# Patient Record
Sex: FEMALE | Race: WHITE | NOT HISPANIC OR LATINO | Employment: OTHER | URBAN - METROPOLITAN AREA
[De-identification: names, ages, dates, MRNs, and addresses within clinical notes are randomized per-mention and may not be internally consistent; named-entity substitution may affect disease eponyms.]

---

## 2017-10-04 ENCOUNTER — APPOINTMENT (OUTPATIENT)
Dept: WOMENS IMAGING | Facility: HOSPITAL | Age: 72
End: 2017-10-04

## 2017-10-04 PROCEDURE — 77080 DXA BONE DENSITY AXIAL: CPT | Performed by: RADIOLOGY

## 2017-10-04 PROCEDURE — 77063 BREAST TOMOSYNTHESIS BI: CPT | Performed by: RADIOLOGY

## 2017-10-04 PROCEDURE — G0202 SCR MAMMO BI INCL CAD: HCPCS | Performed by: RADIOLOGY

## 2018-05-08 VITALS
HEIGHT: 65 IN | DIASTOLIC BLOOD PRESSURE: 74 MMHG | HEART RATE: 72 BPM | RESPIRATION RATE: 21 BRPM | OXYGEN SATURATION: 95 % | SYSTOLIC BLOOD PRESSURE: 122 MMHG | DIASTOLIC BLOOD PRESSURE: 67 MMHG | HEART RATE: 87 BPM | HEART RATE: 76 BPM | HEART RATE: 77 BPM | HEART RATE: 73 BPM | SYSTOLIC BLOOD PRESSURE: 141 MMHG | RESPIRATION RATE: 40 BRPM | SYSTOLIC BLOOD PRESSURE: 107 MMHG | OXYGEN SATURATION: 90 % | RESPIRATION RATE: 14 BRPM | OXYGEN SATURATION: 98 % | RESPIRATION RATE: 41 BRPM | DIASTOLIC BLOOD PRESSURE: 71 MMHG | TEMPERATURE: 97.6 F | SYSTOLIC BLOOD PRESSURE: 124 MMHG | SYSTOLIC BLOOD PRESSURE: 143 MMHG | SYSTOLIC BLOOD PRESSURE: 129 MMHG | DIASTOLIC BLOOD PRESSURE: 72 MMHG | DIASTOLIC BLOOD PRESSURE: 86 MMHG | RESPIRATION RATE: 20 BRPM | DIASTOLIC BLOOD PRESSURE: 73 MMHG | DIASTOLIC BLOOD PRESSURE: 75 MMHG | OXYGEN SATURATION: 89 % | HEART RATE: 75 BPM | DIASTOLIC BLOOD PRESSURE: 83 MMHG | OXYGEN SATURATION: 97 % | DIASTOLIC BLOOD PRESSURE: 76 MMHG | HEART RATE: 82 BPM | TEMPERATURE: 98.1 F | SYSTOLIC BLOOD PRESSURE: 105 MMHG | SYSTOLIC BLOOD PRESSURE: 144 MMHG | RESPIRATION RATE: 18 BRPM | HEART RATE: 83 BPM | SYSTOLIC BLOOD PRESSURE: 133 MMHG | HEART RATE: 78 BPM | OXYGEN SATURATION: 94 % | SYSTOLIC BLOOD PRESSURE: 137 MMHG | RESPIRATION RATE: 48 BRPM | RESPIRATION RATE: 16 BRPM | OXYGEN SATURATION: 91 % | RESPIRATION RATE: 23 BRPM | WEIGHT: 150 LBS | SYSTOLIC BLOOD PRESSURE: 119 MMHG | OXYGEN SATURATION: 100 %

## 2018-05-10 PROBLEM — Z12.11 SURVEILLANCE DUE TO PRIOR COLONIC NEOPLASIA: Status: ACTIVE | Noted: 2018-05-11

## 2018-05-11 ENCOUNTER — AMBULATORY SURGICAL CENTER (AMBULATORY)
Dept: URBAN - METROPOLITAN AREA SURGERY 17 | Facility: SURGERY | Age: 73
End: 2018-05-11

## 2018-05-11 DIAGNOSIS — Z83.71 FAMILY HISTORY OF COLONIC POLYPS: ICD-10-CM

## 2018-05-11 DIAGNOSIS — Z12.11 ENCOUNTER FOR SCREENING FOR MALIGNANT NEOPLASM OF COLON: ICD-10-CM

## 2018-05-11 DIAGNOSIS — Z80.9 FAMILY HISTORY OF MALIGNANT NEOPLASM, UNSPECIFIED: ICD-10-CM

## 2018-05-11 PROCEDURE — G0105 COLORECTAL SCRN; HI RISK IND: HCPCS | Performed by: INTERNAL MEDICINE

## 2018-05-11 RX ADMIN — PROPOFOL 100 MG: 10 INJECTION, EMULSION INTRAVENOUS at 13:03

## 2018-05-11 RX ADMIN — PROPOFOL 25 MG: 10 INJECTION, EMULSION INTRAVENOUS at 13:17

## 2018-05-11 RX ADMIN — PROPOFOL 25 MG: 10 INJECTION, EMULSION INTRAVENOUS at 13:13

## 2018-05-11 RX ADMIN — PROPOFOL 25 MG: 10 INJECTION, EMULSION INTRAVENOUS at 13:09

## 2018-05-11 RX ADMIN — LIDOCAINE HYDROCHLORIDE 25 MG: 10 INJECTION, SOLUTION EPIDURAL; INFILTRATION; INTRACAUDAL; PERINEURAL at 13:03

## 2018-10-08 ENCOUNTER — APPOINTMENT (OUTPATIENT)
Dept: WOMENS IMAGING | Facility: HOSPITAL | Age: 73
End: 2018-10-08

## 2018-10-08 PROCEDURE — 77063 BREAST TOMOSYNTHESIS BI: CPT | Performed by: RADIOLOGY

## 2018-10-08 PROCEDURE — 77067 SCR MAMMO BI INCL CAD: CPT | Performed by: RADIOLOGY

## 2019-11-01 ENCOUNTER — OFFICE VISIT (OUTPATIENT)
Dept: ORTHOPEDIC SURGERY | Facility: CLINIC | Age: 74
End: 2019-11-01

## 2019-11-01 VITALS — WEIGHT: 150 LBS | TEMPERATURE: 98.7 F | HEIGHT: 65 IN | BODY MASS INDEX: 24.99 KG/M2

## 2019-11-01 DIAGNOSIS — M51.36 DDD (DEGENERATIVE DISC DISEASE), LUMBAR: ICD-10-CM

## 2019-11-01 DIAGNOSIS — M25.552 PAIN OF LEFT HIP JOINT: Primary | ICD-10-CM

## 2019-11-01 DIAGNOSIS — M41.9 SCOLIOSIS OF LUMBAR SPINE, UNSPECIFIED SCOLIOSIS TYPE: ICD-10-CM

## 2019-11-01 DIAGNOSIS — M54.16 LEFT LUMBAR RADICULOPATHY: ICD-10-CM

## 2019-11-01 PROCEDURE — 73502 X-RAY EXAM HIP UNI 2-3 VIEWS: CPT | Performed by: ORTHOPAEDIC SURGERY

## 2019-11-01 PROCEDURE — 99214 OFFICE O/P EST MOD 30 MIN: CPT | Performed by: ORTHOPAEDIC SURGERY

## 2019-11-01 RX ORDER — FLUOXETINE HYDROCHLORIDE 20 MG/1
20 CAPSULE ORAL DAILY
COMMUNITY
End: 2021-11-15 | Stop reason: SDUPTHER

## 2019-11-01 RX ORDER — PRAVASTATIN SODIUM 20 MG
20 TABLET ORAL DAILY
COMMUNITY
End: 2022-01-10 | Stop reason: SDUPTHER

## 2019-11-01 RX ORDER — MONTELUKAST SODIUM 10 MG/1
10 TABLET ORAL NIGHTLY
COMMUNITY
End: 2021-10-21

## 2019-11-01 RX ORDER — RIZATRIPTAN BENZOATE 10 MG/1
10 TABLET ORAL ONCE AS NEEDED
COMMUNITY
End: 2022-01-10 | Stop reason: SDUPTHER

## 2019-11-01 RX ORDER — TRIAMCINOLONE ACETONIDE 55 UG/1
2 SPRAY, METERED NASAL DAILY
COMMUNITY
End: 2020-08-03

## 2019-11-01 NOTE — PROGRESS NOTES
"ePatient Name: Sonia Wooten   YOB: 1945  Referring Primary Care Physician: Jeanette Florentino APRN  BMI: Body mass index is 24.96 kg/m².    Chief Complaint:    Chief Complaint   Patient presents with   • Left Hip - Pain   • Establish Care        HPI:     Sonia Wooten is a 74 y.o. female who presents today for evaluation of   Chief Complaint   Patient presents with   • Left Hip - Pain   • Establish Care   .  Patient is seen today complaining of \"left hip pain\".  Is been going on for a few months it hurts her mainly at night and its tingling going from her lower back to her foot on the left.  Gets better with rest there is no trauma.  Had a right total hip arthroplasty in 2011 right total knee in 2013.  He tries to walk about 2 miles a day but is getting difficult    This problem is new to this examiner.     Subjective   Medications:   Home Medications:  Current Outpatient Medications on File Prior to Visit   Medication Sig   • FLUoxetine (PROzac) 20 MG capsule Take 20 mg by mouth Daily.   • montelukast (SINGULAIR) 10 MG tablet Take 10 mg by mouth Every Night.   • pravastatin (PRAVACHOL) 20 MG tablet Take 20 mg by mouth Daily.   • rizatriptan (MAXALT) 10 MG tablet Take 10 mg by mouth 1 (One) Time As Needed for Migraine. May repeat in 2 hours if needed   • Triamcinolone Acetonide (NASACORT) 55 MCG/ACT nasal inhaler 2 sprays into the nostril(s) as directed by provider Daily.     No current facility-administered medications on file prior to visit.      Current Medications:  Scheduled Meds:  Continuous Infusions:  No current facility-administered medications for this visit.   PRN Meds:.    I have reviewed the patient's medical history in detail and updated the computerized patient record.  Review and summarization of old records includes:    History reviewed. No pertinent past medical history.     Past Surgical History:   Procedure Laterality Date   • BACK SURGERY     • HIP SURGERY     • KNEE SURGERY   " "       Social History     Occupational History   • Not on file   Tobacco Use   • Smoking status: Never Smoker   • Smokeless tobacco: Current User   Substance and Sexual Activity   • Alcohol use: Not on file   • Drug use: Not on file   • Sexual activity: Not on file      Social History     Social History Narrative   • Not on file      History reviewed. No pertinent family history.    ROS: 14 point review of systems was performed and all other systems were reviewed and are negative except for documented findings in HPI and today's encounter.     Allergies:   Allergies   Allergen Reactions   • Codeine Nausea And Vomiting   • Penicillins Hives     Constitutional:  Denies fever, shaking or chills   Eyes:  Denies change in visual acuity   HENT:  Denies nasal congestion or sore throat   Respiratory:  Denies cough or shortness of breath   Cardiovascular:  Denies chest pain or severe LE edema   GI:  Denies abdominal pain, nausea, vomiting, bloody stools or diarrhea   Musculoskeletal:  Numbness, tingling, pain, or loss of motor function only as noted above in history of present illness.  : Denies painful urination or hematuria  Integument:  Denies rash, lesion or ulceration   Neurologic:  Denies headache or focal weakness  Endocrine:  Denies lymphadenopathy  Psych:  Denies confusion or change in mental status   Hem:  Denies active bleeding    OBJECTIVE:  Physical Exam: 74 y.o. female  Wt Readings from Last 3 Encounters:   11/01/19 68 kg (150 lb)     Ht Readings from Last 1 Encounters:   11/01/19 165.1 cm (65\")     Body mass index is 24.96 kg/m².  Vitals:    11/01/19 1013   Temp: 98.7 °F (37.1 °C)     Vital signs reviewed.     General Appearance:    Alert, cooperative, in no acute distress                  Eyes: conjunctiva clear  ENT: external ears and nose atraumatic  CV: no peripheral edema  Resp: normal respiratory effort  Skin: no rashes or wounds; normal turgor  Psych: mood and affect appropriate  Lymph: no nodes " appreciated  Neuro: gross sensation intact  Vascular:  Palpable peripheral pulse in noted extremity  Musculoskeletal Extremities: Exam today shows pleasant lady diffuse tenderness over SI joint and trochanter with diffuse low back tenderness Stinchfield is negative she has decreased rotation of her hips no optically tender with axial loading she is stiff in her lower back    Radiology:   AP of the hips lateral left hip show previous right total hip arthroplasty and moderate arthritic change in her left hip.  She has diffuse severe degenerative changes the visualized portion of lumbar spine.  AP lateral lumbar spine which she was sent back for today show severe degenerative changes with scoliosis especially at L4-5 L5-S1.  She did have previous surgery back many years ago.    Assessment:     ICD-10-CM ICD-9-CM   1. Pain of left hip joint M25.552 719.45   2. DDD (degenerative disc disease), lumbar M51.36 722.52   3. Scoliosis of lumbar spine, unspecified scoliosis type M41.9 737.30   4. Left lumbar radiculopathy M54.16 724.4        Procedures       Plan: Recommend physical therapy I think her symptoms are largely coming from her back at this time and she may be in fact experiencing symptoms and signs of radiculopathy along with spinal stenosis which is suspected with her history.  Her hip does not appear arthritic or symptomatic enough to justify hip replacement at this time we will have him work on therapy in her low back if she fails to get better she get an MRI of her lumbar spine to be referred to a spine doctor      11/1/2019    Much of this encounter note is an electronic transcription/translation of spoken language to printed text. The electronic translation of spoken language may permit erroneous, or at times, nonsensical words or phrases to be inadvertently transcribed; Although I have reviewed the note for such errors, some may still exist

## 2019-11-15 ENCOUNTER — HOSPITAL ENCOUNTER (OUTPATIENT)
Dept: PHYSICAL THERAPY | Facility: HOSPITAL | Age: 74
Setting detail: THERAPIES SERIES
Discharge: HOME | End: 2019-11-15

## 2019-11-15 DIAGNOSIS — R26.2 DIFFICULTY WALKING: ICD-10-CM

## 2019-11-15 DIAGNOSIS — M25.552 LEFT HIP PAIN: Primary | ICD-10-CM

## 2019-11-15 DIAGNOSIS — M79.605 CHRONIC PAIN OF LEFT LOWER EXTREMITY: ICD-10-CM

## 2019-11-15 DIAGNOSIS — G89.29 CHRONIC PAIN OF LEFT LOWER EXTREMITY: ICD-10-CM

## 2019-11-15 PROCEDURE — 97110 THERAPEUTIC EXERCISES: CPT

## 2019-11-15 PROCEDURE — 97161 PT EVAL LOW COMPLEX 20 MIN: CPT

## 2019-11-15 NOTE — THERAPY EVALUATION
"    Outpatient Physical Therapy Ortho Initial Evaluation  [facility]     Patient Name: Sonia Wooten  : 1945  MRN: [id number]  Today's Date: 11/15/2019      Visit Date: 11/15/2019    There is no problem list on file for this patient.       History reviewed. No pertinent past medical history.     Past Surgical History:   Procedure Laterality Date   • BACK SURGERY     • HIP SURGERY     • KNEE SURGERY         Visit Dx:     ICD-10-CM ICD-9-CM   1. Left hip pain M25.552 719.45   2. Chronic pain of left lower extremity M79.605 729.5    G89.29 338.29   3. Difficulty walking R26.2 719.7         Patient History     Row Name 11/15/19 1300             History    Chief Complaint  Pain  -LB      Type of Pain  Hip pain;Lower Extremity / Leg  -LB      Date Current Problem(s) Began  19  -LB      Brief Description of Current Complaint  Pt reports L hip pain that began 6 months ago of insidious onset and has worsened since that time. Hx of lumbar surgery in  where they \"cleaned out a disc\", R PRANAV, L TKA. She does report episode on R side 30 years ago that led to back surgery. She used to walk >2 miles without pain. Now she is unable to walk without pain. She has difficulty sleeping, unable to find comfortable position. She does chair exercises 2x/week and does pretty well with that.  She describes the pain as generally an instense aching that makes it uncomfortable to sit on L hip, occasionally shooting pains medial upper leg and lateral lower leg, tingling that comes and goes throughout entire LLE.   -LB      Previous treatment for THIS PROBLEM  Surgery  on lumbar spine  -LB      Patient/Caregiver Goals  Relieve pain;Return to prior level of function;Know what to do to help the symptoms  -LB      Hand Dominance  right-handed  -LB      Occupation/sports/leisure activities  Pt enjoys gardening, walking for exercise.  -LB      Patient seeing anyone else for problem(s)?  yes  -LB      How has patient tried to " help current problem?  rest, stopping walking  -LB      What clinical tests have you had for this problem?  X-ray  -LB      Results of Clinical Tests  of L hip unremarkable, severe degenerative changes of visualized lumbar spine  -LB      Surgery/Hospitalization  L TKR; R PRANAV  -LB      History of Previous Related Injuries  similar episode on R many years ago  -LB         Pain     Pain Location  Hip  -LB      Pain at Present  6  -LB      Pain at Best  4  -LB      Pain at Worst  8  -LB      Pain Frequency  Constant/continuous  -LB      Pain Description  Aching;Sharp;Shooting  -LB      What Performance Factors Make the Current Problem(s) WORSE?  walking, standing, trying to sleep  -LB      What Performance Factors Make the Current Problem(s) BETTER?  rest  -LB      Tolerance Time- Standing  5 minutes  -LB      Tolerance Time- Sitting  30 minutes  -LB      Tolerance Time- Walking  immediate pain  -LB      Tolerance Time- Lying  unable to find comfortable position  -LB      Is your sleep disturbed?  Yes  -LB      What position do you sleep in?  Supine;Right sidelying;Left sidelying  -LB      Difficulties at work?  Pt does not work.  -LB      Difficulties with ADL's?  Pain with cleaning, lifting, bending.  -LB      Difficulties with recreational activities?  Unable to walk for exercise.  -LB         Fall Risk Assessment    Any falls in the past year:  No  -LB         Services    Prior Rehab/Home Health Experiences  No  -LB      Are you currently receiving Home Health services  No  -LB      Do you plan to receive Home Health services in the near future  No  -LB         Daily Activities    Primary Language  English  -LB      Pt Participated in POC and Goals  Yes  -LB         Safety    Are you being hurt, hit, or frightened by anyone at home or in your life?  No  -LB      Are you being neglected by a caregiver  No  -LB        User Key  (r) = Recorded By, (t) = Taken By, (c) = Cosigned By    Initials Name Provider Type    LB  Deanna Rivera, PT Physical Therapist          PT Ortho     Row Name 11/15/19 1500       Subjective Pain    Able to rate subjective pain?  yes  -LB    Pre-Treatment Pain Level  6  -LB       Posture/Observations    Posture/Observations Comments  LLE appeared longer as well as L ASIS anterior; improved with MET  -LB       DTR- Lower Quarter Clearing    Patellar tendon (L2-4)  Bilateral:;2- Normal response  -LB       Myotomal Screen- Lower Quarter Clearing    Hip flexion (L2)  Bilateral:;4+ (Good +)  -LB    Knee extension (L3)  Left:;4+ (Good +);Right:;5 (Normal)  -LB    Ankle DF (L4)  Left:;4 (Good);Right:;5 (Normal)  -LB    Ankle PF (S1)  Bilateral:;4 (Good)  -LB    Knee flexion (S2)  Bilateral:;5 (Normal)  -LB       Lumbar ROM Screen- Lower Quarter Clearing    Lumbar Flexion  Normal  -LB    Lumbar Extension  Normal pt reports relieving  -LB    Lumbar Lateral Flexion  Impaired dec 50% B  -LB    Lumbar Rotation  Impaired dec 75% to R; 25% to L  -LB       SI/Hip Screen- Lower Quarter Clearing    Gaenslen's test  Left:;Positive  -LB    ASIS compression  Negative  -LB    ASIS distraction  Negative  -LB    Aye's/Casey's test  Left:;Positive  -LB    Posterior thigh sheer  Negative  -LB    Pain in Galina's area  Left:;Positive  -LB       Lumbar/SI Special Tests    Slump Test (Neural Tension)  Negative  -LB    SLR (Neural Tension)  Negative  -LB       General ROM    GENERAL ROM COMMENTS  dec L hip ER/IR  -LB       Sensation    Light Touch  Partial deficits in the LLE  -LB       Lower Extremity Flexibility    Hamstrings  Left:;Moderately limited  -LB    Hip External Rotators  Left:;Moderately limited  -LB    Hip Internal Rotators  Left:;Mildly limited  -LB    Gastrocnemius  Left:;Mildly limited  -LB       Gait/Stairs Assessment/Training    [place] Level (Gait)  independent  -LB      User Key  (r) = Recorded By, (t) = Taken By, (c) = Cosigned By    Initials Name Provider Type    LB Deanna Rivera, PT Physical Therapist                       Therapy Education  Education Details: issued HEP, discussed centralization vs. peripheralization, discouraged continued painful exercises at chair class, reviewed log roll  Given: Symptoms/condition management, HEP, Mobility training  Program: New  How Provided: Verbal, Demonstration, Written  Provided to: Patient  Level of Understanding: Verbalized, Demonstrated, Teach back education performed     PT OP Goals     Row Name 11/15/19 1500          PT Short Term Goals    STG Date to Achieve  11/29/19  -LB     STG 1  Pt will demonstrate understanding and compliance with initial HEP.  -LB     STG 1 Progress  New  -LB     STG 2  Pt will report centralization of symptoms from L foot to L knee or more proximal.  -LB     STG 2 Progress  New  -LB     STG 3  Pt will report pain at worse in LLE decreased from 8/10 to 6/10 or better.  -LB     STG 3 Progress  New  -LB        Long Term Goals    LTG Date to Achieve  12/15/19  -LB     LTG 1  Pt will report reduced overall disability from 50% per modified Oswestry to 30% or less.  -LB     LTG 1 Progress  New  -LB     LTG 2  Pt will report tolerance to 15 minutes of continuous walking without inc LLE pain.  -LB     LTG 2 Progress  New  -LB     LTG 3  Pt will report improved tolerance to sleeping by 50% to allow improved healing and improved quality of life.  -LB     LTG 3 Progress  New  -LB        Time Calculation    PT Goal Re-Cert Due Date  02/13/20  -LB       User Key  (r) = Recorded By, (t) = Taken By, (c) = Cosigned By    Initials Name Provider Type    Deanna Moseley, PT Physical Therapist          PT Assessment/Plan     Row Name 11/15/19 1601          PT Assessment    Functional Limitations  Limitation in home management;Performance in leisure activities;Impaired gait;Limitations in community activities;Performance in self-care ADL;Impaired locomotion;Limitations in functional capacity and performance  -LB     Impairments  Impaired  flexibility;Locomotion;Sensation;Poor body mechanics;Posture;Range of motion;Pain;Joint mobility;Gait;Muscle strength  -LB     Assessment Comments  Pt is 74 y.o. female referred to outpatient physical therapy for evaluation and treatment of  evolving  left hip and LLE pain that radiates from L hip to L medial upper leg and lateral lower leg and is described as a general achiness with occasionally shooting pain and intermittent tingling that began insidiously and has worsened over last 6 months.  Patient presents with unequal ASIS, dec R lumbar rotation and B lateral lumbar flexion, dec LLE DF, knee extension strength, pain with transitional movements, muscle guarding and trigger points in L glute, piriformis. PMHx consistent with lumbar surgery (decompression?) 1977, R PRANAV, L TKR. Personal factors affecting her care include chronic nature of symptoms, degenerative changes of lumbar spine, active lifestyle, anxiety towards movement. Pt demonstrates signs and symptoms  consistent with referring diagnosis with radicular symptoms.  Pt scored 50% disability on the Modified Oswestry. Pt is limited in their ability to participate in walking for exercise, gardening, standing for choir practice. She will benefit from continued skilled PT services to address functional deficits. Thank you for this referral.  -LB     Please refer to paper survey for additional self-reported information  Yes  -LB     Rehab Potential  Good  -LB     Patient/caregiver participated in establishment of treatment plan and goals  Yes  -LB     Patient would benefit from skilled therapy intervention  Yes  -LB        PT Plan    PT Frequency  2x/week  -LB     Predicted Duration of Therapy Intervention (Therapy Eval)  4 weeks   -LB     Planned CPT's?  PT EVAL MOD COMPLELITY: 01776;PT RE-EVAL: 66667;PT THER ACT EA 15 MIN: 01269;PT THER PROC EA 15 MIN: 23525;PT MANUAL THERAPY EA 15 MIN: 51878;PT GAIT TRAINING EA 15 MIN: 18800;PT HOT OR COLD PACK TREAT  MCARE;PT HOT/COLD PACK WC NONMCARE: 37511;PT NEUROMUSC RE-EDUCATION EA 15 MIN: 14251;PT ELECTRICAL STIM UNATTEND: ;PT TRACTION LUMBAR: 75168;PT ULTRASOUND EA 15 MIN: 67579  -LB     PT Plan Comments  Assess tolerance to HEP, review log roll, consider lumbar traction, TA stabilization exercises, assess pelvic landmarks, LE flexibility.  -LB       User Key  (r) = Recorded By, (t) = Taken By, (c) = Cosigned By    Initials Name Provider Type    Deanna Moseley, PT Physical Therapist            OP Exercises     Row Name 11/15/19 1500             Subjective Pain    Able to rate subjective pain?  yes  -LB      Pre-Treatment Pain Level  6  -LB         Total Minutes    99506 - PT Therapeutic Exercise Minutes  15  -LB         Exercise 1    Exercise Name 1  LTR  -LB      Reps 1  20  -LB         Exercise 2    Exercise Name 2  SKTC  -LB      Reps 2  3  -LB      Time 2  20  -LB         Exercise 3    Exercise Name 3  piriformis stretch  -LB      Reps 3  3  -LB      Time 3  20  -LB         Exercise 4    Exercise Name 4  glute set  -LB      Reps 4  10  -LB      Time 4  5  -LB         Exercise 5    Exercise Name 5  seated HS stretch  -LB      Reps 5  3  -LB      Time 5  20  -LB         Exercise 6    Exercise Name 6  performed MET for L anterior innominate rotation  -LB      Sets 6  5  -LB      Reps 6  5  -LB      Additional Comments  reduced discrepancy following  -LB        User Key  (r) = Recorded By, (t) = Taken By, (c) = Cosigned By    Initials Name Provider Type    LB Deanna Rivera, PT Physical Therapist                        Outcome Measure Options: Modifed Owestry  Modified Oswestry  Modified Oswestry Score/Comments: 50% disability       Time Calculation:     Start Time: 1315  Stop Time: 1400  Time Calculation (min): 45 min  Total Timed Code Minutes- PT: 15 minute(s)     Therapy Charges for Today     Code Description Service Date Service Provider Modifiers Qty    33979058548 HC PT THER PROC EA 15 MIN 11/15/2019 Miguel  Deanna, PT GP 1    20931333996 HC PT EVAL LOW COMPLEXITY 2 11/15/2019 Deanna Rivera, PT GP 1          PT G-Codes  Outcome Measure Options: Modifed Owestry  Modified Oswestry Score/Comments: 50% disability          Deanna Rivera, PT  11/15/2019

## 2019-11-26 ENCOUNTER — HOSPITAL ENCOUNTER (OUTPATIENT)
Dept: PHYSICAL THERAPY | Facility: HOSPITAL | Age: 74
Setting detail: THERAPIES SERIES
Discharge: HOME | End: 2019-11-26

## 2019-11-26 PROCEDURE — 97012 MECHANICAL TRACTION THERAPY: CPT | Performed by: PHYSICAL THERAPIST

## 2019-11-26 PROCEDURE — 97110 THERAPEUTIC EXERCISES: CPT | Performed by: PHYSICAL THERAPIST

## 2019-11-26 NOTE — THERAPY TREATMENT NOTE
Outpatient Physical Therapy Ortho Treatment Note  [facility]     Patient Name: Sonia Wooten  : 1945  MRN: [id number]  Today's Date: 2019      Visit Date: 2019    Visit Dx:  No diagnosis found.    There is no problem list on file for this patient.       No past medical history on file.     Past Surgical History:   Procedure Laterality Date   • BACK SURGERY     • HIP SURGERY     • KNEE SURGERY                         PT Assessment/Plan     Row Name 19 1141          PT Assessment    Assessment Comments  Patient returns for 1st follow up visit. She requires min cueing for HEP return and was encouraged to trial BLE (previously just performing on the L).  Trialed mechanical lumbar traction for pain control with no immediate adverse response noted.  She did present with equal LL after MET.  -GR        PT Plan    PT Plan Comments  Assess response to traction continue if beneficial.  -GR       User Key  (r) = Recorded By, (t) = Taken By, (c) = Cosigned By    Initials Name Provider Type    Raghu Jones, PT Physical Therapist          Modalities     Row Name 19 1100             Traction 78437    Traction Type  Lumbar  -GR      Rx Minutes  14  -GR      Duration  Intermittent  -GR      Position  Other 90/90  -GR      Weight  50 /25  -GR      Hold  45  -GR      Relax  15  -GR        User Key  (r) = Recorded By, (t) = Taken By, (c) = Cosigned By    Initials Name Provider Type    Raghu Jones, PT Physical Therapist        OP Exercises     Row Name 19 1100             Subjective Comments    Subjective Comments  Reports she loves the HEP and feels very relaxed. Her pain was almost abolished until she went for a long walk yesterday and it flared her back up again.  -GR         Subjective Pain    Able to rate subjective pain?  yes  -GR      Pre-Treatment Pain Level  4  -GR         Total Minutes    59231 - PT Therapeutic Exercise Minutes  25  -GR         Exercise 1     Exercise Name 1  LTR  -GR      Cueing 1  Verbal  -GR      Sets 1  2  -GR      Reps 1  10  -GR      Time 1  3 sec  -GR      Additional Comments  10 to 2 on the clock  -GR         Exercise 2    Exercise Name 2  SKTC  -GR      Cueing 2  Verbal  -GR      Reps 2  3  -GR      Time 2  20  -GR      Additional Comments  each side  -GR         Exercise 3    Exercise Name 3  piriformis stretch  -GR      Cueing 3  Verbal  -GR      Reps 3  3  -GR      Time 3  20  -GR         Exercise 4    Exercise Name 4  glute set  -GR      Cueing 4  Verbal  -GR      Reps 4  10  -GR      Time 4  5  -GR         Exercise 5    Exercise Name 5  seated HS stretch  -GR      Reps 5  3  -GR      Time 5  20  -GR         Exercise 6    Exercise Name 6  SI muscle energy  -GR      Sets 6  5  -GR      Reps 6  5  -GR        User Key  (r) = Recorded By, (t) = Taken By, (c) = Cosigned By    Initials Name Provider Type    GR Raghu Fraire, PT Physical Therapist                       PT OP Goals     Row Name 11/26/19 1100          PT Short Term Goals    STG Date to Achieve  11/29/19  -GR     STG 1  Pt will demonstrate understanding and compliance with initial HEP.  -GR     STG 1 Progress  Ongoing  -GR     STG 2  Pt will report centralization of symptoms from L foot to L knee or more proximal.  -GR     STG 2 Progress  Ongoing  -GR     STG 3  Pt will report pain at worse in LLE decreased from 8/10 to 6/10 or better.  -GR     STG 3 Progress  Ongoing  -GR        Long Term Goals    LTG Date to Achieve  12/15/19  -GR     LTG 1  Pt will report reduced overall disability from 50% per modified Oswestry to 30% or less.  -GR     LTG 1 Progress  Ongoing  -GR     LTG 2  Pt will report tolerance to 15 minutes of continuous walking without inc LLE pain.  -GR     LTG 2 Progress  Ongoing  -GR     LTG 3  Pt will report improved tolerance to sleeping by 50% to allow improved healing and improved quality of life.  -GR     LTG 3 Progress  Ongoing  -GR       User Key  (r) =  Recorded By, (t) = Taken By, (c) = Cosigned By    Initials Name Provider Type    GR Raghu Fraire, PT Physical Therapist          Therapy Education  Education Details: review of initial HEP and benefits/risks traction              Time Calculation:   Start Time: 1100  Stop Time: 1145  Time Calculation (min): 45 min  Total Timed Code Minutes- PT: 25 minute(s)  Therapy Charges for Today     Code Description Service Date Service Provider Modifiers Qty    27509344015  PT THER PROC EA 15 MIN 11/26/2019 Raghu Fraire, PT GP 2    [id number]  PT-TRACTION MECHANICAL 11/26/2019 Raghu Fraire, PT  1                    Raghu CONNOR. [place], PT  11/26/2019

## 2019-11-29 ENCOUNTER — HOSPITAL ENCOUNTER (OUTPATIENT)
Dept: PHYSICAL THERAPY | Facility: HOSPITAL | Age: 74
Setting detail: THERAPIES SERIES
Discharge: HOME | End: 2019-11-29

## 2019-11-29 DIAGNOSIS — R26.2 DIFFICULTY WALKING: ICD-10-CM

## 2019-11-29 DIAGNOSIS — M79.605 CHRONIC PAIN OF LEFT LOWER EXTREMITY: ICD-10-CM

## 2019-11-29 DIAGNOSIS — G89.29 CHRONIC PAIN OF LEFT LOWER EXTREMITY: ICD-10-CM

## 2019-11-29 DIAGNOSIS — M25.552 LEFT HIP PAIN: Primary | ICD-10-CM

## 2019-11-29 PROCEDURE — 97012 MECHANICAL TRACTION THERAPY: CPT

## 2019-11-29 PROCEDURE — 97110 THERAPEUTIC EXERCISES: CPT

## 2019-11-29 NOTE — THERAPY TREATMENT NOTE
Outpatient Physical Therapy Ortho Treatment Note  [facility]     Patient Name: Sonia Wooten  : 1945  MRN: [id number]  Today's Date: 2019      Visit Date: 2019    Visit Dx:    ICD-10-CM ICD-9-CM   1. Left hip pain M25.552 719.45   2. Chronic pain of left lower extremity M79.605 729.5    G89.29 338.29   3. Difficulty walking R26.2 719.7       There is no problem list on file for this patient.       No past medical history on file.     Past Surgical History:   Procedure Laterality Date   • BACK SURGERY     • HIP SURGERY     • KNEE SURGERY                         PT Assessment/Plan     Row Name 19 5497          PT Assessment    Assessment Comments  Pt presents today for her 3 visit and demos great HEP compliance. She responds well to the lumbar traction and tolerated new core strengthening ex's well. Pt will cont to benefit from progressive strengthening.  -LC        PT Plan    PT Plan Comments  increase ex intensity and progress core strengthening  -LC       User Key  (r) = Recorded By, (t) = Taken By, (c) = Cosigned By    Initials Name Provider Type    Hilary Rodriguez, PT Physical Therapist          Modalities     Row Name 19 1300             Subjective Comments    Subjective Comments  Pt reports she is doing a lot better but she has not tried to walk far yet  -LC         Subjective Pain    Able to rate subjective pain?  yes  -LC      Pre-Treatment Pain Level  4  -LC         Traction 39309    Traction Type  Lumbar  -LC      Rx Minutes  14  -LC      Position  Other 90/90  -LC      Weight  50 /25  -LC      Hold  45  -LC      Relax  15  -LC        User Key  (r) = Recorded By, (t) = Taken By, (c) = Cosigned By    Initials Name Provider Type    Hilary Rodriguez, PT Physical Therapist        OP Exercises     Row Name 19 1300             Subjective Comments    Subjective Comments  Pt reports she is doing a lot better but she has not tried to walk far yet  -         Subjective  Pain    Able to rate subjective pain?  yes  -LC      Pre-Treatment Pain Level  4  -LC         Total Minutes    43528 - PT Therapeutic Exercise Minutes  30  -LC         Exercise 1    Exercise Name 1  LTR  -LC      Cueing 1  Verbal  -LC      Sets 1  2  -LC      Reps 1  10  -LC      Time 1  3 sec  -LC         Exercise 2    Exercise Name 2  SKTC  -LC      Cueing 2  Verbal  -LC      Reps 2  3  -LC      Time 2  20  -LC      Additional Comments  each side  -LC         Exercise 3    Exercise Name 3  piriformis stretch  -LC      Cueing 3  Verbal  -LC      Reps 3  3  -LC      Time 3  20  -LC         Exercise 4    Exercise Name 4  bridge  -LC      Cueing 4  Verbal  -LC      Reps 4  10  -LC      Time 4  3  -LC         Exercise 5    Exercise Name 5  seated HS stretch  -LC      Reps 5  3  -LC      Time 5  20  -LC         Exercise 6    Exercise Name 6  SI muscle energy  -LC      Sets 6  --  -LC      Reps 6  --  -LC      Additional Comments  not off today  -LC         Exercise 7    Exercise Name 7  Tra with pelvic floor  -LC      Reps 7  5  -LC      Time 7  5  -LC         Exercise 8    Exercise Name 8  Tra/pf with hip add  -LC      Reps 8  10  -LC      Additional Comments  ball  -LC         Exercise 9    Exercise Name 9  tra/pf with hip abd  -LC      Reps 9  10  -LC      Additional Comments  BTB  -LC         Exercise 10    Exercise Name 10  tra/pf with alt heel lift  -LC      Sets 10  2  -LC      Reps 10  5  -LC        User Key  (r) = Recorded By, (t) = Taken By, (c) = Cosigned By    Initials Name Provider Type    LC Hilary Lopez, PT Physical Therapist                       PT OP Goals     Row Name 11/29/19 1300          PT Short Term Goals    STG Date to Achieve  11/29/19  -LC     STG 1  Pt will demonstrate understanding and compliance with initial HEP.  -LC     STG 1 Progress  Ongoing  -LC     STG 2  Pt will report centralization of symptoms from L foot to L knee or more proximal.  -LC     STG 2 Progress  Ongoing  -LC     STG 3   Pt will report pain at worse in LLE decreased from 8/10 to 6/10 or better.  -LC     STG 3 Progress  Ongoing  -LC        Long Term Goals    LTG Date to Achieve  12/15/19  -LC     LTG 1  Pt will report reduced overall disability from 50% per modified Oswestry to 30% or less.  -LC     LTG 1 Progress  Ongoing  -LC     LTG 2  Pt will report tolerance to 15 minutes of continuous walking without inc LLE pain.  -LC     LTG 2 Progress  Ongoing  -LC     LTG 3  Pt will report improved tolerance to sleeping by 50% to allow improved healing and improved quality of life.  -LC     LTG 3 Progress  Ongoing  -LC       User Key  (r) = Recorded By, (t) = Taken By, (c) = Cosigned By    Initials Name Provider Type    Hilary Rodriguez, PT Physical Therapist          Therapy Education  Education Details: review of hep and added new ex's  Given: HEP  Program: New, Reinforced, Progressed  How Provided: Verbal, Demonstration, Written  Provided to: Patient  Level of Understanding: Teach back education performed, Verbalized, Demonstrated              Time Calculation:   Start Time: 1315  Stop Time: 1400  Time Calculation (min): 45 min  Therapy Charges for Today     Code Description Service Date Service Provider Modifiers Qty    30979641024  PT THER PROC EA 15 MIN 11/29/2019 Hilary Lopez, PT GP 2    [id number]  PT TRACTION LUMBAR 11/29/2019 Hilary Lopez, PT GP 1                    Hilary Lopez, PT  11/29/2019

## 2019-12-03 ENCOUNTER — HOSPITAL ENCOUNTER (OUTPATIENT)
Dept: PHYSICAL THERAPY | Facility: HOSPITAL | Age: 74
Setting detail: THERAPIES SERIES
Discharge: HOME | End: 2019-12-03

## 2019-12-03 DIAGNOSIS — G89.29 CHRONIC PAIN OF LEFT LOWER EXTREMITY: ICD-10-CM

## 2019-12-03 DIAGNOSIS — M25.552 LEFT HIP PAIN: Primary | ICD-10-CM

## 2019-12-03 DIAGNOSIS — M79.605 CHRONIC PAIN OF LEFT LOWER EXTREMITY: ICD-10-CM

## 2019-12-03 DIAGNOSIS — R26.2 DIFFICULTY WALKING: ICD-10-CM

## 2019-12-03 PROCEDURE — 97012 MECHANICAL TRACTION THERAPY: CPT | Performed by: PHYSICAL THERAPIST

## 2019-12-03 PROCEDURE — 97110 THERAPEUTIC EXERCISES: CPT | Performed by: PHYSICAL THERAPIST

## 2019-12-03 NOTE — THERAPY TREATMENT NOTE
"    Outpatient Physical Therapy Ortho Treatment Note  [facility]     Patient Name: Sonia Wooten  : 1945  MRN: [id number]  Today's Date: 12/3/2019      Visit Date: 2019    Visit Dx:    ICD-10-CM ICD-9-CM   1. Left hip pain M25.552 719.45   2. Chronic pain of left lower extremity M79.605 729.5    G89.29 338.29   3. Difficulty walking R26.2 719.7       There is no problem list on file for this patient.       No past medical history on file.     Past Surgical History:   Procedure Laterality Date   • BACK SURGERY     • HIP SURGERY     • KNEE SURGERY         PT Ortho     Row Name 19 1130       Subjective Comments    Subjective Comments  Reports \"low level\" of hip/knee pain. \"I wish I could return to walking\", but reports limited walking due to pain at this time.  -JS       Subjective Pain    Able to rate subjective pain?  yes  -JS    Pre-Treatment Pain Level  2  -JS    Post-Treatment Pain Level  1 Mild L knee pain, 0/10 L hip pain after traction  -JS      User Key  (r) = Recorded By, (t) = Taken By, (c) = Cosigned By    Initials Name Provider Type    Sulma Jefferson, PT Physical Therapist                      PT Assessment/Plan     Row Name 19 1130          PT Assessment    Assessment Comments  Pt with continued subjective improvement, remains compliant with HEP. Treatment continues to focus on core stabilization/strengthening, flexibility progressing to initial standing core strengthening with theraband without increased pain. Responds with further improvement following traction.  -JS        PT Plan    PT Plan Comments  Continue for further core strengthening/stabilization, flexibility. Review standing theraband ex next visit.  -JS       User Key  (r) = Recorded By, (t) = Taken By, (c) = Cosigned By    Initials Name Provider Type    Sulma Jefferson, PT Physical Therapist          Modalities     Row Name 19 1130             Traction 58453    Traction Type  Lumbar  -JS      Rx Minutes  15 " " -JS      Position  Other 90/90  -JS      Weight  50 /25  -JS      Hold  45  -JS      Relax  15  -JS        User Key  (r) = Recorded By, (t) = Taken By, (c) = Cosigned By    Initials Name Provider Type    Sulma Jefferson PT Physical Therapist        OP Exercises     Row Name 12/03/19 1130             Subjective Comments    Subjective Comments  Reports \"low level\" of hip/knee pain. \"I wish I could return to walking\", but reports limited walking due to pain at this time.  -JS         Subjective Pain    Able to rate subjective pain?  yes  -JS      Pre-Treatment Pain Level  2  -JS      Post-Treatment Pain Level  1 Mild L knee pain, 0/10 L hip pain after traction  -JS         Total Minutes    86880 - PT Therapeutic Exercise Minutes  35  -JS         Exercise 1    Exercise Name 1  LTR  -JS      Cueing 1  Verbal  -JS      Sets 1  2  -JS      Reps 1  10  -JS      Time 1  3 sec  -JS         Exercise 2    Exercise Name 2  SKTC  -JS      Cueing 2  Verbal  -JS      Reps 2  3  -JS      Time 2  20  -JS      Additional Comments  each side  -JS         Exercise 3    Exercise Name 3  piriformis stretch  -JS      Cueing 3  Verbal  -JS      Reps 3  3  -JS      Time 3  20  -JS         Exercise 4    Exercise Name 4  bridge  -JS      Cueing 4  Verbal  -JS      Sets 4  2  -JS      Reps 4  10  -JS      Time 4  3  -JS         Exercise 5    Exercise Name 5  seated HS stretch  -JS      Reps 5  3  -JS      Time 5  20  -JS         Exercise 6    Exercise Name 6  SI muscle energy  -JS      Additional Comments  level today  -JS         Exercise 7    Exercise Name 7  Tra with pelvic floor  -JS      Reps 7  5  -JS      Time 7  5  -JS         Exercise 8    Exercise Name 8  Tra/pf with hip add  -JS      Sets 8  2  -JS      Reps 8  10  -JS      Additional Comments  ball  -JS         Exercise 9    Exercise Name 9  tra/pf with hip abd  -JS      Sets 9  2  -JS      Reps 9  10  -JS      Additional Comments  BTB  -JS         Exercise 10    Exercise Name 10  " tra/pf with alt heel lift  -JS      Sets 10  2  -JS      Reps 10  5  -JS         Exercise 11    Exercise Name 11  Scapular rows with TrAbdominus  -JS      Cueing 11  Verbal;Demo  -JS      Reps 11  10  -JS      Additional Comments  RTB  -JS         Exercise 12    Exercise Name 12  Shoulder ext with TrAbdominus  -JS      Cueing 12  Verbal;Demo  -JS      Reps 12  10  -JS      Additional Comments  RTB  -JS        User Key  (r) = Recorded By, (t) = Taken By, (c) = Cosigned By    Initials Name Provider Type    Sulma Jefferson, PT Physical Therapist                       PT OP Goals     Row Name 12/03/19 1130          PT Short Term Goals    STG Date to Achieve  11/29/19  -JS     STG 1  Pt will demonstrate understanding and compliance with initial HEP.  -JS     STG 1 Progress  Met  -JS     STG 2  Pt will report centralization of symptoms from L foot to L knee or more proximal.  -JS     STG 2 Progress  Met  -JS     STG 2 Progress Comments  Current pain located L hip & knee.  -JS     STG 3  Pt will report pain at worse in LLE decreased from 8/10 to 6/10 or better.  -JS     STG 3 Progress  Met  -JS     STG 3 Progress Comments  Current pain 2/10 L LE  -JS        Long Term Goals    LTG Date to Achieve  12/15/19  -JS     LTG 1  Pt will report reduced overall disability from 50% per modified Oswestry to 30% or less.  -JS     LTG 1 Progress  Ongoing  -JS     LTG 2  Pt will report tolerance to 15 minutes of continuous walking without inc LLE pain.  -JS     LTG 2 Progress  Ongoing  -JS     LTG 3  Pt will report improved tolerance to sleeping by 50% to allow improved healing and improved quality of life.  -JS     LTG 3 Progress  Ongoing  -JS       User Key  (r) = Recorded By, (t) = Taken By, (c) = Cosigned By    Initials Name Provider Type    Sulma Jefferson, PT Physical Therapist          Therapy Education  Education Details: Review of HEP, cueing for core stabilization. Added standing theraband ex with core stabilization  Given:  HEP  Program: Reinforced  How Provided: Verbal, Demonstration  Provided to: Patient  Level of Understanding: Teach back education performed, Verbalized, Demonstrated              Time Calculation:   Start Time: 1130  Stop Time: 1220  Time Calculation (min): 50 min  Therapy Charges for Today     Code Description Service Date Service Provider Modifiers Qty    76367267136  PT THER PROC EA 15 MIN 12/3/2019 Sulma Covarrubias, PT GP 2    [id number]  PT-TRACTION MECHANICAL 12/3/2019 Sulma Covarrubias, PT  1                    Sulma Covarrubias, PT  12/3/2019

## 2019-12-06 ENCOUNTER — HOSPITAL ENCOUNTER (OUTPATIENT)
Dept: PHYSICAL THERAPY | Facility: HOSPITAL | Age: 74
Setting detail: THERAPIES SERIES
Discharge: HOME | End: 2019-12-06

## 2019-12-06 DIAGNOSIS — G89.29 CHRONIC PAIN OF LEFT LOWER EXTREMITY: ICD-10-CM

## 2019-12-06 DIAGNOSIS — R26.2 DIFFICULTY WALKING: ICD-10-CM

## 2019-12-06 DIAGNOSIS — M25.552 LEFT HIP PAIN: Primary | ICD-10-CM

## 2019-12-06 DIAGNOSIS — M79.605 CHRONIC PAIN OF LEFT LOWER EXTREMITY: ICD-10-CM

## 2019-12-06 PROCEDURE — 97110 THERAPEUTIC EXERCISES: CPT

## 2019-12-06 PROCEDURE — 97012 MECHANICAL TRACTION THERAPY: CPT

## 2019-12-06 NOTE — THERAPY TREATMENT NOTE
Outpatient Physical Therapy Ortho Treatment Note  [facility]     Patient Name: Sonia Wooten  : 1945  MRN: [id number]  Today's Date: 2019      Visit Date: 2019    Visit Dx:    ICD-10-CM ICD-9-CM   1. Left hip pain M25.552 719.45   2. Chronic pain of left lower extremity M79.605 729.5    G89.29 338.29   3. Difficulty walking R26.2 719.7       There is no problem list on file for this patient.       No past medical history on file.     Past Surgical History:   Procedure Laterality Date   • BACK SURGERY     • HIP SURGERY     • KNEE SURGERY                         PT Assessment/Plan     Row Name 19 1350          PT Assessment    Assessment Comments  Pt continues to report decreased symptoms with stretches and PT visits. She continues to have dec tolerance to walking long distances or standing for prolonged period of time. Discussed timeline for strengthening as pt has only began working on strengthening exercises for last 2 weeks. Pt verbalized understanding. Pt doing well with TrA activation during exercise but has not attempted when doing housework etc. Discussed the importance of stabilization with walking and household tasks to improve function.   -LB        PT Plan    PT Plan Comments  Continue to progress strengthening of core/hips.   -LB       User Key  (r) = Recorded By, (t) = Taken By, (c) = Cosigned By    Initials Name Provider Type    Deanna Moseley PT Physical Therapist          Modalities     Row Name 19 1300             Moist Heat    MH Applied  Yes  -LB      Location  lumbar spine during traction  -LB      Rx Minutes  10 mins  -LB         Traction 13776    Traction Type  Lumbar  -LB      Rx Minutes  15  -LB      Position  Other 90/90  -LB      Weight  50 /25  -LB      Hold  45  -LB      Relax  15  -LB        User Key  (r) = Recorded By, (t) = Taken By, (c) = Cosigned By    Initials Name Provider Type    Deanna Moseley PT Physical Therapist        OP Exercises      Row Name 12/06/19 1300             Subjective Comments    Subjective Comments  I really do fine unless I do alot of walking or standing. I feel great when I leave as long as I don't walk or stand.  -LB         Subjective Pain    Able to rate subjective pain?  yes  -LB      Pre-Treatment Pain Level  2  -LB      Subjective Pain Comment  I did alot yesterday so it was hurting.  -LB         Total Minutes    95269 - PT Therapeutic Exercise Minutes  30  -LB         Exercise 1    Exercise Name 1  LTR on green ball  -LB      Cueing 1  Verbal  -LB      Reps 1  10  -LB      Additional Comments  each; cuing for TA  -LB         Exercise 3    Exercise Name 3  piriformis stretch  -LB      Cueing 3  Verbal  -LB      Reps 3  3  -LB      Time 3  20  -LB         Exercise 4    Exercise Name 4  bridge  -LB      Cueing 4  Verbal  -LB      Sets 4  2  -LB      Reps 4  10  -LB      Time 4  3  -LB      Additional Comments  on green ball  -LB         Exercise 7    Exercise Name 7  --  -LB      Reps 7  --  -LB      Time 7  --  -LB         Exercise 8    Exercise Name 8  --  -LB      Sets 8  --  -LB      Reps 8  --  -LB      Additional Comments  --  -LB         Exercise 9    Exercise Name 9  tra/pf with lateral walking at ballet bar  -LB      Sets 9  --  -LB      Reps 9  3 laps  -LB      Additional Comments  RTB  -LB         Exercise 10    Exercise Name 10  tra/pf with alt heel lift  -LB      Sets 10  2  -LB      Reps 10  5  -LB         Exercise 11    Exercise Name 11  Scapular rows with TrAbdominus  -LB      Cueing 11  Verbal;Demo  -LB      Reps 11  15  -LB      Additional Comments  RTB  -LB         Exercise 12    Exercise Name 12  Shoulder ext with TrAbdominus  -LB      Cueing 12  Verbal;Demo  -LB      Reps 12  15  -LB      Additional Comments  RTB  -LB         Exercise 13    Exercise Name 13  SL clamshell  -LB      Reps 13  10  -LB      Additional Comments  each  -LB         Exercise 14    Exercise Name 14  DKTC with green ball  -LB       Reps 14  15  -LB      Time 14  5  -LB      Additional Comments  with TrA  -LB        User Key  (r) = Recorded By, (t) = Taken By, (c) = Cosigned By    Initials Name Provider Type    Deanna Moseley PT Physical Therapist                       PT OP Goals     Row Name 12/06/19 1300          PT Short Term Goals    STG Date to Achieve  11/29/19  -LB     STG 1  Pt will demonstrate understanding and compliance with initial HEP.  -LB     STG 1 Progress  Met  -LB     STG 2  Pt will report centralization of symptoms from L foot to L knee or more proximal.  -LB     STG 2 Progress  Met  -LB     STG 3  Pt will report pain at worse in LLE decreased from 8/10 to 6/10 or better.  -LB     STG 3 Progress  Met  -LB        Long Term Goals    LTG Date to Achieve  12/15/19  -LB     LTG 1  Pt will report reduced overall disability from 50% per modified Oswestry to 30% or less.  -LB     LTG 1 Progress  Ongoing  -LB     LTG 2  Pt will report tolerance to 15 minutes of continuous walking without inc LLE pain.  -LB     LTG 2 Progress  Ongoing  -LB     LTG 3  Pt will report improved tolerance to sleeping by 50% to allow improved healing and improved quality of life.  -LB     LTG 3 Progress  Ongoing  -LB       User Key  (r) = Recorded By, (t) = Taken By, (c) = Cosigned By    Initials Name Provider Type    Deanna Moseley PT Physical Therapist          Therapy Education  Education Details: discussed strengthening timeline; expectations, added SL clamshell to HEP  Given: Symptoms/condition management, HEP  Program: Reinforced  How Provided: Verbal  Provided to: Patient  Level of Understanding: Teach back education performed, Verbalized, Demonstrated              Time Calculation:   Start Time: 1315  Stop Time: 1400  Time Calculation (min): 45 min  Total Timed Code Minutes- PT: 30 minute(s)  Therapy Charges for Today     Code Description Service Date Service Provider Modifiers Qty    50566743621 HC PT THER PROC EA 15 MIN 12/6/2019 Miguel  Deanna, PT GP 2    50482607375 HC PT HOT OR COLD PACK TREAT MCARE 12/6/2019 Deanna Rivera, PT GP 1    64597000227 HC PT TRACTION LUMBAR 12/6/2019 Deanna Rivera, PT GP 1                    Deanna Rivera, PT  12/6/2019

## 2019-12-10 ENCOUNTER — HOSPITAL ENCOUNTER (OUTPATIENT)
Dept: PHYSICAL THERAPY | Facility: HOSPITAL | Age: 74
Setting detail: THERAPIES SERIES
Discharge: HOME | End: 2019-12-10

## 2019-12-10 DIAGNOSIS — R26.2 DIFFICULTY WALKING: ICD-10-CM

## 2019-12-10 DIAGNOSIS — G89.29 CHRONIC PAIN OF LEFT LOWER EXTREMITY: ICD-10-CM

## 2019-12-10 DIAGNOSIS — M79.605 CHRONIC PAIN OF LEFT LOWER EXTREMITY: ICD-10-CM

## 2019-12-10 DIAGNOSIS — M25.552 LEFT HIP PAIN: Primary | ICD-10-CM

## 2019-12-10 PROCEDURE — 97110 THERAPEUTIC EXERCISES: CPT | Performed by: PHYSICAL THERAPIST

## 2019-12-10 PROCEDURE — 97012 MECHANICAL TRACTION THERAPY: CPT | Performed by: PHYSICAL THERAPIST

## 2019-12-10 NOTE — THERAPY DISCHARGE NOTE
Outpatient Physical Therapy Ortho Treatment Note/Discharge Summary  [facility]     Patient Name: Sonia Wooten  : 1945  MRN: [id number]  Today's Date: 12/10/2019      Visit Date: 12/10/2019    Visit Dx:    ICD-10-CM ICD-9-CM   1. Left hip pain M25.552 719.45   2. Chronic pain of left lower extremity M79.605 729.5    G89.29 338.29   3. Difficulty walking R26.2 719.7       There is no problem list on file for this patient.       No past medical history on file.     Past Surgical History:   Procedure Laterality Date   • BACK SURGERY     • HIP SURGERY     • KNEE SURGERY                         PT Assessment/Plan     Row Name 12/10/19 1233          PT Assessment    Assessment Comments  Patient attended 6 sessions of skilled PT for lumbar radiculopathy.  She has plateaud with progress and perceived disability per the STACE[facility] is unfortunately worse (was 50% now 58% where 100% = complete disability). For this reason she will d/c to I HEP today. Thank you for this referral.  -GR        PT Plan    PT Plan Comments  dc to I HEP per plateau.  -GR       User Key  (r) = Recorded By, (t) = Taken By, (c) = Cosigned By    Initials Name Provider Type    Raghu Jones, PT Physical Therapist          Modalities     Row Name 12/10/19 1100             Moist Heat    MH Applied  Yes  -GR      Location  lumbar spine during traction  -GR      Rx Minutes  15 mins  -GR         Traction 48871    Traction Type  Lumbar  -GR      Rx Minutes  15  -GR      Duration  Intermittent  -GR      Position  Other 90/90  -GR      Weight  55 /30  -GR      Hold  45  -GR      Relax  15  -GR        User Key  (r) = Recorded By, (t) = Taken By, (c) = Cosigned By    Initials Name Provider Type    Raghu Jones, PT Physical Therapist          OP Exercises     Row Name 12/10/19 1100             Subjective Comments    Subjective Comments  Feels she has reached a plateau.  -GR         Subjective Pain    Able to rate subjective pain?  yes  -GR       Pre-Treatment Pain Level  0  -GR      Subjective Pain Comment  at rest  -GR         Total Minutes    00840 - PT Therapeutic Exercise Minutes  33  -GR         Exercise 1    Exercise Name 1  LTR   -GR      Cueing 1  Verbal  -GR      Reps 1  10  -GR      Additional Comments  swiss ball  -GR         Exercise 3    Exercise Name 3  piriformis stretch  -GR      Cueing 3  Verbal  -GR      Reps 3  3  -GR      Time 3  20  -GR         Exercise 4    Exercise Name 4  bridge  -GR      Cueing 4  Verbal  -GR      Sets 4  2  -GR      Reps 4  10  -GR      Time 4  3  -GR      Additional Comments  swiss ball  -GR         Exercise 8    Exercise Name 8  Tra/pf with hip add  -GR      Sets 8  2  -GR      Reps 8  10  -GR         Exercise 11    Exercise Name 11  Scapular rows with TrAbdominus  -GR      Cueing 11  Verbal;Demo  -GR      Reps 11  15  -GR      Additional Comments  GTB  -GR         Exercise 12    Exercise Name 12  Shoulder ext with TrAbdominus  -GR      Cueing 12  Verbal;Demo  -GR      Reps 12  15  -GR      Additional Comments  GTB  -GR         Exercise 13    Exercise Name 13  SL clamshell  -GR      Reps 13  10  -GR      Additional Comments  each  -GR         Exercise 15    Exercise Name 15  finalized HEP review  -GR        User Key  (r) = Recorded By, (t) = Taken By, (c) = Cosigned By    Initials Name Provider Type    GR Raghu Fraire, PT Physical Therapist                         PT OP Goals     Row Name 12/10/19 1100          PT Short Term Goals    STG Date to Achieve  11/29/19  -GR     STG 1  Pt will demonstrate understanding and compliance with initial HEP.  -GR     STG 1 Progress  Met  -GR     STG 2  Pt will report centralization of symptoms from L foot to L knee or more proximal.  -GR     STG 2 Progress  Met  -GR     STG 3  Pt will report pain at worse in LLE decreased from 8/10 to 6/10 or better.  -GR     STG 3 Progress  Met  -GR        Long Term Goals    LTG Date to Achieve  12/15/19  -GR     LTG 1  Pt will  report reduced overall disability from 50% per modified Oswestry to 30% or less.  -GR     LTG 1 Progress  Not Met  -GR     LTG 1 Progress Comments  58%  -GR     LTG 2  Pt will report tolerance to 15 minutes of continuous walking without inc LLE pain.  -GR     LTG 2 Progress  Not Met  -GR     LTG 3  Pt will report improved tolerance to sleeping by 50% to allow improved healing and improved quality of life.  -GR     LTG 3 Progress  Met  -GR       User Key  (r) = Recorded By, (t) = Taken By, (c) = Cosigned By    Initials Name Provider Type    Raghu Jones, PT Physical Therapist               Outcome Measure Options: Modifed Owestry  Modified Oswestry  Modified Oswestry Score/Comments: 58% disability      Time Calculation:   Start Time: 1150  Stop Time: 1238  Time Calculation (min): 48 min  Total Timed Code Minutes- PT: 33 minute(s)  Therapy Charges for Today     Code Description Service Date Service Provider Modifiers Qty    13205490867 HC PT THER PROC EA 15 MIN 12/10/2019 Raghu Fraire, PT GP 2    08611978419 HC PT-TRACTION MECHANICAL 12/10/2019 Raghu Fraire, PT  1    26888115681 HC PT HOT OR COLD PACK TREAT MCARE 12/10/2019 Raghu Fraire, PT GP 1          PT G-Codes  Outcome Measure Options: Modifed Owestry  Modified Oswestry Score/Comments: 58% disability     OP PT Discharge Summary  Date of Discharge: 12/10/19  Reason for Discharge: Maximum functional potential achieved  Outcomes Achieved: Patient able to partially acheive established goals  Discharge Destination: Home with home program  Discharge Instructions/Additional Comments: dc to I HEP and MD consult if no improvement.      Raghu Fraire, PT  12/10/2019

## 2019-12-13 ENCOUNTER — APPOINTMENT (OUTPATIENT)
Dept: PHYSICAL THERAPY | Facility: HOSPITAL | Age: 74
End: 2019-12-13

## 2019-12-18 ENCOUNTER — APPOINTMENT (OUTPATIENT)
Dept: PHYSICAL THERAPY | Facility: HOSPITAL | Age: 74
End: 2019-12-18

## 2020-04-17 ENCOUNTER — OFFICE VISIT (OUTPATIENT)
Dept: ONCOLOGY | Facility: CLINIC | Age: 75
End: 2020-04-17

## 2020-04-17 DIAGNOSIS — D75.839 THROMBOCYTOSIS: Primary | ICD-10-CM

## 2020-04-17 DIAGNOSIS — D47.1 MYELOPROLIFERATIVE DISORDER (HCC): ICD-10-CM

## 2020-04-17 PROCEDURE — 99443 PR PHYS/QHP TELEPHONE EVALUATION 21-30 MIN: CPT | Performed by: INTERNAL MEDICINE

## 2020-04-17 RX ORDER — HYDROXYUREA 500 MG/1
500 CAPSULE ORAL DAILY
Qty: 90 CAPSULE | Refills: 3 | Status: SHIPPED | OUTPATIENT
Start: 2020-04-17 | End: 2020-05-11 | Stop reason: SDUPTHER

## 2020-04-17 NOTE — PROGRESS NOTES
Subjective     REASON FOR CONSULTATION: Thrombocytosis with platelet count > 1 million; Probable MPN   Provide an opinion on any further workup or treatment                             REQUESTING PHYSICIAN: Jeanette SPANGLER      RECORDS OBTAINED:  Records of the patients history including those obtained from the referring provider were reviewed and summarized in detail.    HISTORY OF PRESENT ILLNESS:  The patient is a 74 y.o. year old female who is here for an opinion about the above issue. She is referred to us from her primary care office with recent abnormal CBC showing her platelet count over 1.5 million. She has been started on 81 mg aspirin and she is not having any acute symptoms of headache, chest pain, itching , bleeding or eythromelalgia.    We plan to start her on Hydrea 500mg daily and will be ordering labs to evaluate for myeloproliferative disorder.     History of Present Illness     No past medical history on file.     Past Surgical History:   Procedure Laterality Date   • BACK SURGERY     • CATARACT EXTRACTION  2018   • HIP SURGERY     • KNEE SURGERY          Current Outpatient Medications on File Prior to Visit   Medication Sig Dispense Refill   • FLUoxetine (PROzac) 20 MG capsule Take 20 mg by mouth Daily.     • montelukast (SINGULAIR) 10 MG tablet Take 10 mg by mouth Every Night.     • pravastatin (PRAVACHOL) 20 MG tablet Take 20 mg by mouth Daily.     • rizatriptan (MAXALT) 10 MG tablet Take 10 mg by mouth 1 (One) Time As Needed for Migraine. May repeat in 2 hours if needed     • Triamcinolone Acetonide (NASACORT) 55 MCG/ACT nasal inhaler 2 sprays into the nostril(s) as directed by provider Daily.       No current facility-administered medications on file prior to visit.         ALLERGIES:    Allergies   Allergen Reactions   • Codeine Nausea And Vomiting   • Penicillins Hives        Social History     Socioeconomic History   • Marital status: Unknown     Spouse name: Not on file   • Number of  children: Not on file   • Years of education: Not on file   • Highest education level: Not on file   Tobacco Use   • Smoking status: Never Smoker   • Smokeless tobacco: Current User        No family history on file.     Review of Systems   Constitutional: Negative for activity change, chills, fatigue and fever.   HENT: Negative for mouth sores, trouble swallowing and voice change.    Eyes: Negative for pain and visual disturbance.   Respiratory: Negative for cough, shortness of breath and wheezing.    Cardiovascular: Negative for chest pain and palpitations.   Gastrointestinal: Negative for abdominal pain, constipation, diarrhea, nausea and vomiting.   Genitourinary: Negative for difficulty urinating, frequency and urgency.   Musculoskeletal: Negative for arthralgias and joint swelling.   Skin: Negative for rash.   Neurological: Negative for dizziness, seizures, weakness and headaches.   Hematological: Negative for adenopathy. Does not bruise/bleed easily.   Psychiatric/Behavioral: Negative for behavioral problems and confusion. The patient is not nervous/anxious.        Objective     There were no vitals filed for this visit.  No flowsheet data found.    No PE performed 4/17/2020; Telemedicine visit due to COVID 19  Physical Exam      RECENT LABS:  Hematology No results found for: WBC, RBC, HGB, HCT, PLT       Assessment/Plan   1.  Probable MPD ( Essential Thrombocytosis ) with platelet count > 1 million    Recommendations:  1.  Continue aspirin 81 mg po daily  2.  Begin Hydrea 500 mg po daily  3.  Lab visit next week for: JAK2, MPL, CALR, BCR/ABL, repeat CBC, CMP,LDH, uric acid  4.  MD follow up 3-4 wks to review results and check CBC, and adjust Hydrea dose as needed.    This visit has been rescheduled as a phone visit to comply with patient safety concerns in accordance with CDC recommendations. Total time of discussion was 30 minutes.    You have chosen to receive care through a telephone visit. Do you consent  to use a telephone visit for your medical care today? Yes

## 2020-04-20 ENCOUNTER — TELEPHONE (OUTPATIENT)
Dept: ONCOLOGY | Facility: HOSPITAL | Age: 75
End: 2020-04-20

## 2020-04-20 NOTE — TELEPHONE ENCOUNTER
Called and notified patients  this was sent in and confirmed by pharmacy     ----- Message from Alyse Giraldo Rep sent at 4/20/2020  8:39 AM EDT -----  Regarding: rx  Patient states she has not heard from [facility] regarding Rx for Hydrea (I Assume). She uses [facility] in the [place] near [facility].    If you could check on this for her please.    Thank you!    Huong

## 2020-04-21 ENCOUNTER — LAB (OUTPATIENT)
Dept: LAB | Facility: HOSPITAL | Age: 75
End: 2020-04-21

## 2020-04-21 ENCOUNTER — CLINICAL SUPPORT (OUTPATIENT)
Dept: ONCOLOGY | Facility: HOSPITAL | Age: 75
End: 2020-04-21

## 2020-04-21 DIAGNOSIS — D75.839 THROMBOCYTOSIS: ICD-10-CM

## 2020-04-21 DIAGNOSIS — D47.1 MYELOPROLIFERATIVE DISORDER (HCC): ICD-10-CM

## 2020-04-21 LAB
ALBUMIN SERPL-MCNC: 4.5 G/DL (ref 3.5–5.2)
ALBUMIN/GLOB SERPL: 1.6 G/DL (ref 1.1–2.4)
ALP SERPL-CCNC: 56 U/L (ref 38–116)
ALT SERPL W P-5'-P-CCNC: 11 U/L (ref 0–33)
ANION GAP SERPL CALCULATED.3IONS-SCNC: 12.3 MMOL/L (ref 5–15)
AST SERPL-CCNC: 21 U/L (ref 0–32)
BASOPHILS # BLD AUTO: 0.08 10*3/MM3 (ref 0–0.2)
BASOPHILS NFR BLD AUTO: 0.9 % (ref 0–1.5)
BILIRUB SERPL-MCNC: 0.3 MG/DL (ref 0.2–1.2)
BUN BLD-MCNC: 16 MG/DL (ref 6–20)
BUN/CREAT SERPL: 24.6 (ref 7.3–30)
CALCIUM SPEC-SCNC: 10.2 MG/DL (ref 8.5–10.2)
CHLORIDE SERPL-SCNC: 100 MMOL/L (ref 98–107)
CO2 SERPL-SCNC: 27.7 MMOL/L (ref 22–29)
CREAT BLD-MCNC: 0.65 MG/DL (ref 0.6–1.1)
DEPRECATED RDW RBC AUTO: 51.2 FL (ref 37–54)
EOSINOPHIL # BLD AUTO: 0.5 10*3/MM3 (ref 0–0.4)
EOSINOPHIL NFR BLD AUTO: 5.7 % (ref 0.3–6.2)
ERYTHROCYTE [DISTWIDTH] IN BLOOD BY AUTOMATED COUNT: 15.9 % (ref 12.3–15.4)
GFR SERPL CREATININE-BSD FRML MDRD: 89 ML/MIN/1.73
GLOBULIN UR ELPH-MCNC: 2.9 GM/DL (ref 1.8–3.5)
GLUCOSE BLD-MCNC: 87 MG/DL (ref 74–124)
HCT VFR BLD AUTO: 39 % (ref 34–46.6)
HGB BLD-MCNC: 12.1 G/DL (ref 12–15.9)
IMM GRANULOCYTES # BLD AUTO: 0.02 10*3/MM3 (ref 0–0.05)
IMM GRANULOCYTES NFR BLD AUTO: 0.2 % (ref 0–0.5)
LDH SERPL-CCNC: 347 U/L (ref 99–259)
LYMPHOCYTES # BLD AUTO: 2.89 10*3/MM3 (ref 0.7–3.1)
LYMPHOCYTES NFR BLD AUTO: 32.7 % (ref 19.6–45.3)
MCH RBC QN AUTO: 27.4 PG (ref 26.6–33)
MCHC RBC AUTO-ENTMCNC: 31 G/DL (ref 31.5–35.7)
MCV RBC AUTO: 88.4 FL (ref 79–97)
MONOCYTES # BLD AUTO: 0.99 10*3/MM3 (ref 0.1–0.9)
MONOCYTES NFR BLD AUTO: 11.2 % (ref 5–12)
NEUTROPHILS # BLD AUTO: 4.36 10*3/MM3 (ref 1.7–7)
NEUTROPHILS NFR BLD AUTO: 49.3 % (ref 42.7–76)
NRBC BLD AUTO-RTO: 0 /100 WBC (ref 0–0.2)
PLATELET # BLD AUTO: 2054 10*3/MM3 (ref 140–450)
PMV BLD AUTO: 8.8 FL (ref 6–12)
POTASSIUM BLD-SCNC: 4.3 MMOL/L (ref 3.5–4.7)
PROT SERPL-MCNC: 7.4 G/DL (ref 6.3–8)
RBC # BLD AUTO: 4.41 10*6/MM3 (ref 3.77–5.28)
SODIUM BLD-SCNC: 140 MMOL/L (ref 134–145)
URATE SERPL-MCNC: 4.2 MG/DL (ref 2.8–7.4)
WBC NRBC COR # BLD: 8.84 10*3/MM3 (ref 3.4–10.8)

## 2020-04-21 PROCEDURE — G0463 HOSPITAL OUTPT CLINIC VISIT: HCPCS

## 2020-04-21 PROCEDURE — 85025 COMPLETE CBC W/AUTO DIFF WBC: CPT

## 2020-04-21 PROCEDURE — 36415 COLL VENOUS BLD VENIPUNCTURE: CPT

## 2020-04-21 PROCEDURE — 83615 LACTATE (LD) (LDH) ENZYME: CPT

## 2020-04-21 PROCEDURE — 80053 COMPREHEN METABOLIC PANEL: CPT

## 2020-04-21 PROCEDURE — 84550 ASSAY OF BLOOD/URIC ACID: CPT

## 2020-04-21 NOTE — PROGRESS NOTES
CBC reviewed with pt. Plts >2mil. Pt states she just started Hydrea 500mg yesterday. There was a delay in getting her medication. Pt does not return until 5/11. D/W Dr. Brown. Per Dr. Brown, have her start taking Hydrea 1000mg daily and continue on baby aspirin. Pt to return in 1 week for CBC and RN review. Informed pt and wrote down instructions. She v/u. Message sent to scheduling. Pt was advised that scheduling would call her to make apts. She v/u.

## 2020-04-28 ENCOUNTER — LAB (OUTPATIENT)
Dept: LAB | Facility: HOSPITAL | Age: 75
End: 2020-04-28

## 2020-04-28 ENCOUNTER — CLINICAL SUPPORT (OUTPATIENT)
Dept: ONCOLOGY | Facility: HOSPITAL | Age: 75
End: 2020-04-28

## 2020-04-28 DIAGNOSIS — D75.839 THROMBOCYTOSIS: ICD-10-CM

## 2020-04-28 DIAGNOSIS — D47.1 MYELOPROLIFERATIVE DISORDER (HCC): ICD-10-CM

## 2020-04-28 LAB
BASOPHILS # BLD AUTO: 0.09 10*3/MM3 (ref 0–0.2)
BASOPHILS NFR BLD AUTO: 1.1 % (ref 0–1.5)
DEPRECATED RDW RBC AUTO: 47.6 FL (ref 37–54)
EOSINOPHIL # BLD AUTO: 0.27 10*3/MM3 (ref 0–0.4)
EOSINOPHIL NFR BLD AUTO: 3.3 % (ref 0.3–6.2)
ERYTHROCYTE [DISTWIDTH] IN BLOOD BY AUTOMATED COUNT: 15.8 % (ref 12.3–15.4)
HCT VFR BLD AUTO: 37.5 % (ref 34–46.6)
HGB BLD-MCNC: 12.2 G/DL (ref 12–15.9)
IMM GRANULOCYTES # BLD AUTO: 0.05 10*3/MM3 (ref 0–0.05)
IMM GRANULOCYTES NFR BLD AUTO: 0.6 % (ref 0–0.5)
LYMPHOCYTES # BLD AUTO: 3.09 10*3/MM3 (ref 0.7–3.1)
LYMPHOCYTES NFR BLD AUTO: 37.7 % (ref 19.6–45.3)
MCH RBC QN AUTO: 27.8 PG (ref 26.6–33)
MCHC RBC AUTO-ENTMCNC: 32.5 G/DL (ref 31.5–35.7)
MCV RBC AUTO: 85.4 FL (ref 79–97)
MONOCYTES # BLD AUTO: 0.88 10*3/MM3 (ref 0.1–0.9)
MONOCYTES NFR BLD AUTO: 10.7 % (ref 5–12)
NEUTROPHILS # BLD AUTO: 3.82 10*3/MM3 (ref 1.7–7)
NEUTROPHILS NFR BLD AUTO: 46.6 % (ref 42.7–76)
NRBC BLD AUTO-RTO: 0 /100 WBC (ref 0–0.2)
PLATELET # BLD AUTO: 1968 10*3/MM3 (ref 140–450)
PMV BLD AUTO: 8.8 FL (ref 6–12)
RBC # BLD AUTO: 4.39 10*6/MM3 (ref 3.77–5.28)
WBC NRBC COR # BLD: 8.2 10*3/MM3 (ref 3.4–10.8)

## 2020-04-28 PROCEDURE — G0463 HOSPITAL OUTPT CLINIC VISIT: HCPCS

## 2020-04-28 PROCEDURE — 85025 COMPLETE CBC W/AUTO DIFF WBC: CPT

## 2020-04-28 PROCEDURE — 36415 COLL VENOUS BLD VENIPUNCTURE: CPT

## 2020-04-28 NOTE — PROGRESS NOTES
CBC reviewed with pt. Plts 1.9 million today. Pt has been taking Hydrea 1000mg daily for the past week. She complains of skin itching/dryness, fatigue and hair loss. She has several questions related to her disease process which I told her would best be answered by Dr. Brown at her apt with him on 5/11. Reviewed symptoms with Esther Bravo NP. She states the itching and hair loss are most likely related to disease process. The fatigue is a SE of the Hydrea. Reviewed pt's labs with Dr. Krishna (MD#2). Per Dr. Krishna, pt needs to increase her Hydrea to 1000mg in AM and 1000mg in PM. Recheck in 1 week. Called pt and informed her. She v/u. Advised her of her apt next week also. V/u.      Lab Results   Component Value Date    WBC 8.20 04/28/2020    HGB 12.2 04/28/2020    HCT 37.5 04/28/2020    MCV 85.4 04/28/2020    PLT 1,968 (C) 04/28/2020

## 2020-05-05 ENCOUNTER — LAB (OUTPATIENT)
Dept: LAB | Facility: HOSPITAL | Age: 75
End: 2020-05-05

## 2020-05-05 ENCOUNTER — CLINICAL SUPPORT (OUTPATIENT)
Dept: ONCOLOGY | Facility: HOSPITAL | Age: 75
End: 2020-05-05

## 2020-05-05 DIAGNOSIS — D75.839 THROMBOCYTOSIS: Primary | ICD-10-CM

## 2020-05-05 LAB
BASOPHILS # BLD AUTO: 0.07 10*3/MM3 (ref 0–0.2)
BASOPHILS NFR BLD AUTO: 1.2 % (ref 0–1.5)
DEPRECATED RDW RBC AUTO: 48.3 FL (ref 37–54)
EOSINOPHIL # BLD AUTO: 0.08 10*3/MM3 (ref 0–0.4)
EOSINOPHIL NFR BLD AUTO: 1.3 % (ref 0.3–6.2)
ERYTHROCYTE [DISTWIDTH] IN BLOOD BY AUTOMATED COUNT: 16.6 % (ref 12.3–15.4)
HCT VFR BLD AUTO: 34.4 % (ref 34–46.6)
HGB BLD-MCNC: 11.4 G/DL (ref 12–15.9)
IMM GRANULOCYTES # BLD AUTO: 0.03 10*3/MM3 (ref 0–0.05)
IMM GRANULOCYTES NFR BLD AUTO: 0.5 % (ref 0–0.5)
LYMPHOCYTES # BLD AUTO: 2.65 10*3/MM3 (ref 0.7–3.1)
LYMPHOCYTES NFR BLD AUTO: 44.2 % (ref 19.6–45.3)
MCH RBC QN AUTO: 28.3 PG (ref 26.6–33)
MCHC RBC AUTO-ENTMCNC: 33.1 G/DL (ref 31.5–35.7)
MCV RBC AUTO: 85.4 FL (ref 79–97)
MONOCYTES # BLD AUTO: 0.5 10*3/MM3 (ref 0.1–0.9)
MONOCYTES NFR BLD AUTO: 8.3 % (ref 5–12)
NEUTROPHILS # BLD AUTO: 2.67 10*3/MM3 (ref 1.7–7)
NEUTROPHILS NFR BLD AUTO: 44.5 % (ref 42.7–76)
NRBC BLD AUTO-RTO: 0 /100 WBC (ref 0–0.2)
PLATELET # BLD AUTO: 1387 10*3/MM3 (ref 140–450)
PMV BLD AUTO: 8.8 FL (ref 6–12)
RBC # BLD AUTO: 4.03 10*6/MM3 (ref 3.77–5.28)
WBC NRBC COR # BLD: 6 10*3/MM3 (ref 3.4–10.8)

## 2020-05-05 PROCEDURE — G0463 HOSPITAL OUTPT CLINIC VISIT: HCPCS

## 2020-05-05 PROCEDURE — 85025 COMPLETE CBC W/AUTO DIFF WBC: CPT

## 2020-05-05 PROCEDURE — 36415 COLL VENOUS BLD VENIPUNCTURE: CPT

## 2020-05-05 NOTE — PROGRESS NOTES
CBC reviewed with pt. Plts improved at 1.3 million. Hgb dropped to 11.4. Pt has been taking Hydrea 1000mg in AM and 1000mg in PM. Denies complaints. D/W Dr. Brown. Per Dr. Brown, pt needs to continue on this dose. Order a ferritin and iron panel to be drawn next week. Informed pt and she v/u.       Lab Results   Component Value Date    WBC 6.00 05/05/2020    HGB 11.4 (L) 05/05/2020    HCT 34.4 05/05/2020    MCV 85.4 05/05/2020    PLT 1,387 (C) 05/05/2020

## 2020-05-11 ENCOUNTER — LAB (OUTPATIENT)
Dept: LAB | Facility: HOSPITAL | Age: 75
End: 2020-05-11

## 2020-05-11 ENCOUNTER — OFFICE VISIT (OUTPATIENT)
Dept: ONCOLOGY | Facility: CLINIC | Age: 75
End: 2020-05-11

## 2020-05-11 VITALS
HEART RATE: 85 BPM | HEIGHT: 65 IN | TEMPERATURE: 97.5 F | DIASTOLIC BLOOD PRESSURE: 74 MMHG | OXYGEN SATURATION: 97 % | BODY MASS INDEX: 25.99 KG/M2 | WEIGHT: 156 LBS | RESPIRATION RATE: 18 BRPM | SYSTOLIC BLOOD PRESSURE: 109 MMHG

## 2020-05-11 DIAGNOSIS — D75.839 THROMBOCYTOSIS: Primary | ICD-10-CM

## 2020-05-11 DIAGNOSIS — D75.839 THROMBOCYTOSIS: ICD-10-CM

## 2020-05-11 DIAGNOSIS — D47.1 MYELOPROLIFERATIVE DISORDER (HCC): Primary | ICD-10-CM

## 2020-05-11 LAB
BASOPHILS # BLD AUTO: 0.06 10*3/MM3 (ref 0–0.2)
BASOPHILS NFR BLD AUTO: 1.3 % (ref 0–1.5)
CALR EXON 9 MUT ANL BLD/T: NORMAL
DEPRECATED RDW RBC AUTO: 50.4 FL (ref 37–54)
EOSINOPHIL # BLD AUTO: 0.05 10*3/MM3 (ref 0–0.4)
EOSINOPHIL NFR BLD AUTO: 1.1 % (ref 0.3–6.2)
ERYTHROCYTE [DISTWIDTH] IN BLOOD BY AUTOMATED COUNT: 18.6 % (ref 12.3–15.4)
FERRITIN SERPL-MCNC: 18.3 NG/ML (ref 13–150)
HCT VFR BLD AUTO: 38.3 % (ref 34–46.6)
HGB BLD-MCNC: 12.2 G/DL (ref 12–15.9)
IMM GRANULOCYTES # BLD AUTO: 0.01 10*3/MM3 (ref 0–0.05)
IMM GRANULOCYTES NFR BLD AUTO: 0.2 % (ref 0–0.5)
IRON 24H UR-MRATE: 84 MCG/DL (ref 37–145)
IRON SATN MFR SERPL: 16 % (ref 14–48)
LYMPHOCYTES # BLD AUTO: 2.07 10*3/MM3 (ref 0.7–3.1)
LYMPHOCYTES NFR BLD AUTO: 44.2 % (ref 19.6–45.3)
MCH RBC QN AUTO: 29 PG (ref 26.6–33)
MCHC RBC AUTO-ENTMCNC: 31.9 G/DL (ref 31.5–35.7)
MCV RBC AUTO: 91 FL (ref 79–97)
MONOCYTES # BLD AUTO: 0.39 10*3/MM3 (ref 0.1–0.9)
MONOCYTES NFR BLD AUTO: 8.3 % (ref 5–12)
NEUTROPHILS # BLD AUTO: 2.1 10*3/MM3 (ref 1.7–7)
NEUTROPHILS NFR BLD AUTO: 44.9 % (ref 42.7–76)
NRBC BLD AUTO-RTO: 0.6 /100 WBC (ref 0–0.2)
PLATELET # BLD AUTO: 619 10*3/MM3 (ref 140–450)
PMV BLD AUTO: 8.7 FL (ref 6–12)
RBC # BLD AUTO: 4.21 10*6/MM3 (ref 3.77–5.28)
REF LAB TEST METHOD: NORMAL
REF LAB TEST METHOD: NORMAL
TIBC SERPL-MCNC: 519 MCG/DL (ref 249–505)
TRANSFERRIN SERPL-MCNC: 371 MG/DL (ref 200–360)
WBC NRBC COR # BLD: 4.68 10*3/MM3 (ref 3.4–10.8)

## 2020-05-11 PROCEDURE — 36415 COLL VENOUS BLD VENIPUNCTURE: CPT

## 2020-05-11 PROCEDURE — 83540 ASSAY OF IRON: CPT

## 2020-05-11 PROCEDURE — 82728 ASSAY OF FERRITIN: CPT

## 2020-05-11 PROCEDURE — 99442 PR PHYS/QHP TELEPHONE EVALUATION 11-20 MIN: CPT | Performed by: INTERNAL MEDICINE

## 2020-05-11 PROCEDURE — 85025 COMPLETE CBC W/AUTO DIFF WBC: CPT

## 2020-05-11 PROCEDURE — 84466 ASSAY OF TRANSFERRIN: CPT

## 2020-05-11 RX ORDER — TRIAMTERENE AND HYDROCHLOROTHIAZIDE 37.5; 25 MG/1; MG/1
TABLET ORAL
COMMUNITY
Start: 2020-04-12 | End: 2021-11-15 | Stop reason: SDUPTHER

## 2020-05-11 RX ORDER — HYDROXYUREA 500 MG/1
1500 CAPSULE ORAL DAILY
Qty: 180 CAPSULE | Refills: 3 | Status: SHIPPED | OUTPATIENT
Start: 2020-05-11 | End: 2020-08-03 | Stop reason: SDUPTHER

## 2020-05-11 NOTE — PROGRESS NOTES
Subjective   This visit has been rescheduled as a phone visit to comply with patient safety concerns in accordance with CDC recommendations. Total time of discussion was 13 minutes.    You have chosen to receive care through a telephone visit. Do you consent to use a telephone visit for your medical care today? Yes      REASON FOR CONSULTATION: Thrombocytosis with platelet count > 1 million; Probable MPN   Provide an opinion on any further workup or treatment                             REQUESTING PHYSICIAN: Jeanette SPANGLER      RECORDS OBTAINED:  Records of the patients history including those obtained from the referring provider were reviewed and summarized in detail.    HISTORY OF PRESENT ILLNESS:  The patient is a 75 y.o. year old female who is here for an opinion about the above issue. She is referred to us from her primary care office with recent abnormal CBC showing her platelet count over 1.5 million. She has been started on 81 mg aspirin and she is not having any acute symptoms of headache, chest pain, itching , bleeding or eythromelalgia.    We originally started her on  Hydrea 500mg daily and increased her dose based on her blood counts over the last few weeks to her current dose of 1000 mg twice daily. Her platelet count today is much improved at 614,000.     We will slightly decrease her dose of Hydrea to 1500 mg daily ( 2 tabs qam, and 2 tabs qpm ).    History of Present Illness     No past medical history on file.     Past Surgical History:   Procedure Laterality Date   • BACK SURGERY     • CATARACT EXTRACTION  2018   • HIP SURGERY     • KNEE SURGERY          Current Outpatient Medications on File Prior to Visit   Medication Sig Dispense Refill   • FLUoxetine (PROzac) 20 MG capsule Take 20 mg by mouth Daily.     • hydroxyurea (HYDREA) 500 MG capsule Take 1 capsule by mouth Daily. 90 capsule 3   • montelukast (SINGULAIR) 10 MG tablet Take 10 mg by mouth Every Night.     • pravastatin (PRAVACHOL) 20  "MG tablet Take 20 mg by mouth Daily.     • rizatriptan (MAXALT) 10 MG tablet Take 10 mg by mouth 1 (One) Time As Needed for Migraine. May repeat in 2 hours if needed     • Triamcinolone Acetonide (NASACORT) 55 MCG/ACT nasal inhaler 2 sprays into the nostril(s) as directed by provider Daily.     • triamterene-hydrochlorothiazide (MAXZIDE-25) 37.5-25 MG per tablet        No current facility-administered medications on file prior to visit.         ALLERGIES:    Allergies   Allergen Reactions   • Codeine Nausea And Vomiting   • Penicillins Hives        Social History     Socioeconomic History   • Marital status:      Spouse name: Not on file   • Number of children: Not on file   • Years of education: Not on file   • Highest education level: Not on file   Tobacco Use   • Smoking status: Never Smoker   • Smokeless tobacco: Current User        No family history on file.     Review of Systems   Constitutional: Negative for activity change, chills, fatigue and fever.   HENT: Negative for mouth sores, trouble swallowing and voice change.    Eyes: Negative for pain and visual disturbance.   Respiratory: Negative for cough, shortness of breath and wheezing.    Cardiovascular: Negative for chest pain and palpitations.   Gastrointestinal: Negative for abdominal pain, constipation, diarrhea, nausea and vomiting.   Genitourinary: Negative for difficulty urinating, frequency and urgency.   Musculoskeletal: Negative for arthralgias and joint swelling.   Skin: Negative for rash.   Neurological: Negative for dizziness, seizures, weakness and headaches.   Hematological: Negative for adenopathy. Does not bruise/bleed easily.   Psychiatric/Behavioral: Negative for behavioral problems and confusion. The patient is not nervous/anxious.        Objective     Vitals:    05/11/20 1310   BP: 109/74   Pulse: 85   Resp: 18   Temp: 97.5 °F (36.4 °C)   TempSrc: Oral   SpO2: 97%   Weight: 70.8 kg (156 lb)   Height: 165.1 cm (65\")   PainSc: " 0-No pain     Current Status 5/11/2020   ECOG score 0       No PE performed 5/11/2020; Telemedicine visit due to COVID 19  Physical Exam      RECENT LABS:  Hematology WBC   Date Value Ref Range Status   05/11/2020 4.68 3.40 - 10.80 10*3/mm3 Final     RBC   Date Value Ref Range Status   05/11/2020 4.21 3.77 - 5.28 10*6/mm3 Final     Hemoglobin   Date Value Ref Range Status   05/11/2020 12.2 12.0 - 15.9 g/dL Final     Hematocrit   Date Value Ref Range Status   05/11/2020 38.3 34.0 - 46.6 % Final     Platelets   Date Value Ref Range Status   05/11/2020 619 (H) 140 - 450 10*3/mm3 Final          Assessment/Plan   1.  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  Good response and tolerance to Hydrea thus far.    Recommendations:  1.  Continue aspirin 81 mg po daily  2.  Adjust  Hydrea to a dose of 1500 mg po daily  3.  Lab + RN review Q2wks  4.  MD follow up 6 wks to review results and check CBC, and adjust Hydrea dose further as needed.    This visit has been rescheduled as a phone visit to comply with patient safety concerns in accordance with CDC recommendations. Total time of discussion was 13 minutes.    You have chosen to receive care through a telephone visit. Do you consent to use a telephone visit for your medical care today? Yes

## 2020-05-12 LAB — REF LAB TEST METHOD: NORMAL

## 2020-05-22 ENCOUNTER — RESULTS ENCOUNTER (OUTPATIENT)
Dept: ONCOLOGY | Facility: CLINIC | Age: 75
End: 2020-05-22

## 2020-05-22 DIAGNOSIS — D47.1 MYELOPROLIFERATIVE DISORDER (HCC): ICD-10-CM

## 2020-05-22 DIAGNOSIS — D75.839 THROMBOCYTOSIS: ICD-10-CM

## 2020-05-26 ENCOUNTER — LAB (OUTPATIENT)
Dept: LAB | Facility: HOSPITAL | Age: 75
End: 2020-05-26

## 2020-05-26 ENCOUNTER — CLINICAL SUPPORT (OUTPATIENT)
Dept: ONCOLOGY | Facility: HOSPITAL | Age: 75
End: 2020-05-26

## 2020-05-26 DIAGNOSIS — D75.839 THROMBOCYTOSIS: ICD-10-CM

## 2020-05-26 DIAGNOSIS — D47.1 MYELOPROLIFERATIVE DISORDER (HCC): ICD-10-CM

## 2020-05-26 LAB
BASOPHILS # BLD AUTO: 0.07 10*3/MM3 (ref 0–0.2)
BASOPHILS NFR BLD AUTO: 1.5 % (ref 0–1.5)
DEPRECATED RDW RBC AUTO: 52.8 FL (ref 37–54)
EOSINOPHIL # BLD AUTO: 0.05 10*3/MM3 (ref 0–0.4)
EOSINOPHIL NFR BLD AUTO: 1 % (ref 0.3–6.2)
ERYTHROCYTE [DISTWIDTH] IN BLOOD BY AUTOMATED COUNT: 23.9 % (ref 12.3–15.4)
HCT VFR BLD AUTO: 37.6 % (ref 34–46.6)
HGB BLD-MCNC: 12.9 G/DL (ref 12–15.9)
IMM GRANULOCYTES # BLD AUTO: 0.02 10*3/MM3 (ref 0–0.05)
IMM GRANULOCYTES NFR BLD AUTO: 0.4 % (ref 0–0.5)
LYMPHOCYTES # BLD AUTO: 2.3 10*3/MM3 (ref 0.7–3.1)
LYMPHOCYTES NFR BLD AUTO: 47.9 % (ref 19.6–45.3)
MCH RBC QN AUTO: 31.3 PG (ref 26.6–33)
MCHC RBC AUTO-ENTMCNC: 34.3 G/DL (ref 31.5–35.7)
MCV RBC AUTO: 91.3 FL (ref 79–97)
MONOCYTES # BLD AUTO: 0.48 10*3/MM3 (ref 0.1–0.9)
MONOCYTES NFR BLD AUTO: 10 % (ref 5–12)
NEUTROPHILS # BLD AUTO: 1.88 10*3/MM3 (ref 1.7–7)
NEUTROPHILS NFR BLD AUTO: 39.2 % (ref 42.7–76)
NRBC BLD AUTO-RTO: 0 /100 WBC (ref 0–0.2)
PLATELET # BLD AUTO: 231 10*3/MM3 (ref 140–450)
PMV BLD AUTO: 9.2 FL (ref 6–12)
RBC # BLD AUTO: 4.12 10*6/MM3 (ref 3.77–5.28)
WBC NRBC COR # BLD: 4.8 10*3/MM3 (ref 3.4–10.8)

## 2020-05-26 PROCEDURE — 85025 COMPLETE CBC W/AUTO DIFF WBC: CPT

## 2020-05-26 PROCEDURE — G0463 HOSPITAL OUTPT CLINIC VISIT: HCPCS

## 2020-05-26 PROCEDURE — 36415 COLL VENOUS BLD VENIPUNCTURE: CPT

## 2020-05-26 NOTE — NURSING NOTE
Pt here for RN review, labs stable. Tolerating Hydrea 1500 mg daily. No complaints voiced.  Lab Results   Component Value Date    WBC 4.80 05/26/2020    HGB 12.9 05/26/2020    HCT 37.6 05/26/2020    MCV 91.3 05/26/2020     05/26/2020       Printout of appts provided, educated to call for questions or concerns

## 2020-06-05 ENCOUNTER — RESULTS ENCOUNTER (OUTPATIENT)
Dept: ONCOLOGY | Facility: CLINIC | Age: 75
End: 2020-06-05

## 2020-06-05 DIAGNOSIS — D75.839 THROMBOCYTOSIS: ICD-10-CM

## 2020-06-05 DIAGNOSIS — D47.1 MYELOPROLIFERATIVE DISORDER (HCC): ICD-10-CM

## 2020-06-08 ENCOUNTER — LAB (OUTPATIENT)
Dept: LAB | Facility: HOSPITAL | Age: 75
End: 2020-06-08

## 2020-06-08 ENCOUNTER — CLINICAL SUPPORT (OUTPATIENT)
Dept: ONCOLOGY | Facility: HOSPITAL | Age: 75
End: 2020-06-08

## 2020-06-08 DIAGNOSIS — D47.1 MYELOPROLIFERATIVE DISORDER (HCC): ICD-10-CM

## 2020-06-08 DIAGNOSIS — D75.839 THROMBOCYTOSIS: ICD-10-CM

## 2020-06-08 LAB
BASOPHILS # BLD AUTO: 0.06 10*3/MM3 (ref 0–0.2)
BASOPHILS NFR BLD AUTO: 1.3 % (ref 0–1.5)
DEPRECATED RDW RBC AUTO: 85.2 FL (ref 37–54)
EOSINOPHIL # BLD AUTO: 0.05 10*3/MM3 (ref 0–0.4)
EOSINOPHIL NFR BLD AUTO: 1.1 % (ref 0.3–6.2)
ERYTHROCYTE [DISTWIDTH] IN BLOOD BY AUTOMATED COUNT: 27.1 % (ref 12.3–15.4)
HCT VFR BLD AUTO: 34.9 % (ref 34–46.6)
HGB BLD-MCNC: 12.1 G/DL (ref 12–15.9)
IMM GRANULOCYTES # BLD AUTO: 0.02 10*3/MM3 (ref 0–0.05)
IMM GRANULOCYTES NFR BLD AUTO: 0.4 % (ref 0–0.5)
LYMPHOCYTES # BLD AUTO: 2.57 10*3/MM3 (ref 0.7–3.1)
LYMPHOCYTES NFR BLD AUTO: 55.3 % (ref 19.6–45.3)
MCH RBC QN AUTO: 32.6 PG (ref 26.6–33)
MCHC RBC AUTO-ENTMCNC: 34.7 G/DL (ref 31.5–35.7)
MCV RBC AUTO: 94.1 FL (ref 79–97)
MONOCYTES # BLD AUTO: 0.56 10*3/MM3 (ref 0.1–0.9)
MONOCYTES NFR BLD AUTO: 12 % (ref 5–12)
NEUTROPHILS # BLD AUTO: 1.39 10*3/MM3 (ref 1.7–7)
NEUTROPHILS NFR BLD AUTO: 29.9 % (ref 42.7–76)
NRBC BLD AUTO-RTO: 0 /100 WBC (ref 0–0.2)
PLATELET # BLD AUTO: 338 10*3/MM3 (ref 140–450)
PMV BLD AUTO: 9 FL (ref 6–12)
RBC # BLD AUTO: 3.71 10*6/MM3 (ref 3.77–5.28)
WBC NRBC COR # BLD: 4.65 10*3/MM3 (ref 3.4–10.8)

## 2020-06-08 PROCEDURE — 36415 COLL VENOUS BLD VENIPUNCTURE: CPT

## 2020-06-08 PROCEDURE — 85025 COMPLETE CBC W/AUTO DIFF WBC: CPT

## 2020-06-08 PROCEDURE — G0463 HOSPITAL OUTPT CLINIC VISIT: HCPCS

## 2020-06-08 NOTE — NURSING NOTE
Pt is here for lab with RN review.  CBC reviewed with pt, counts are stable for this pt at this time.   Copy of labs given to pt and f/u appt reviewed. Pt is instructed to call the office with any concerns or new symptoms prior to next visit. Pt vu.    Lab Results   Component Value Date    WBC 4.65 06/08/2020    HGB 12.1 06/08/2020    HCT 34.9 06/08/2020    MCV 94.1 06/08/2020     06/08/2020     Pt did mention that she noticed a darker discoloration in a few of her nail beds, near her cuticles.  Noticed it in the last week or so.  I told her I wasn't aware that the hydrea was known to do that, but that I would mention it to Dr. Brown to see if there were further instructions.  She sees him in 2 weeks for f/u and will call us sooner if this issue is worsening.  I did discuss with Dr. Brown. He is aware and no further instructions given at this time.    Pt will continue hydrea 1500mg daily

## 2020-06-19 ENCOUNTER — RESULTS ENCOUNTER (OUTPATIENT)
Dept: ONCOLOGY | Facility: CLINIC | Age: 75
End: 2020-06-19

## 2020-06-19 DIAGNOSIS — D75.839 THROMBOCYTOSIS: ICD-10-CM

## 2020-06-19 DIAGNOSIS — D47.1 MYELOPROLIFERATIVE DISORDER (HCC): ICD-10-CM

## 2020-06-22 ENCOUNTER — OFFICE VISIT (OUTPATIENT)
Dept: ONCOLOGY | Facility: CLINIC | Age: 75
End: 2020-06-22

## 2020-06-22 ENCOUNTER — LAB (OUTPATIENT)
Dept: LAB | Facility: HOSPITAL | Age: 75
End: 2020-06-22

## 2020-06-22 VITALS
WEIGHT: 156 LBS | RESPIRATION RATE: 16 BRPM | SYSTOLIC BLOOD PRESSURE: 127 MMHG | BODY MASS INDEX: 25.99 KG/M2 | HEART RATE: 98 BPM | OXYGEN SATURATION: 96 % | TEMPERATURE: 97.1 F | HEIGHT: 65 IN | DIASTOLIC BLOOD PRESSURE: 82 MMHG

## 2020-06-22 DIAGNOSIS — Z15.89 MONOALLELIC MUTATION OF CALR3 GENE: ICD-10-CM

## 2020-06-22 DIAGNOSIS — D47.1 MYELOPROLIFERATIVE DISORDER (HCC): Primary | ICD-10-CM

## 2020-06-22 DIAGNOSIS — D75.839 THROMBOCYTOSIS: ICD-10-CM

## 2020-06-22 DIAGNOSIS — D47.1 MYELOPROLIFERATIVE DISORDER (HCC): ICD-10-CM

## 2020-06-22 LAB
ALBUMIN SERPL-MCNC: 4.4 G/DL (ref 3.5–5.2)
ALBUMIN/GLOB SERPL: 1.8 G/DL (ref 1.1–2.4)
ALP SERPL-CCNC: 51 U/L (ref 38–116)
ALT SERPL W P-5'-P-CCNC: 15 U/L (ref 0–33)
ANION GAP SERPL CALCULATED.3IONS-SCNC: 8.9 MMOL/L (ref 5–15)
AST SERPL-CCNC: 21 U/L (ref 0–32)
BASOPHILS # BLD AUTO: 0.05 10*3/MM3 (ref 0–0.2)
BASOPHILS NFR BLD AUTO: 1.3 % (ref 0–1.5)
BILIRUB SERPL-MCNC: 0.6 MG/DL (ref 0.2–1.2)
BUN BLD-MCNC: 14 MG/DL (ref 6–20)
BUN/CREAT SERPL: 21.9 (ref 7.3–30)
CALCIUM SPEC-SCNC: 10 MG/DL (ref 8.5–10.2)
CHLORIDE SERPL-SCNC: 102 MMOL/L (ref 98–107)
CO2 SERPL-SCNC: 28.1 MMOL/L (ref 22–29)
CREAT BLD-MCNC: 0.64 MG/DL (ref 0.6–1.1)
EOSINOPHIL # BLD AUTO: 0.01 10*3/MM3 (ref 0–0.4)
EOSINOPHIL NFR BLD AUTO: 0.3 % (ref 0.3–6.2)
ERYTHROCYTE [DISTWIDTH] IN BLOOD BY AUTOMATED COUNT: ABNORMAL %
GFR SERPL CREATININE-BSD FRML MDRD: 90 ML/MIN/1.73
GLOBULIN UR ELPH-MCNC: 2.4 GM/DL (ref 1.8–3.5)
GLUCOSE BLD-MCNC: 90 MG/DL (ref 74–124)
HCT VFR BLD AUTO: 35.7 % (ref 34–46.6)
HGB BLD-MCNC: 12.5 G/DL (ref 12–15.9)
IMM GRANULOCYTES # BLD AUTO: 0.02 10*3/MM3 (ref 0–0.05)
IMM GRANULOCYTES NFR BLD AUTO: 0.5 % (ref 0–0.5)
LYMPHOCYTES # BLD AUTO: 1.88 10*3/MM3 (ref 0.7–3.1)
LYMPHOCYTES NFR BLD AUTO: 50.4 % (ref 19.6–45.3)
MCH RBC QN AUTO: 35.1 PG (ref 26.6–33)
MCHC RBC AUTO-ENTMCNC: 35 G/DL (ref 31.5–35.7)
MCV RBC AUTO: 100.3 FL (ref 79–97)
MONOCYTES # BLD AUTO: 0.4 10*3/MM3 (ref 0.1–0.9)
MONOCYTES NFR BLD AUTO: 10.7 % (ref 5–12)
NEUTROPHILS # BLD AUTO: 1.37 10*3/MM3 (ref 1.7–7)
NEUTROPHILS NFR BLD AUTO: 36.8 % (ref 42.7–76)
NRBC BLD AUTO-RTO: 0 /100 WBC (ref 0–0.2)
PLATELET # BLD AUTO: 174 10*3/MM3 (ref 140–450)
PMV BLD AUTO: 8.4 FL (ref 6–12)
POTASSIUM BLD-SCNC: 4.1 MMOL/L (ref 3.5–4.7)
PROT SERPL-MCNC: 6.8 G/DL (ref 6.3–8)
RBC # BLD AUTO: 3.56 10*6/MM3 (ref 3.77–5.28)
SODIUM BLD-SCNC: 139 MMOL/L (ref 134–145)
WBC NRBC COR # BLD: 3.73 10*3/MM3 (ref 3.4–10.8)

## 2020-06-22 PROCEDURE — 36415 COLL VENOUS BLD VENIPUNCTURE: CPT

## 2020-06-22 PROCEDURE — 85025 COMPLETE CBC W/AUTO DIFF WBC: CPT

## 2020-06-22 PROCEDURE — 99214 OFFICE O/P EST MOD 30 MIN: CPT | Performed by: INTERNAL MEDICINE

## 2020-06-22 PROCEDURE — 80053 COMPREHEN METABOLIC PANEL: CPT

## 2020-06-22 RX ORDER — FLUTICASONE PROPIONATE 50 MCG
SPRAY, SUSPENSION (ML) NASAL
COMMUNITY
Start: 2020-05-21

## 2020-06-22 NOTE — PROGRESS NOTES
Subjective     REASON FOR FOLLOW UP: Essential thrombocytosis controlled with Hydrea    HISTORY OF PRESENT ILLNESS:  The patient is a 75 y.o. year old female who was referred to us from her primary care office with recent abnormal CBC showing her platelet count over 1.5 million. She was started on 81 mg aspirin and Hydrea.  She currently is taking 1500 mg daily.    Her blood counts today look good with a normal platelet count of 174,000 but her white count is down slightly and we will be further modifying her dose to 1500 mg 3 days a week (Monday, Wednesday, and Friday) and 1000 mg a day the remaining 4 days of the week    History of Present Illness     No past medical history on file.     Past Surgical History:   Procedure Laterality Date   • BACK SURGERY     • CATARACT EXTRACTION  2018   • HIP SURGERY     • KNEE SURGERY          Current Outpatient Medications on File Prior to Visit   Medication Sig Dispense Refill   • FLUoxetine (PROzac) 20 MG capsule Take 20 mg by mouth Daily.     • fluticasone (FLONASE) 50 MCG/ACT nasal spray      • hydroxyurea (HYDREA) 500 MG capsule Take 3 capsules by mouth Daily. Take 2 caps orally each am and 1 cap orally each pm 180 capsule 3   • montelukast (SINGULAIR) 10 MG tablet Take 10 mg by mouth Every Night.     • pravastatin (PRAVACHOL) 20 MG tablet Take 20 mg by mouth Daily.     • rizatriptan (MAXALT) 10 MG tablet Take 10 mg by mouth 1 (One) Time As Needed for Migraine. May repeat in 2 hours if needed     • Triamcinolone Acetonide (NASACORT) 55 MCG/ACT nasal inhaler 2 sprays into the nostril(s) as directed by provider Daily.     • triamterene-hydrochlorothiazide (MAXZIDE-25) 37.5-25 MG per tablet        No current facility-administered medications on file prior to visit.         ALLERGIES:    Allergies   Allergen Reactions   • Codeine Nausea And Vomiting   • Penicillins Hives        Social History     Socioeconomic History   • Marital status:      Spouse name: Not on file  "  • Number of children: Not on file   • Years of education: Not on file   • Highest education level: Not on file   Tobacco Use   • Smoking status: Never Smoker   • Smokeless tobacco: Current User        No family history on file.     Review of Systems   Constitutional: Negative for activity change, chills, fatigue and fever.   HENT: Negative for mouth sores, trouble swallowing and voice change.    Eyes: Negative for pain and visual disturbance.   Respiratory: Negative for cough, shortness of breath and wheezing.    Cardiovascular: Negative for chest pain and palpitations.   Gastrointestinal: Negative for abdominal pain, constipation, diarrhea, nausea and vomiting.   Genitourinary: Negative for difficulty urinating, frequency and urgency.   Musculoskeletal: Negative for arthralgias and joint swelling.   Skin: Negative for rash.   Neurological: Negative for dizziness, seizures, weakness and headaches.   Hematological: Negative for adenopathy. Does not bruise/bleed easily.   Psychiatric/Behavioral: Negative for behavioral problems and confusion. The patient is not nervous/anxious.        Objective     Vitals:    06/22/20 0903   BP: 127/82   Pulse: 98   Resp: 16   Temp: 97.1 °F (36.2 °C)   TempSrc: Skin   SpO2: 96%   Weight: 70.8 kg (156 lb)   Height: 165.1 cm (65\")   PainSc: 0-No pain     Current Status 6/22/2020   ECOG score 0       Physical Exam   Constitutional: She is oriented to person, place, and time. She appears well-developed and well-nourished. No distress.   HENT:   Head: Normocephalic.   Eyes: Pupils are equal, round, and reactive to light. Conjunctivae and EOM are normal. No scleral icterus.   Neck: Normal range of motion. Neck supple. No JVD present. No thyromegaly present.   Cardiovascular: Normal rate and regular rhythm. Exam reveals no gallop and no friction rub.   No murmur heard.  Pulmonary/Chest: Effort normal and breath sounds normal. She has no wheezes. She has no rales.   Abdominal: Soft. She " exhibits no distension and no mass. There is no tenderness.   Musculoskeletal: Normal range of motion. She exhibits no edema or deformity.   Lymphadenopathy:     She has no cervical adenopathy.   Neurological: She is alert and oriented to person, place, and time. She has normal reflexes. No cranial nerve deficit.   Skin: Skin is warm and dry. No rash noted. No erythema.   Nailbed discoloration   Psychiatric: She has a normal mood and affect. Her behavior is normal. Judgment normal.             RECENT LABS:  Hematology WBC   Date Value Ref Range Status   06/22/2020 3.73 3.40 - 10.80 10*3/mm3 Final     RBC   Date Value Ref Range Status   06/22/2020 3.56 (L) 3.77 - 5.28 10*6/mm3 Final     Hemoglobin   Date Value Ref Range Status   06/22/2020 12.5 12.0 - 15.9 g/dL Final     Hematocrit   Date Value Ref Range Status   06/22/2020 35.7 34.0 - 46.6 % Final     Platelets   Date Value Ref Range Status   06/22/2020 174 140 - 450 10*3/mm3 Final          Assessment/Plan   1.  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  Good response and tolerance to Hydrea thus far.  She currently is on 1500 mg a day but we will be decreasing her dose as part of the visit today to 1500 mg every Monday Wednesday and Friday and 1000 mg the remaining 4 days of the week.    Recommendations:  1.  Continue aspirin 81 mg po daily  2.  Adjust  Hydrea to a dose of 1500 mg every Monday Wednesday and Friday and 1000 mg the remaining 4 days of the week  3.  Lab + RN review Q2wks  4.  MD follow up 6 wks to review results and check CBC, and adjust Hydrea dose further as needed.

## 2020-07-03 ENCOUNTER — RESULTS ENCOUNTER (OUTPATIENT)
Dept: ONCOLOGY | Facility: CLINIC | Age: 75
End: 2020-07-03

## 2020-07-03 DIAGNOSIS — D75.839 THROMBOCYTOSIS: ICD-10-CM

## 2020-07-03 DIAGNOSIS — D47.1 MYELOPROLIFERATIVE DISORDER (HCC): ICD-10-CM

## 2020-07-03 DIAGNOSIS — Z15.89 MONOALLELIC MUTATION OF CALR3 GENE: ICD-10-CM

## 2020-07-06 ENCOUNTER — CLINICAL SUPPORT (OUTPATIENT)
Dept: ONCOLOGY | Facility: HOSPITAL | Age: 75
End: 2020-07-06

## 2020-07-06 ENCOUNTER — LAB (OUTPATIENT)
Dept: LAB | Facility: HOSPITAL | Age: 75
End: 2020-07-06

## 2020-07-06 DIAGNOSIS — Z15.89 MONOALLELIC MUTATION OF CALR3 GENE: ICD-10-CM

## 2020-07-06 DIAGNOSIS — D75.839 THROMBOCYTOSIS: ICD-10-CM

## 2020-07-06 DIAGNOSIS — D47.1 MYELOPROLIFERATIVE DISORDER (HCC): ICD-10-CM

## 2020-07-06 LAB
BASOPHILS # BLD AUTO: 0.04 10*3/MM3 (ref 0–0.2)
BASOPHILS NFR BLD AUTO: 0.9 % (ref 0–1.5)
EOSINOPHIL # BLD AUTO: 0.02 10*3/MM3 (ref 0–0.4)
EOSINOPHIL NFR BLD AUTO: 0.5 % (ref 0.3–6.2)
ERYTHROCYTE [DISTWIDTH] IN BLOOD BY AUTOMATED COUNT: ABNORMAL %
HCT VFR BLD AUTO: 34 % (ref 34–46.6)
HGB BLD-MCNC: 12.1 G/DL (ref 12–15.9)
IMM GRANULOCYTES # BLD AUTO: 0.01 10*3/MM3 (ref 0–0.05)
IMM GRANULOCYTES NFR BLD AUTO: 0.2 % (ref 0–0.5)
LYMPHOCYTES # BLD AUTO: 2.49 10*3/MM3 (ref 0.7–3.1)
LYMPHOCYTES NFR BLD AUTO: 57.8 % (ref 19.6–45.3)
MCH RBC QN AUTO: 36.2 PG (ref 26.6–33)
MCHC RBC AUTO-ENTMCNC: 35.6 G/DL (ref 31.5–35.7)
MCV RBC AUTO: 101.8 FL (ref 79–97)
MONOCYTES # BLD AUTO: 0.43 10*3/MM3 (ref 0.1–0.9)
MONOCYTES NFR BLD AUTO: 10 % (ref 5–12)
NEUTROPHILS NFR BLD AUTO: 1.32 10*3/MM3 (ref 1.7–7)
NEUTROPHILS NFR BLD AUTO: 30.6 % (ref 42.7–76)
NRBC BLD AUTO-RTO: 0 /100 WBC (ref 0–0.2)
PLATELET # BLD AUTO: 285 10*3/MM3 (ref 140–450)
PMV BLD AUTO: 9 FL (ref 6–12)
RBC # BLD AUTO: 3.34 10*6/MM3 (ref 3.77–5.28)
WBC # BLD AUTO: 4.31 10*3/MM3 (ref 3.4–10.8)

## 2020-07-06 PROCEDURE — G0463 HOSPITAL OUTPT CLINIC VISIT: HCPCS

## 2020-07-06 PROCEDURE — 85025 COMPLETE CBC W/AUTO DIFF WBC: CPT

## 2020-07-06 PROCEDURE — 36415 COLL VENOUS BLD VENIPUNCTURE: CPT

## 2020-07-17 ENCOUNTER — RESULTS ENCOUNTER (OUTPATIENT)
Dept: ONCOLOGY | Facility: CLINIC | Age: 75
End: 2020-07-17

## 2020-07-17 DIAGNOSIS — D47.1 MYELOPROLIFERATIVE DISORDER (HCC): ICD-10-CM

## 2020-07-17 DIAGNOSIS — D75.839 THROMBOCYTOSIS: ICD-10-CM

## 2020-07-17 DIAGNOSIS — Z15.89 MONOALLELIC MUTATION OF CALR3 GENE: ICD-10-CM

## 2020-07-20 ENCOUNTER — CLINICAL SUPPORT (OUTPATIENT)
Dept: ONCOLOGY | Facility: HOSPITAL | Age: 75
End: 2020-07-20

## 2020-07-20 ENCOUNTER — LAB (OUTPATIENT)
Dept: LAB | Facility: HOSPITAL | Age: 75
End: 2020-07-20

## 2020-07-20 DIAGNOSIS — D47.1 MYELOPROLIFERATIVE DISORDER (HCC): ICD-10-CM

## 2020-07-20 DIAGNOSIS — D75.839 THROMBOCYTOSIS: ICD-10-CM

## 2020-07-20 DIAGNOSIS — Z15.89 MONOALLELIC MUTATION OF CALR3 GENE: ICD-10-CM

## 2020-07-20 LAB
BASOPHILS # BLD AUTO: 0.03 10*3/MM3 (ref 0–0.2)
BASOPHILS NFR BLD AUTO: 0.7 % (ref 0–1.5)
EOSINOPHIL # BLD AUTO: 0.02 10*3/MM3 (ref 0–0.4)
EOSINOPHIL NFR BLD AUTO: 0.5 % (ref 0.3–6.2)
ERYTHROCYTE [DISTWIDTH] IN BLOOD BY AUTOMATED COUNT: ABNORMAL %
HCT VFR BLD AUTO: 32 % (ref 34–46.6)
HGB BLD-MCNC: 11.4 G/DL (ref 12–15.9)
IMM GRANULOCYTES # BLD AUTO: 0.04 10*3/MM3 (ref 0–0.05)
IMM GRANULOCYTES NFR BLD AUTO: 0.9 % (ref 0–0.5)
LYMPHOCYTES # BLD AUTO: 2.44 10*3/MM3 (ref 0.7–3.1)
LYMPHOCYTES NFR BLD AUTO: 55.1 % (ref 19.6–45.3)
MCH RBC QN AUTO: 38.3 PG (ref 26.6–33)
MCHC RBC AUTO-ENTMCNC: 35.6 G/DL (ref 31.5–35.7)
MCV RBC AUTO: 107.4 FL (ref 79–97)
MONOCYTES # BLD AUTO: 0.57 10*3/MM3 (ref 0.1–0.9)
MONOCYTES NFR BLD AUTO: 12.9 % (ref 5–12)
NEUTROPHILS NFR BLD AUTO: 1.33 10*3/MM3 (ref 1.7–7)
NEUTROPHILS NFR BLD AUTO: 29.9 % (ref 42.7–76)
NRBC BLD AUTO-RTO: 0 /100 WBC (ref 0–0.2)
PLATELET # BLD AUTO: 318 10*3/MM3 (ref 140–450)
PMV BLD AUTO: 9.1 FL (ref 6–12)
RBC # BLD AUTO: 2.98 10*6/MM3 (ref 3.77–5.28)
WBC # BLD AUTO: 4.43 10*3/MM3 (ref 3.4–10.8)

## 2020-07-20 PROCEDURE — G0463 HOSPITAL OUTPT CLINIC VISIT: HCPCS

## 2020-07-20 PROCEDURE — 36415 COLL VENOUS BLD VENIPUNCTURE: CPT

## 2020-07-20 PROCEDURE — 85025 COMPLETE CBC W/AUTO DIFF WBC: CPT

## 2020-07-20 NOTE — PROGRESS NOTES
Lab Results   Component Value Date    WBC 4.43 07/20/2020    HGB 11.4 (L) 07/20/2020    HCT 32.0 (L) 07/20/2020    .4 (H) 07/20/2020     07/20/2020   Pt is here for lab with RN review.   ANC 1.33  CBC reviewed with pt, counts are stable for this pt at this time. Pt has no complaints and reports feeling good. Reviewed need for good handwashing and to avoid crowds. Copy of labs given to pt and f/u appt reviewed. Pt is instructed to call the office with any concerns or new symptoms prior to next visit. Pt vu

## 2020-07-29 ENCOUNTER — TELEPHONE (OUTPATIENT)
Dept: ORTHOPEDIC SURGERY | Facility: CLINIC | Age: 75
End: 2020-07-29

## 2020-07-31 ENCOUNTER — RESULTS ENCOUNTER (OUTPATIENT)
Dept: ONCOLOGY | Facility: CLINIC | Age: 75
End: 2020-07-31

## 2020-07-31 DIAGNOSIS — D75.839 THROMBOCYTOSIS: ICD-10-CM

## 2020-07-31 DIAGNOSIS — Z15.89 MONOALLELIC MUTATION OF CALR3 GENE: ICD-10-CM

## 2020-07-31 DIAGNOSIS — D47.1 MYELOPROLIFERATIVE DISORDER (HCC): ICD-10-CM

## 2020-08-03 ENCOUNTER — LAB (OUTPATIENT)
Dept: LAB | Facility: HOSPITAL | Age: 75
End: 2020-08-03

## 2020-08-03 ENCOUNTER — OFFICE VISIT (OUTPATIENT)
Dept: ONCOLOGY | Facility: CLINIC | Age: 75
End: 2020-08-03

## 2020-08-03 VITALS
TEMPERATURE: 98 F | HEIGHT: 65 IN | BODY MASS INDEX: 26.12 KG/M2 | HEART RATE: 94 BPM | SYSTOLIC BLOOD PRESSURE: 115 MMHG | WEIGHT: 156.8 LBS | OXYGEN SATURATION: 97 % | RESPIRATION RATE: 16 BRPM | DIASTOLIC BLOOD PRESSURE: 77 MMHG

## 2020-08-03 DIAGNOSIS — D75.839 THROMBOCYTOSIS: ICD-10-CM

## 2020-08-03 DIAGNOSIS — Z15.89 MONOALLELIC MUTATION OF CALR3 GENE: ICD-10-CM

## 2020-08-03 DIAGNOSIS — D47.1 MYELOPROLIFERATIVE DISORDER (HCC): Primary | ICD-10-CM

## 2020-08-03 DIAGNOSIS — D47.1 MYELOPROLIFERATIVE DISORDER (HCC): ICD-10-CM

## 2020-08-03 LAB
ALBUMIN SERPL-MCNC: 4.6 G/DL (ref 3.5–5.2)
ALBUMIN/GLOB SERPL: 1.7 G/DL (ref 1.1–2.4)
ALP SERPL-CCNC: 55 U/L (ref 38–116)
ALT SERPL W P-5'-P-CCNC: 15 U/L (ref 0–33)
ANION GAP SERPL CALCULATED.3IONS-SCNC: 12.7 MMOL/L (ref 5–15)
AST SERPL-CCNC: 25 U/L (ref 0–32)
BASOPHILS # BLD AUTO: 0.04 10*3/MM3 (ref 0–0.2)
BASOPHILS NFR BLD AUTO: 0.7 % (ref 0–1.5)
BILIRUB SERPL-MCNC: 0.5 MG/DL (ref 0.2–1.2)
BUN SERPL-MCNC: 20 MG/DL (ref 6–20)
BUN/CREAT SERPL: 25.6 (ref 7.3–30)
CALCIUM SPEC-SCNC: 10 MG/DL (ref 8.5–10.2)
CHLORIDE SERPL-SCNC: 99 MMOL/L (ref 98–107)
CO2 SERPL-SCNC: 27.3 MMOL/L (ref 22–29)
CREAT SERPL-MCNC: 0.78 MG/DL (ref 0.6–1.1)
EOSINOPHIL # BLD AUTO: 0.02 10*3/MM3 (ref 0–0.4)
EOSINOPHIL NFR BLD AUTO: 0.4 % (ref 0.3–6.2)
ERYTHROCYTE [DISTWIDTH] IN BLOOD BY AUTOMATED COUNT: ABNORMAL %
GFR SERPL CREATININE-BSD FRML MDRD: 72 ML/MIN/1.73
GLOBULIN UR ELPH-MCNC: 2.7 GM/DL (ref 1.8–3.5)
GLUCOSE SERPL-MCNC: 113 MG/DL (ref 74–124)
HCT VFR BLD AUTO: 36 % (ref 34–46.6)
HGB BLD-MCNC: 12.7 G/DL (ref 12–15.9)
IMM GRANULOCYTES # BLD AUTO: 0.02 10*3/MM3 (ref 0–0.05)
IMM GRANULOCYTES NFR BLD AUTO: 0.4 % (ref 0–0.5)
LYMPHOCYTES # BLD AUTO: 2.6 10*3/MM3 (ref 0.7–3.1)
LYMPHOCYTES NFR BLD AUTO: 48.2 % (ref 19.6–45.3)
MCH RBC QN AUTO: 41.4 PG (ref 26.6–33)
MCHC RBC AUTO-ENTMCNC: 35.3 G/DL (ref 31.5–35.7)
MCV RBC AUTO: 117.3 FL (ref 79–97)
MONOCYTES # BLD AUTO: 0.7 10*3/MM3 (ref 0.1–0.9)
MONOCYTES NFR BLD AUTO: 13 % (ref 5–12)
NEUTROPHILS NFR BLD AUTO: 2.01 10*3/MM3 (ref 1.7–7)
NEUTROPHILS NFR BLD AUTO: 37.3 % (ref 42.7–76)
NRBC BLD AUTO-RTO: 0 /100 WBC (ref 0–0.2)
PLATELET # BLD AUTO: 228 10*3/MM3 (ref 140–450)
PMV BLD AUTO: 8.8 FL (ref 6–12)
POTASSIUM SERPL-SCNC: 3.7 MMOL/L (ref 3.5–4.7)
PROT SERPL-MCNC: 7.3 G/DL (ref 6.3–8)
RBC # BLD AUTO: 3.07 10*6/MM3 (ref 3.77–5.28)
SODIUM SERPL-SCNC: 139 MMOL/L (ref 134–145)
WBC # BLD AUTO: 5.39 10*3/MM3 (ref 3.4–10.8)

## 2020-08-03 PROCEDURE — 36415 COLL VENOUS BLD VENIPUNCTURE: CPT

## 2020-08-03 PROCEDURE — 99214 OFFICE O/P EST MOD 30 MIN: CPT | Performed by: INTERNAL MEDICINE

## 2020-08-03 PROCEDURE — 80053 COMPREHEN METABOLIC PANEL: CPT

## 2020-08-03 PROCEDURE — 85025 COMPLETE CBC W/AUTO DIFF WBC: CPT

## 2020-08-03 RX ORDER — HYDROXYUREA 500 MG/1
CAPSULE ORAL
Qty: 70 CAPSULE | Refills: 5 | Status: SHIPPED | OUTPATIENT
Start: 2020-08-03 | End: 2021-02-17

## 2020-08-03 NOTE — PROGRESS NOTES
Subjective     REASON FOR FOLLOW UP: Essential thrombocytosis controlled with Hydrea    HISTORY OF PRESENT ILLNESS:  The patient is a 75 y.o. year old female who was referred to us from her primary care office with recent abnormal CBC showing her platelet count over 1.5 million. She was started on 81 mg aspirin and Hydrea.  She currently is taking 1500 mg every Monday Wednesday and Friday and 1000 mg Tuesday Thursday Saturday and Sunday..    Her blood counts today look good with a normal platelet count of 228,000.  Her hemoglobin and white count are normal also.  She does not have any new complaints and is tolerating this dose very well.  History of Present Illness     No past medical history on file.     Past Surgical History:   Procedure Laterality Date   • BACK SURGERY     • CATARACT EXTRACTION  2018   • HIP SURGERY     • KNEE SURGERY          Current Outpatient Medications on File Prior to Visit   Medication Sig Dispense Refill   • FLUoxetine (PROzac) 20 MG capsule Take 20 mg by mouth Daily.     • fluticasone (FLONASE) 50 MCG/ACT nasal spray      • hydroxyurea (HYDREA) 500 MG capsule Take 3 capsules by mouth Daily. Take 2 caps orally each am and 1 cap orally each pm 180 capsule 3   • montelukast (SINGULAIR) 10 MG tablet Take 10 mg by mouth Every Night.     • pravastatin (PRAVACHOL) 20 MG tablet Take 20 mg by mouth Daily.     • rizatriptan (MAXALT) 10 MG tablet Take 10 mg by mouth 1 (One) Time As Needed for Migraine. May repeat in 2 hours if needed     • Triamcinolone Acetonide (NASACORT) 55 MCG/ACT nasal inhaler 2 sprays into the nostril(s) as directed by provider Daily.     • triamterene-hydrochlorothiazide (MAXZIDE-25) 37.5-25 MG per tablet        No current facility-administered medications on file prior to visit.         ALLERGIES:    Allergies   Allergen Reactions   • Codeine Nausea And Vomiting   • Penicillins Hives        Social History     Socioeconomic History   • Marital status:      Spouse  "name: Not on file   • Number of children: Not on file   • Years of education: Not on file   • Highest education level: Not on file   Tobacco Use   • Smoking status: Never Smoker   • Smokeless tobacco: Current User        No family history on file.     Review of Systems   Constitutional: Negative for activity change, chills, fatigue and fever.   HENT: Negative for mouth sores, trouble swallowing and voice change.    Eyes: Negative for pain and visual disturbance.   Respiratory: Negative for cough, shortness of breath and wheezing.    Cardiovascular: Negative for chest pain and palpitations.   Gastrointestinal: Negative for abdominal pain, constipation, diarrhea, nausea and vomiting.   Genitourinary: Negative for difficulty urinating, frequency and urgency.   Musculoskeletal: Negative for arthralgias and joint swelling.   Skin: Negative for rash.   Neurological: Negative for dizziness, seizures, weakness and headaches.   Hematological: Negative for adenopathy. Does not bruise/bleed easily.   Psychiatric/Behavioral: Negative for behavioral problems and confusion. The patient is not nervous/anxious.    Reviewed and unchanged on the visit of 8/3/2020    Objective     Vitals:    08/03/20 1550   BP: 115/77   Pulse: 94   Resp: 16   Temp: 98 °F (36.7 °C)   TempSrc: Skin   SpO2: 97%   Weight: 71.1 kg (156 lb 12.8 oz)   Height: 165.1 cm (65\")   PainSc: 0-No pain     Current Status 8/3/2020   ECOG score 0       Physical Exam   Constitutional: She is oriented to person, place, and time. She appears well-developed and well-nourished. No distress.   HENT:   Head: Normocephalic.   Eyes: Pupils are equal, round, and reactive to light. Conjunctivae and EOM are normal. No scleral icterus.   Neck: Normal range of motion. Neck supple. No JVD present. No thyromegaly present.   Cardiovascular: Normal rate and regular rhythm. Exam reveals no gallop and no friction rub.   No murmur heard.  Pulmonary/Chest: Effort normal and breath sounds " normal. She has no wheezes. She has no rales.   Abdominal: Soft. She exhibits no distension and no mass. There is no tenderness.   Musculoskeletal: Normal range of motion. She exhibits no edema or deformity.   Lymphadenopathy:     She has no cervical adenopathy.   Neurological: She is alert and oriented to person, place, and time. She has normal reflexes. No cranial nerve deficit.   Skin: Skin is warm and dry. No rash noted. No erythema.   Nailbed discoloration   Psychiatric: She has a normal mood and affect. Her behavior is normal. Judgment normal.   Repeated and unchanged on the visit of 8/3/2020         RECENT LABS:  Hematology WBC   Date Value Ref Range Status   08/03/2020 5.39 3.40 - 10.80 10*3/mm3 Final     RBC   Date Value Ref Range Status   08/03/2020 3.07 (L) 3.77 - 5.28 10*6/mm3 Final     Hemoglobin   Date Value Ref Range Status   08/03/2020 12.7 12.0 - 15.9 g/dL Final     Hematocrit   Date Value Ref Range Status   08/03/2020 36.0 34.0 - 46.6 % Final     Platelets   Date Value Ref Range Status   08/03/2020 228 140 - 450 10*3/mm3 Final          Assessment/Plan   1.  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  Good response and tolerance to Hydrea thus far.  She currently is on 1500 mg every Monday Wednesday and Friday and 1000 mg the remaining 4 days of the week.    Recommendations:  1.  Continue aspirin 81 mg po daily  2.  Continue Hydrea to a dose of 1500 mg every Monday Wednesday and Friday and 1000 mg the remaining 4 days of the week  3.  Lab + RN review Q4wks  4.  MD follow up 4 months to review results and check CBC, and adjust Hydrea dose further as needed.  If she is stable on the next visit we likely will go to an every 6-month MD follow-up schedule.

## 2020-08-28 ENCOUNTER — RESULTS ENCOUNTER (OUTPATIENT)
Dept: ONCOLOGY | Facility: CLINIC | Age: 75
End: 2020-08-28

## 2020-08-28 ENCOUNTER — CONSULT (OUTPATIENT)
Dept: ORTHOPEDIC SURGERY | Facility: CLINIC | Age: 75
End: 2020-08-28

## 2020-08-28 VITALS — HEIGHT: 65 IN | BODY MASS INDEX: 25.92 KG/M2 | TEMPERATURE: 98.2 F | WEIGHT: 155.6 LBS

## 2020-08-28 DIAGNOSIS — Z15.89 MONOALLELIC MUTATION OF CALR3 GENE: ICD-10-CM

## 2020-08-28 DIAGNOSIS — D47.1 MYELOPROLIFERATIVE DISORDER (HCC): ICD-10-CM

## 2020-08-28 DIAGNOSIS — M48.062 SPINAL STENOSIS OF LUMBAR REGION WITH NEUROGENIC CLAUDICATION: ICD-10-CM

## 2020-08-28 DIAGNOSIS — M41.9 SCOLIOSIS OF LUMBAR SPINE, UNSPECIFIED SCOLIOSIS TYPE: Primary | ICD-10-CM

## 2020-08-28 DIAGNOSIS — D75.839 THROMBOCYTOSIS: ICD-10-CM

## 2020-08-28 PROCEDURE — 99213 OFFICE O/P EST LOW 20 MIN: CPT | Performed by: ORTHOPAEDIC SURGERY

## 2020-08-28 PROCEDURE — 72100 X-RAY EXAM L-S SPINE 2/3 VWS: CPT | Performed by: ORTHOPAEDIC SURGERY

## 2020-08-28 NOTE — PROGRESS NOTES
New patient or new problem visit    Chief Complaint   Patient presents with   • Lumbar Spine - Establish Care, Pain       HPI: He complains about back pain radiating to both lower extremities been ongoing a while equal portions back and leg moderate constant aching physical therapy did not help much.  Worse when she is walking and gardening is tolerable but she wants to make sure nothing is being damaged.  She did undergo a laminectomy and 71 by Dr. Robertson in [place].    PFSH: See chart- reviewed    Review of Systems   HENT: Positive for hearing loss, sinus pressure, sinus pain and tinnitus.    Eyes: Positive for photophobia.   Endocrine: Positive for heat intolerance.   Musculoskeletal: Positive for arthralgias, back pain, gait problem, myalgias and neck stiffness.   Allergic/Immunologic: Positive for food allergies.   Neurological: Positive for headaches.       PE: Constitutional: Vital signs above-noted.  Awake, alert and oriented    Psychiatric: Affect and insight do not appear grossly disturbed.    Pulmonary: Breathing is unlabored, color is good.    Skin: Warm, dry and normal turgor    Cardiac: Pedal pulses intact.  No edema.    Eyesight and hearing appear adequate for examination purposes      Musculoskeletal:  There is some tenderness to percussion and palpation of the spine. Motion appears undisturbed.  Posture is unremarkable to coronal and sagittal inspection.    The skin about the area is notable for a long well-healed midline lumbar incision.  There is no palpable or visible deformity.  There is no local spasm.       Neurologic:   Reflexes are 2+ and symmetrical in the patellae and absent in the Achilles.   Motor function is undisturbed in quadriceps, EHL, and gastrocnemius   sensation appears symmetrically intact to light touch.  In the bilateral lower extremities there is no evidence of atrophy.   Clonus is absent..  Gait appears undisturbed.  SLR test negative      MEDICAL DECISION MAKING    XRAY:  Plain film x-rays of the lumbar spine show slight loss of lordosis multilevel spondylosis well-maintained coronal posture.  No comparison views are available.    Other: n/a    Impression: Lumbar disc degeneration and probable lumbar spinal stenosis    Plan: Lumbar MRI for evaluation of extent of stenosis.  There may be a role for epidurals on the other hand she mostly wants reassurance that she is safe to carry on as is.

## 2020-08-31 ENCOUNTER — LAB (OUTPATIENT)
Dept: LAB | Facility: HOSPITAL | Age: 75
End: 2020-08-31

## 2020-08-31 ENCOUNTER — CLINICAL SUPPORT (OUTPATIENT)
Dept: ONCOLOGY | Facility: HOSPITAL | Age: 75
End: 2020-08-31

## 2020-08-31 DIAGNOSIS — D75.839 THROMBOCYTOSIS: ICD-10-CM

## 2020-08-31 DIAGNOSIS — D47.1 MYELOPROLIFERATIVE DISORDER (HCC): ICD-10-CM

## 2020-08-31 DIAGNOSIS — Z15.89 MONOALLELIC MUTATION OF CALR3 GENE: ICD-10-CM

## 2020-08-31 LAB
BASOPHILS # BLD AUTO: 0.04 10*3/MM3 (ref 0–0.2)
BASOPHILS NFR BLD AUTO: 1.1 % (ref 0–1.5)
DEPRECATED RDW RBC AUTO: 57.5 FL (ref 37–54)
EOSINOPHIL # BLD AUTO: 0.04 10*3/MM3 (ref 0–0.4)
EOSINOPHIL NFR BLD AUTO: 1.1 % (ref 0.3–6.2)
ERYTHROCYTE [DISTWIDTH] IN BLOOD BY AUTOMATED COUNT: 13.7 % (ref 12.3–15.4)
HCT VFR BLD AUTO: 32.1 % (ref 34–46.6)
HGB BLD-MCNC: 11.9 G/DL (ref 12–15.9)
IMM GRANULOCYTES # BLD AUTO: 0.01 10*3/MM3 (ref 0–0.05)
IMM GRANULOCYTES NFR BLD AUTO: 0.3 % (ref 0–0.5)
LYMPHOCYTES # BLD AUTO: 1.81 10*3/MM3 (ref 0.7–3.1)
LYMPHOCYTES NFR BLD AUTO: 49.3 % (ref 19.6–45.3)
MCH RBC QN AUTO: 45.9 PG (ref 26.6–33)
MCHC RBC AUTO-ENTMCNC: 37.1 G/DL (ref 31.5–35.7)
MCV RBC AUTO: 123.9 FL (ref 79–97)
MONOCYTES # BLD AUTO: 0.44 10*3/MM3 (ref 0.1–0.9)
MONOCYTES NFR BLD AUTO: 12 % (ref 5–12)
NEUTROPHILS NFR BLD AUTO: 1.33 10*3/MM3 (ref 1.7–7)
NEUTROPHILS NFR BLD AUTO: 36.2 % (ref 42.7–76)
NRBC BLD AUTO-RTO: 0 /100 WBC (ref 0–0.2)
PLATELET # BLD AUTO: 278 10*3/MM3 (ref 140–450)
PMV BLD AUTO: 9 FL (ref 6–12)
RBC # BLD AUTO: 2.59 10*6/MM3 (ref 3.77–5.28)
WBC # BLD AUTO: 3.67 10*3/MM3 (ref 3.4–10.8)

## 2020-08-31 PROCEDURE — 85025 COMPLETE CBC W/AUTO DIFF WBC: CPT

## 2020-08-31 PROCEDURE — 36415 COLL VENOUS BLD VENIPUNCTURE: CPT

## 2020-08-31 PROCEDURE — G0463 HOSPITAL OUTPT CLINIC VISIT: HCPCS

## 2020-08-31 NOTE — PROGRESS NOTES
CBC reviewed with pt. Hgb 11.9, ANC 1.33. All other counts stable. Pt confirmed Hydrea dose, states she is taking 1500mg M,W,F and 1000mg all other days. States she is tolerating well, voicing no complaints. States she has a little discoloration in her fingernails but this is not new and Dr. Borwn advised her it is an expected SE. Since some of her counts decreased, d/w Emmanuelle Meyers NP (Dr. Brown out of office). Per Emmanuelle, no adjustments needed. We will continue to monitor monthly. Pt aware.    Lab Results   Component Value Date    WBC 3.67 08/31/2020    HGB 11.9 (L) 08/31/2020    HCT 32.1 (L) 08/31/2020    .9 (H) 08/31/2020     08/31/2020

## 2020-09-10 ENCOUNTER — HOSPITAL ENCOUNTER (OUTPATIENT)
Dept: MRI IMAGING | Facility: HOSPITAL | Age: 75
Discharge: HOME | End: 2020-09-10
Admitting: ORTHOPAEDIC SURGERY

## 2020-09-10 DIAGNOSIS — M41.9 SCOLIOSIS OF LUMBAR SPINE, UNSPECIFIED SCOLIOSIS TYPE: ICD-10-CM

## 2020-09-10 DIAGNOSIS — M48.062 SPINAL STENOSIS OF LUMBAR REGION WITH NEUROGENIC CLAUDICATION: ICD-10-CM

## 2020-09-10 PROCEDURE — 72148 MRI LUMBAR SPINE W/O DYE: CPT

## 2020-09-14 ENCOUNTER — TELEPHONE (OUTPATIENT)
Dept: ORTHOPEDIC SURGERY | Facility: CLINIC | Age: 75
End: 2020-09-14

## 2020-09-14 NOTE — TELEPHONE ENCOUNTER
Patient called to request results of Lumbar MRI done 9/10/20 ([facility]). Patient can be reached at [phone]. Thanks /srh

## 2020-09-15 NOTE — TELEPHONE ENCOUNTER
Spoke with pt. She stated she's not in any pain right now and she will call us when the pain returns.

## 2020-09-15 NOTE — TELEPHONE ENCOUNTER
Reassured her that the MRI shows widespread wear-and-tear changes, but nothing sinister in the way of nerve compression or apparent instability.  That means she can safely live with this, try epidurals, and try to avoid surgery as we discussed.

## 2020-09-25 ENCOUNTER — RESULTS ENCOUNTER (OUTPATIENT)
Dept: ONCOLOGY | Facility: CLINIC | Age: 75
End: 2020-09-25

## 2020-09-25 DIAGNOSIS — D75.839 THROMBOCYTOSIS: ICD-10-CM

## 2020-09-25 DIAGNOSIS — D47.1 MYELOPROLIFERATIVE DISORDER (HCC): ICD-10-CM

## 2020-09-25 DIAGNOSIS — Z15.89 MONOALLELIC MUTATION OF CALR3 GENE: ICD-10-CM

## 2020-09-28 ENCOUNTER — CLINICAL SUPPORT (OUTPATIENT)
Dept: ONCOLOGY | Facility: HOSPITAL | Age: 75
End: 2020-09-28

## 2020-09-28 ENCOUNTER — LAB (OUTPATIENT)
Dept: LAB | Facility: HOSPITAL | Age: 75
End: 2020-09-28

## 2020-09-28 DIAGNOSIS — D75.839 THROMBOCYTOSIS: ICD-10-CM

## 2020-09-28 DIAGNOSIS — D47.1 MYELOPROLIFERATIVE DISORDER (HCC): ICD-10-CM

## 2020-09-28 DIAGNOSIS — Z15.89 MONOALLELIC MUTATION OF CALR3 GENE: ICD-10-CM

## 2020-09-28 LAB
BASOPHILS # BLD AUTO: 0.04 10*3/MM3 (ref 0–0.2)
BASOPHILS NFR BLD AUTO: 0.8 % (ref 0–1.5)
DEPRECATED RDW RBC AUTO: 55.3 FL (ref 37–54)
EOSINOPHIL # BLD AUTO: 0.04 10*3/MM3 (ref 0–0.4)
EOSINOPHIL NFR BLD AUTO: 0.8 % (ref 0.3–6.2)
ERYTHROCYTE [DISTWIDTH] IN BLOOD BY AUTOMATED COUNT: 11.9 % (ref 12.3–15.4)
HCT VFR BLD AUTO: 34.1 % (ref 34–46.6)
HGB BLD-MCNC: 12.7 G/DL (ref 12–15.9)
IMM GRANULOCYTES # BLD AUTO: 0.03 10*3/MM3 (ref 0–0.05)
IMM GRANULOCYTES NFR BLD AUTO: 0.6 % (ref 0–0.5)
LYMPHOCYTES # BLD AUTO: 2.47 10*3/MM3 (ref 0.7–3.1)
LYMPHOCYTES NFR BLD AUTO: 48.6 % (ref 19.6–45.3)
MCH RBC QN AUTO: 47.2 PG (ref 26.6–33)
MCHC RBC AUTO-ENTMCNC: 37.2 G/DL (ref 31.5–35.7)
MCV RBC AUTO: 126.8 FL (ref 79–97)
MONOCYTES # BLD AUTO: 0.51 10*3/MM3 (ref 0.1–0.9)
MONOCYTES NFR BLD AUTO: 10 % (ref 5–12)
NEUTROPHILS NFR BLD AUTO: 1.99 10*3/MM3 (ref 1.7–7)
NEUTROPHILS NFR BLD AUTO: 39.2 % (ref 42.7–76)
NRBC BLD AUTO-RTO: 0 /100 WBC (ref 0–0.2)
PLATELET # BLD AUTO: 202 10*3/MM3 (ref 140–450)
PMV BLD AUTO: 9.6 FL (ref 6–12)
RBC # BLD AUTO: 2.69 10*6/MM3 (ref 3.77–5.28)
WBC # BLD AUTO: 5.08 10*3/MM3 (ref 3.4–10.8)

## 2020-09-28 PROCEDURE — G0463 HOSPITAL OUTPT CLINIC VISIT: HCPCS

## 2020-09-28 PROCEDURE — 36415 COLL VENOUS BLD VENIPUNCTURE: CPT

## 2020-09-28 PROCEDURE — 85025 COMPLETE CBC W/AUTO DIFF WBC: CPT

## 2020-09-28 NOTE — PROGRESS NOTES
CBC reviewed with pt. Counts improved from last visit. Pt taking Hydrea as instructed (1500mg M,W,F and 1000mg all other days). She is tolerating treatment well. She does still report some discoloration of her fingernails and toenails. This is not bothersome to her and states Dr. Brown told her this could happen while on Hydrea. D/W Babita in pharmacy also and she said changes in skin pigmentation could occur while on Hydrea. Informed pt to keep an eye on this. If she notices her fingernails becoming soft, brittle or feeling like they are going to fall off, she needs to call our office. Pt v/u.       Lab Results   Component Value Date    WBC 5.08 09/28/2020    HGB 12.7 09/28/2020    HCT 34.1 09/28/2020    .8 (H) 09/28/2020     09/28/2020

## 2020-10-23 ENCOUNTER — RESULTS ENCOUNTER (OUTPATIENT)
Dept: ONCOLOGY | Facility: CLINIC | Age: 75
End: 2020-10-23

## 2020-10-23 DIAGNOSIS — Z15.89 MONOALLELIC MUTATION OF CALR3 GENE: ICD-10-CM

## 2020-10-23 DIAGNOSIS — D75.839 THROMBOCYTOSIS: ICD-10-CM

## 2020-10-23 DIAGNOSIS — D47.1 MYELOPROLIFERATIVE DISORDER (HCC): ICD-10-CM

## 2020-10-26 ENCOUNTER — LAB (OUTPATIENT)
Dept: LAB | Facility: HOSPITAL | Age: 75
End: 2020-10-26

## 2020-10-26 ENCOUNTER — CLINICAL SUPPORT (OUTPATIENT)
Dept: ONCOLOGY | Facility: HOSPITAL | Age: 75
End: 2020-10-26

## 2020-10-26 DIAGNOSIS — D47.1 MYELOPROLIFERATIVE DISORDER (HCC): ICD-10-CM

## 2020-10-26 DIAGNOSIS — Z15.89 MONOALLELIC MUTATION OF CALR3 GENE: ICD-10-CM

## 2020-10-26 DIAGNOSIS — D75.839 THROMBOCYTOSIS: ICD-10-CM

## 2020-10-26 LAB
BASOPHILS # BLD AUTO: 0.04 10*3/MM3 (ref 0–0.2)
BASOPHILS NFR BLD AUTO: 0.9 % (ref 0–1.5)
DEPRECATED RDW RBC AUTO: 55.1 FL (ref 37–54)
EOSINOPHIL # BLD AUTO: 0.07 10*3/MM3 (ref 0–0.4)
EOSINOPHIL NFR BLD AUTO: 1.6 % (ref 0.3–6.2)
ERYTHROCYTE [DISTWIDTH] IN BLOOD BY AUTOMATED COUNT: 11.8 % (ref 12.3–15.4)
HCT VFR BLD AUTO: 35 % (ref 34–46.6)
HGB BLD-MCNC: 12.9 G/DL (ref 12–15.9)
IMM GRANULOCYTES # BLD AUTO: 0.01 10*3/MM3 (ref 0–0.05)
IMM GRANULOCYTES NFR BLD AUTO: 0.2 % (ref 0–0.5)
LYMPHOCYTES # BLD AUTO: 1.86 10*3/MM3 (ref 0.7–3.1)
LYMPHOCYTES NFR BLD AUTO: 43.7 % (ref 19.6–45.3)
MCH RBC QN AUTO: 46.6 PG (ref 26.6–33)
MCHC RBC AUTO-ENTMCNC: 36.9 G/DL (ref 31.5–35.7)
MCV RBC AUTO: 126.4 FL (ref 79–97)
MONOCYTES # BLD AUTO: 0.5 10*3/MM3 (ref 0.1–0.9)
MONOCYTES NFR BLD AUTO: 11.7 % (ref 5–12)
NEUTROPHILS NFR BLD AUTO: 1.78 10*3/MM3 (ref 1.7–7)
NEUTROPHILS NFR BLD AUTO: 41.9 % (ref 42.7–76)
NRBC BLD AUTO-RTO: 0 /100 WBC (ref 0–0.2)
PLATELET # BLD AUTO: 209 10*3/MM3 (ref 140–450)
PMV BLD AUTO: 9.4 FL (ref 6–12)
RBC # BLD AUTO: 2.77 10*6/MM3 (ref 3.77–5.28)
WBC # BLD AUTO: 4.26 10*3/MM3 (ref 3.4–10.8)

## 2020-10-26 PROCEDURE — 36415 COLL VENOUS BLD VENIPUNCTURE: CPT

## 2020-10-26 PROCEDURE — 85025 COMPLETE CBC W/AUTO DIFF WBC: CPT

## 2020-10-26 PROCEDURE — G0463 HOSPITAL OUTPT CLINIC VISIT: HCPCS

## 2020-10-26 NOTE — PROGRESS NOTES
CBC reviewed with pt. Counts stable for pt. She continues on Hydrea 1500mg M,W,F and 1000mg all other days. She is tolerating well. Pt will return in about 3 weeks for MD visit with Dr. Brown. Copy of labs given to pt.         Lab Results   Component Value Date    WBC 4.26 10/26/2020    HGB 12.9 10/26/2020    HCT 35.0 10/26/2020    .4 (H) 10/26/2020     10/26/2020

## 2020-11-16 DIAGNOSIS — D47.1 MYELOPROLIFERATIVE DISORDER (HCC): Primary | ICD-10-CM

## 2020-11-19 ENCOUNTER — LAB (OUTPATIENT)
Dept: LAB | Facility: HOSPITAL | Age: 75
End: 2020-11-19

## 2020-11-19 ENCOUNTER — OFFICE VISIT (OUTPATIENT)
Dept: ONCOLOGY | Facility: CLINIC | Age: 75
End: 2020-11-19

## 2020-11-19 VITALS
RESPIRATION RATE: 16 BRPM | OXYGEN SATURATION: 94 % | HEIGHT: 65 IN | HEART RATE: 108 BPM | DIASTOLIC BLOOD PRESSURE: 68 MMHG | TEMPERATURE: 97.8 F | WEIGHT: 157.8 LBS | SYSTOLIC BLOOD PRESSURE: 106 MMHG | BODY MASS INDEX: 26.29 KG/M2

## 2020-11-19 DIAGNOSIS — D75.839 THROMBOCYTOSIS: ICD-10-CM

## 2020-11-19 DIAGNOSIS — Z15.89 MONOALLELIC MUTATION OF CALR3 GENE: ICD-10-CM

## 2020-11-19 DIAGNOSIS — D47.1 MYELOPROLIFERATIVE DISORDER (HCC): ICD-10-CM

## 2020-11-19 DIAGNOSIS — D75.839 THROMBOCYTOSIS: Primary | ICD-10-CM

## 2020-11-19 LAB
ALBUMIN SERPL-MCNC: 4.6 G/DL (ref 3.5–5.2)
ALBUMIN/GLOB SERPL: 1.8 G/DL (ref 1.1–2.4)
ALP SERPL-CCNC: 62 U/L (ref 38–116)
ALT SERPL W P-5'-P-CCNC: 16 U/L (ref 0–33)
ANION GAP SERPL CALCULATED.3IONS-SCNC: 9.3 MMOL/L (ref 5–15)
AST SERPL-CCNC: 23 U/L (ref 0–32)
BASOPHILS # BLD AUTO: 0.03 10*3/MM3 (ref 0–0.2)
BASOPHILS NFR BLD AUTO: 0.6 % (ref 0–1.5)
BILIRUB SERPL-MCNC: 0.5 MG/DL (ref 0.2–1.2)
BUN SERPL-MCNC: 16 MG/DL (ref 6–20)
BUN/CREAT SERPL: 25.4 (ref 7.3–30)
CALCIUM SPEC-SCNC: 9.6 MG/DL (ref 8.5–10.2)
CHLORIDE SERPL-SCNC: 101 MMOL/L (ref 98–107)
CO2 SERPL-SCNC: 28.7 MMOL/L (ref 22–29)
CREAT SERPL-MCNC: 0.63 MG/DL (ref 0.6–1.1)
DEPRECATED RDW RBC AUTO: 58.3 FL (ref 37–54)
EOSINOPHIL # BLD AUTO: 0.05 10*3/MM3 (ref 0–0.4)
EOSINOPHIL NFR BLD AUTO: 1.1 % (ref 0.3–6.2)
ERYTHROCYTE [DISTWIDTH] IN BLOOD BY AUTOMATED COUNT: 12.1 % (ref 12.3–15.4)
GFR SERPL CREATININE-BSD FRML MDRD: 92 ML/MIN/1.73
GLOBULIN UR ELPH-MCNC: 2.5 GM/DL (ref 1.8–3.5)
GLUCOSE SERPL-MCNC: 144 MG/DL (ref 74–124)
HCT VFR BLD AUTO: 35.2 % (ref 34–46.6)
HGB BLD-MCNC: 12.5 G/DL (ref 12–15.9)
IMM GRANULOCYTES # BLD AUTO: 0.01 10*3/MM3 (ref 0–0.05)
IMM GRANULOCYTES NFR BLD AUTO: 0.2 % (ref 0–0.5)
LYMPHOCYTES # BLD AUTO: 2.03 10*3/MM3 (ref 0.7–3.1)
LYMPHOCYTES NFR BLD AUTO: 43.1 % (ref 19.6–45.3)
MCH RBC QN AUTO: 46.6 PG (ref 26.6–33)
MCHC RBC AUTO-ENTMCNC: 35.5 G/DL (ref 31.5–35.7)
MCV RBC AUTO: 131.3 FL (ref 79–97)
MONOCYTES # BLD AUTO: 0.44 10*3/MM3 (ref 0.1–0.9)
MONOCYTES NFR BLD AUTO: 9.3 % (ref 5–12)
NEUTROPHILS NFR BLD AUTO: 2.15 10*3/MM3 (ref 1.7–7)
NEUTROPHILS NFR BLD AUTO: 45.7 % (ref 42.7–76)
NRBC BLD AUTO-RTO: 0 /100 WBC (ref 0–0.2)
PLATELET # BLD AUTO: 214 10*3/MM3 (ref 140–450)
PMV BLD AUTO: 9 FL (ref 6–12)
POTASSIUM SERPL-SCNC: 3.7 MMOL/L (ref 3.5–4.7)
PROT SERPL-MCNC: 7.1 G/DL (ref 6.3–8)
RBC # BLD AUTO: 2.68 10*6/MM3 (ref 3.77–5.28)
SODIUM SERPL-SCNC: 139 MMOL/L (ref 134–145)
WBC # BLD AUTO: 4.71 10*3/MM3 (ref 3.4–10.8)

## 2020-11-19 PROCEDURE — 85025 COMPLETE CBC W/AUTO DIFF WBC: CPT

## 2020-11-19 PROCEDURE — 99214 OFFICE O/P EST MOD 30 MIN: CPT | Performed by: INTERNAL MEDICINE

## 2020-11-19 PROCEDURE — 80053 COMPREHEN METABOLIC PANEL: CPT

## 2020-11-19 PROCEDURE — 36415 COLL VENOUS BLD VENIPUNCTURE: CPT

## 2020-11-19 RX ORDER — FLUOROMETHOLONE 0.1 %
SUSPENSION, DROPS(FINAL DOSAGE FORM)(ML) OPHTHALMIC (EYE)
COMMUNITY
Start: 2020-10-02 | End: 2022-03-02

## 2020-11-19 RX ORDER — INFLUENZA A VIRUS A/MICHIGAN/45/2015 X-275 (H1N1) ANTIGEN (FORMALDEHYDE INACTIVATED), INFLUENZA A VIRUS A/SINGAPORE/INFIMH-16-0019/2016 IVR-186 (H3N2) ANTIGEN (FORMALDEHYDE INACTIVATED), INFLUENZA B VIRUS B/PHUKET/3073/2013 ANTIGEN (FORMALDEHYDE INACTIVATED), AND INFLUENZA B VIRUS B/MARYLAND/15/2016 BX-69A ANTIGEN (FORMALDEHYDE INACTIVATED) 60; 60; 60; 60 UG/.7ML; UG/.7ML; UG/.7ML; UG/.7ML
INJECTION, SUSPENSION INTRAMUSCULAR
COMMUNITY
Start: 2020-09-14 | End: 2021-10-21

## 2020-11-19 RX ORDER — KETOCONAZOLE 20 MG/ML
SHAMPOO TOPICAL
COMMUNITY
Start: 2020-11-03 | End: 2021-11-15

## 2020-11-19 NOTE — PROGRESS NOTES
Subjective     REASON FOR FOLLOW UP: Essential thrombocytosis controlled with Hydrea    HISTORY OF PRESENT ILLNESS:  The patient is a 75 y.o. year old female who was referred to us from her primary care office with recent abnormal CBC showing her platelet count over 1.5 million. She was started on 81 mg aspirin and Hydrea.  She currently is taking 1500 mg every Monday Wednesday and Friday and 1000 mg Tuesday Thursday Saturday and Sunday..    Her blood counts today look good with a normal platelet count of 228,000.  Her hemoglobin and white count are normal also.  She does not have any new complaints and is tolerating this dose very well.  History of Present Illness     No past medical history on file.     Past Surgical History:   Procedure Laterality Date   • BACK SURGERY     • CATARACT EXTRACTION  2018   • HIP SURGERY     • KNEE SURGERY          Current Outpatient Medications on File Prior to Visit   Medication Sig Dispense Refill   • fluorometholone (FML) 0.1 % ophthalmic suspension      • FLUoxetine (PROzac) 20 MG capsule Take 20 mg by mouth Daily.     • fluticasone (FLONASE) 50 MCG/ACT nasal spray      • Fluzone High-Dose Quadrivalent 0.7 ML suspension prefilled syringe      • hydroxyurea (HYDREA) 500 MG capsule Take 3 tablets by mouth every M, W, and F; 2 tablets by mouth Tue, Thurs, Sat, Sun 70 capsule 5   • ketoconazole (NIZORAL) 2 % shampoo      • montelukast (SINGULAIR) 10 MG tablet Take 10 mg by mouth Every Night.     • pravastatin (PRAVACHOL) 20 MG tablet Take 20 mg by mouth Daily.     • rizatriptan (MAXALT) 10 MG tablet Take 10 mg by mouth 1 (One) Time As Needed for Migraine. May repeat in 2 hours if needed     • triamterene-hydrochlorothiazide (MAXZIDE-25) 37.5-25 MG per tablet        No current facility-administered medications on file prior to visit.         ALLERGIES:    Allergies   Allergen Reactions   • Codeine Nausea And Vomiting   • Penicillins Hives        Social History     Socioeconomic  "History   • Marital status:      Spouse name: Not on file   • Number of children: Not on file   • Years of education: Not on file   • Highest education level: Not on file   Tobacco Use   • Smoking status: Never Smoker   • Smokeless tobacco: Current User   Substance and Sexual Activity   • Alcohol use: Never     Frequency: Never   • Drug use: Never        No family history on file.     Review of Systems   Constitutional: Negative for activity change, chills, fatigue and fever.   HENT: Negative for mouth sores, trouble swallowing and voice change.    Eyes: Negative for pain and visual disturbance.   Respiratory: Negative for cough, shortness of breath and wheezing.    Cardiovascular: Negative for chest pain and palpitations.   Gastrointestinal: Negative for abdominal pain, constipation, diarrhea, nausea and vomiting.   Genitourinary: Negative for difficulty urinating, frequency and urgency.   Musculoskeletal: Negative for arthralgias and joint swelling.   Skin: Negative for rash.   Neurological: Negative for dizziness, seizures, weakness and headaches.   Hematological: Negative for adenopathy. Does not bruise/bleed easily.   Psychiatric/Behavioral: Negative for behavioral problems and confusion. The patient is not nervous/anxious.    Reviewed and unchanged on the visit of 11/19/2020    Objective     Vitals:    11/19/20 1458   BP: 106/68   Pulse: 108   Resp: 16   Temp: 97.8 °F (36.6 °C)   TempSrc: Skin   SpO2: 94%   Weight: 71.6 kg (157 lb 12.8 oz)   Height: 165.1 cm (65\")   PainSc: 0-No pain     Current Status 8/3/2020   ECOG score 0       Physical Exam   Constitutional: She is oriented to person, place, and time. She appears well-developed. No distress.   HENT:   Head: Normocephalic.   Eyes: Pupils are equal, round, and reactive to light. Conjunctivae are normal. No scleral icterus.   Neck: Normal range of motion. Neck supple. No JVD present. No thyromegaly present.   Cardiovascular: Normal rate and regular " rhythm. Exam reveals no gallop and no friction rub.   No murmur heard.  Pulmonary/Chest: Effort normal and breath sounds normal. She has no wheezes. She has no rales.   Abdominal: Soft. She exhibits no distension and no mass. There is no abdominal tenderness.   Musculoskeletal: Normal range of motion. No deformity.   Lymphadenopathy:     She has no cervical adenopathy.   Neurological: She is alert and oriented to person, place, and time. She has normal reflexes. No cranial nerve deficit.   Skin: Skin is warm and dry. No rash noted. No erythema.   Nailbed discoloration   Psychiatric: Her behavior is normal. Judgment normal.   Repeated and unchanged on the visit of 11/19/2020         RECENT LABS:  Hematology WBC   Date Value Ref Range Status   11/19/2020 4.71 3.40 - 10.80 10*3/mm3 Final     RBC   Date Value Ref Range Status   11/19/2020 2.68 (L) 3.77 - 5.28 10*6/mm3 Final     Hemoglobin   Date Value Ref Range Status   11/19/2020 12.5 12.0 - 15.9 g/dL Final     Hematocrit   Date Value Ref Range Status   11/19/2020 35.2 34.0 - 46.6 % Final     Platelets   Date Value Ref Range Status   11/19/2020 214 140 - 450 10*3/mm3 Final          Assessment/Plan   1.  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  Good response and tolerance to Hydrea thus far.  She currently is on 1500 mg every Monday Wednesday and Friday and 1000 mg the remaining 4 days of the week.    Recommendations:  1.  Continue aspirin 81 mg po daily  2.  Continue Hydrea to a dose of 1500 mg every Monday Wednesday and Friday and 1000 mg the remaining 4 days of the week  3.  Lab + RN review Q6wks  4.  MD follow up 6 months to review results and check CBC, and adjust Hydrea dose further as needed.

## 2020-11-20 ENCOUNTER — RESULTS ENCOUNTER (OUTPATIENT)
Dept: ONCOLOGY | Facility: CLINIC | Age: 75
End: 2020-11-20

## 2020-11-20 DIAGNOSIS — Z15.89 MONOALLELIC MUTATION OF CALR3 GENE: ICD-10-CM

## 2020-11-20 DIAGNOSIS — D47.1 MYELOPROLIFERATIVE DISORDER (HCC): ICD-10-CM

## 2020-11-20 DIAGNOSIS — D75.839 THROMBOCYTOSIS: ICD-10-CM

## 2020-12-25 ENCOUNTER — RESULTS ENCOUNTER (OUTPATIENT)
Dept: ONCOLOGY | Facility: CLINIC | Age: 75
End: 2020-12-25

## 2020-12-25 DIAGNOSIS — D47.1 MYELOPROLIFERATIVE DISORDER (HCC): ICD-10-CM

## 2020-12-25 DIAGNOSIS — D75.839 THROMBOCYTOSIS: ICD-10-CM

## 2020-12-25 DIAGNOSIS — Z15.89 MONOALLELIC MUTATION OF CALR3 GENE: ICD-10-CM

## 2020-12-31 ENCOUNTER — CLINICAL SUPPORT (OUTPATIENT)
Dept: ONCOLOGY | Facility: HOSPITAL | Age: 75
End: 2020-12-31

## 2020-12-31 ENCOUNTER — LAB (OUTPATIENT)
Dept: LAB | Facility: HOSPITAL | Age: 75
End: 2020-12-31

## 2020-12-31 DIAGNOSIS — Z15.89 MONOALLELIC MUTATION OF CALR3 GENE: ICD-10-CM

## 2020-12-31 DIAGNOSIS — D75.839 THROMBOCYTOSIS: ICD-10-CM

## 2020-12-31 DIAGNOSIS — D47.1 MYELOPROLIFERATIVE DISORDER (HCC): ICD-10-CM

## 2020-12-31 LAB
BASOPHILS # BLD AUTO: 0.03 10*3/MM3 (ref 0–0.2)
BASOPHILS NFR BLD AUTO: 0.7 % (ref 0–1.5)
DEPRECATED RDW RBC AUTO: 55.8 FL (ref 37–54)
EOSINOPHIL # BLD AUTO: 0.02 10*3/MM3 (ref 0–0.4)
EOSINOPHIL NFR BLD AUTO: 0.4 % (ref 0.3–6.2)
ERYTHROCYTE [DISTWIDTH] IN BLOOD BY AUTOMATED COUNT: 11.9 % (ref 12.3–15.4)
HCT VFR BLD AUTO: 34.8 % (ref 34–46.6)
HGB BLD-MCNC: 13 G/DL (ref 12–15.9)
IMM GRANULOCYTES # BLD AUTO: 0.03 10*3/MM3 (ref 0–0.05)
IMM GRANULOCYTES NFR BLD AUTO: 0.7 % (ref 0–0.5)
LYMPHOCYTES # BLD AUTO: 2.04 10*3/MM3 (ref 0.7–3.1)
LYMPHOCYTES NFR BLD AUTO: 45.6 % (ref 19.6–45.3)
MCH RBC QN AUTO: 47.6 PG (ref 26.6–33)
MCHC RBC AUTO-ENTMCNC: 37.4 G/DL (ref 31.5–35.7)
MCV RBC AUTO: 127.5 FL (ref 79–97)
MONOCYTES # BLD AUTO: 0.53 10*3/MM3 (ref 0.1–0.9)
MONOCYTES NFR BLD AUTO: 11.9 % (ref 5–12)
NEUTROPHILS NFR BLD AUTO: 1.82 10*3/MM3 (ref 1.7–7)
NEUTROPHILS NFR BLD AUTO: 40.7 % (ref 42.7–76)
NRBC BLD AUTO-RTO: 0 /100 WBC (ref 0–0.2)
PLATELET # BLD AUTO: 235 10*3/MM3 (ref 140–450)
PMV BLD AUTO: 9.3 FL (ref 6–12)
RBC # BLD AUTO: 2.73 10*6/MM3 (ref 3.77–5.28)
WBC # BLD AUTO: 4.47 10*3/MM3 (ref 3.4–10.8)

## 2020-12-31 PROCEDURE — G0463 HOSPITAL OUTPT CLINIC VISIT: HCPCS

## 2020-12-31 PROCEDURE — 36415 COLL VENOUS BLD VENIPUNCTURE: CPT

## 2020-12-31 PROCEDURE — 85025 COMPLETE CBC W/AUTO DIFF WBC: CPT

## 2020-12-31 NOTE — NURSING NOTE
Lab Results   Component Value Date    WBC 4.47 12/31/2020    HGB 13.0 12/31/2020    HCT 34.8 12/31/2020    .5 (H) 12/31/2020     12/31/2020   ANC 1.82  Pt is here for lab with RN review.  CBC reviewed with pt, counts are stable for this pt at this time. Pt has no complaints. She confirms taking Hydrea 1500 mg on M/W/F and 1000 mg AOD. Copy of labs given to pt and f/u appt reviewed. Pt is instructed to call the office with any concerns or new symptoms prior to next visit. Pt vu

## 2021-02-11 ENCOUNTER — CLINICAL SUPPORT (OUTPATIENT)
Dept: ONCOLOGY | Facility: HOSPITAL | Age: 76
End: 2021-02-11

## 2021-02-11 ENCOUNTER — APPOINTMENT (OUTPATIENT)
Dept: ONCOLOGY | Facility: HOSPITAL | Age: 76
End: 2021-02-11

## 2021-02-11 ENCOUNTER — APPOINTMENT (OUTPATIENT)
Dept: LAB | Facility: HOSPITAL | Age: 76
End: 2021-02-11

## 2021-02-11 ENCOUNTER — LAB (OUTPATIENT)
Dept: LAB | Facility: HOSPITAL | Age: 76
End: 2021-02-11

## 2021-02-11 DIAGNOSIS — Z15.89 MONOALLELIC MUTATION OF CALR3 GENE: ICD-10-CM

## 2021-02-11 DIAGNOSIS — D75.839 THROMBOCYTOSIS: ICD-10-CM

## 2021-02-11 DIAGNOSIS — D47.1 MYELOPROLIFERATIVE DISORDER (HCC): ICD-10-CM

## 2021-02-11 LAB
BASOPHILS # BLD AUTO: 0.03 10*3/MM3 (ref 0–0.2)
BASOPHILS NFR BLD AUTO: 0.7 % (ref 0–1.5)
DEPRECATED RDW RBC AUTO: 53.8 FL (ref 37–54)
EOSINOPHIL # BLD AUTO: 0.02 10*3/MM3 (ref 0–0.4)
EOSINOPHIL NFR BLD AUTO: 0.5 % (ref 0.3–6.2)
ERYTHROCYTE [DISTWIDTH] IN BLOOD BY AUTOMATED COUNT: 11.7 % (ref 12.3–15.4)
HCT VFR BLD AUTO: 34.1 % (ref 34–46.6)
HGB BLD-MCNC: 12.7 G/DL (ref 12–15.9)
IMM GRANULOCYTES # BLD AUTO: 0.01 10*3/MM3 (ref 0–0.05)
IMM GRANULOCYTES NFR BLD AUTO: 0.2 % (ref 0–0.5)
LYMPHOCYTES # BLD AUTO: 2.3 10*3/MM3 (ref 0.7–3.1)
LYMPHOCYTES NFR BLD AUTO: 52.8 % (ref 19.6–45.3)
MCH RBC QN AUTO: 47 PG (ref 26.6–33)
MCHC RBC AUTO-ENTMCNC: 37.2 G/DL (ref 31.5–35.7)
MCV RBC AUTO: 126.3 FL (ref 79–97)
MONOCYTES # BLD AUTO: 0.51 10*3/MM3 (ref 0.1–0.9)
MONOCYTES NFR BLD AUTO: 11.7 % (ref 5–12)
NEUTROPHILS NFR BLD AUTO: 1.49 10*3/MM3 (ref 1.7–7)
NEUTROPHILS NFR BLD AUTO: 34.1 % (ref 42.7–76)
NRBC BLD AUTO-RTO: 0 /100 WBC (ref 0–0.2)
PLATELET # BLD AUTO: 239 10*3/MM3 (ref 140–450)
PMV BLD AUTO: 9.1 FL (ref 6–12)
RBC # BLD AUTO: 2.7 10*6/MM3 (ref 3.77–5.28)
WBC # BLD AUTO: 4.36 10*3/MM3 (ref 3.4–10.8)

## 2021-02-11 PROCEDURE — 36415 COLL VENOUS BLD VENIPUNCTURE: CPT

## 2021-02-11 PROCEDURE — G0463 HOSPITAL OUTPT CLINIC VISIT: HCPCS

## 2021-02-11 PROCEDURE — 85025 COMPLETE CBC W/AUTO DIFF WBC: CPT

## 2021-02-11 NOTE — NURSING NOTE
Pt is here for lab with RN review. CBC reviewed with pt, counts are stable for this pt at this time. Pt continues Hydrea as instructed w/ good tolerance. Pt has no complaints. Copy of labs given to pt and f/u appt reviewed. Pt is instructed to call the office with any concerns or new symptoms prior to next visit. Pt v/u.    Lab Results   Component Value Date    WBC 4.36 02/11/2021    HGB 12.7 02/11/2021    HCT 34.1 02/11/2021    .3 (H) 02/11/2021     02/11/2021

## 2021-02-17 DIAGNOSIS — Z15.89 MONOALLELIC MUTATION OF CALR3 GENE: ICD-10-CM

## 2021-02-17 DIAGNOSIS — D75.839 THROMBOCYTOSIS: ICD-10-CM

## 2021-02-17 DIAGNOSIS — D47.1 MYELOPROLIFERATIVE DISORDER (HCC): ICD-10-CM

## 2021-02-17 RX ORDER — HYDROXYUREA 500 MG/1
CAPSULE ORAL
Qty: 180 CAPSULE | Refills: 2 | Status: SHIPPED | OUTPATIENT
Start: 2021-02-17 | End: 2021-10-18

## 2021-03-01 ENCOUNTER — APPOINTMENT (OUTPATIENT)
Dept: WOMENS IMAGING | Facility: HOSPITAL | Age: 76
End: 2021-03-01

## 2021-03-01 PROCEDURE — 77080 DXA BONE DENSITY AXIAL: CPT | Performed by: RADIOLOGY

## 2021-03-01 PROCEDURE — 77063 BREAST TOMOSYNTHESIS BI: CPT | Performed by: RADIOLOGY

## 2021-03-01 PROCEDURE — 77067 SCR MAMMO BI INCL CAD: CPT | Performed by: RADIOLOGY

## 2021-03-09 DIAGNOSIS — Z23 IMMUNIZATION DUE: ICD-10-CM

## 2021-03-25 ENCOUNTER — CLINICAL SUPPORT (OUTPATIENT)
Dept: ONCOLOGY | Facility: HOSPITAL | Age: 76
End: 2021-03-25

## 2021-03-25 ENCOUNTER — LAB (OUTPATIENT)
Dept: LAB | Facility: HOSPITAL | Age: 76
End: 2021-03-25

## 2021-03-25 DIAGNOSIS — Z15.89 MONOALLELIC MUTATION OF CALR3 GENE: ICD-10-CM

## 2021-03-25 DIAGNOSIS — D75.839 THROMBOCYTOSIS: ICD-10-CM

## 2021-03-25 DIAGNOSIS — D47.1 MYELOPROLIFERATIVE DISORDER (HCC): ICD-10-CM

## 2021-03-25 LAB
BASOPHILS # BLD AUTO: 0.04 10*3/MM3 (ref 0–0.2)
BASOPHILS NFR BLD AUTO: 0.8 % (ref 0–1.5)
DEPRECATED RDW RBC AUTO: 57.5 FL (ref 37–54)
EOSINOPHIL # BLD AUTO: 0.03 10*3/MM3 (ref 0–0.4)
EOSINOPHIL NFR BLD AUTO: 0.6 % (ref 0.3–6.2)
ERYTHROCYTE [DISTWIDTH] IN BLOOD BY AUTOMATED COUNT: 12.3 % (ref 12.3–15.4)
HCT VFR BLD AUTO: 36.8 % (ref 34–46.6)
HGB BLD-MCNC: 13.6 G/DL (ref 12–15.9)
IMM GRANULOCYTES # BLD AUTO: 0.05 10*3/MM3 (ref 0–0.05)
IMM GRANULOCYTES NFR BLD AUTO: 1 % (ref 0–0.5)
LYMPHOCYTES # BLD AUTO: 2.15 10*3/MM3 (ref 0.7–3.1)
LYMPHOCYTES NFR BLD AUTO: 44.1 % (ref 19.6–45.3)
MCH RBC QN AUTO: 48.9 PG (ref 26.6–33)
MCHC RBC AUTO-ENTMCNC: 37 G/DL (ref 31.5–35.7)
MCV RBC AUTO: 132.4 FL (ref 79–97)
MONOCYTES # BLD AUTO: 0.63 10*3/MM3 (ref 0.1–0.9)
MONOCYTES NFR BLD AUTO: 12.9 % (ref 5–12)
NEUTROPHILS NFR BLD AUTO: 1.98 10*3/MM3 (ref 1.7–7)
NEUTROPHILS NFR BLD AUTO: 40.6 % (ref 42.7–76)
NRBC BLD AUTO-RTO: 0 /100 WBC (ref 0–0.2)
PLATELET # BLD AUTO: 250 10*3/MM3 (ref 140–450)
PMV BLD AUTO: 9.6 FL (ref 6–12)
RBC # BLD AUTO: 2.78 10*6/MM3 (ref 3.77–5.28)
WBC # BLD AUTO: 4.82 10*3/MM3 (ref 3.4–10.8)

## 2021-03-25 PROCEDURE — 36415 COLL VENOUS BLD VENIPUNCTURE: CPT

## 2021-03-25 PROCEDURE — 85025 COMPLETE CBC W/AUTO DIFF WBC: CPT

## 2021-03-25 PROCEDURE — G0463 HOSPITAL OUTPT CLINIC VISIT: HCPCS

## 2021-03-25 NOTE — NURSING NOTE
Pt here for CBC and RN review. CBC reviewed with pt. Counts are stable for this pt at this time.Pt states she is feeling good, no c/o. Pt confirmed taking hydrea 1500 mg M/W/F and 1000 mg all other days with no issues. Copy of labs given to pt. Pt v/u to call with any new concerns.         Lab Results   Component Value Date    WBC 4.82 03/25/2021    HGB 13.6 03/25/2021    HCT 36.8 03/25/2021    .4 (H) 03/25/2021     03/25/2021

## 2021-05-06 ENCOUNTER — LAB (OUTPATIENT)
Dept: LAB | Facility: HOSPITAL | Age: 76
End: 2021-05-06

## 2021-05-06 ENCOUNTER — OFFICE VISIT (OUTPATIENT)
Dept: ONCOLOGY | Facility: CLINIC | Age: 76
End: 2021-05-06

## 2021-05-06 VITALS
HEART RATE: 88 BPM | OXYGEN SATURATION: 98 % | TEMPERATURE: 96.9 F | RESPIRATION RATE: 16 BRPM | SYSTOLIC BLOOD PRESSURE: 117 MMHG | BODY MASS INDEX: 26.73 KG/M2 | WEIGHT: 160.4 LBS | DIASTOLIC BLOOD PRESSURE: 76 MMHG | HEIGHT: 65 IN

## 2021-05-06 DIAGNOSIS — D75.839 THROMBOCYTOSIS: ICD-10-CM

## 2021-05-06 DIAGNOSIS — Z15.89 MONOALLELIC MUTATION OF CALR3 GENE: ICD-10-CM

## 2021-05-06 DIAGNOSIS — D47.1 MYELOPROLIFERATIVE DISORDER (HCC): ICD-10-CM

## 2021-05-06 DIAGNOSIS — D75.839 THROMBOCYTOSIS: Primary | ICD-10-CM

## 2021-05-06 LAB
ALBUMIN SERPL-MCNC: 4.3 G/DL (ref 3.5–5.2)
ALBUMIN/GLOB SERPL: 1.6 G/DL (ref 1.1–2.4)
ALP SERPL-CCNC: 53 U/L (ref 38–116)
ALT SERPL W P-5'-P-CCNC: 14 U/L (ref 0–33)
ANION GAP SERPL CALCULATED.3IONS-SCNC: 10.1 MMOL/L (ref 5–15)
AST SERPL-CCNC: 25 U/L (ref 0–32)
BASOPHILS # BLD AUTO: 0.04 10*3/MM3 (ref 0–0.2)
BASOPHILS NFR BLD AUTO: 1 % (ref 0–1.5)
BILIRUB SERPL-MCNC: 0.6 MG/DL (ref 0.2–1.2)
BUN SERPL-MCNC: 17 MG/DL (ref 6–20)
BUN/CREAT SERPL: 25.8 (ref 7.3–30)
CALCIUM SPEC-SCNC: 9.7 MG/DL (ref 8.5–10.2)
CHLORIDE SERPL-SCNC: 103 MMOL/L (ref 98–107)
CO2 SERPL-SCNC: 25.9 MMOL/L (ref 22–29)
CREAT SERPL-MCNC: 0.66 MG/DL (ref 0.6–1.1)
DEPRECATED RDW RBC AUTO: 58.8 FL (ref 37–54)
EOSINOPHIL # BLD AUTO: 0.01 10*3/MM3 (ref 0–0.4)
EOSINOPHIL NFR BLD AUTO: 0.3 % (ref 0.3–6.2)
ERYTHROCYTE [DISTWIDTH] IN BLOOD BY AUTOMATED COUNT: 12.2 % (ref 12.3–15.4)
GFR SERPL CREATININE-BSD FRML MDRD: 87 ML/MIN/1.73
GLOBULIN UR ELPH-MCNC: 2.7 GM/DL (ref 1.8–3.5)
GLUCOSE SERPL-MCNC: 107 MG/DL (ref 74–124)
HCT VFR BLD AUTO: 34.3 % (ref 34–46.6)
HGB BLD-MCNC: 12.3 G/DL (ref 12–15.9)
IMM GRANULOCYTES # BLD AUTO: 0.01 10*3/MM3 (ref 0–0.05)
IMM GRANULOCYTES NFR BLD AUTO: 0.3 % (ref 0–0.5)
LYMPHOCYTES # BLD AUTO: 1.77 10*3/MM3 (ref 0.7–3.1)
LYMPHOCYTES NFR BLD AUTO: 44.7 % (ref 19.6–45.3)
MCH RBC QN AUTO: 46.8 PG (ref 26.6–33)
MCHC RBC AUTO-ENTMCNC: 35.9 G/DL (ref 31.5–35.7)
MCV RBC AUTO: 130.4 FL (ref 79–97)
MONOCYTES # BLD AUTO: 0.42 10*3/MM3 (ref 0.1–0.9)
MONOCYTES NFR BLD AUTO: 10.6 % (ref 5–12)
NEUTROPHILS NFR BLD AUTO: 1.71 10*3/MM3 (ref 1.7–7)
NEUTROPHILS NFR BLD AUTO: 43.1 % (ref 42.7–76)
NRBC BLD AUTO-RTO: 0 /100 WBC (ref 0–0.2)
PLATELET # BLD AUTO: 218 10*3/MM3 (ref 140–450)
PMV BLD AUTO: 8.6 FL (ref 6–12)
POTASSIUM SERPL-SCNC: 4.1 MMOL/L (ref 3.5–4.7)
PROT SERPL-MCNC: 7 G/DL (ref 6.3–8)
RBC # BLD AUTO: 2.63 10*6/MM3 (ref 3.77–5.28)
SODIUM SERPL-SCNC: 139 MMOL/L (ref 134–145)
WBC # BLD AUTO: 3.96 10*3/MM3 (ref 3.4–10.8)

## 2021-05-06 PROCEDURE — 80053 COMPREHEN METABOLIC PANEL: CPT

## 2021-05-06 PROCEDURE — 36415 COLL VENOUS BLD VENIPUNCTURE: CPT

## 2021-05-06 PROCEDURE — 99213 OFFICE O/P EST LOW 20 MIN: CPT | Performed by: INTERNAL MEDICINE

## 2021-05-06 PROCEDURE — 85025 COMPLETE CBC W/AUTO DIFF WBC: CPT

## 2021-05-06 NOTE — PROGRESS NOTES
Subjective     REASON FOR FOLLOW UP: Essential thrombocytosis controlled with Hydrea    HISTORY OF PRESENT ILLNESS:  The patient is a 76 y.o. year old female who was referred to us from her primary care office with recent abnormal CBC showing her platelet count over 1.5 million. She was started on 81 mg aspirin and Hydrea.  She currently is taking 1500 mg every Monday Wednesday and Friday and 1000 mg Tuesday Thursday Saturday and Sunday..    Her blood counts today look good with a normal platelet count of 218,000.  Her hemoglobin and white count are slightly lower with both at the lower end of normal..  She does not have any new complaints.  Her MCV is markedly elevated suggesting good compliance with Hydrea.    We discussed making a very slight adjustment in her Hydrea dose to 1500 mg Monday and Friday and 1000 mg the remaining 5 days of the week.  History of Present Illness     Past Medical History:   Diagnosis Date   • DDD (degenerative disc disease), lumbar    • H/o Hip pain    • History of depression    • History of low back pain    • History of thrombocytosis    • Hypercholesterolemia    • Sleep apnea         Past Surgical History:   Procedure Laterality Date   • BACK SURGERY     • CATARACT EXTRACTION  2018   • HIP SURGERY     • KNEE SURGERY     • TOTAL HIP ARTHROPLASTY Right 2011   • TOTAL KNEE ARTHROPLASTY Right 2013        Current Outpatient Medications on File Prior to Visit   Medication Sig Dispense Refill   • fluorometholone (FML) 0.1 % ophthalmic suspension      • FLUoxetine (PROzac) 20 MG capsule Take 20 mg by mouth Daily.     • fluticasone (FLONASE) 50 MCG/ACT nasal spray      • Fluzone High-Dose Quadrivalent 0.7 ML suspension prefilled syringe      • hydroxyurea (HYDREA) 500 MG capsule Take 1500 mg on Monday Wednesday and Friday and 1000 mg on remaining day unless prescribed by physician 180 capsule 2   • ketoconazole (NIZORAL) 2 % shampoo      • montelukast (SINGULAIR) 10 MG tablet Take 10 mg by  "mouth Every Night.     • pravastatin (PRAVACHOL) 20 MG tablet Take 20 mg by mouth Daily.     • rizatriptan (MAXALT) 10 MG tablet Take 10 mg by mouth 1 (One) Time As Needed for Migraine. May repeat in 2 hours if needed     • triamterene-hydrochlorothiazide (MAXZIDE-25) 37.5-25 MG per tablet        No current facility-administered medications on file prior to visit.        ALLERGIES:    Allergies   Allergen Reactions   • Codeine Nausea And Vomiting   • Penicillins Hives        Social History     Socioeconomic History   • Marital status:      Spouse name: Temo   • Number of children: Not on file   • Years of education: Not on file   • Highest education level: Not on file   Tobacco Use   • Smoking status: Never Smoker   • Smokeless tobacco: Current User   Substance and Sexual Activity   • Alcohol use: Never   • Drug use: Never        No family history on file.     Review of Systems   Constitutional: Negative for activity change, chills, fatigue and fever.   HENT: Negative for mouth sores, trouble swallowing and voice change.    Eyes: Negative for pain and visual disturbance.   Respiratory: Negative for cough, shortness of breath and wheezing.    Cardiovascular: Negative for chest pain and palpitations.   Gastrointestinal: Negative for abdominal pain, constipation, diarrhea, nausea and vomiting.   Genitourinary: Negative for difficulty urinating, frequency and urgency.   Musculoskeletal: Negative for arthralgias and joint swelling.   Skin: Negative for rash.   Neurological: Negative for dizziness, seizures, weakness and headaches.   Hematological: Negative for adenopathy. Does not bruise/bleed easily.   Psychiatric/Behavioral: Negative for behavioral problems and confusion. The patient is not nervous/anxious.        Objective     Vitals:    05/06/21 1022   BP: 117/76   Pulse: 88   Resp: 16   Temp: 96.9 °F (36.1 °C)   TempSrc: Skin   SpO2: 98%   Weight: 72.8 kg (160 lb 6.4 oz)   Height: 165.1 cm (65\")   PainSc: " 0-No pain     Current Status 8/3/2020   ECOG score 0       Physical Exam   Constitutional: She is oriented to person, place, and time. She appears well-developed. No distress.   HENT:   Head: Normocephalic.   Eyes: Pupils are equal, round, and reactive to light. Conjunctivae are normal. No scleral icterus.   Neck: No JVD present. No thyromegaly present.   Cardiovascular: Normal rate and regular rhythm. Exam reveals no gallop and no friction rub.   No murmur heard.  Pulmonary/Chest: Effort normal and breath sounds normal. She has no wheezes. She has no rales.   Abdominal: Soft. She exhibits no distension and no mass. There is no abdominal tenderness.   Musculoskeletal: Normal range of motion. No deformity.   Lymphadenopathy:     She has no cervical adenopathy.   Neurological: She is alert and oriented to person, place, and time. She has normal reflexes. No cranial nerve deficit.   Skin: Skin is warm and dry. No rash noted. No erythema.   Nailbed discoloration   Psychiatric: Her behavior is normal. Judgment normal.            RECENT LABS:  Hematology WBC   Date Value Ref Range Status   05/06/2021 3.96 3.40 - 10.80 10*3/mm3 Final     RBC   Date Value Ref Range Status   05/06/2021 2.63 (L) 3.77 - 5.28 10*6/mm3 Final     Hemoglobin   Date Value Ref Range Status   05/06/2021 12.3 12.0 - 15.9 g/dL Final     Hematocrit   Date Value Ref Range Status   05/06/2021 34.3 34.0 - 46.6 % Final     Platelets   Date Value Ref Range Status   05/06/2021 218 140 - 450 10*3/mm3 Final          Assessment/Plan   1.  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  Good response and tolerance to Hydrea thus far.  She currently is on 1500 mg every Monday Wednesday and Friday and 1000 mg the remaining 4 days of the week.    Recommendations:  1.  Continue aspirin 81 mg po daily  2.  Adjust Hydrea dose to 1500 mg every Monday and Friday and 1000 mg the remaining 5 days of the week  3.  Lab + RN  review Q6wks  4.  MD follow up 6 months to review results and check CBC, and adjust Hydrea dose further as needed.

## 2021-06-03 ENCOUNTER — TELEPHONE (OUTPATIENT)
Dept: ONCOLOGY | Facility: CLINIC | Age: 76
End: 2021-06-03

## 2021-06-03 NOTE — TELEPHONE ENCOUNTER
Caller: Sonia Wooten    Relationship to patient: Self    Best call back number: [phone]    Chief complaint: PATIENT WOULD LIKE TO RESCHEDULE APPTS ON 6/17/21 AND 7/29/21.  SHE STATES THAT WEDNESDAYS ARE NOW HER BEST DAYS IN THE EARLY MORNING.  Monday AND Friday AFTER 10:00 ARE OPTIONAL DAYS FOR HER.       APPTS ARE FOR LAB/NURSE REVIEW     PLEASE CONTACT PATIENT TO ADVISE

## 2021-06-16 ENCOUNTER — APPOINTMENT (OUTPATIENT)
Dept: ONCOLOGY | Facility: HOSPITAL | Age: 76
End: 2021-06-16

## 2021-06-16 ENCOUNTER — APPOINTMENT (OUTPATIENT)
Dept: LAB | Facility: HOSPITAL | Age: 76
End: 2021-06-16

## 2021-06-17 ENCOUNTER — APPOINTMENT (OUTPATIENT)
Dept: LAB | Facility: HOSPITAL | Age: 76
End: 2021-06-17

## 2021-06-17 ENCOUNTER — INFUSION (OUTPATIENT)
Dept: ONCOLOGY | Facility: HOSPITAL | Age: 76
End: 2021-06-17

## 2021-06-17 ENCOUNTER — APPOINTMENT (OUTPATIENT)
Dept: ONCOLOGY | Facility: HOSPITAL | Age: 76
End: 2021-06-17

## 2021-06-17 ENCOUNTER — LAB (OUTPATIENT)
Dept: LAB | Facility: HOSPITAL | Age: 76
End: 2021-06-17

## 2021-06-17 DIAGNOSIS — Z15.89 MONOALLELIC MUTATION OF CALR3 GENE: ICD-10-CM

## 2021-06-17 DIAGNOSIS — D47.1 MYELOPROLIFERATIVE DISORDER (HCC): ICD-10-CM

## 2021-06-17 DIAGNOSIS — D75.839 THROMBOCYTOSIS: ICD-10-CM

## 2021-06-17 LAB
BASOPHILS # BLD AUTO: 0.04 10*3/MM3 (ref 0–0.2)
BASOPHILS NFR BLD AUTO: 0.8 % (ref 0–1.5)
DEPRECATED RDW RBC AUTO: 52.1 FL (ref 37–54)
EOSINOPHIL # BLD AUTO: 0.02 10*3/MM3 (ref 0–0.4)
EOSINOPHIL NFR BLD AUTO: 0.4 % (ref 0.3–6.2)
ERYTHROCYTE [DISTWIDTH] IN BLOOD BY AUTOMATED COUNT: 11.4 % (ref 12.3–15.4)
HCT VFR BLD AUTO: 36.4 % (ref 34–46.6)
HGB BLD-MCNC: 13.5 G/DL (ref 12–15.9)
IMM GRANULOCYTES # BLD AUTO: 0.03 10*3/MM3 (ref 0–0.05)
IMM GRANULOCYTES NFR BLD AUTO: 0.6 % (ref 0–0.5)
LYMPHOCYTES # BLD AUTO: 2.73 10*3/MM3 (ref 0.7–3.1)
LYMPHOCYTES NFR BLD AUTO: 51.7 % (ref 19.6–45.3)
MCH RBC QN AUTO: 46.2 PG (ref 26.6–33)
MCHC RBC AUTO-ENTMCNC: 37.1 G/DL (ref 31.5–35.7)
MCV RBC AUTO: 124.7 FL (ref 79–97)
MONOCYTES # BLD AUTO: 0.54 10*3/MM3 (ref 0.1–0.9)
MONOCYTES NFR BLD AUTO: 10.2 % (ref 5–12)
NEUTROPHILS NFR BLD AUTO: 1.92 10*3/MM3 (ref 1.7–7)
NEUTROPHILS NFR BLD AUTO: 36.3 % (ref 42.7–76)
NRBC BLD AUTO-RTO: 0 /100 WBC (ref 0–0.2)
PLATELET # BLD AUTO: 275 10*3/MM3 (ref 140–450)
PMV BLD AUTO: 9.2 FL (ref 6–12)
RBC # BLD AUTO: 2.92 10*6/MM3 (ref 3.77–5.28)
WBC # BLD AUTO: 5.28 10*3/MM3 (ref 3.4–10.8)

## 2021-06-17 PROCEDURE — 85025 COMPLETE CBC W/AUTO DIFF WBC: CPT

## 2021-06-17 PROCEDURE — 36415 COLL VENOUS BLD VENIPUNCTURE: CPT

## 2021-06-17 PROCEDURE — G0463 HOSPITAL OUTPT CLINIC VISIT: HCPCS

## 2021-06-17 NOTE — PROGRESS NOTES
Pt here for an RN review. Counts are stable. Pt reports feeling well. Good tolerance to hydrea. Pt taking aspirin as prescribed and hydrea 1500 mg M/F and 1000 mg the rest of the week. Provided copy of labs and pt went to the appt desk.    Lab Results   Component Value Date    WBC 5.28 06/17/2021    HGB 13.5 06/17/2021    HCT 36.4 06/17/2021    .7 (H) 06/17/2021     06/17/2021

## 2021-07-28 ENCOUNTER — LAB (OUTPATIENT)
Dept: LAB | Facility: HOSPITAL | Age: 76
End: 2021-07-28

## 2021-07-28 ENCOUNTER — CLINICAL SUPPORT (OUTPATIENT)
Dept: ONCOLOGY | Facility: HOSPITAL | Age: 76
End: 2021-07-28

## 2021-07-28 DIAGNOSIS — Z15.89 MONOALLELIC MUTATION OF CALR3 GENE: ICD-10-CM

## 2021-07-28 DIAGNOSIS — D75.839 THROMBOCYTOSIS: ICD-10-CM

## 2021-07-28 DIAGNOSIS — D47.1 MYELOPROLIFERATIVE DISORDER (HCC): ICD-10-CM

## 2021-07-28 LAB
BASOPHILS # BLD AUTO: 0.04 10*3/MM3 (ref 0–0.2)
BASOPHILS NFR BLD AUTO: 1 % (ref 0–1.5)
DEPRECATED RDW RBC AUTO: 54.7 FL (ref 37–54)
EOSINOPHIL # BLD AUTO: 0.04 10*3/MM3 (ref 0–0.4)
EOSINOPHIL NFR BLD AUTO: 1 % (ref 0.3–6.2)
ERYTHROCYTE [DISTWIDTH] IN BLOOD BY AUTOMATED COUNT: 11.9 % (ref 12.3–15.4)
HCT VFR BLD AUTO: 34.8 % (ref 34–46.6)
HGB BLD-MCNC: 12.7 G/DL (ref 12–15.9)
IMM GRANULOCYTES # BLD AUTO: 0.01 10*3/MM3 (ref 0–0.05)
IMM GRANULOCYTES NFR BLD AUTO: 0.2 % (ref 0–0.5)
LYMPHOCYTES # BLD AUTO: 1.9 10*3/MM3 (ref 0.7–3.1)
LYMPHOCYTES NFR BLD AUTO: 45.1 % (ref 19.6–45.3)
MCH RBC QN AUTO: 45.7 PG (ref 26.6–33)
MCHC RBC AUTO-ENTMCNC: 36.5 G/DL (ref 31.5–35.7)
MCV RBC AUTO: 125.2 FL (ref 79–97)
MONOCYTES # BLD AUTO: 0.54 10*3/MM3 (ref 0.1–0.9)
MONOCYTES NFR BLD AUTO: 12.8 % (ref 5–12)
NEUTROPHILS NFR BLD AUTO: 1.68 10*3/MM3 (ref 1.7–7)
NEUTROPHILS NFR BLD AUTO: 39.9 % (ref 42.7–76)
NRBC BLD AUTO-RTO: 0 /100 WBC (ref 0–0.2)
PLATELET # BLD AUTO: 315 10*3/MM3 (ref 140–450)
PMV BLD AUTO: 9 FL (ref 6–12)
RBC # BLD AUTO: 2.78 10*6/MM3 (ref 3.77–5.28)
WBC # BLD AUTO: 4.21 10*3/MM3 (ref 3.4–10.8)

## 2021-07-28 PROCEDURE — 85025 COMPLETE CBC W/AUTO DIFF WBC: CPT

## 2021-07-28 PROCEDURE — 36415 COLL VENOUS BLD VENIPUNCTURE: CPT

## 2021-07-28 PROCEDURE — G0463 HOSPITAL OUTPT CLINIC VISIT: HCPCS

## 2021-07-28 NOTE — NURSING NOTE
Pt is here for lab with RN review.  CBC reviewed with pt, counts are stable for this pt at this time. Pt has no complaints. Tolerating Hydrea with no issues. Copy of labs given to pt and f/u appt reviewed. Pt is instructed to call the office with any concerns or new symptoms prior to next visit. Pt vu      Lab Results   Component Value Date    WBC 4.21 07/28/2021    HGB 12.7 07/28/2021    HCT 34.8 07/28/2021    .2 (H) 07/28/2021     07/28/2021

## 2021-07-29 ENCOUNTER — APPOINTMENT (OUTPATIENT)
Dept: ONCOLOGY | Facility: HOSPITAL | Age: 76
End: 2021-07-29

## 2021-07-29 ENCOUNTER — APPOINTMENT (OUTPATIENT)
Dept: LAB | Facility: HOSPITAL | Age: 76
End: 2021-07-29

## 2021-09-08 ENCOUNTER — CLINICAL SUPPORT (OUTPATIENT)
Dept: ONCOLOGY | Facility: HOSPITAL | Age: 76
End: 2021-09-08

## 2021-09-08 ENCOUNTER — LAB (OUTPATIENT)
Dept: LAB | Facility: HOSPITAL | Age: 76
End: 2021-09-08

## 2021-09-08 DIAGNOSIS — D75.839 THROMBOCYTOSIS: ICD-10-CM

## 2021-09-08 DIAGNOSIS — Z15.89 MONOALLELIC MUTATION OF CALR3 GENE: ICD-10-CM

## 2021-09-08 DIAGNOSIS — D47.1 MYELOPROLIFERATIVE DISORDER (HCC): ICD-10-CM

## 2021-09-08 LAB
BASOPHILS # BLD AUTO: 0.05 10*3/MM3 (ref 0–0.2)
BASOPHILS NFR BLD AUTO: 1.1 % (ref 0–1.5)
DEPRECATED RDW RBC AUTO: 55.4 FL (ref 37–54)
EOSINOPHIL # BLD AUTO: 0.05 10*3/MM3 (ref 0–0.4)
EOSINOPHIL NFR BLD AUTO: 1.1 % (ref 0.3–6.2)
ERYTHROCYTE [DISTWIDTH] IN BLOOD BY AUTOMATED COUNT: 12 % (ref 12.3–15.4)
HCT VFR BLD AUTO: 36.2 % (ref 34–46.6)
HGB BLD-MCNC: 13.2 G/DL (ref 12–15.9)
IMM GRANULOCYTES # BLD AUTO: 0.01 10*3/MM3 (ref 0–0.05)
IMM GRANULOCYTES NFR BLD AUTO: 0.2 % (ref 0–0.5)
LYMPHOCYTES # BLD AUTO: 1.92 10*3/MM3 (ref 0.7–3.1)
LYMPHOCYTES NFR BLD AUTO: 42.9 % (ref 19.6–45.3)
MCH RBC QN AUTO: 45.8 PG (ref 26.6–33)
MCHC RBC AUTO-ENTMCNC: 36.5 G/DL (ref 31.5–35.7)
MCV RBC AUTO: 125.7 FL (ref 79–97)
MONOCYTES # BLD AUTO: 0.75 10*3/MM3 (ref 0.1–0.9)
MONOCYTES NFR BLD AUTO: 16.7 % (ref 5–12)
NEUTROPHILS NFR BLD AUTO: 1.7 10*3/MM3 (ref 1.7–7)
NEUTROPHILS NFR BLD AUTO: 38 % (ref 42.7–76)
NRBC BLD AUTO-RTO: 0 /100 WBC (ref 0–0.2)
PLATELET # BLD AUTO: 310 10*3/MM3 (ref 140–450)
PMV BLD AUTO: 9 FL (ref 6–12)
RBC # BLD AUTO: 2.88 10*6/MM3 (ref 3.77–5.28)
WBC # BLD AUTO: 4.48 10*3/MM3 (ref 3.4–10.8)

## 2021-09-08 PROCEDURE — G0463 HOSPITAL OUTPT CLINIC VISIT: HCPCS

## 2021-09-08 PROCEDURE — 85025 COMPLETE CBC W/AUTO DIFF WBC: CPT

## 2021-09-08 PROCEDURE — 36415 COLL VENOUS BLD VENIPUNCTURE: CPT

## 2021-09-08 NOTE — NURSING NOTE
Pt is here for lab with RN review.  CBC reviewed with pt, counts are stable for this pt at this time. Pt has no complaints. Tolerating Hydrea with no issues. Copy of labs given to pt and f/u appt reviewed. Pt is instructed to call the office with any concerns or new symptoms prior to next visit. Pt v/u.    Lab Results   Component Value Date    WBC 4.48 09/08/2021    HGB 13.2 09/08/2021    HCT 36.2 09/08/2021    .7 (H) 09/08/2021     09/08/2021

## 2021-10-18 ENCOUNTER — OFFICE VISIT (OUTPATIENT)
Dept: ONCOLOGY | Facility: CLINIC | Age: 76
End: 2021-10-18

## 2021-10-18 ENCOUNTER — LAB (OUTPATIENT)
Dept: LAB | Facility: HOSPITAL | Age: 76
End: 2021-10-18

## 2021-10-18 VITALS
TEMPERATURE: 96.8 F | RESPIRATION RATE: 16 BRPM | WEIGHT: 156.8 LBS | HEIGHT: 65 IN | DIASTOLIC BLOOD PRESSURE: 62 MMHG | OXYGEN SATURATION: 96 % | SYSTOLIC BLOOD PRESSURE: 111 MMHG | HEART RATE: 117 BPM | BODY MASS INDEX: 26.12 KG/M2

## 2021-10-18 DIAGNOSIS — Z15.89 MONOALLELIC MUTATION OF CALR3 GENE: ICD-10-CM

## 2021-10-18 DIAGNOSIS — D75.839 THROMBOCYTOSIS: ICD-10-CM

## 2021-10-18 DIAGNOSIS — D47.1 MYELOPROLIFERATIVE DISORDER (HCC): ICD-10-CM

## 2021-10-18 DIAGNOSIS — D75.839 THROMBOCYTOSIS: Primary | ICD-10-CM

## 2021-10-18 LAB
BASOPHILS # BLD AUTO: 0.06 10*3/MM3 (ref 0–0.2)
BASOPHILS NFR BLD AUTO: 0.8 % (ref 0–1.5)
DEPRECATED RDW RBC AUTO: 54.4 FL (ref 37–54)
EOSINOPHIL # BLD AUTO: 0.02 10*3/MM3 (ref 0–0.4)
EOSINOPHIL NFR BLD AUTO: 0.3 % (ref 0.3–6.2)
ERYTHROCYTE [DISTWIDTH] IN BLOOD BY AUTOMATED COUNT: 11.9 % (ref 12.3–15.4)
HCT VFR BLD AUTO: 35.9 % (ref 34–46.6)
HGB BLD-MCNC: 13.3 G/DL (ref 12–15.9)
IMM GRANULOCYTES # BLD AUTO: 0.02 10*3/MM3 (ref 0–0.05)
IMM GRANULOCYTES NFR BLD AUTO: 0.3 % (ref 0–0.5)
LYMPHOCYTES # BLD AUTO: 2.27 10*3/MM3 (ref 0.7–3.1)
LYMPHOCYTES NFR BLD AUTO: 31.3 % (ref 19.6–45.3)
MCH RBC QN AUTO: 46 PG (ref 26.6–33)
MCHC RBC AUTO-ENTMCNC: 37 G/DL (ref 31.5–35.7)
MCV RBC AUTO: 124.2 FL (ref 79–97)
MONOCYTES # BLD AUTO: 0.84 10*3/MM3 (ref 0.1–0.9)
MONOCYTES NFR BLD AUTO: 11.6 % (ref 5–12)
NEUTROPHILS NFR BLD AUTO: 4.05 10*3/MM3 (ref 1.7–7)
NEUTROPHILS NFR BLD AUTO: 55.7 % (ref 42.7–76)
NRBC BLD AUTO-RTO: 0.3 /100 WBC (ref 0–0.2)
PLATELET # BLD AUTO: 334 10*3/MM3 (ref 140–450)
PMV BLD AUTO: 9.1 FL (ref 6–12)
RBC # BLD AUTO: 2.89 10*6/MM3 (ref 3.77–5.28)
WBC # BLD AUTO: 7.26 10*3/MM3 (ref 3.4–10.8)

## 2021-10-18 PROCEDURE — 36415 COLL VENOUS BLD VENIPUNCTURE: CPT

## 2021-10-18 PROCEDURE — 99213 OFFICE O/P EST LOW 20 MIN: CPT | Performed by: INTERNAL MEDICINE

## 2021-10-18 PROCEDURE — 85025 COMPLETE CBC W/AUTO DIFF WBC: CPT

## 2021-10-18 RX ORDER — ASPIRIN 81 MG/1
81 TABLET ORAL DAILY
COMMUNITY
End: 2022-06-23

## 2021-10-18 RX ORDER — HYDROXYUREA 500 MG/1
CAPSULE ORAL
Qty: 180 CAPSULE | Refills: 2 | Status: SHIPPED | OUTPATIENT
Start: 2021-10-18 | End: 2021-12-14

## 2021-10-18 NOTE — PROGRESS NOTES
Subjective     REASON FOR FOLLOW UP: Essential thrombocytosis controlled with Hydrea    HISTORY OF PRESENT ILLNESS:  The patient is a 76 y.o. year old female who was referred to us from her primary care office with recent abnormal CBC showing her platelet count over 1.5 million. She was started on 81 mg aspirin and Hydrea.  She currently is taking 1500 mg every Monday and Friday and 1000 mg the remaining 5 days of the week.    Her blood counts today look good with a normal platelet count of 334,000.  Her hemoglobin and white count are also normal..  She does not have any new complaints.  Her MCV is markedly elevated suggesting good compliance with Hydrea.      History of Present Illness     Past Medical History:   Diagnosis Date   • DDD (degenerative disc disease), lumbar    • H/o Hip pain    • History of depression    • History of low back pain    • History of thrombocytosis    • Hypercholesterolemia    • Sleep apnea         Past Surgical History:   Procedure Laterality Date   • BACK SURGERY     • CATARACT EXTRACTION  2018   • HIP SURGERY     • KNEE SURGERY     • TOTAL HIP ARTHROPLASTY Right 2011   • TOTAL KNEE ARTHROPLASTY Right 2013        Current Outpatient Medications on File Prior to Visit   Medication Sig Dispense Refill   • fluorometholone (FML) 0.1 % ophthalmic suspension      • FLUoxetine (PROzac) 20 MG capsule Take 20 mg by mouth Daily.     • fluticasone (FLONASE) 50 MCG/ACT nasal spray      • Fluzone High-Dose Quadrivalent 0.7 ML suspension prefilled syringe      • hydroxyurea (HYDREA) 500 MG capsule Take 1500 mg on Monday Wednesday and Friday and 1000 mg on remaining day unless prescribed by physician 180 capsule 2   • ketoconazole (NIZORAL) 2 % shampoo      • montelukast (SINGULAIR) 10 MG tablet Take 10 mg by mouth Every Night.     • pravastatin (PRAVACHOL) 20 MG tablet Take 20 mg by mouth Daily.     • rizatriptan (MAXALT) 10 MG tablet Take 10 mg by mouth 1 (One) Time As Needed for Migraine. May  repeat in 2 hours if needed     • triamterene-hydrochlorothiazide (MAXZIDE-25) 37.5-25 MG per tablet        No current facility-administered medications on file prior to visit.        ALLERGIES:    Allergies   Allergen Reactions   • Codeine Nausea And Vomiting   • Penicillins Hives        Social History     Socioeconomic History   • Marital status:      Spouse name: Temo   Tobacco Use   • Smoking status: Never Smoker   • Smokeless tobacco: Current User   Substance and Sexual Activity   • Alcohol use: Never   • Drug use: Never        No family history on file.     Review of Systems   Constitutional: Negative for activity change, chills, fatigue and fever.   HENT: Negative for mouth sores, trouble swallowing and voice change.    Eyes: Negative for pain and visual disturbance.   Respiratory: Negative for cough, shortness of breath and wheezing.    Cardiovascular: Negative for chest pain and palpitations.   Gastrointestinal: Negative for abdominal pain, constipation, diarrhea, nausea and vomiting.   Genitourinary: Negative for difficulty urinating, frequency and urgency.   Musculoskeletal: Negative for arthralgias and joint swelling.   Skin: Negative for rash.   Neurological: Negative for dizziness, seizures, weakness and headaches.   Hematological: Negative for adenopathy. Does not bruise/bleed easily.   Psychiatric/Behavioral: Negative for behavioral problems and confusion. The patient is not nervous/anxious.        Objective     There were no vitals filed for this visit.  Current Status 8/3/2020   ECOG score 0       Physical Exam   Constitutional: She is oriented to person, place, and time. She appears well-developed. No distress.   HENT:   Head: Normocephalic.   Eyes: Pupils are equal, round, and reactive to light. Conjunctivae are normal. No scleral icterus.   Neck: No JVD present. No thyromegaly present.   Cardiovascular: Normal rate and regular rhythm. Exam reveals no gallop and no friction rub.   No  murmur heard.  Pulmonary/Chest: Effort normal and breath sounds normal. She has no wheezes. She has no rales.   Abdominal: Soft. She exhibits no distension and no mass. There is no abdominal tenderness.   Musculoskeletal: Normal range of motion. No deformity.   Lymphadenopathy:     She has no cervical adenopathy.   Neurological: She is alert and oriented to person, place, and time. She has normal reflexes. No cranial nerve deficit.   Skin: Skin is warm and dry. No rash noted. No erythema.   Nailbed discoloration   Psychiatric: Her behavior is normal. Judgment normal.            RECENT LABS:  Hematology WBC   Date Value Ref Range Status   10/18/2021 7.26 3.40 - 10.80 10*3/mm3 Final     RBC   Date Value Ref Range Status   10/18/2021 2.89 (L) 3.77 - 5.28 10*6/mm3 Final     Hemoglobin   Date Value Ref Range Status   10/18/2021 13.3 12.0 - 15.9 g/dL Final     Hematocrit   Date Value Ref Range Status   10/18/2021 35.9 34.0 - 46.6 % Final     Platelets   Date Value Ref Range Status   10/18/2021 334 140 - 450 10*3/mm3 Final          Assessment/Plan   1.  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  Good response and tolerance to Hydrea thus far.  She currently is on 1500 mg every Monday and Friday and 1000 mg the remaining 5 days of the week.    Recommendations:  1.  Continue aspirin 81 mg po daily  2.  Continue Hydrea dose of 1500 mg every Monday and Friday and 1000 mg the remaining 5 days of the week  3.  Lab + RN review Q8 wks  4.  MD follow up 6 months to review results and check CBC, and adjust Hydrea dose further as needed.

## 2021-10-21 ENCOUNTER — OFFICE VISIT (OUTPATIENT)
Dept: INTERNAL MEDICINE | Facility: CLINIC | Age: 76
End: 2021-10-21

## 2021-10-21 VITALS
HEART RATE: 94 BPM | HEIGHT: 65 IN | SYSTOLIC BLOOD PRESSURE: 120 MMHG | WEIGHT: 155.4 LBS | TEMPERATURE: 97.1 F | OXYGEN SATURATION: 99 % | BODY MASS INDEX: 25.89 KG/M2 | DIASTOLIC BLOOD PRESSURE: 82 MMHG

## 2021-10-21 DIAGNOSIS — R10.30 LOWER ABDOMINAL PAIN: Primary | ICD-10-CM

## 2021-10-21 DIAGNOSIS — R82.90 ABNORMAL URINE FINDINGS: ICD-10-CM

## 2021-10-21 LAB
BACTERIA UR QL AUTO: ABNORMAL /HPF
BILIRUB UR QL STRIP: NEGATIVE
CLARITY UR: CLEAR
COLOR UR: YELLOW
GLUCOSE UR STRIP-MCNC: NEGATIVE MG/DL
HGB UR QL STRIP.AUTO: ABNORMAL
HYALINE CASTS UR QL AUTO: ABNORMAL /LPF
KETONES UR QL STRIP: NEGATIVE
LEUKOCYTE ESTERASE UR QL STRIP.AUTO: NEGATIVE
NITRITE UR QL STRIP: NEGATIVE
PH UR STRIP.AUTO: 7 [PH] (ref 5–8)
PROT UR QL STRIP: NEGATIVE
RBC # UR: ABNORMAL /HPF
REF LAB TEST METHOD: ABNORMAL
SP GR UR STRIP: 1.02 (ref 1–1.03)
SQUAMOUS #/AREA URNS HPF: ABNORMAL /HPF
UROBILINOGEN UR QL STRIP: ABNORMAL
WBC UR QL AUTO: ABNORMAL /HPF

## 2021-10-21 PROCEDURE — 99203 OFFICE O/P NEW LOW 30 MIN: CPT | Performed by: NURSE PRACTITIONER

## 2021-10-21 PROCEDURE — 81001 URINALYSIS AUTO W/SCOPE: CPT | Performed by: NURSE PRACTITIONER

## 2021-10-21 RX ORDER — NITROFURANTOIN 25; 75 MG/1; MG/1
100 CAPSULE ORAL 2 TIMES DAILY
Qty: 10 CAPSULE | Refills: 0 | Status: SHIPPED | OUTPATIENT
Start: 2021-10-21 | End: 2021-10-26

## 2021-10-23 LAB
BACTERIA UR CULT: NO GROWTH
BACTERIA UR CULT: NORMAL

## 2021-10-25 ENCOUNTER — TELEPHONE (OUTPATIENT)
Dept: INTERNAL MEDICINE | Facility: CLINIC | Age: 76
End: 2021-10-25

## 2021-10-25 NOTE — TELEPHONE ENCOUNTER
----- Message from ANÍBAL Lyons sent at 10/25/2021  1:17 PM EDT -----  Please call patient and let her know that her urine culture did come back negative or no growth.  Thank you ANÍBAL Sanz

## 2021-11-15 ENCOUNTER — OFFICE VISIT (OUTPATIENT)
Dept: INTERNAL MEDICINE | Facility: CLINIC | Age: 76
End: 2021-11-15

## 2021-11-15 VITALS
WEIGHT: 157.6 LBS | SYSTOLIC BLOOD PRESSURE: 130 MMHG | TEMPERATURE: 96.9 F | BODY MASS INDEX: 26.26 KG/M2 | DIASTOLIC BLOOD PRESSURE: 70 MMHG | HEART RATE: 83 BPM | OXYGEN SATURATION: 99 % | HEIGHT: 65 IN

## 2021-11-15 DIAGNOSIS — L30.9 ECZEMA, UNSPECIFIED TYPE: ICD-10-CM

## 2021-11-15 DIAGNOSIS — Z76.89 ENCOUNTER TO ESTABLISH CARE: Primary | ICD-10-CM

## 2021-11-15 DIAGNOSIS — F41.9 ANXIETY: ICD-10-CM

## 2021-11-15 DIAGNOSIS — I10 PRIMARY HYPERTENSION: ICD-10-CM

## 2021-11-15 PROCEDURE — 99214 OFFICE O/P EST MOD 30 MIN: CPT | Performed by: FAMILY MEDICINE

## 2021-11-15 RX ORDER — TRIAMTERENE AND HYDROCHLOROTHIAZIDE 37.5; 25 MG/1; MG/1
1 TABLET ORAL DAILY
Qty: 90 TABLET | Refills: 1 | Status: SHIPPED | OUTPATIENT
Start: 2021-11-15 | End: 2022-03-02 | Stop reason: SDUPTHER

## 2021-11-15 RX ORDER — FLUOXETINE HYDROCHLORIDE 20 MG/1
20 CAPSULE ORAL DAILY
Qty: 90 CAPSULE | Refills: 1 | Status: SHIPPED | OUTPATIENT
Start: 2021-11-15 | End: 2022-09-07 | Stop reason: SDUPTHER

## 2021-11-15 NOTE — ASSESSMENT & PLAN NOTE
Discussion regarding keeping skin moisturized   Concern some allergy to glasses and area which touches her face. She will go to [facility] for new nose guards.   Cetaphil soap and moisturizer on face.

## 2021-11-15 NOTE — ASSESSMENT & PLAN NOTE
Hypertension is Chronic, stable.  Continue current treatment regimen.  Dietary sodium restriction.  Continue current medications.  Refilled medication today   Blood pressure will be reassessed at the next regular appointment.

## 2021-11-15 NOTE — ASSESSMENT & PLAN NOTE
Chronic  Pt states she has had a history for a long time.    Stable on fluoxetine.    Refilled today.

## 2021-11-15 NOTE — PROGRESS NOTES
"Chief Complaint  Establish Care (new patient ), Back Pain, and Eczema (on face )    Subjective    History of Present Illness {CC  Problem List  Visit  Diagnosis   Encounters  Notes  Medications  Labs  Result Review Imaging  Media :23}     Sonia Wooten presents to [facility] for   History of Present Illness   75 yo female present to establish care. Pt is new to me and previously seen by Dr. Ureña. 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  Pt following with Dr. Umbetro Brown hemo/onc. Pt states she is stable at this time.     Pt states rash for the last two years. In the area on her face under glasses. She has history of of ezema as a child.      Vision exam- [facility] [place] Dr. Sumeet Bobo  Allergist- Nathanael Spence    Objective     Vital Signs:   /70 (BP Location: Left arm, Patient Position: Sitting, Cuff Size: Adult)   Pulse 83   Temp 96.9 °F (36.1 °C) (Temporal)   Ht 165.1 cm (65\")   Wt 71.5 kg (157 lb 9.6 oz)   SpO2 99%   BMI 26.23 kg/m²   Physical Exam  Vitals reviewed.   Constitutional:       General: She is not in acute distress.     Appearance: She is not ill-appearing.   HENT:      Head: Normocephalic.      Right Ear: Tympanic membrane normal.      Left Ear: Tympanic membrane normal.   Eyes:      Conjunctiva/sclera: Conjunctivae normal.   Cardiovascular:      Rate and Rhythm: Regular rhythm.      Pulses: Normal pulses.      Heart sounds: Normal heart sounds.   Pulmonary:      Effort: Pulmonary effort is normal. No respiratory distress.      Breath sounds: Normal breath sounds. No wheezing.   Abdominal:      General: Bowel sounds are normal. There is no distension.      Palpations: Abdomen is soft.      Tenderness: There is no abdominal tenderness.   Musculoskeletal:      Right lower leg: No edema.      Left lower leg: No edema.   Skin:     Findings: Rash present.      Comments: Sides of nose    Neurological:      " Mental Status: She is alert.   Psychiatric:         Mood and Affect: Mood normal.        Result Review  Data Reviewed:{ Labs  Result Review  Imaging  Med Tab  Media :23}   The following data was reviewed by: Josselyn Redmond MD on 11/15/2021  Lab Results - Last 18 Months   Lab Units 10/18/21  1051 09/08/21  0804 07/28/21  0803 06/17/21  1128 05/06/21  1003 12/31/20  0849 11/19/20  1451 08/31/20  0758 08/03/20  1534   BUN mg/dL  --   --   --   --  17  --  16  --  20   CREATININE mg/dL  --   --   --   --  0.66  --  0.63  --  0.78   EGFR IF NONAFRICN AM mL/min/1.73  --   --   --   --  87  --  92  --  72   SODIUM mmol/L  --   --   --   --  139  --  139  --  139   POTASSIUM mmol/L  --   --   --   --  4.1  --  3.7  --  3.7   CHLORIDE mmol/L  --   --   --   --  103  --  101  --  99   CALCIUM mg/dL  --   --   --   --  9.7  --  9.6  --  10.0   ALBUMIN g/dL  --   --   --   --  4.30  --  4.60  --  4.60   BILIRUBIN mg/dL  --   --   --   --  0.6  --  0.5  --  0.5   ALK PHOS U/L  --   --   --   --  53  --  62  --  55   AST (SGOT) U/L  --   --   --   --  25  --  23  --  25   ALT (SGPT) U/L  --   --   --   --  14  --  16  --  15   WBC 10*3/mm3 7.26 4.48 4.21   < > 3.96   < > 4.71   < > 5.39   RBC 10*6/mm3 2.89* 2.88* 2.78*   < > 2.63*   < > 2.68*   < > 3.07*   HEMATOCRIT % 35.9 36.2 34.8   < > 34.3   < > 35.2   < > 36.0   MCV fL 124.2* 125.7* 125.2*   < > 130.4*   < > 131.3*   < > 117.3*   MCH pg 46.0* 45.8* 45.7*   < > 46.8*   < > 46.6*   < > 41.4*    < > = values in this interval not displayed.            Current Outpatient Medications:   •  aspirin 81 MG EC tablet, Take 81 mg by mouth Daily., Disp: , Rfl:   •  Cholecalciferol (VITAMIN D3 PO), Take  by mouth., Disp: , Rfl:   •  fluorometholone (FML) 0.1 % ophthalmic suspension, , Disp: , Rfl:   •  FLUoxetine (PROzac) 20 MG capsule, Take 1 capsule by mouth Daily., Disp: 90 capsule, Rfl: 1  •  fluticasone (FLONASE) 50 MCG/ACT nasal spray, , Disp: , Rfl:   •  hydroxyurea  (HYDREA) 500 MG capsule, Take 1500 mg on Monday and Friday and 1000 mg on remaining days unless prescribed by physician, Disp: 180 capsule, Rfl: 2  •  pravastatin (PRAVACHOL) 20 MG tablet, Take 20 mg by mouth Daily., Disp: , Rfl:   •  rizatriptan (MAXALT) 10 MG tablet, Take 10 mg by mouth 1 (One) Time As Needed for Migraine. May repeat in 2 hours if needed, Disp: , Rfl:   •  triamterene-hydrochlorothiazide (MAXZIDE-25) 37.5-25 MG per tablet, Take 1 tablet by mouth Daily., Disp: 90 tablet, Rfl: 1     Assessment and Plan {CC Problem List  Visit Diagnosis  ROS  Review (Popup)  Health Maintenance  Quality  BestPractice  Medications  SmartSets  SnapShot Encounters  Media :23}   Problem List Items Addressed This Visit        Cardiac and Vasculature    Primary hypertension    Current Assessment & Plan     Hypertension is Chronic, stable.  Continue current treatment regimen.  Dietary sodium restriction.  Continue current medications.  Refilled medication today   Blood pressure will be reassessed at the next regular appointment.         Relevant Medications    triamterene-hydrochlorothiazide (MAXZIDE-25) 37.5-25 MG per tablet       Mental Health    Anxiety    Current Assessment & Plan     Chronic  Pt states she has had a history for a long time.    Stable on fluoxetine.    Refilled today.          Relevant Medications    FLUoxetine (PROzac) 20 MG capsule       Skin    Eczema    Current Assessment & Plan     Discussion regarding keeping skin moisturized   Concern some allergy to glasses and area which touches her face. She will go to [facility] for new nose guards.   Cetaphil soap and moisturizer on face.              Other Visit Diagnoses     Encounter to establish care    -  Primary          Follow Up   Return in about 3 months (around 2/15/2022) for Annual physical.  Patient was given instructions and counseling regarding her condition or for health maintenance advice. Please see specific information pulled into  the AVS if appropriate.    EpicAct:[email or web address]_ORDERS,RunParams:STARTUPTYPE=FOLLOW    [email or web address]_DISCHARGE

## 2021-11-18 ENCOUNTER — PATIENT ROUNDING (BHMG ONLY) (OUTPATIENT)
Dept: INTERNAL MEDICINE | Facility: CLINIC | Age: 76
End: 2021-11-18

## 2021-11-18 NOTE — PROGRESS NOTES
November 18, 2021    Hello, may I speak with Sonia ENCISO [place]?    My name is Anabelle Reynolds    I am  with [facility]  [address] [place] [id number] [phone].    Before we get started may I verify your date of birth? 1945    I am calling to officially welcome you to our practice and ask about your recent visit. Is this a good time to talk? No- left voicemail welcoming her to the practice and left my direct number to return the call.        I appreciate you taking the time to speak with me today. Is there anything else I can do for you? NO      Thank you, and have a great day.

## 2021-12-03 ENCOUNTER — OFFICE VISIT (OUTPATIENT)
Dept: ORTHOPEDIC SURGERY | Facility: CLINIC | Age: 76
End: 2021-12-03

## 2021-12-03 VITALS — HEIGHT: 64 IN | BODY MASS INDEX: 25.61 KG/M2 | WEIGHT: 150 LBS

## 2021-12-03 DIAGNOSIS — R52 PAIN: ICD-10-CM

## 2021-12-03 DIAGNOSIS — M16.12 PRIMARY OSTEOARTHRITIS OF LEFT HIP: ICD-10-CM

## 2021-12-03 DIAGNOSIS — M41.9 SCOLIOSIS OF LUMBAR SPINE, UNSPECIFIED SCOLIOSIS TYPE: Primary | ICD-10-CM

## 2021-12-03 DIAGNOSIS — M19.012 PRIMARY LOCALIZED OSTEOARTHROSIS OF LEFT SHOULDER REGION: ICD-10-CM

## 2021-12-03 PROCEDURE — 72100 X-RAY EXAM L-S SPINE 2/3 VWS: CPT | Performed by: ORTHOPAEDIC SURGERY

## 2021-12-03 PROCEDURE — 73030 X-RAY EXAM OF SHOULDER: CPT | Performed by: ORTHOPAEDIC SURGERY

## 2021-12-03 PROCEDURE — 99214 OFFICE O/P EST MOD 30 MIN: CPT | Performed by: ORTHOPAEDIC SURGERY

## 2021-12-03 NOTE — PROGRESS NOTES
She is complaining of pain in the left shoulder, the left groin and the low back. She is followed by Dr. Chow for orthopedic complaints and saw Dr. Campbell for hip replacement in the past. No leg pain numbness or tingling and sciatica is better than before. The back pain is manageable as is the shoulder pain for that matter and the groin pain but she just wanted to get checked out. On exam she does have crepitus in the left shoulder but fairly decent strength and motion there. The left hip is Stinchfield positive. Good strength in the legs bilaterally sensations intact mild lumbar tenderness. X-rays of the lumbar spine show disc degeneration, fairly well-maintained lordosis multilevel spondylosis and an old fracture of L1. Nothing change from prior films. Compared to pelvic x-rays in 2019 however the left hip does show some increased degeneration and supports her groin pain coming from degenerative arthritis. Finally left shoulder x-rays do show subacromial narrowing and some glenohumeral arthritis. No comparison views are available.  She has probably some rotator cuff arthropathy in the left shoulder which is fairly mild, left hip DJD, and lumbar disc degeneration with currently minimally symptomatic stenosis. She wants reassurance that safe to live with as opposed to pursuing treatment at this point and I reassured her in all likelihood that is a good approach. If she fails to improve or if 1 area of pain begins to take the forefront then she certainly would be a candidate for further treatment including physical therapy, injections or even surgery. She will follow-up with Dr. Chow for the hip and shoulder and see me for the back

## 2021-12-13 ENCOUNTER — LAB (OUTPATIENT)
Dept: LAB | Facility: HOSPITAL | Age: 76
End: 2021-12-13

## 2021-12-13 ENCOUNTER — CLINICAL SUPPORT (OUTPATIENT)
Dept: ONCOLOGY | Facility: HOSPITAL | Age: 76
End: 2021-12-13

## 2021-12-13 ENCOUNTER — TELEPHONE (OUTPATIENT)
Dept: ONCOLOGY | Facility: HOSPITAL | Age: 76
End: 2021-12-13

## 2021-12-13 DIAGNOSIS — D47.1 MYELOPROLIFERATIVE DISORDER (HCC): ICD-10-CM

## 2021-12-13 DIAGNOSIS — D75.839 THROMBOCYTOSIS: ICD-10-CM

## 2021-12-13 DIAGNOSIS — Z15.89 MONOALLELIC MUTATION OF CALR3 GENE: ICD-10-CM

## 2021-12-13 LAB
ALBUMIN SERPL-MCNC: 4.5 G/DL (ref 3.5–5.2)
ALBUMIN/GLOB SERPL: 1.8 G/DL (ref 1.1–2.4)
ALP SERPL-CCNC: 57 U/L (ref 38–116)
ALT SERPL W P-5'-P-CCNC: 15 U/L (ref 0–33)
ANION GAP SERPL CALCULATED.3IONS-SCNC: 11.9 MMOL/L (ref 5–15)
AST SERPL-CCNC: 24 U/L (ref 0–32)
BASOPHILS # BLD AUTO: 0.05 10*3/MM3 (ref 0–0.2)
BASOPHILS NFR BLD AUTO: 1.4 % (ref 0–1.5)
BILIRUB SERPL-MCNC: 0.6 MG/DL (ref 0.2–1.2)
BUN SERPL-MCNC: 12 MG/DL (ref 6–20)
BUN/CREAT SERPL: 17.1 (ref 7.3–30)
CALCIUM SPEC-SCNC: 10 MG/DL (ref 8.5–10.2)
CHLORIDE SERPL-SCNC: 101 MMOL/L (ref 98–107)
CO2 SERPL-SCNC: 27.1 MMOL/L (ref 22–29)
CREAT SERPL-MCNC: 0.7 MG/DL (ref 0.6–1.1)
DEPRECATED RDW RBC AUTO: 62.4 FL (ref 37–54)
EOSINOPHIL # BLD AUTO: 0.02 10*3/MM3 (ref 0–0.4)
EOSINOPHIL NFR BLD AUTO: 0.5 % (ref 0.3–6.2)
ERYTHROCYTE [DISTWIDTH] IN BLOOD BY AUTOMATED COUNT: 13 % (ref 12.3–15.4)
FERRITIN SERPL-MCNC: 60.9 NG/ML (ref 13–150)
GFR SERPL CREATININE-BSD FRML MDRD: 81 ML/MIN/1.73
GLOBULIN UR ELPH-MCNC: 2.5 GM/DL (ref 1.8–3.5)
GLUCOSE SERPL-MCNC: 94 MG/DL (ref 74–124)
HCT VFR BLD AUTO: 35.1 % (ref 34–46.6)
HGB BLD-MCNC: 12.6 G/DL (ref 12–15.9)
IMM GRANULOCYTES # BLD AUTO: 0.01 10*3/MM3 (ref 0–0.05)
IMM GRANULOCYTES NFR BLD AUTO: 0.3 % (ref 0–0.5)
LYMPHOCYTES # BLD AUTO: 1.78 10*3/MM3 (ref 0.7–3.1)
LYMPHOCYTES NFR BLD AUTO: 48.1 % (ref 19.6–45.3)
MCH RBC QN AUTO: 47 PG (ref 26.6–33)
MCHC RBC AUTO-ENTMCNC: 35.9 G/DL (ref 31.5–35.7)
MCV RBC AUTO: 131 FL (ref 79–97)
MONOCYTES # BLD AUTO: 0.48 10*3/MM3 (ref 0.1–0.9)
MONOCYTES NFR BLD AUTO: 13 % (ref 5–12)
NEUTROPHILS NFR BLD AUTO: 1.36 10*3/MM3 (ref 1.7–7)
NEUTROPHILS NFR BLD AUTO: 36.7 % (ref 42.7–76)
NRBC BLD AUTO-RTO: 0 /100 WBC (ref 0–0.2)
PLATELET # BLD AUTO: 304 10*3/MM3 (ref 140–450)
PMV BLD AUTO: 8.8 FL (ref 6–12)
POTASSIUM SERPL-SCNC: 3.8 MMOL/L (ref 3.5–4.7)
PROT SERPL-MCNC: 7 G/DL (ref 6.3–8)
RBC # BLD AUTO: 2.68 10*6/MM3 (ref 3.77–5.28)
SODIUM SERPL-SCNC: 140 MMOL/L (ref 134–145)
VIT B12 BLD-MCNC: 738 PG/ML (ref 211–946)
WBC NRBC COR # BLD: 3.7 10*3/MM3 (ref 3.4–10.8)

## 2021-12-13 PROCEDURE — 80053 COMPREHEN METABOLIC PANEL: CPT

## 2021-12-13 PROCEDURE — G0463 HOSPITAL OUTPT CLINIC VISIT: HCPCS

## 2021-12-13 PROCEDURE — 82728 ASSAY OF FERRITIN: CPT

## 2021-12-13 PROCEDURE — 36415 COLL VENOUS BLD VENIPUNCTURE: CPT

## 2021-12-13 PROCEDURE — 82607 VITAMIN B-12: CPT | Performed by: INTERNAL MEDICINE

## 2021-12-13 PROCEDURE — 85025 COMPLETE CBC W/AUTO DIFF WBC: CPT

## 2021-12-13 NOTE — NURSING NOTE
Pt here for labs and RN review. CBC reviewed with pt. Counts are stable for this pt at this time. ANC 1.36 today. Pt confirmed taking hydrea 1500 mg twice weekly and 1000 mg AOD with no issued. Message sent to Dr. Brown for review. Will call pt with any dosing changes or abnormalities in labs. Pt v/u. Copy of labs given to pt and f/u appt reviewed. Pt v/u to call with any new concerns.     Lab Results   Component Value Date    WBC 3.70 12/13/2021    HGB 12.6 12/13/2021    HCT 35.1 12/13/2021    .0 (H) 12/13/2021     12/13/2021     Per Dr. Brown, decrease pt's hydrea dose to 1000 mg daily. No answer, left detailed VM that pt is to decrease hydrea to 1000 mg daily and to return call with any questions or concerns.

## 2021-12-13 NOTE — TELEPHONE ENCOUNTER
----- Message from Umberto Brown MD sent at 12/13/2021  4:23 PM EST -----  Please decrease the Hydrea dosage to 1000 mg daily (7 days a week).  ----- Message -----  From: Jennifer Decker, RN  Sent: 12/13/2021  10:26 AM EST  To: Umberto Brown MD    Pt here for CBC and RN review. Pt taking hydrea 1500 mg twice weekly and 1000 mg all other days. ANC 1.36. Please advise with any new orders.       Thanks!

## 2021-12-14 ENCOUNTER — TELEPHONE (OUTPATIENT)
Dept: ONCOLOGY | Facility: CLINIC | Age: 76
End: 2021-12-14

## 2021-12-14 DIAGNOSIS — D75.839 THROMBOCYTOSIS: ICD-10-CM

## 2021-12-14 DIAGNOSIS — D47.1 MYELOPROLIFERATIVE DISORDER (HCC): ICD-10-CM

## 2021-12-14 DIAGNOSIS — Z15.89 MONOALLELIC MUTATION OF CALR3 GENE: ICD-10-CM

## 2021-12-14 RX ORDER — HYDROXYUREA 500 MG/1
CAPSULE ORAL
Qty: 180 CAPSULE | Refills: 2
Start: 2021-12-14 | End: 2022-04-04 | Stop reason: SDUPTHER

## 2022-01-10 ENCOUNTER — TELEPHONE (OUTPATIENT)
Dept: INTERNAL MEDICINE | Facility: CLINIC | Age: 77
End: 2022-01-10

## 2022-01-10 RX ORDER — PRAVASTATIN SODIUM 20 MG
20 TABLET ORAL DAILY
Qty: 90 TABLET | Refills: 1 | Status: SHIPPED | OUTPATIENT
Start: 2022-01-10 | End: 2022-09-07 | Stop reason: SDUPTHER

## 2022-01-10 RX ORDER — RIZATRIPTAN BENZOATE 10 MG/1
10 TABLET ORAL ONCE AS NEEDED
Qty: 12 TABLET | Refills: 1 | Status: SHIPPED | OUTPATIENT
Start: 2022-01-10 | End: 2022-03-02

## 2022-01-10 NOTE — TELEPHONE ENCOUNTER
----- Message from Sonia ZARIADeandre Wooten sent at 2022 12:48 PM EST -----  Regarding: Need new scripts  I have 2 prescriptions that Dr Wang had issued that have .  I called [facility] for refills, but had not noticed they were .  May be going in circles at [facility], given snow, etc.  The things I need are:  Pravastatin 20 MG daily  Rizatriptan 10 MG as required.  No urgent need on either.

## 2022-02-07 ENCOUNTER — CLINICAL SUPPORT (OUTPATIENT)
Dept: ONCOLOGY | Facility: HOSPITAL | Age: 77
End: 2022-02-07

## 2022-02-07 ENCOUNTER — LAB (OUTPATIENT)
Dept: LAB | Facility: HOSPITAL | Age: 77
End: 2022-02-07

## 2022-02-07 DIAGNOSIS — D75.839 THROMBOCYTOSIS: ICD-10-CM

## 2022-02-07 DIAGNOSIS — D47.1 MYELOPROLIFERATIVE DISORDER: ICD-10-CM

## 2022-02-07 LAB
BASOPHILS # BLD AUTO: 0.05 10*3/MM3 (ref 0–0.2)
BASOPHILS NFR BLD AUTO: 0.8 % (ref 0–1.5)
DEPRECATED RDW RBC AUTO: 53.5 FL (ref 37–54)
EOSINOPHIL # BLD AUTO: 0.03 10*3/MM3 (ref 0–0.4)
EOSINOPHIL NFR BLD AUTO: 0.5 % (ref 0.3–6.2)
ERYTHROCYTE [DISTWIDTH] IN BLOOD BY AUTOMATED COUNT: 11.4 % (ref 12.3–15.4)
HCT VFR BLD AUTO: 39.6 % (ref 34–46.6)
HGB BLD-MCNC: 14.5 G/DL (ref 12–15.9)
IMM GRANULOCYTES # BLD AUTO: 0.03 10*3/MM3 (ref 0–0.05)
IMM GRANULOCYTES NFR BLD AUTO: 0.5 % (ref 0–0.5)
LYMPHOCYTES # BLD AUTO: 2.13 10*3/MM3 (ref 0.7–3.1)
LYMPHOCYTES NFR BLD AUTO: 35.3 % (ref 19.6–45.3)
MCH RBC QN AUTO: 45.7 PG (ref 26.6–33)
MCHC RBC AUTO-ENTMCNC: 36.6 G/DL (ref 31.5–35.7)
MCV RBC AUTO: 124.9 FL (ref 79–97)
MONOCYTES # BLD AUTO: 0.82 10*3/MM3 (ref 0.1–0.9)
MONOCYTES NFR BLD AUTO: 13.6 % (ref 5–12)
NEUTROPHILS NFR BLD AUTO: 2.97 10*3/MM3 (ref 1.7–7)
NEUTROPHILS NFR BLD AUTO: 49.3 % (ref 42.7–76)
NRBC BLD AUTO-RTO: 0 /100 WBC (ref 0–0.2)
PLATELET # BLD AUTO: 401 10*3/MM3 (ref 140–450)
PMV BLD AUTO: 9 FL (ref 6–12)
RBC # BLD AUTO: 3.17 10*6/MM3 (ref 3.77–5.28)
WBC NRBC COR # BLD: 6.03 10*3/MM3 (ref 3.4–10.8)

## 2022-02-07 PROCEDURE — 36415 COLL VENOUS BLD VENIPUNCTURE: CPT

## 2022-02-07 PROCEDURE — G0463 HOSPITAL OUTPT CLINIC VISIT: HCPCS

## 2022-02-07 PROCEDURE — 85025 COMPLETE CBC W/AUTO DIFF WBC: CPT

## 2022-02-07 NOTE — NURSING NOTE
Pt here for CBC and review. Platelet count 401 today. Reports tolerating Hydrea 1000 mg daily without issue. Hydrea was decreased on previous visit d/t ANC being low. ANC is 2.97 today. Copy of labs provided and f/u appt reviewed.    Lab Results   Component Value Date    WBC 6.03 02/07/2022    HGB 14.5 02/07/2022    HCT 39.6 02/07/2022    .9 (H) 02/07/2022     02/07/2022

## 2022-02-09 ENCOUNTER — TELEPHONE (OUTPATIENT)
Dept: ORTHOPEDIC SURGERY | Facility: CLINIC | Age: 77
End: 2022-02-09

## 2022-02-09 DIAGNOSIS — M41.9 SCOLIOSIS OF LUMBAR SPINE, UNSPECIFIED SCOLIOSIS TYPE: Primary | ICD-10-CM

## 2022-02-09 DIAGNOSIS — M48.062 SPINAL STENOSIS OF LUMBAR REGION WITH NEUROGENIC CLAUDICATION: ICD-10-CM

## 2022-02-09 NOTE — TELEPHONE ENCOUNTER
Caller: PATIENT     Relationship to patient: SELF     Best call back number:  [phone]      Chief complaint: INCREASING PAIN IN MID TO LOWER BACK GOING DOWN TO LEFT LEG     Type of visit: EPIDURAL INJECTION     Additional notes: PT.STATES THAT SHE LAST SAW DR. ANAND IN December 2021 AND THAT HE TOLD HER TO CALL BACK IF SHE WANTED TO SCHEDULE EPIDURAL INJECTION.   PLEASE CALL TO ADVISE.

## 2022-02-25 ENCOUNTER — ANESTHESIA (OUTPATIENT)
Dept: PAIN MEDICINE | Facility: HOSPITAL | Age: 77
End: 2022-02-25

## 2022-02-25 ENCOUNTER — ANESTHESIA EVENT (OUTPATIENT)
Dept: PAIN MEDICINE | Facility: HOSPITAL | Age: 77
End: 2022-02-25

## 2022-02-25 ENCOUNTER — HOSPITAL ENCOUNTER (OUTPATIENT)
Dept: PAIN MEDICINE | Facility: HOSPITAL | Age: 77
Discharge: HOME | End: 2022-02-25

## 2022-02-25 ENCOUNTER — HOSPITAL ENCOUNTER (OUTPATIENT)
Dept: GENERAL RADIOLOGY | Facility: HOSPITAL | Age: 77
Discharge: HOME | End: 2022-02-25

## 2022-02-25 VITALS
TEMPERATURE: 96.9 F | DIASTOLIC BLOOD PRESSURE: 90 MMHG | HEART RATE: 75 BPM | SYSTOLIC BLOOD PRESSURE: 129 MMHG | WEIGHT: 153 LBS | BODY MASS INDEX: 25.49 KG/M2 | OXYGEN SATURATION: 97 % | HEIGHT: 65 IN | RESPIRATION RATE: 16 BRPM

## 2022-02-25 DIAGNOSIS — R52 PAIN: ICD-10-CM

## 2022-02-25 DIAGNOSIS — M54.16 LUMBAR RADICULOPATHY: Primary | ICD-10-CM

## 2022-02-25 PROCEDURE — 0 IOPAMIDOL 41 % SOLUTION: Performed by: ANESTHESIOLOGY

## 2022-02-25 PROCEDURE — 25010000002 METHYLPREDNISOLONE PER 80 MG: Performed by: ANESTHESIOLOGY

## 2022-02-25 PROCEDURE — 77003 FLUOROGUIDE FOR SPINE INJECT: CPT

## 2022-02-25 RX ORDER — SODIUM CHLORIDE 0.9 % (FLUSH) 0.9 %
1-10 SYRINGE (ML) INJECTION AS NEEDED
Status: DISCONTINUED | OUTPATIENT
Start: 2022-02-25 | End: 2022-02-26 | Stop reason: HOSPADM

## 2022-02-25 RX ORDER — FENTANYL CITRATE 50 UG/ML
50 INJECTION, SOLUTION INTRAMUSCULAR; INTRAVENOUS AS NEEDED
Status: DISCONTINUED | OUTPATIENT
Start: 2022-02-25 | End: 2022-02-26 | Stop reason: HOSPADM

## 2022-02-25 RX ORDER — METHYLPREDNISOLONE ACETATE 80 MG/ML
80 INJECTION, SUSPENSION INTRA-ARTICULAR; INTRALESIONAL; INTRAMUSCULAR; SOFT TISSUE ONCE
Status: COMPLETED | OUTPATIENT
Start: 2022-02-25 | End: 2022-02-25

## 2022-02-25 RX ORDER — LIDOCAINE HYDROCHLORIDE 10 MG/ML
1 INJECTION, SOLUTION INFILTRATION; PERINEURAL ONCE AS NEEDED
Status: DISCONTINUED | OUTPATIENT
Start: 2022-02-25 | End: 2022-02-26 | Stop reason: HOSPADM

## 2022-02-25 RX ORDER — MIDAZOLAM HYDROCHLORIDE 1 MG/ML
1 INJECTION INTRAMUSCULAR; INTRAVENOUS AS NEEDED
Status: DISCONTINUED | OUTPATIENT
Start: 2022-02-25 | End: 2022-02-26 | Stop reason: HOSPADM

## 2022-02-25 RX ADMIN — METHYLPREDNISOLONE ACETATE 80 MG: 80 INJECTION, SUSPENSION INTRA-ARTICULAR; INTRALESIONAL; INTRAMUSCULAR; SOFT TISSUE at 10:39

## 2022-02-25 RX ADMIN — IOPAMIDOL 10 ML: 408 INJECTION, SOLUTION INTRATHECAL at 10:39

## 2022-02-25 NOTE — ANESTHESIA PROCEDURE NOTES
PAIN Epidural block      Patient reassessed immediately prior to procedure    Patient location during procedure: pain clinic  Start Time: 2/25/2022 10:33 AM  Stop Time: 2/25/2022 10:41 AM  Indication:procedure for pain  Performed By  Anesthesiologist: Maurice Alvarado MD  Preanesthetic Checklist  Completed: patient identified, IV checked, risks and benefits discussed, surgical consent, monitors and equipment checked, pre-op evaluation and timeout performed  Additional Notes  Diagnosis:  Post-Op Diagnosis Codes:     * Lumbar radiculopathy (M54.16)      Prep:  Pt Position:prone  Sterile Tech:gloves, mask and sterile barrier  Prep:chlorhexidine gluconate and isopropyl alcohol  Monitoring:blood pressure monitoring, continuous pulse oximetry and EKG  Procedure:Sedation: no     Approach:left paramedian  Guidance: fluoroscopy  Location:lumbar  Level:3-4  Needle Type:Tuohy  Needle Gauge:20  Aspiration:negative  Test Dose:negative  Medications:  Preservative Free Saline:3mL  Isovue:2mL  Comments:Isovue dye was instilled and dye spread was confirmed to be consistent with epidural placement  Depomedrol:80 mg  Post Assessment:  Dressing:occlusive dressing applied  Pt Tolerance:patient tolerated the procedure well with no apparent complications  Complications:no

## 2022-02-25 NOTE — H&P
[facility]    History and Physical    Patient Name: Sonia Wooten  :  1945  MRN:  [id number]  Date of Admission: 2022    Subjective     Patient is a 76 y.o. female presents with chief complaint of chronic, moderate, severe low back, buttock and leg: left pain.  Onset of symptoms was gradual starting several months ago.  Symptoms are associated/aggravated by activity or exercise. Symptoms improve with nothing    The following portions of the patients history were reviewed and updated as appropriate: current medications, allergies, past medical history, past surgical history, past family history, past social history and problem list        MRI - reviewed        Objective     Past Medical History:   Past Medical History:   Diagnosis Date   • DDD (degenerative disc disease), lumbar    • GERD (gastroesophageal reflux disease)    • H/o Hip pain    • History of depression    • History of low back pain    • History of thrombocytosis    • Hypercholesterolemia    • Low back pain    • Sleep apnea      Past Surgical History:   Past Surgical History:   Procedure Laterality Date   • BACK SURGERY     • CATARACT EXTRACTION  2018   • HIP SURGERY     • KNEE SURGERY     • TOTAL HIP ARTHROPLASTY Right    • TOTAL KNEE ARTHROPLASTY Right 2013     Family History: History reviewed. No pertinent family history.  Social History:   Social History     Socioeconomic History   • Marital status:      Spouse name: Temo   Tobacco Use   • Smoking status: Never Smoker   • Smokeless tobacco: Never Used   Substance and Sexual Activity   • Alcohol use: Never   • Drug use: Never   • Sexual activity: Defer       Vital Signs Range for the last 24 hours  Temperature: Temp:  [36.1 °C (96.9 °F)] 36.1 °C (96.9 °F)   Temp Source: Temp src: Oral   BP: BP: (128)/(89) 128/89   Pulse: Heart Rate:  [79] 79   Respirations: Resp:  [16] 16   SPO2: SpO2:  [96 %] 96 %   O2 Amount (l/min):     O2 Devices Device (Oxygen Therapy): room air  "  Weight: Weight:  [69.4 kg (153 lb)] 69.4 kg (153 lb)     Flowsheet Rows      First Filed Value   Admission Height 165.1 cm (65\") Documented at 02/25/2022 1014   Admission Weight 69.4 kg (153 lb) Documented at 02/25/2022 1014          --------------------------------------------------------------------------------    Current Outpatient Medications   Medication Sig Dispense Refill   • aspirin 81 MG EC tablet Take 81 mg by mouth Daily.     • Cholecalciferol (VITAMIN D3 PO) Take  by mouth.     • Cyanocobalamin (VITAMIN B-12 PO) Take  by mouth.     • Esomeprazole Magnesium (NEXIUM PO) Take  by mouth.     • fluorometholone (FML) 0.1 % ophthalmic suspension      • FLUoxetine (PROzac) 20 MG capsule Take 1 capsule by mouth Daily. 90 capsule 1   • fluticasone (FLONASE) 50 MCG/ACT nasal spray      • hydroxyurea (HYDREA) 500 MG capsule Take 1000 mg daily unless prescribed by physician 180 capsule 2   • pravastatin (PRAVACHOL) 20 MG tablet Take 1 tablet by mouth Daily. 90 tablet 1   • rizatriptan (MAXALT) 10 MG tablet Take 1 tablet by mouth 1 (One) Time As Needed for Migraine. May repeat in 2 hours if needed 12 tablet 1   • triamterene-hydrochlorothiazide (MAXZIDE-25) 37.5-25 MG per tablet Take 1 tablet by mouth Daily. 90 tablet 1     No current facility-administered medications for this encounter.       --------------------------------------------------------------------------------  Assessment/Plan      Anesthesia Evaluation           Pain impairs ability to perform ADLs: Bathing, Dressing, Housekeeping, Ambulation, Driving, Working, Sleeping and Exercise/Activity  Modalities previously tried to control pain with limited effectiveness within the last 4-6 weeks: Ice, Rest, Heat and OTC medications     Airway   Mallampati: II  TM distance: >3 FB  Neck ROM: full  no difficulty expected  Dental      Pulmonary     breath sounds clear to auscultation  (+) sleep apnea,   (-) decreased breath sounds  Cardiovascular     Rhythm: " regular  Rate: normal    (+) hypertension, hyperlipidemia,       Neuro/Psych  (+) psychiatric history Anxiety,   left straight leg raise test,    (-) normal gait, right straight leg raise test  GI/Hepatic/Renal/Endo    (+)  GERD,      Musculoskeletal   normal range of motion    (+) arthralgias, back pain, joint swelling, myalgias, radiculopathy Left lower extremity  Abdominal     Abdomen: soft.   Substance History      OB/GYN          Other   arthritis, blood dyscrasia (myeloproliferative disorder),                Diagnosis and Plan    Treatment Plan  ASA 3      Procedures: Lumbar Epidural Steroid Injection(LESI), With fluoroscopy, Without ultrasound,      Anesthetic plan and risks discussed with patient.          Diagnosis     * Lumbar radiculopathy [M54.16]     Plan:  Lumbar epidural steroid injection under fluoroscopic guidance    I have encouraged them to continue:  1.  Physical therapy exercises at home as prescribed by physical therapy or from the pain clinic handout (given to the patient).  Continuation of these exercises every day, or multiple times per week, even when the patient has good pain relief, was stressed to the patient as a preventative measure to decrease the frequency and severity of future pain episodes.  2.  Continue pain medicines as already prescribed.  If patient not currently taking any, it is recommended to begin Acetaminophen 1000 mg po q 8 hours.  If other medicines containing Acetaminophen are currently prescribed, maintain daily dose at 3000mg.    3.  If they can tolerate NSAIDS, it is recommended to take Ibuprofen 600 mg po q 6 hours for 7 days during pain exacerbations.   Alternatively, they may substitute an NSAID of their choice (e.g. Aleve)  4.  Heat and ice to the affected area as tolerated for pain control.  It was discussed that heating pads can cause burns.  5.  Low impact exercise such as walking or water exercise was recommended to maintain overall health and aid in weight  control.   6.  Follow up as needed for subsequent injections.  7.  Patient was counseled to abstain from tobacco products.

## 2022-03-02 ENCOUNTER — OFFICE VISIT (OUTPATIENT)
Dept: INTERNAL MEDICINE | Facility: CLINIC | Age: 77
End: 2022-03-02

## 2022-03-02 VITALS
BODY MASS INDEX: 26.99 KG/M2 | WEIGHT: 162 LBS | HEART RATE: 93 BPM | SYSTOLIC BLOOD PRESSURE: 124 MMHG | DIASTOLIC BLOOD PRESSURE: 90 MMHG | HEIGHT: 65 IN | TEMPERATURE: 98.4 F | OXYGEN SATURATION: 95 %

## 2022-03-02 DIAGNOSIS — Z23 IMMUNIZATION DUE: ICD-10-CM

## 2022-03-02 DIAGNOSIS — Z11.59 NEED FOR HEPATITIS C SCREENING TEST: ICD-10-CM

## 2022-03-02 DIAGNOSIS — I10 PRIMARY HYPERTENSION: ICD-10-CM

## 2022-03-02 DIAGNOSIS — Z91.81 AT MODERATE RISK FOR FALL: ICD-10-CM

## 2022-03-02 DIAGNOSIS — Z00.00 MEDICARE ANNUAL WELLNESS VISIT, SUBSEQUENT: Primary | ICD-10-CM

## 2022-03-02 DIAGNOSIS — Z13.6 ENCOUNTER FOR SCREENING FOR CARDIOVASCULAR DISORDERS: ICD-10-CM

## 2022-03-02 DIAGNOSIS — Z71.85 IMMUNIZATION COUNSELING: ICD-10-CM

## 2022-03-02 DIAGNOSIS — G43.009 MIGRAINE WITHOUT AURA AND WITHOUT STATUS MIGRAINOSUS, NOT INTRACTABLE: ICD-10-CM

## 2022-03-02 PROCEDURE — 1159F MED LIST DOCD IN RCRD: CPT | Performed by: FAMILY MEDICINE

## 2022-03-02 PROCEDURE — G0439 PPPS, SUBSEQ VISIT: HCPCS | Performed by: FAMILY MEDICINE

## 2022-03-02 PROCEDURE — 1170F FXNL STATUS ASSESSED: CPT | Performed by: FAMILY MEDICINE

## 2022-03-02 RX ORDER — TRIAMTERENE AND HYDROCHLOROTHIAZIDE 37.5; 25 MG/1; MG/1
1 TABLET ORAL DAILY
Qty: 90 TABLET | Refills: 1 | Status: SHIPPED | OUTPATIENT
Start: 2022-03-02 | End: 2022-09-07 | Stop reason: SDUPTHER

## 2022-03-02 RX ORDER — SUMATRIPTAN 25 MG/1
TABLET, FILM COATED ORAL
Qty: 12 TABLET | Refills: 1 | Status: SHIPPED | OUTPATIENT
Start: 2022-03-02 | End: 2022-05-20

## 2022-03-02 RX ORDER — TRIAMCINOLONE ACETONIDE 0.25 MG/ML
LOTION TOPICAL
COMMUNITY
Start: 2022-01-25

## 2022-03-02 NOTE — ASSESSMENT & PLAN NOTE
Headaches are chronic, insurance no longer cover previous medication. .  switch to sumatriptan   Call if any issues or concerns.

## 2022-03-02 NOTE — PATIENT INSTRUCTIONS
Fall Prevention in the Home, Adult  Falls can cause injuries and can affect people from all age groups. There are many simple things that you can do to make your home safe and to help prevent falls. Ask for help when making these changes, if needed.  What actions can I take to prevent falls?  General instructions  · Use good lighting in all rooms. Replace any light bulbs that burn out.  · Turn on lights if it is dark. Use night-lights.  · Place frequently used items in easy-to-reach places. Lower the shelves around your home if necessary.  · Set up furniture so that there are clear paths around it. Avoid moving your furniture around.  · Remove throw rugs and other tripping hazards from the floor.  · Avoid walking on wet floors.  · Fix any uneven floor surfaces.  · Add color or contrast paint or tape to grab bars and handrails in your home. Place contrasting color strips on the first and last steps of stairways.  · When you use a stepladder, make sure that it is completely opened and that the sides are firmly locked. Have someone hold the ladder while you are using it. Do not climb a closed stepladder.  · Be aware of any and all pets.  What can I do in the bathroom?         · Keep the floor dry. Immediately clean up any water that spills onto the floor.  · Remove soap buildup in the tub or shower on a regular basis.  · Use non-skid mats or decals on the floor of the tub or shower.  · Attach bath mats securely with double-sided, non-slip rug tape.  · If you need to sit down while you are in the shower, use a plastic, non-slip stool.  · Install grab bars by the toilet and in the tub and shower. Do not use towel bars as grab bars.  What can I do in the bedroom?  · Make sure that a bedside light is easy to reach.  · Do not use oversized bedding that drapes onto the floor.  · Have a firm chair that has side arms to use for getting dressed.  What can I do in the kitchen?  · Clean up any spills right away.  · If you  need to reach for something above you, use a sturdy step stool that has a grab bar.  · Keep electrical cables out of the way.  · Do not use floor polish or wax that makes floors slippery. If you must use wax, make sure that it is non-skid floor wax.  What can I do in the stairways?  · Do not leave any items on the stairs.  · Make sure that you have a light switch at the top of the stairs and the bottom of the stairs. Have them installed if you do not have them.  · Make sure that there are handrails on both sides of the stairs. Fix handrails that are broken or loose. Make sure that handrails are as long as the stairways.  · Install non-slip stair treads on all stairs in your home.  · Avoid having throw rugs at the top or bottom of stairways, or secure the rugs with carpet tape to prevent them from moving.  · Choose a carpet design that does not hide the edge of steps on the stairway.  · Check any carpeting to make sure that it is firmly attached to the stairs. Fix any carpet that is loose or worn.  What can I do on the outside of my home?  · Use bright outdoor lighting.  · Regularly repair the edges of walkways and driveways and fix any cracks.  · Remove high doorway thresholds.  · Trim any shrubbery on the main path into your home.  · Regularly check that handrails are securely fastened and in good repair. Both sides of any steps should have handrails.  · Install guardrails along the edges of any raised decks or porches.  · Clear walkways of debris and clutter, including tools and rocks.  · Have leaves, snow, and ice cleared regularly.  · Use sand or salt on walkways during winter months.  · In the garage, clean up any spills right away, including grease or oil spills.  What other actions can I take?  · Wear closed-toe shoes that fit well and support your feet. Wear shoes that have rubber soles or low heels.  · Use mobility aids as needed, such as canes, walkers, scooters, and crutches.  · Review your medicines with  your health care provider. Some medicines can cause dizziness or changes in blood pressure, which increase your risk of falling.  Talk with your health care provider about other ways that you can decrease your risk of falls. This may include working with a physical therapist or  to improve your strength, balance, and endurance.  Where to find more information  · Centers for Disease Control and Prevention, [place]: https://www.cdc.gov  · [place]: https://bf7yjqs.ambrocio.nih.gov  Contact a health care provider if:  · You are afraid of falling at home.  · You feel weak, drowsy, or dizzy at home.  · You fall at home.  Summary  · There are many simple things that you can do to make your home safe and to help prevent falls.  · Ways to make your home safe include removing tripping hazards and installing grab bars in the bathroom.  · Ask for help when making these changes in your home.  This information is not intended to replace advice given to you by your health care provider. Make sure you discuss any questions you have with your health care provider.  Document Revised: 11/30/2018 Document Reviewed: 08/02/2018  [employer] Patient Education © 2021 [employer]      Sit-to-Stand Exercise    The sit-to-stand exercise (also known as the chair stand or chair rise exercise) strengthens your lower body and helps you maintain or improve your mobility and independence. The goal is to do the sit-to-stand exercise without using your hands. This will be easier as you become stronger. You should always talk with your health care provider before starting any exercise program, especially if you have had recent surgery.  Do the exercise exactly as told by your health care provider and adjust it as directed. It is normal to feel mild stretching, pulling, tightness, or discomfort as you do this exercise, but you should stop right away if you feel sudden pain or your pain gets worse. Do not begin doing this exercise  until told by your health care provider.  What the sit-to-stand exercise does  The sit-to-stand exercise helps to strengthen the muscles in your thighs and the muscles in the center of your body that give you stability (core muscles). This exercise is especially helpful if:  · You have had knee or hip surgery.  · You have trouble getting up from a chair, out of a car, or off the toilet.  How to do the sit-to-stand exercise  1. Sit toward the front edge of a sturdy chair without armrests. Your knees should be bent and your feet should be flat on the floor and shoulder-width apart.  2. Place your hands lightly on each side of the seat. Keep your back and neck as straight as possible, with your chest slightly forward.  3. Breathe in slowly. Lean forward and slightly shift your weight to the front of your feet.  4. Breathe out as you slowly stand up. Use your hands as little as possible.  5. Stand and pause for a full breath in and out.  6. Breathe in as you sit down slowly. Tighten your core and abdominal muscles to control your lowering as much as possible.  7. Breathe out slowly.  8. Do this exercise 10-15 times. If needed, do it fewer times until you build up strength.  9. Rest for 1 minute, then do another set of 10-15 repetitions.  To change the difficulty of the sit-to-stand exercise  · If the exercise is too difficult, use a chair with sturdy armrests, and push off the armrests to help you come to the standing position. You can also use the armrests to help slowly lower yourself back to sitting. As this gets easier, try to use your arms less. You can also place a firm cushion or pillow on the chair to make the surface higher.  · If this exercise is too easy, do not use your arms to help raise or lower yourself. You can also wear a weighted vest, use hand weights, increase your repetitions, or try a lower chair.  General tips  · You may feel tired when starting an exercise routine. This is normal.  · You may have  muscle soreness that lasts a few days. This is normal. As you get stronger, you may not feel muscle soreness.  · Use smooth, steady movements.  · Do not  hold your breath during strength exercises. This can cause unsafe changes in your blood pressure.  · Breathe in slowly through your nose, and breathe out slowly through your mouth.  Summary  · Strengthening your lower body is an important step to help you move safely and independently.  · The sit-to-stand exercise helps strengthen the muscles in your thighs and core.  · You should always talk with your health care provider before starting any exercise program, especially if you have had recent surgery.  This information is not intended to replace advice given to you by your health care provider. Make sure you discuss any questions you have with your health care provider.  Document Revised: 10/16/2019 Document Reviewed: 02/08/2018  [employer] Patient Education © 2021 [employer]    You are due for adacel Tdap vaccination. (provides protection against tetanus, diptheria and whooping cough) Please  get the immunization at your local pharmacy at your earliest convenience. This immunization will next be due in 10 years. Please click on the link for more information about this vaccine.    [facility] Tdap Vaccine Information    You are due for Shingrix vaccination series ( the newest shingles vaccine).  It is a two shot series spaced 2-6 months apart. Please get this vaccine series started at your earliest convenience at your local pharmacy to help avoid shingles outbreak. It is more effective than the old Zostavax vaccine and is recommended even if you have had the Zostavax vaccine in the past.  Once the Shingrix series is completed, it does not need to be repeated.   For more information, please look at the website below:  [facility] Shingrix Vaccine Information      Medicare Wellness  Personal Prevention Plan of Service     Date of Office Visit:    Encounter Provider:  Josselyn HARVEY  MD Ruy  Place of Service:  [facility]  Patient Name: Sonia Wooten  :  1945    As part of the Medicare Wellness portion of your visit today, we are providing you with this personalized preventive plan of services (PPPS). This plan is based upon recommendations of the [place] Preventive Services Task Force (USPSTF) and the Advisory Committee on Immunization Practices (ACIP).    This lists the preventive care services that should be considered, and provides dates of when you are due. Items listed as completed are up-to-date and do not require any further intervention.    Health Maintenance   Topic Date Due   • TDAP/TD VACCINES (1 - Tdap) Never done   • ZOSTER VACCINE (1 of 2) Never done   • Pneumococcal Vaccine 65+ (1 of 1 - PPSV23) Never done   • HEPATITIS C SCREENING  Never done   • LIPID PANEL  Never done   • INFLUENZA VACCINE  Never done   • DXA SCAN  2023   • ANNUAL WELLNESS VISIT  2023   • COVID-19 Vaccine  Completed       Orders Placed This Encounter   Procedures   • Lipid Panel-[facility] or [facility], or [facility]     Order Specific Question:   Release to patient     Answer:   Immediate   • Hepatitis C Antibody-[facility] or [facility], or [facility]     Order Specific Question:   Release to patient     Answer:   Immediate       No follow-ups on file.

## 2022-03-02 NOTE — PROGRESS NOTES
The ABCs of the Annual Wellness Visit  Subsequent Medicare Wellness Visit    Chief Complaint   Patient presents with   • Medicare Wellness-subsequent      Subjective    History of Present Illness:  Sonia Wooten is a 76 y.o. female who presents for a Subsequent Medicare Wellness Visit.    The following portions of the patient's history were reviewed and   updated as appropriate: allergies, current medications, past family history, past medical history, past social history, past surgical history and problem list.    Compared to one year ago, the patient feels her physical   health is the same.    Compared to one year ago, the patient feels her mental   health is the same.    Recent Hospitalizations:  She was not admitted to the hospital during the last year.       Current Medical Providers:  Patient Care Team:  Josselyn Redmond MD as PCP - General (Family Medicine)  Ester Rivera APRN as Referring Physician (Nurse Practitioner)  Umberto Brown MD as Consulting Physician (Hematology and Oncology)  Pallares, Clara Ann, MD as Consulting Physician (Internal Medicine)    Outpatient Medications Prior to Visit   Medication Sig Dispense Refill   • aspirin 81 MG EC tablet Take 81 mg by mouth Daily.     • Cholecalciferol (VITAMIN D3 PO) Take  by mouth.     • Cyanocobalamin (VITAMIN B-12 PO) Take  by mouth.     • Esomeprazole Magnesium (NEXIUM PO) Take  by mouth.     • FLUoxetine (PROzac) 20 MG capsule Take 1 capsule by mouth Daily. 90 capsule 1   • fluticasone (FLONASE) 50 MCG/ACT nasal spray      • hydroxyurea (HYDREA) 500 MG capsule Take 1000 mg daily unless prescribed by physician 180 capsule 2   • pravastatin (PRAVACHOL) 20 MG tablet Take 1 tablet by mouth Daily. 90 tablet 1   • Triamcinolone Acetonide 0.025 % lotion      • rizatriptan (MAXALT) 10 MG tablet Take 1 tablet by mouth 1 (One) Time As Needed for Migraine. May repeat in 2 hours if needed 12 tablet 1   • triamterene-hydrochlorothiazide (MAXZIDE-25)  37.5-25 MG per tablet Take 1 tablet by mouth Daily. 90 tablet 1   • fluorometholone (FML) 0.1 % ophthalmic suspension        No facility-administered medications prior to visit.       No opioid medication identified on active medication list. I have reviewed chart for other potential  high risk medication/s and harmful drug interactions in the elderly.          Aspirin is on active medication list. Aspirin use is indicated based on review of current medical condition/s. Pros and cons of this therapy have been discussed today. Benefits of this medication outweigh potential harm.  Patient has been encouraged to continue taking this medication.  .      Patient Active Problem List   Diagnosis   • Thrombocytosis   • Myeloproliferative disorder (HCC)   • Monoallelic mutation of CALR3 gene   • Eczema   • Primary hypertension   • Anxiety   • Migraine without aura and without status migrainosus, not intractable     Advance Care Planning  Advance Directive is on file.  ACP discussion was held with the patient during this visit. Patient has an advance directive in EMR which is still valid.     Review of Systems   Constitutional: Negative for appetite change and fever.   HENT: Positive for rhinorrhea. Negative for congestion and sneezing.    Respiratory: Negative for choking, chest tightness and shortness of breath.    Cardiovascular: Negative for chest pain, palpitations and leg swelling.   Gastrointestinal: Negative for abdominal pain, constipation, diarrhea, nausea and vomiting.   Genitourinary: Negative for difficulty urinating, vaginal bleeding and vaginal discharge.   Musculoskeletal: Positive for arthralgias. Negative for back pain.        Shoulder and Knee, L    Skin: Positive for rash.   Neurological: Negative for dizziness.   Hematological: Does not bruise/bleed easily.   Psychiatric/Behavioral: Negative for behavioral problems, dysphoric mood and sleep disturbance.         Objective    Vitals:    03/02/22 0925   BP:  "124/90   BP Location: Right arm   Patient Position: Sitting   Cuff Size: Adult   Pulse: 93   Temp: 98.4 °F (36.9 °C)   TempSrc: Temporal   SpO2: 95%   Weight: 73.5 kg (162 lb)   Height: 165.1 cm (65\")     BMI Readings from Last 1 Encounters:   03/02/22 26.96 kg/m²   BMI is above normal parameters. Recommendations include: exercise counseling and nutrition counseling    Does the patient have evidence of cognitive impairment? Yes   She states notice some changes with remembering names    Physical Exam  Vitals reviewed.   Constitutional:       General: She is not in acute distress.     Appearance: She is not ill-appearing.   HENT:      Head: Normocephalic.      Right Ear: Tympanic membrane normal.      Left Ear: Tympanic membrane normal.   Eyes:      Conjunctiva/sclera: Conjunctivae normal.   Cardiovascular:      Rate and Rhythm: Regular rhythm.      Heart sounds: Normal heart sounds.   Pulmonary:      Effort: Pulmonary effort is normal. No respiratory distress.      Breath sounds: Normal breath sounds. No wheezing.   Abdominal:      General: Bowel sounds are normal.      Palpations: Abdomen is soft.   Musculoskeletal:      Right lower leg: No edema.      Left lower leg: No edema.   Skin:     General: Skin is warm and dry.   Neurological:      Mental Status: She is alert.   Psychiatric:         Mood and Affect: Mood normal.         Thought Content: Thought content normal.         Judgment: Judgment normal.                 HEALTH RISK ASSESSMENT    Smoking Status:  Social History     Tobacco Use   Smoking Status Never Smoker   Smokeless Tobacco Never Used     Alcohol Consumption:  Social History     Substance and Sexual Activity   Alcohol Use Never     Fall Risk Screen:    STEADI Fall Risk Assessment was completed, and patient is at MODERATE risk for falls. Assessment completed on:3/2/2022    Depression Screening:  PHQ-2/PHQ-9 Depression Screening 3/2/2022   Little interest or pleasure in doing things 0   Feeling down, " depressed, or hopeless 0   Trouble falling or staying asleep, or sleeping too much -   Feeling tired or having little energy -   Poor appetite or overeating -   Feeling bad about yourself - or that you are a failure or have let yourself or your family down -   Trouble concentrating on things, such as reading the newspaper or watching television -   Moving or speaking so slowly that other people could have noticed. Or the opposite - being so fidgety or restless that you have been moving around a lot more than usual -   Thoughts that you would be better off dead, or of hurting yourself in some way -   Total Score 0   If you checked off any problems, how difficult have these problems made it for you to do your work, take care of things at home, or get along with other people? -       Health Habits and Functional and Cognitive Screening:  Functional & Cognitive Status 3/2/2022   Do you have difficulty preparing food and eating? No   Do you have difficulty bathing yourself, getting dressed or grooming yourself? No   Do you have difficulty using the toilet? No   Do you have difficulty moving around from place to place? No   Do you have trouble with steps or getting out of a bed or a chair? Yes   Current Diet Well Balanced Diet   Dental Exam Up to date   Eye Exam Up to date   Exercise (times per week) 4 times per week   Current Exercises Include Home Fitness Gym;Walking   Do you need help using the phone?  No   Are you deaf or do you have serious difficulty hearing?  Yes   Do you need help with transportation? No   Do you need help shopping? No   Do you need help preparing meals?  No   Do you need help with housework?  No   Do you need help with laundry? No   Do you need help taking your medications? No   Do you need help managing money? No   Do you ever drive or ride in a car without wearing a seat belt? No   Have you felt unusual stress, anger or loneliness in the last month? Yes   Who do you live with? Spouse   If you  need help, do you have trouble finding someone available to you? No   Have you been bothered in the last four weeks by sexual problems? No   Do you have difficulty concentrating, remembering or making decisions? Yes       Age-appropriate Screening Schedule:  Refer to the list below for future screening recommendations based on patient's age, sex and/or medical conditions. Orders for these recommended tests are listed in the plan section. The patient has been provided with a written plan.    Health Maintenance   Topic Date Due   • TDAP/TD VACCINES (1 - Tdap) Never done   • ZOSTER VACCINE (1 of 2) Never done   • LIPID PANEL  Never done   • DXA SCAN  03/01/2023   • INFLUENZA VACCINE  Completed              Assessment/Plan   CMS Preventative Services Quick Reference  Risk Factors Identified During Encounter  Dementia/Memory   Immunizations Discussed/Encouraged (specific Immunizations; Tdap, Pneumococcal 23 and Shingrix  The above risks/problems have been discussed with the patient.  Follow up actions/plans if indicated are seen below in the Assessment/Plan Section.  Pertinent information has been shared with the patient in the After Visit Summary.    Diagnoses and all orders for this visit:    1. Medicare annual wellness visit, subsequent (Primary)    2. Encounter for screening for cardiovascular disorders  -     Lipid Panel-[facility] or [facility], or [facility]    3. At moderate risk for fall    4. Need for hepatitis C screening test  -     Hepatitis C Antibody-[facility] or [facility], or [facility]    5. Immunization due    6. Immunization counseling    7. Primary hypertension  -     triamterene-hydrochlorothiazide (MAXZIDE-25) 37.5-25 MG per tablet; Take 1 tablet by mouth Daily.  Dispense: 90 tablet; Refill: 1  -     Basic metabolic panel    8. Migraine without aura and without status migrainosus, not intractable  Assessment & Plan:  Headaches are chronic, insurance no longer cover  previous medication. .  switch to sumatriptan   Call if any issues or concerns.         Orders:  -     SUMAtriptan (IMITREX) 25 MG tablet; Take one tablet at onset of headache. May repeat dose one time in 2 hours if headache not relieved.  Dispense: 12 tablet; Refill: 1      Follow Up:   Return in about 6 months (around 9/2/2022) for Recheck.     An After Visit Summary and PPPS were made available to the patient.

## 2022-03-08 LAB
BUN SERPL-MCNC: 16 MG/DL (ref 8–27)
BUN/CREAT SERPL: 22 (ref 12–28)
CALCIUM SERPL-MCNC: 9.7 MG/DL (ref 8.7–10.3)
CHLORIDE SERPL-SCNC: 103 MMOL/L (ref 96–106)
CHOLEST SERPL-MCNC: 220 MG/DL (ref 100–199)
CO2 SERPL-SCNC: 24 MMOL/L (ref 20–29)
CREAT SERPL-MCNC: 0.74 MG/DL (ref 0.57–1)
EGFR GENE MUT ANL BLD/T: 84 ML/MIN/1.73
GLUCOSE SERPL-MCNC: 81 MG/DL (ref 65–99)
HCV AB S/CO SERPL IA: <0.1 S/CO RATIO (ref 0–0.9)
HDLC SERPL-MCNC: 72 MG/DL
LDLC SERPL CALC-MCNC: 126 MG/DL (ref 0–99)
POTASSIUM SERPL-SCNC: 4.5 MMOL/L (ref 3.5–5.2)
SODIUM SERPL-SCNC: 142 MMOL/L (ref 134–144)
TRIGL SERPL-MCNC: 129 MG/DL (ref 0–149)
VLDLC SERPL CALC-MCNC: 22 MG/DL (ref 5–40)

## 2022-03-18 ENCOUNTER — OFFICE VISIT (OUTPATIENT)
Dept: ORTHOPEDIC SURGERY | Facility: CLINIC | Age: 77
End: 2022-03-18

## 2022-03-18 VITALS — TEMPERATURE: 98.4 F | WEIGHT: 160 LBS | HEIGHT: 65 IN | BODY MASS INDEX: 26.66 KG/M2

## 2022-03-18 DIAGNOSIS — M48.062 SPINAL STENOSIS OF LUMBAR REGION WITH NEUROGENIC CLAUDICATION: ICD-10-CM

## 2022-03-18 DIAGNOSIS — M54.2 NECK PAIN: Primary | ICD-10-CM

## 2022-03-18 DIAGNOSIS — M54.50 LUMBAR BACK PAIN: ICD-10-CM

## 2022-03-18 PROCEDURE — 99213 OFFICE O/P EST LOW 20 MIN: CPT | Performed by: ORTHOPAEDIC SURGERY

## 2022-03-18 NOTE — PROGRESS NOTES
She underwent epidural injection x2 with perhaps only minimal, fleeting relief from one of them.  No imbalance but she has had some buzzing annoying numbness in the thighs and in the left buttock pain.  Also some neck pain and occipital and parietal headache as well as occasional left arm pain.  Her main pain complaint is a hip pain and buzzing in low back pain as well.  Good strength in the legs on examination.  I reviewed her MRI from 2020 and it shows multilevel degenerative changes some foraminal stenotic changes but nothing much centrally.  Also she underwent surgery in 1972 lumbar laminectomy.  I told her that if the pain becomes bad enough to consider surgery we can do a myelogram and CT scan for further evaluation but I would tend to stay away from surgery for now.  She can walk for exercise the pain she is experiencing is not damaging in a way that outweighs the health effects of exercise and walking and staying active.  She can see a chiropractor and they can proceed as directed.  There is some risk as an anything but if they are getting relief is ago it could be useful so long as he knows of her stenotic type symptoms and multilevel degenerative change.  We will hold off of further neck work-up for the neck at this point and I will see her back as needed.

## 2022-04-04 ENCOUNTER — LAB (OUTPATIENT)
Dept: LAB | Facility: HOSPITAL | Age: 77
End: 2022-04-04

## 2022-04-04 ENCOUNTER — OFFICE VISIT (OUTPATIENT)
Dept: ONCOLOGY | Facility: CLINIC | Age: 77
End: 2022-04-04

## 2022-04-04 VITALS
WEIGHT: 161.3 LBS | OXYGEN SATURATION: 96 % | HEART RATE: 87 BPM | BODY MASS INDEX: 26.87 KG/M2 | DIASTOLIC BLOOD PRESSURE: 85 MMHG | SYSTOLIC BLOOD PRESSURE: 120 MMHG | TEMPERATURE: 96.6 F | RESPIRATION RATE: 16 BRPM | HEIGHT: 65 IN

## 2022-04-04 DIAGNOSIS — D75.839 THROMBOCYTOSIS: ICD-10-CM

## 2022-04-04 DIAGNOSIS — D47.1 MYELOPROLIFERATIVE DISORDER: ICD-10-CM

## 2022-04-04 DIAGNOSIS — Z15.89 MONOALLELIC MUTATION OF CALR3 GENE: ICD-10-CM

## 2022-04-04 LAB
ALBUMIN SERPL-MCNC: 4.2 G/DL (ref 3.5–5.2)
ALBUMIN/GLOB SERPL: 1.4 G/DL (ref 1.1–2.4)
ALP SERPL-CCNC: 60 U/L (ref 38–116)
ALT SERPL W P-5'-P-CCNC: 14 U/L (ref 0–33)
ANION GAP SERPL CALCULATED.3IONS-SCNC: 9 MMOL/L (ref 5–15)
AST SERPL-CCNC: 18 U/L (ref 0–32)
BASOPHILS # BLD AUTO: 0.08 10*3/MM3 (ref 0–0.2)
BASOPHILS NFR BLD AUTO: 1.3 % (ref 0–1.5)
BILIRUB SERPL-MCNC: 0.3 MG/DL (ref 0.2–1.2)
BUN SERPL-MCNC: 16 MG/DL (ref 6–20)
BUN/CREAT SERPL: 24.2 (ref 7.3–30)
CALCIUM SPEC-SCNC: 9.7 MG/DL (ref 8.5–10.2)
CHLORIDE SERPL-SCNC: 102 MMOL/L (ref 98–107)
CO2 SERPL-SCNC: 29 MMOL/L (ref 22–29)
CREAT SERPL-MCNC: 0.66 MG/DL (ref 0.6–1.1)
DEPRECATED RDW RBC AUTO: 51.1 FL (ref 37–54)
EGFRCR SERPLBLD CKD-EPI 2021: 91 ML/MIN/1.73
EOSINOPHIL # BLD AUTO: 0.06 10*3/MM3 (ref 0–0.4)
EOSINOPHIL NFR BLD AUTO: 1 % (ref 0.3–6.2)
ERYTHROCYTE [DISTWIDTH] IN BLOOD BY AUTOMATED COUNT: 11.3 % (ref 12.3–15.4)
GLOBULIN UR ELPH-MCNC: 2.9 GM/DL (ref 1.8–3.5)
GLUCOSE SERPL-MCNC: 100 MG/DL (ref 74–124)
HCT VFR BLD AUTO: 38.8 % (ref 34–46.6)
HGB BLD-MCNC: 13.8 G/DL (ref 12–15.9)
IMM GRANULOCYTES # BLD AUTO: 0.03 10*3/MM3 (ref 0–0.05)
IMM GRANULOCYTES NFR BLD AUTO: 0.5 % (ref 0–0.5)
LDH SERPL-CCNC: 274 U/L (ref 99–259)
LYMPHOCYTES # BLD AUTO: 2.36 10*3/MM3 (ref 0.7–3.1)
LYMPHOCYTES NFR BLD AUTO: 39.6 % (ref 19.6–45.3)
MCH RBC QN AUTO: 43.9 PG (ref 26.6–33)
MCHC RBC AUTO-ENTMCNC: 35.6 G/DL (ref 31.5–35.7)
MCV RBC AUTO: 123.6 FL (ref 79–97)
MONOCYTES # BLD AUTO: 0.7 10*3/MM3 (ref 0.1–0.9)
MONOCYTES NFR BLD AUTO: 11.7 % (ref 5–12)
NEUTROPHILS NFR BLD AUTO: 2.73 10*3/MM3 (ref 1.7–7)
NEUTROPHILS NFR BLD AUTO: 45.9 % (ref 42.7–76)
NRBC BLD AUTO-RTO: 0 /100 WBC (ref 0–0.2)
PLATELET # BLD AUTO: 555 10*3/MM3 (ref 140–450)
PMV BLD AUTO: 8.7 FL (ref 6–12)
POTASSIUM SERPL-SCNC: 4.2 MMOL/L (ref 3.5–4.7)
PROT SERPL-MCNC: 7.1 G/DL (ref 6.3–8)
RBC # BLD AUTO: 3.14 10*6/MM3 (ref 3.77–5.28)
SODIUM SERPL-SCNC: 140 MMOL/L (ref 134–145)
URATE SERPL-MCNC: 3.3 MG/DL (ref 2.8–7.4)
WBC NRBC COR # BLD: 5.96 10*3/MM3 (ref 3.4–10.8)

## 2022-04-04 PROCEDURE — 84550 ASSAY OF BLOOD/URIC ACID: CPT

## 2022-04-04 PROCEDURE — 80053 COMPREHEN METABOLIC PANEL: CPT

## 2022-04-04 PROCEDURE — 83615 LACTATE (LD) (LDH) ENZYME: CPT

## 2022-04-04 PROCEDURE — 85025 COMPLETE CBC W/AUTO DIFF WBC: CPT

## 2022-04-04 PROCEDURE — 99213 OFFICE O/P EST LOW 20 MIN: CPT | Performed by: INTERNAL MEDICINE

## 2022-04-04 PROCEDURE — 36415 COLL VENOUS BLD VENIPUNCTURE: CPT

## 2022-04-04 RX ORDER — HYDROXYUREA 500 MG/1
CAPSULE ORAL
Qty: 180 CAPSULE | Refills: 2
Start: 2022-04-04 | End: 2022-07-25

## 2022-04-04 NOTE — PROGRESS NOTES
Subjective     REASON FOR FOLLOW UP: Essential thrombocytosis controlled with Hydrea    HISTORY OF PRESENT ILLNESS:  The patient is a 76 y.o. year old female who was referred to us from her primary care office with recent abnormal CBC showing her platelet count over 1.5 million. She was started on 81 mg aspirin and Hydrea.  We thought she had been taking 1500 mg every Monday and Friday and 1000 mg the remaining 5 days of the week.  On today's visit however she tells me she has been taking just 1000 mg every day.    Her blood counts today look pretty good with a  platelet count of 555,000.  Her hemoglobin and white count are also normal..  She does not have any new complaints.  Her MCV is markedly elevated suggesting good compliance with Hydrea.      History of Present Illness     Past Medical History:   Diagnosis Date   • DDD (degenerative disc disease), lumbar    • GERD (gastroesophageal reflux disease)    • H/o Hip pain    • History of depression    • History of low back pain    • History of thrombocytosis    • Hypercholesterolemia    • Low back pain    • Sleep apnea         Past Surgical History:   Procedure Laterality Date   • BACK SURGERY     • CATARACT EXTRACTION  2018   • HIP SURGERY     • KNEE SURGERY     • TOTAL HIP ARTHROPLASTY Right 2011   • TOTAL KNEE ARTHROPLASTY Right 2013        Current Outpatient Medications on File Prior to Visit   Medication Sig Dispense Refill   • aspirin 81 MG EC tablet Take 81 mg by mouth Daily.     • Cholecalciferol (VITAMIN D3 PO) Take  by mouth.     • Cyanocobalamin (VITAMIN B-12 PO) Take  by mouth.     • Esomeprazole Magnesium (NEXIUM PO) Take  by mouth As Needed.     • FLUoxetine (PROzac) 20 MG capsule Take 1 capsule by mouth Daily. 90 capsule 1   • fluticasone (FLONASE) 50 MCG/ACT nasal spray      • pravastatin (PRAVACHOL) 20 MG tablet Take 1 tablet by mouth Daily. 90 tablet 1   • SUMAtriptan (IMITREX) 25 MG tablet Take one tablet at onset of headache. May repeat dose  "one time in 2 hours if headache not relieved. 12 tablet 1   • Triamcinolone Acetonide 0.025 % lotion      • triamterene-hydrochlorothiazide (MAXZIDE-25) 37.5-25 MG per tablet Take 1 tablet by mouth Daily. 90 tablet 1   • [DISCONTINUED] hydroxyurea (HYDREA) 500 MG capsule Take 1000 mg daily unless prescribed by physician 180 capsule 2     No current facility-administered medications on file prior to visit.        ALLERGIES:    Allergies   Allergen Reactions   • Codeine Nausea And Vomiting   • Penicillins Hives        Social History     Socioeconomic History   • Marital status:      Spouse name: Temo   Tobacco Use   • Smoking status: Never Smoker   • Smokeless tobacco: Never Used   Substance and Sexual Activity   • Alcohol use: Never   • Drug use: Never   • Sexual activity: Defer        No family history on file.     Review of Systems   Constitutional: Negative for activity change, chills, fatigue and fever.   HENT: Negative for mouth sores, trouble swallowing and voice change.    Eyes: Negative for pain and visual disturbance.   Respiratory: Negative for cough, shortness of breath and wheezing.    Cardiovascular: Negative for chest pain and palpitations.   Gastrointestinal: Negative for abdominal pain, constipation, diarrhea, nausea and vomiting.   Genitourinary: Negative for difficulty urinating, frequency and urgency.   Musculoskeletal: Negative for arthralgias and joint swelling.   Skin: Negative for rash.   Neurological: Negative for dizziness, seizures, weakness and headaches.   Hematological: Negative for adenopathy. Does not bruise/bleed easily.   Psychiatric/Behavioral: Negative for behavioral problems and confusion. The patient is not nervous/anxious.        Objective     Vitals:    04/04/22 1046   BP: 120/85   Pulse: 87   Resp: 16   Temp: 96.6 °F (35.9 °C)   TempSrc: Temporal   SpO2: 96%   Weight: 73.2 kg (161 lb 4.8 oz)   Height: 165.1 cm (65\")   PainSc: 0-No pain     Current Status 4/4/2022   ECOG " score 0       Physical Exam   Constitutional: She is oriented to person, place, and time. She appears well-developed. No distress.   HENT:   Head: Normocephalic.   Eyes: Pupils are equal, round, and reactive to light. Conjunctivae are normal. No scleral icterus.   Neck: No JVD present. No thyromegaly present.   Cardiovascular: Normal rate and regular rhythm. Exam reveals no gallop and no friction rub.   No murmur heard.  Pulmonary/Chest: Effort normal and breath sounds normal. She has no wheezes. She has no rales.   Abdominal: Soft. She exhibits no distension and no mass. There is no abdominal tenderness.   Musculoskeletal: Normal range of motion. No deformity.   Lymphadenopathy:     She has no cervical adenopathy.   Neurological: She is alert and oriented to person, place, and time. She has normal reflexes. No cranial nerve deficit.   Skin: Skin is warm and dry. No rash noted. No erythema.   Nailbed discoloration   Psychiatric: Her behavior is normal. Judgment normal.            RECENT LABS:  Hematology WBC   Date Value Ref Range Status   04/04/2022 5.96 3.40 - 10.80 10*3/mm3 Final     RBC   Date Value Ref Range Status   04/04/2022 3.14 (L) 3.77 - 5.28 10*6/mm3 Final     Hemoglobin   Date Value Ref Range Status   04/04/2022 13.8 12.0 - 15.9 g/dL Final     Hematocrit   Date Value Ref Range Status   04/04/2022 38.8 34.0 - 46.6 % Final     Platelets   Date Value Ref Range Status   04/04/2022 555 (H) 140 - 450 10*3/mm3 Final          Assessment/Plan   1.  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  Good response and tolerance to Hydrea thus far.  She currently is on 1000 mg daily with good control of her counts.    Recommendations:  1.  Continue aspirin 81 mg po daily  2.  Continue Hydrea dose of 1000 mg daily  3.  Lab + RN review in 2 months in 4 months.  4.  MD follow up 6 months to review results and check CBC, and adjust Hydrea dose further as needed.

## 2022-04-14 ENCOUNTER — TELEPHONE (OUTPATIENT)
Dept: ORTHOPEDIC SURGERY | Facility: CLINIC | Age: 77
End: 2022-04-14

## 2022-04-14 NOTE — TELEPHONE ENCOUNTER
Caller: TIFFANY     Relationship: PHYSICAL THERAPIST     Best call back number:     What form or medical record are you requesting: MRIS, XRAYS ANY IMAGING AND LAST OFFICE NOTES FROM 3/18/22    Who is requesting this form or medical record from you: TIFFANY     How would you like to receive the form or medical records (pick-up, mail, fax): FAX  If fax, what is the fax number: [phone]       HPI:  Patient is 71yo female with PMhx of Ovarian Ca s/p debulking surgery, s/p ileostomy, on chemo, HTN, HLD, presents s/p fall at home. Recently the patient has had increased urgency/frequency, got up to go to the bathroom, tripped over pice of furniture, fell onto the R side of the body, hitting her head in the process, sustaining R humeral neck fracture, and R femoral neck fracture. Pt denies any cardiac history, denies CP, SOB. METs>4, has never had cardiac issues, previously tolerated surgeries under general anesthesia without any issues. (20 May 2018 10:19)      PT now is SD, S/P right THR, Non-OP right humerus fx      Consulted by Dr. Strong  for VTE prophylaxis, risk stratification, and anticoagulation management.    PAST MEDICAL & SURGICAL HISTORY:  Perforation of sigmoid colon: s/p Reymundo procedure 4/2016  Ovarian cancer  Other acute pulmonary embolism  Essential hypertension, hypertension with unspecified goal  Malignant neoplasm of ovary, unspecified laterality  History of conization of cervix: 1984  Uterus disorder: removed in 2003, due to fibroids  H/O skin graft: to abdominal wound (11/2016)  S/P cholecystectomy: 2011  H/O abdominal hysterectomy: removal of ovaries, tubes ,omentum,radical debulking  Status post Reymundo procedure: April 2016          CrCl: 31.3    Caprini VTE Risk Score: #9      IMPROVE Bleeding Risk Score #4.5    Falls Risk:   High (x  )  Mod (  )  Low (  )      FAMILY HISTORY:  No pertinent family history in first degree relatives  No pertinent family history in first degree relatives    Denies any personal or familial history of clotting or bleeding disorders.    Allergies    Bananas (Unknown)  ibuprofen (Other)  Lexapro (Unknown)  Originally Entered as [Unknown] reaction to [cantelope] (Unknown)  Originally Entered as [Unknown] reaction to [cherries] (Unknown)  Peaches (Unknown)  wallnuts, paches, bananas, honedew and cantalope (Hives; Short breath)  walnut (Stomach Upset (Severe))  Wellbutrin (Hives)  Intolerances    carboplatin (Flushing (Skin); Rash)      REVIEW OF SYSTEMS    (  )Fever	     (  )Constipation	(  )SOB				(  )Headache	(  )Dysuria  (  )Chills	     (  )Melena	(  )Dyspnea present on exertion	                    (  )Dizziness                    (  )Polyuria  (  )Nausea	     (  )Hematochezia	(  )Cough			                    (  )Syncope   	(  )Hematuria  (  )Vomiting    (  )Chest Pain	(  )Wheezing			(  )Weakness  (  )Diarrhea     (  )Palpitations	(  )Anorexia			(  )Myalgia    All other review of systems negative: Yes          PHYSICAL EXAM:    Constitutional: Appears Well    Neurological: A& O x 3    Skin: Warm    Respiratory and Thorax: normal effort; Breath sounds: normal; No rales/wheezing/rhonchi  	  Cardiovascular: S1, S2, regular, NMBR	MP ST    Gastrointestinal: BS + x 4Q, nontender,  Ileostomy draining liquid brown drainage    Genitourinary:  Bladder nondistended, nontender    Musculoskeletal:   General Right:   + muscle/joint tenderness,   normal tone, no joint swelling,   ROM: limited	    General Left:   no muscle/joint tenderness,   normal tone, no joint swelling,   ROM: full    Hip:  Right: Dressing CDI; Drain: hemovac ; Type of drng.: serosang.; Amt. of drng: small    Right arm: In sling: fingers warm and mobile, radial pulse +. + sensation, + cap refill                      Lower extrems:   Right: no calf tenderness              negative anselmo's sign               + pedal pulses    Left:   no calf tenderness              negative anselmo's sign               + pedal pulses                          9.3    26.26 )-----------( 152      ( 21 May 2018 07:02 )             29.6       05-21    142  |  110<H>  |  23  ----------------------------<  96  4.2   |  19<L>  |  0.93    Ca    8.2<L>      21 May 2018 07:02    TPro  7.6  /  Alb  2.9<L>  /  TBili  0.2  /  DBili  x   /  AST  28  /  ALT  39  /  AlkPhos  126<H>  05-20      PT/INR - ( 20 May 2018 05:20 )   PT: 11.9 sec;   INR: 1.10 ratio         PTT - ( 20 May 2018 05:20 )  PTT:26.7 sec				    MEDICATIONS  (STANDING):  docusate sodium 100 milliGRAM(s) Oral three times a day  enoxaparin Injectable 40 milliGRAM(s) SubCutaneous daily  HYDROmorphone PCA (1 mG/mL) 30 milliLiter(s) PCA Continuous PCA Continuous  pantoprazole    Tablet 40 milliGRAM(s) Oral daily  polyethylene glycol 3350 17 Gram(s) Oral daily          DVT Prophylaxis:  LMWH                   (  )  Heparin SQ           (  )  Coumadin             (  )  Xarelto                  (  )  Eliquis                   (  )  Venodynes           (  )  Ambulation          (  )  UFH                       (  )  Contraindicated  (  ) HPI:  Patient is 71yo female with PMhx of Ovarian Ca s/p debulking surgery, s/p ileostomy, on chemo, HTN, HLD, presents s/p fall at home. Recently the patient has had increased urgency/frequency, got up to go to the bathroom, tripped over pice of furniture, fell onto the R side of the body, hitting her head in the process, sustaining R humeral neck fracture, and R femoral neck fracture. Pt denies any cardiac history, denies CP, SOB. METs>4, has never had cardiac issues, previously tolerated surgeries under general anesthesia without any issues. (20 May 2018 10:19)      PT now is SD, S/P right THR, Non-OP right humerus fx      Consulted by Dr. Strong  for VTE prophylaxis, risk stratification, and anticoagulation management.    PAST MEDICAL & SURGICAL HISTORY:  Perforation of sigmoid colon: s/p Reymundo procedure 4/2016  Ovarian cancer  Other acute pulmonary embolism  Essential hypertension, hypertension with unspecified goal  Malignant neoplasm of ovary, unspecified laterality  History of conization of cervix: 1984  Uterus disorder: removed in 2003, due to fibroids  H/O skin graft: to abdominal wound (11/2016)  S/P cholecystectomy: 2011  H/O abdominal hysterectomy: removal of ovaries, tubes ,omentum,radical debulking  Status post Reymundo procedure: April 2016          CrCl: 31.3    Caprini VTE Risk Score: #9      IMPROVE Bleeding Risk Score #4.5    Falls Risk:   High (x  )  Mod (  )  Low (  )      FAMILY HISTORY:  No pertinent family history in first degree relatives  No pertinent family history in first degree relatives    Denies any personal or familial history of clotting or bleeding disorders.    Allergies    Bananas (Unknown)  ibuprofen (Other)  Lexapro (Unknown)  Originally Entered as [Unknown] reaction to [cantelope] (Unknown)  Originally Entered as [Unknown] reaction to [cherries] (Unknown)  Peaches (Unknown)  wallnuts, paches, bananas, honedew and cantalope (Hives; Short breath)  walnut (Stomach Upset (Severe))  Wellbutrin (Hives)  Intolerances    carboplatin (Flushing (Skin); Rash)      REVIEW OF SYSTEMS    (  )Fever	     (  )Constipation	(  )SOB				(  )Headache	(  )Dysuria  (  )Chills	     (  )Melena	(  )Dyspnea present on exertion	                    (  )Dizziness                    (  )Polyuria  (  )Nausea	     (  )Hematochezia	(  )Cough			                    (  )Syncope   	(  )Hematuria  (  )Vomiting    (  )Chest Pain	(  )Wheezing			(  )Weakness  (  )Diarrhea     (  )Palpitations	(  )Anorexia			(  )Myalgia    All other review of systems negative: Yes          PHYSICAL EXAM:    Constitutional: Appears Well    Neurological: A& O x 3    Skin: Warm    Respiratory and Thorax: normal effort; Breath sounds: normal; No rales/wheezing/rhonchi  	  Cardiovascular: S1, S2, regular, NMBR	MP ST    Gastrointestinal: BS + x 4Q, nontender,  Ileostomy draining liquid brown drainage    Genitourinary:  Bladder nondistended, nontender    Musculoskeletal:   General Right:   + muscle/joint tenderness,   normal tone, no joint swelling,   ROM: limited	    General Left:   no muscle/joint tenderness,   normal tone, no joint swelling,   ROM: full    Hip:  Right: Dressing CDI; Drain: hemovac ; Type of drng.: serosang.; Amt. of drng: small    Right arm: In sling: fingers warm and mobile, radial pulse +. + sensation, + cap refill                      Lower extrems:   Right: no calf tenderness              negative anselmo's sign               + pedal pulses    Left:   no calf tenderness              negative anselmo's sign               + pedal pulses                          9.3    26.26 )-----------( 152      ( 21 May 2018 07:02 )             29.6       05-21    142  |  110<H>  |  23  ----------------------------<  96  4.2   |  19<L>  |  0.93    Ca    8.2<L>      21 May 2018 07:02    TPro  7.6  /  Alb  2.9<L>  /  TBili  0.2  /  DBili  x   /  AST  28  /  ALT  39  /  AlkPhos  126<H>  05-20      PT/INR - ( 20 May 2018 05:20 )   PT: 11.9 sec;   INR: 1.10 ratio         PTT - ( 20 May 2018 05:20 )  PTT:26.7 sec				      Ct head:  Neg for bleed      MEDICATIONS  (STANDING):  docusate sodium 100 milliGRAM(s) Oral three times a day  enoxaparin Injectable 40 milliGRAM(s) SubCutaneous daily  HYDROmorphone PCA (1 mG/mL) 30 milliLiter(s) PCA Continuous PCA Continuous  pantoprazole    Tablet 40 milliGRAM(s) Oral daily  polyethylene glycol 3350 17 Gram(s) Oral daily          DVT Prophylaxis:  LMWH                   (  )  Heparin SQ           (  )  Coumadin             (  )  Xarelto                  (  )  Eliquis                   (  )  Venodynes           (  )  Ambulation          (  )  UFH                       (  )  Contraindicated  (  ) HPI:  Patient is 69yo female with PMhx of Ovarian Ca s/p debulking surgery, s/p ileostomy, on chemo, HTN, HLD, presents s/p fall at home. Recently the patient has had increased urgency/frequency, got up to go to the bathroom, tripped over piece of furniture, fell onto the R side of the body, hitting her head in the process, sustaining R humeral neck fracture, and R femoral neck fracture. Pt denies any cardiac history, denies CP, SOB. METs>4, has never had cardiac issues, previously tolerated surgeries under general anesthesia without any issues. (20 May 2018 10:19)      PT now is SD, S/P right THR, Non-OP right humerus fx      Consulted by Dr. Strong  for VTE prophylaxis, risk stratification, and anticoagulation management.    PAST MEDICAL & SURGICAL HISTORY:  Perforation of sigmoid colon: s/p Reymundo procedure 4/2016  Ovarian cancer  Other acute pulmonary embolism  Essential hypertension, hypertension with unspecified goal  Malignant neoplasm of ovary, unspecified laterality  History of conization of cervix: 1984  Uterus disorder: removed in 2003, due to fibroids  H/O skin graft: to abdominal wound (11/2016)  S/P cholecystectomy: 2011  H/O abdominal hysterectomy: removal of ovaries, tubes ,omentum,radical debulking  Status post Reymundo procedure: April 2016          CrCl: 31.3    Caprini VTE Risk Score: #9      IMPROVE Bleeding Risk Score #4.5    Falls Risk:   High (x  )  Mod (  )  Low (  )      FAMILY HISTORY:  No pertinent family history in first degree relatives  No pertinent family history in first degree relatives    Denies any personal or familial history of clotting or bleeding disorders.    Allergies    Bananas (Unknown)  ibuprofen (Other)  Lexapro (Unknown)  Originally Entered as [Unknown] reaction to [cantelope] (Unknown)  Originally Entered as [Unknown] reaction to [cherries] (Unknown)  Peaches (Unknown)  wallnuts, paches, bananas, honedew and cantalope (Hives; Short breath)  walnut (Stomach Upset (Severe))  Wellbutrin (Hives)  Intolerances    carboplatin (Flushing (Skin); Rash)      REVIEW OF SYSTEMS    (  )Fever	     (  )Constipation	(  )SOB				(  )Headache	(  )Dysuria  (  )Chills	     (  )Melena	(  )Dyspnea present on exertion	                    (  )Dizziness                    (  )Polyuria  (  )Nausea	     (  )Hematochezia	(  )Cough			                    (  )Syncope   	(  )Hematuria  (  )Vomiting    (  )Chest Pain	(  )Wheezing			(  )Weakness  (  )Diarrhea     (  )Palpitations	(  )Anorexia			(  )Myalgia    All other review of systems negative: Yes          PHYSICAL EXAM:    Constitutional: Appears Well    Neurological: A& O x 3    Skin: Warm    Respiratory and Thorax: normal effort; Breath sounds: normal; No rales/wheezing/rhonchi  	  Cardiovascular: S1, S2, regular, NMBR	MP ST    Gastrointestinal: BS + x 4Q, nontender,  Ileostomy draining liquid brown drainage    Genitourinary:  Bladder nondistended, nontender    Musculoskeletal:   General Right:   + muscle/joint tenderness,   normal tone, no joint swelling,   ROM: limited	    General Left:   no muscle/joint tenderness,   normal tone, no joint swelling,   ROM: full    Hip:  Right: Dressing CDI;   Right arm: In sling: fingers warm and mobile, radial pulse +. + sensation, + cap refill                      Lower extrems:   Right: no calf tenderness              negative anselmo's sign               + pedal pulses    Left:   no calf tenderness              negative anselmo's sign               + pedal pulses                          9.3    26.26 )-----------( 152      ( 21 May 2018 07:02 )             29.6       05-21    142  |  110<H>  |  23  ----------------------------<  96  4.2   |  19<L>  |  0.93    Ca    8.2<L>      21 May 2018 07:02    TPro  7.6  /  Alb  2.9<L>  /  TBili  0.2  /  DBili  x   /  AST  28  /  ALT  39  /  AlkPhos  126<H>  05-20      PT/INR - ( 20 May 2018 05:20 )   PT: 11.9 sec;   INR: 1.10 ratio         PTT - ( 20 May 2018 05:20 )  PTT:26.7 sec				      Ct head:  Neg for bleed    MEDICATIONS  (STANDING):  docusate sodium 100 milliGRAM(s) Oral three times a day  HYDROmorphone PCA (1 mG/mL) 30 milliLiter(s) PCA Continuous PCA Continuous  oxybutynin 5 milliGRAM(s) Oral three times a day  pantoprazole    Tablet 40 milliGRAM(s) Oral daily  polyethylene glycol 3350 17 Gram(s) Oral daily  rivaroxaban 10 milliGRAM(s) Oral daily        DVT Prophylaxis:  LMWH                   (  )  Heparin SQ           (  )  Coumadin             (  )  Xarelto                  ( x )  Eliquis                   (  )  Venodynes           ( x )  Ambulation          ( x )  UFH                       (  )  Contraindicated  (  )

## 2022-05-19 DIAGNOSIS — G43.009 MIGRAINE WITHOUT AURA AND WITHOUT STATUS MIGRAINOSUS, NOT INTRACTABLE: ICD-10-CM

## 2022-05-20 RX ORDER — SUMATRIPTAN 25 MG/1
TABLET, FILM COATED ORAL
Qty: 9 TABLET | Refills: 1 | Status: SHIPPED | OUTPATIENT
Start: 2022-05-20 | End: 2022-08-30

## 2022-05-31 ENCOUNTER — LAB (OUTPATIENT)
Dept: LAB | Facility: HOSPITAL | Age: 77
End: 2022-05-31

## 2022-05-31 ENCOUNTER — TELEPHONE (OUTPATIENT)
Dept: ONCOLOGY | Facility: CLINIC | Age: 77
End: 2022-05-31

## 2022-05-31 ENCOUNTER — CLINICAL SUPPORT (OUTPATIENT)
Dept: ONCOLOGY | Facility: HOSPITAL | Age: 77
End: 2022-05-31

## 2022-05-31 ENCOUNTER — TELEPHONE (OUTPATIENT)
Dept: ONCOLOGY | Facility: HOSPITAL | Age: 77
End: 2022-05-31

## 2022-05-31 DIAGNOSIS — D75.839 THROMBOCYTOSIS: ICD-10-CM

## 2022-05-31 DIAGNOSIS — Z15.89 MONOALLELIC MUTATION OF CALR3 GENE: ICD-10-CM

## 2022-05-31 DIAGNOSIS — D47.1 MYELOPROLIFERATIVE DISORDER: ICD-10-CM

## 2022-05-31 LAB
BASOPHILS # BLD AUTO: 0.07 10*3/MM3 (ref 0–0.2)
BASOPHILS NFR BLD AUTO: 1.1 % (ref 0–1.5)
DEPRECATED RDW RBC AUTO: 53.8 FL (ref 37–54)
EOSINOPHIL # BLD AUTO: 0.06 10*3/MM3 (ref 0–0.4)
EOSINOPHIL NFR BLD AUTO: 0.9 % (ref 0.3–6.2)
ERYTHROCYTE [DISTWIDTH] IN BLOOD BY AUTOMATED COUNT: 12.2 % (ref 12.3–15.4)
HCT VFR BLD AUTO: 40 % (ref 34–46.6)
HGB BLD-MCNC: 14.5 G/DL (ref 12–15.9)
IMM GRANULOCYTES # BLD AUTO: 0.03 10*3/MM3 (ref 0–0.05)
IMM GRANULOCYTES NFR BLD AUTO: 0.5 % (ref 0–0.5)
LYMPHOCYTES # BLD AUTO: 2.58 10*3/MM3 (ref 0.7–3.1)
LYMPHOCYTES NFR BLD AUTO: 39.9 % (ref 19.6–45.3)
MCH RBC QN AUTO: 43.3 PG (ref 26.6–33)
MCHC RBC AUTO-ENTMCNC: 36.3 G/DL (ref 31.5–35.7)
MCV RBC AUTO: 119.4 FL (ref 79–97)
MONOCYTES # BLD AUTO: 0.63 10*3/MM3 (ref 0.1–0.9)
MONOCYTES NFR BLD AUTO: 9.8 % (ref 5–12)
NEUTROPHILS NFR BLD AUTO: 3.09 10*3/MM3 (ref 1.7–7)
NEUTROPHILS NFR BLD AUTO: 47.8 % (ref 42.7–76)
NRBC BLD AUTO-RTO: 0 /100 WBC (ref 0–0.2)
PLATELET # BLD AUTO: 608 10*3/MM3 (ref 140–450)
PMV BLD AUTO: 9.2 FL (ref 6–12)
RBC # BLD AUTO: 3.35 10*6/MM3 (ref 3.77–5.28)
WBC NRBC COR # BLD: 6.46 10*3/MM3 (ref 3.4–10.8)

## 2022-05-31 PROCEDURE — G0463 HOSPITAL OUTPT CLINIC VISIT: HCPCS

## 2022-05-31 PROCEDURE — 36415 COLL VENOUS BLD VENIPUNCTURE: CPT

## 2022-05-31 PROCEDURE — 85025 COMPLETE CBC W/AUTO DIFF WBC: CPT

## 2022-05-31 NOTE — TELEPHONE ENCOUNTER
Reviewed Dr. Brown's note and instructions with patient, she repeated back the new instructions and dosing. She had no further questions and v/u.

## 2022-05-31 NOTE — TELEPHONE ENCOUNTER
----- Message from Umberto Brown MD sent at 5/31/2022  9:37 AM EDT -----  Please contact Ms. Wooten and ask her to increase her Hydrea dose on Saturdays and Sundays to 1500 mg and continue 1000 mg the other 5 days of the week.    Thanks

## 2022-05-31 NOTE — TELEPHONE ENCOUNTER
Called and s/w pts spouse and let him know MD response. He v/u.    ----- Message from Umberto Brown MD sent at 5/31/2022  9:40 AM EDT -----  She should remain on aspirin 81 mg daily.  Thanks  ----- Message -----  From: Ester Cole RN  Sent: 5/31/2022   9:12 AM EDT  To: Umberto Brown MD    Your last note read that the pt should continue taking 81 mg of aspirin daily, but she reports stopping aspirin 2 months ago. She wanted to make sure this was ok or did you want her to restart the aspirin?

## 2022-05-31 NOTE — PROGRESS NOTES
Pt present for visit. Pt confirms taking hydrea 1000 mg daily with no issues. Plts are increasing compared to previous. Pt reports stopping 81 mg aspirin 2 months ago. Message sent to Dr. Brown to advise. Copy of labs provided and she v/u.    Lab Results   Component Value Date    WBC 6.46 05/31/2022    HGB 14.5 05/31/2022    HCT 40.0 05/31/2022    .4 (H) 05/31/2022     (H) 05/31/2022

## 2022-06-23 ENCOUNTER — OFFICE VISIT (OUTPATIENT)
Dept: INTERNAL MEDICINE | Facility: CLINIC | Age: 77
End: 2022-06-23

## 2022-06-23 ENCOUNTER — HOSPITAL ENCOUNTER (EMERGENCY)
Facility: HOSPITAL | Age: 77
Discharge: HOME | End: 2022-06-23
Attending: EMERGENCY MEDICINE | Admitting: EMERGENCY MEDICINE

## 2022-06-23 VITALS
HEART RATE: 90 BPM | SYSTOLIC BLOOD PRESSURE: 110 MMHG | WEIGHT: 157 LBS | BODY MASS INDEX: 26.16 KG/M2 | TEMPERATURE: 98.4 F | DIASTOLIC BLOOD PRESSURE: 70 MMHG | OXYGEN SATURATION: 99 % | HEIGHT: 65 IN

## 2022-06-23 VITALS
RESPIRATION RATE: 16 BRPM | DIASTOLIC BLOOD PRESSURE: 71 MMHG | SYSTOLIC BLOOD PRESSURE: 109 MMHG | TEMPERATURE: 97.7 F | OXYGEN SATURATION: 99 % | HEART RATE: 78 BPM

## 2022-06-23 DIAGNOSIS — K29.60 NSAID INDUCED GASTRITIS: Primary | ICD-10-CM

## 2022-06-23 DIAGNOSIS — K92.1 BLACK TARRY STOOLS: Primary | ICD-10-CM

## 2022-06-23 DIAGNOSIS — R53.83 FATIGUE, UNSPECIFIED TYPE: ICD-10-CM

## 2022-06-23 DIAGNOSIS — T39.395A NSAID INDUCED GASTRITIS: Primary | ICD-10-CM

## 2022-06-23 LAB
ABO GROUP BLD: NORMAL
ALBUMIN SERPL-MCNC: 4.3 G/DL (ref 3.5–5.2)
ALBUMIN/GLOB SERPL: 2 G/DL
ALP SERPL-CCNC: 57 U/L (ref 39–117)
ALT SERPL W P-5'-P-CCNC: 17 U/L (ref 1–33)
ANION GAP SERPL CALCULATED.3IONS-SCNC: 11 MMOL/L (ref 5–15)
ANISOCYTOSIS BLD QL: NORMAL
AST SERPL-CCNC: 24 U/L (ref 1–32)
BILIRUB SERPL-MCNC: 0.3 MG/DL (ref 0–1.2)
BLD GP AB SCN SERPL QL: NEGATIVE
BUN SERPL-MCNC: 15 MG/DL (ref 8–23)
BUN/CREAT SERPL: 24.2 (ref 7–25)
CALCIUM SPEC-SCNC: 9.4 MG/DL (ref 8.6–10.5)
CHLORIDE SERPL-SCNC: 100 MMOL/L (ref 98–107)
CO2 SERPL-SCNC: 28 MMOL/L (ref 22–29)
CREAT SERPL-MCNC: 0.62 MG/DL (ref 0.57–1)
DEPRECATED RDW RBC AUTO: 61.2 FL (ref 37–54)
EGFRCR SERPLBLD CKD-EPI 2021: 91.9 ML/MIN/1.73
ERYTHROCYTE [DISTWIDTH] IN BLOOD BY AUTOMATED COUNT: 14.1 % (ref 12.3–15.4)
GLOBULIN UR ELPH-MCNC: 2.2 GM/DL
GLUCOSE SERPL-MCNC: 91 MG/DL (ref 65–99)
HCT VFR BLD AUTO: 28.5 % (ref 34–46.6)
HGB BLD-MCNC: 10.2 G/DL (ref 12–15.9)
HOLD SPECIMEN: NORMAL
HOLD SPECIMEN: NORMAL
LYMPHOCYTES # BLD MANUAL: 2.1 10*3/MM3 (ref 0.7–3.1)
LYMPHOCYTES NFR BLD MANUAL: 9.3 % (ref 5–12)
MACROCYTES BLD QL SMEAR: NORMAL
MCH RBC QN AUTO: 43.4 PG (ref 26.6–33)
MCHC RBC AUTO-ENTMCNC: 35.8 G/DL (ref 31.5–35.7)
MCV RBC AUTO: 121.3 FL (ref 79–97)
MONOCYTES # BLD: 0.63 10*3/MM3 (ref 0.1–0.9)
NEUTROPHILS # BLD AUTO: 4.06 10*3/MM3 (ref 1.7–7)
NEUTROPHILS NFR BLD MANUAL: 59.8 % (ref 42.7–76)
NRBC BLD AUTO-RTO: 0.3 /100 WBC (ref 0–0.2)
PLAT MORPH BLD: NORMAL
PLATELET # BLD AUTO: 647 10*3/MM3 (ref 140–450)
PMV BLD AUTO: 8.8 FL (ref 6–12)
POLYCHROMASIA BLD QL SMEAR: NORMAL
POTASSIUM SERPL-SCNC: 3.9 MMOL/L (ref 3.5–5.2)
PROT SERPL-MCNC: 6.5 G/DL (ref 6–8.5)
RBC # BLD AUTO: 2.35 10*6/MM3 (ref 3.77–5.28)
RH BLD: POSITIVE
SODIUM SERPL-SCNC: 139 MMOL/L (ref 136–145)
T&S EXPIRATION DATE: NORMAL
VARIANT LYMPHS NFR BLD MANUAL: 30.9 % (ref 19.6–45.3)
WBC MORPH BLD: NORMAL
WBC NRBC COR # BLD: 6.79 10*3/MM3 (ref 3.4–10.8)
WHOLE BLOOD HOLD COAG: NORMAL
WHOLE BLOOD HOLD SPECIMEN: NORMAL

## 2022-06-23 PROCEDURE — 80053 COMPREHEN METABOLIC PANEL: CPT

## 2022-06-23 PROCEDURE — 99214 OFFICE O/P EST MOD 30 MIN: CPT | Performed by: NURSE PRACTITIONER

## 2022-06-23 PROCEDURE — 85025 COMPLETE CBC W/AUTO DIFF WBC: CPT

## 2022-06-23 PROCEDURE — 99283 EMERGENCY DEPT VISIT LOW MDM: CPT

## 2022-06-23 PROCEDURE — 36415 COLL VENOUS BLD VENIPUNCTURE: CPT

## 2022-06-23 PROCEDURE — 86900 BLOOD TYPING SEROLOGIC ABO: CPT

## 2022-06-23 PROCEDURE — 86850 RBC ANTIBODY SCREEN: CPT

## 2022-06-23 PROCEDURE — 85007 BL SMEAR W/DIFF WBC COUNT: CPT

## 2022-06-23 PROCEDURE — 86901 BLOOD TYPING SEROLOGIC RH(D): CPT

## 2022-06-23 RX ORDER — FLUOROURACIL 50 MG/G
CREAM TOPICAL
COMMUNITY
Start: 2022-05-05

## 2022-06-23 RX ORDER — SODIUM CHLORIDE 0.9 % (FLUSH) 0.9 %
10 SYRINGE (ML) INJECTION AS NEEDED
Status: DISCONTINUED | OUTPATIENT
Start: 2022-06-23 | End: 2022-06-23 | Stop reason: HOSPADM

## 2022-06-23 RX ORDER — OMEPRAZOLE 40 MG/1
40 CAPSULE, DELAYED RELEASE ORAL DAILY
Qty: 30 CAPSULE | Refills: 1 | Status: SHIPPED | OUTPATIENT
Start: 2022-06-23 | End: 2022-07-23

## 2022-06-23 RX ORDER — RIZATRIPTAN BENZOATE 10 MG/1
TABLET ORAL
COMMUNITY
Start: 2022-06-20 | End: 2022-06-23

## 2022-06-23 RX ORDER — OMEPRAZOLE 40 MG/1
40 CAPSULE, DELAYED RELEASE ORAL DAILY
Qty: 30 CAPSULE | Refills: 0 | Status: SHIPPED | OUTPATIENT
Start: 2022-06-23

## 2022-06-23 RX ORDER — DOXYCYCLINE 50 MG/1
CAPSULE ORAL
COMMUNITY
Start: 2022-05-03 | End: 2022-09-07

## 2022-06-23 NOTE — ED PROVIDER NOTES
EMERGENCY DEPARTMENT ENCOUNTER    Room Number:  30/30  Date seen:  6/23/2022  Time seen: 19:13 EDT  PCP: Josselyn Redmond MD  Historian: pt    HPI:  Chief complaint:rectal bleeding  A complete HPI/ROS/PMH/PSH/SH/FH are unobtainable due to: n/a  Context:Sonia Wooten is a 77 y.o. female with past medical history significant for myeloproliferative disorder, spinal stenosis of lumbar region who presents to the ED from her PCP's office due to complaints of taking too much Ibuprofen and having one episode on Sunday of hematemesis and then Mon, Tues, Wed had some blood in her stool.  She has not had any blood in her stool today. Her symptoms are not made better/worse by anything.   She denies rectal pain, abdominal pain, fever/chills or feeling lightheaded.  She had been taking the doses of Ibuprofen due to plantar fascitis pain.  She has had this happen one other time in the 1970's.      Patient was placed in face mask in first look. Patient was wearing facemask when I entered the room and throughout our encounter. I wore full protective equipment throughout this patient encounter including a N95 face mask, eye shield and gloves. Hand hygiene/washing of hands was performed before donning protective equipment and after removal when leaving the room.      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   • Eczema 11/15/2021   • Primary hypertension 11/15/2021   • Anxiety 11/15/2021   • Migraine without aura and without status migrainosus, not intractable 03/02/2022     Resolved Ambulatory Problems     Diagnosis Date Noted   • No Resolved Ambulatory Problems     Past Medical History:   Diagnosis Date   • DDD (degenerative disc disease), lumbar    • GERD (gastroesophageal reflux disease)    • H/o Hip pain     • History of depression    • History of low back pain    • History of thrombocytosis    • Hypercholesterolemia    • Low back pain    • Sleep apnea        PAST SURGICAL HISTORY  Past Surgical History:   Procedure Laterality Date   • BACK SURGERY     • CATARACT EXTRACTION  2018   • HIP SURGERY     • KNEE SURGERY     • TOTAL HIP ARTHROPLASTY Right 2011   • TOTAL KNEE ARTHROPLASTY Right 2013       FAMILY HISTORY  No family history on file.    SOCIAL HISTORY  Social History     Socioeconomic History   • Marital status:      Spouse name: Temo   Tobacco Use   • Smoking status: Never Smoker   • Smokeless tobacco: Never Used   Substance and Sexual Activity   • Alcohol use: Never   • Drug use: Never   • Sexual activity: Defer       REVIEW OF SYSTEMS  Review of Systems    All systems reviewed and negative except for those discussed in HPI.     PHYSICAL EXAM    ED Triage Vitals   Temp Heart Rate Resp BP SpO2   06/23/22 1526 06/23/22 1526 06/23/22 1526 06/23/22 1829 06/23/22 1526   97.7 °F (36.5 °C) 99 16 131/71 96 %      Temp src Heart Rate Source Patient Position BP Location FiO2 (%)   06/23/22 1526 06/23/22 1526 -- -- --   Tympanic Monitor        Physical Exam    I have reviewed the triage vital signs and nursing notes.      GENERAL: not distressed  HENT: nares patent  EYES: no scleral icterus  NECK: no ROM limitations  CV: regular rhythm, regular rate, no murmur, no rubs, no gallups  RESPIRATORY: normal effort, CTAB  ABDOMEN: soft, rounded, no focal tenderness, no guarding/rigidity.  BS  normal  : deferred  MUSCULOSKELETAL: no deformity  NEURO: alert, moves all extremities, follows commands  SKIN: warm, dry    LAB RESULTS  Recent Results (from the past 24 hour(s))   Comprehensive Metabolic Panel    Collection Time: 06/23/22  6:03 PM    Specimen: Arm, Right; Blood   Result Value Ref Range    Glucose 91 65 - 99 mg/dL    BUN 15 8 - 23 mg/dL    Creatinine 0.62 0.57 - 1.00 mg/dL    Sodium 139 136 - 145 mmol/L     Potassium 3.9 3.5 - 5.2 mmol/L    Chloride 100 98 - 107 mmol/L    CO2 28.0 22.0 - 29.0 mmol/L    Calcium 9.4 8.6 - 10.5 mg/dL    Total Protein 6.5 6.0 - 8.5 g/dL    Albumin 4.30 3.50 - 5.20 g/dL    ALT (SGPT) 17 1 - 33 U/L    AST (SGOT) 24 1 - 32 U/L    Alkaline Phosphatase 57 39 - 117 U/L    Total Bilirubin 0.3 0.0 - 1.2 mg/dL    Globulin 2.2 gm/dL    A/G Ratio 2.0 g/dL    BUN/Creatinine Ratio 24.2 7.0 - 25.0    Anion Gap 11.0 5.0 - 15.0 mmol/L    eGFR 91.9 >60.0 mL/min/1.73   Type & Screen    Collection Time: 06/23/22  6:03 PM    Specimen: Arm, Right; Blood   Result Value Ref Range    ABO Type O     RH type Positive     Antibody Screen Negative     T&S Expiration Date 6/26/2022 11:59:59 PM    Green Top (Gel)    Collection Time: 06/23/22  6:03 PM   Result Value Ref Range    Extra Tube Hold for add-ons.    Lavender Top    Collection Time: 06/23/22  6:03 PM   Result Value Ref Range    Extra Tube hold for add-on    Gold Top - SST    Collection Time: 06/23/22  6:03 PM   Result Value Ref Range    Extra Tube Hold for add-ons.    Light Blue Top    Collection Time: 06/23/22  6:03 PM   Result Value Ref Range    Extra Tube Hold for add-ons.    CBC Auto Differential    Collection Time: 06/23/22  6:03 PM    Specimen: Arm, Right; Blood   Result Value Ref Range    WBC 6.79 3.40 - 10.80 10*3/mm3    RBC 2.35 (L) 3.77 - 5.28 10*6/mm3    Hemoglobin 10.2 (L) 12.0 - 15.9 g/dL    Hematocrit 28.5 (L) 34.0 - 46.6 %    .3 (H) 79.0 - 97.0 fL    MCH 43.4 (H) 26.6 - 33.0 pg    MCHC 35.8 (H) 31.5 - 35.7 g/dL    RDW 14.1 12.3 - 15.4 %    RDW-SD 61.2 (H) 37.0 - 54.0 fl    MPV 8.8 6.0 - 12.0 fL    Platelets 647 (H) 140 - 450 10*3/mm3    nRBC 0.3 (H) 0.0 - 0.2 /100 WBC   Manual Differential    Collection Time: 06/23/22  6:03 PM    Specimen: Arm, Right; Blood   Result Value Ref Range    Neutrophil % 59.8 42.7 - 76.0 %    Lymphocyte % 30.9 19.6 - 45.3 %    Monocyte % 9.3 5.0 - 12.0 %    Neutrophils Absolute 4.06 1.70 - 7.00 10*3/mm3     Lymphocytes Absolute 2.10 0.70 - 3.10 10*3/mm3    Monocytes Absolute 0.63 0.10 - 0.90 10*3/mm3    Anisocytosis Slight/1+ None Seen    Macrocytes Slight/1+ None Seen    Polychromasia Slight/1+ None Seen    WBC Morphology Normal Normal    Platelet Morphology Normal Normal         RADIOLOGY RESULTS  No Radiology Exams Resulted Within Past 24 Hours       PROGRESS, DATA ANALYSIS, CONSULTS AND MEDICAL DECISION MAKING  All labs have been independently reviewed by me.  All radiology studies have been reviewed by me and discussed with radiologist dictating the report.  EKG's independently viewed and interpreted by me unless stated otherwise. Discussion below represents my analysis of pertinent findings related to patient's condition, differential diagnosis, treatment plan and final disposition.     ED Course as of 06/23/22 2111   Thu Jun 23, 2022 2023 MDM: blood in stool likely from overuse NSAIDS.  Hemoglobin stable today.  She is not hypotensive and has no complaints that are concerning for acute blood loss anemia.  I will start PPI and have her follow up with PCP in one week for repeat hemoglobin.  [EW]      ED Course User Index  [EW] Mariah Noonan APRN     DDX: nsaid induced gastritis, acute GI bleeding, acute blood loss anemia    Reviewed pt's history and workup with Dr. Mitchell.  After a bedside evaluation, Dr. Mitchell agrees with the plan of care.    The patient's history, physical exam, and lab findings were discussed with the physician, who also performed a face to face history and physical exam.  I discussed all results and noted any abnormalities with patient.  Discussed absoute need to recheck abnormalities with their family physician.  I answered any of the patient's questions.  Discussed plan for discharge, as there is no emergent indication for admission.  Pt is agreeable and understands need for follow up and repeat testing.  Pt is aware that discharge does not mean that nothing is wrong but it indicates  no emergency is present and they must continue care with their family physician.  Pt is discharged with instructions to follow up with primary care doctor to have their blood pressure rechecked.     Disposition vitals:  /71   Pulse 78   Temp 97.7 °F (36.5 °C) (Tympanic)   Resp 16   SpO2 99%       DIAGNOSIS  Final diagnoses:   NSAID induced gastritis       FOLLOW UP   Josselyn Redmond MD  [address] [address]  [phone]    Schedule an appointment as soon as possible for a visit in 1 week  for repeat check of hemoglobin         Mariah Noonan, APRN  06/23/22 [id number]

## 2022-06-23 NOTE — ED NOTES
"Pt reports that she was taking 2 ibuprofen BID for about 2 weeks for some foot pain and believes to have gotten a gastric ulcer.  Pt had 6 on Saturday. Pt threw up Sunday 0200 and had \"black blood in it\" - pt also had black diarrhea at the same time. Pt endorses that it was tarry.  Pt denies abd pain at this time.  "

## 2022-06-23 NOTE — PROGRESS NOTES
"Chief Complaint  Abdominal Pain (upper)    Subjective        Sonia Wooten presents to [facility]  History of Present Illness    Patient is a pleasant 77-year-old female who typically sees  here in the office.  Patient is new to me and she is here today with complaints of black tarry stools over the last couple days.  Patient has a history of an ulcer is not on a PPI.  Patient also complains of weakness at this time.  Patient states over the weekend she took a double dose of ibuprofen and she believes this has caused her problem at this time.  Patient does not have a gastroenterologist.    She does c/o dizziness while standing and weakness at this time.       Objective   Vital Signs:  /70 (BP Location: Left arm, Patient Position: Sitting, Cuff Size: Adult)   Pulse 90   Temp 98.4 °F (36.9 °C)   Ht 165.1 cm (65\")   Wt 71.2 kg (157 lb)   SpO2 99%   BMI 26.13 kg/m²   Estimated body mass index is 26.13 kg/m² as calculated from the following:    Height as of this encounter: 165.1 cm (65\").    Weight as of this encounter: 71.2 kg (157 lb).          Physical Exam  Vitals and nursing note reviewed.   Constitutional:       Appearance: She is ill-appearing.      Comments: C/o dizziness while standing and overall weakness/fatigue   HENT:      Head: Normocephalic.      Nose: Nose normal.      Mouth/Throat:      Mouth: Mucous membranes are moist.   Eyes:      Pupils: Pupils are equal, round, and reactive to light.   Cardiovascular:      Rate and Rhythm: Normal rate and regular rhythm.      Pulses: Normal pulses.      Heart sounds: Normal heart sounds.      Comments: No peripheral edema noted.   Pulmonary:      Effort: Pulmonary effort is normal. No respiratory distress.      Breath sounds: Normal breath sounds. No stridor. No wheezing, rhonchi or rales.   Chest:      Chest wall: No tenderness.   Abdominal:      General: Bowel sounds are normal. There is no distension.      " Tenderness: There is no abdominal tenderness.      Comments: C/o multiple black stools.   Hx of GERD   Musculoskeletal:         General: Normal range of motion.      Cervical back: Normal range of motion.   Skin:     General: Skin is warm.      Capillary Refill: Capillary refill takes less than 2 seconds.   Neurological:      Mental Status: She is alert and oriented to person, place, and time.   Psychiatric:         Behavior: Behavior normal.        Result Review :      \plain     CBC in office reviewed   CBC & Differential (05/31/2022 8:56 AM)       Assessment and Plan   Diagnoses and all orders for this visit:    1. Black tarry stools (Primary)  -     Cancel: POCT CBC  -     CBC & Differential; Future  -     Occult Blood, Fecal By Immunoassay - Stool, Per Rectum; Future  -     Cancel: Ambulatory Referral to Gastroenterology  -     Ambulatory Referral to Gastroenterology  -     Cancel: CBC & Differential    2. Fatigue, unspecified type  -     Cancel: POCT CBC  -     CBC & Differential; Future  -     Occult Blood, Fecal By Immunoassay - Stool, Per Rectum; Future  -     Cancel: Ambulatory Referral to Gastroenterology  -     Ambulatory Referral to Gastroenterology  -     Cancel: CBC & Differential    Other orders  -     omeprazole (priLOSEC) 40 MG capsule; Take 1 capsule by mouth Daily for 30 days.  Dispense: 30 capsule; Refill: 1      After reviewing your CBC (red blood cell count was at 2.22 and hemoglobin is down to 9.8 with a hematocrit of 28.8.  MCV is 127, platelets are 717).  Patient currently sees Dr. Brown for diagnosis of thrombocytosis, Myeloproliferative disorder, and Monoallelic mutation of CALR3.  I recommend her going to the ER now for further evaluation and treatment.          Follow Up   Return if symptoms worsen or fail to improve.  Patient was given instructions and counseling regarding her condition or for health maintenance advice. Please see specific information pulled into the AVS if appropriate.

## 2022-06-23 NOTE — ED TRIAGE NOTES
Pt was dx c ulcer at pmd today.  She has had blood in her stool    Patient was placed in face mask during first look triage.  Patient was wearing a face mask throughout encounter.  I wore personal protective equipment throughout the encounter.  Hand hygiene was performed before and after patient encounter.

## 2022-06-24 NOTE — ED PROVIDER NOTES
MD ATTESTATION NOTE    The DONOVAN and I have discussed this patient's history, physical exam, and treatment plan.  I have reviewed the documentation and personally had a face to face interaction with the patient. I affirm the documentation and agree with the treatment and plan.  The attached note describes my personal findings.      I provided a substantive portion of the care of the patient.  I personally performed the physical exam in its entirety, and below are my findings.  For this patient encounter, the patient wore surgical mask, I wore full protective PPE including N95 and eye protection.      Brief HPI: This patient is a 77-year-old female presenting to the emergency room today secondary to blood in her stools/dark stools that have been present for the past couple of days.  She does report that she has been taking a fairly large amount of ibuprofen and attempts to treat plantar fasciitis.  She currently denies any associated abdominal pain.    PHYSICAL EXAM  ED Triage Vitals   Temp Heart Rate Resp BP SpO2   06/23/22 1526 06/23/22 1526 06/23/22 1526 06/23/22 1829 06/23/22 1526   97.7 °F (36.5 °C) 99 16 131/71 96 %      Temp src Heart Rate Source Patient Position BP Location FiO2 (%)   06/23/22 1526 06/23/22 1526 -- -- --   Tympanic Monitor            GENERAL: Resting comfortably and in no acute distress, nontoxic in appearance  HENT: nares patent  EYES: no scleral icterus  CV: regular rhythm, normal rate, no M/R/G  RESPIRATORY: normal effort, lungs clear bilaterally  ABDOMEN: soft, nontender, no rebound or guarding  MUSCULOSKELETAL: no deformity, no edema  NEURO: alert, moves all extremities, follows commands  PSYCH:  calm, cooperative  SKIN: warm, dry    Vital signs and nursing notes reviewed.        Plan: We will advise the patient to discontinue the ibuprofen immediately.  We will obtain labs including a CBC in the emergency room today.  We will monitor and reassess following.       Marco Antonio Mitchell,  MD  06/23/22 [id number]

## 2022-06-24 NOTE — DISCHARGE INSTRUCTIONS
Do not take NSAIDS (Ibuprofen, aleve, aspirin)    Use Tylenol    Your Primary Care called in RX for Omeprazole.  If it is not there I called in one as well.    Take it daily    Return Precautions    Although you are being discharged from the ED today, I encourage you to return for worsening symptoms.  Things can, and do, change such that treatment at home with medication may not be adequate.      Specifically, return for any of the following:    Chest pain, shortness of breath, pain or nausea and vomiting not controlled by medications provided.    Please make a follow up with your Primary Care Provider for a blood pressure recheck.

## 2022-06-27 ENCOUNTER — OFFICE VISIT (OUTPATIENT)
Dept: INTERNAL MEDICINE | Facility: CLINIC | Age: 77
End: 2022-06-27

## 2022-06-27 VITALS
HEART RATE: 77 BPM | RESPIRATION RATE: 16 BRPM | TEMPERATURE: 96.1 F | SYSTOLIC BLOOD PRESSURE: 124 MMHG | OXYGEN SATURATION: 98 % | WEIGHT: 160 LBS | BODY MASS INDEX: 26.66 KG/M2 | DIASTOLIC BLOOD PRESSURE: 78 MMHG | HEIGHT: 65 IN

## 2022-06-27 DIAGNOSIS — D64.9 ANEMIA, UNSPECIFIED TYPE: Primary | ICD-10-CM

## 2022-06-27 PROCEDURE — 99213 OFFICE O/P EST LOW 20 MIN: CPT | Performed by: FAMILY MEDICINE

## 2022-06-27 NOTE — PROGRESS NOTES
"    Chief Complaint  Rectal Bleeding ([facility] ER 6/23/22 follow up;rectal bleeding)    Subjective    History of Present Illness {CC  Problem List  Visit  Diagnosis   Encounters  Notes  Medications  Labs  Result Review Imaging  Media :23}     Sonia Wooten presents to [facility] for   History of Present Illness   78 yo female for ER follow. States black stools after increase NSAID use for foot pain. She was evaluated in ER, discharged with omeprazole. She is taking omeprazole daily currently. Dark stools has resolved, lasted a few days.    Still feeling tired.    She has never seen GI outpatient.  Last episode of bleeding from ulcers was in her 20's.        Social History     Socioeconomic History   • Marital status:      Spouse name: Temo   Tobacco Use   • Smoking status: Never Smoker   • Smokeless tobacco: Never Used   Substance and Sexual Activity   • Alcohol use: Never   • Drug use: Never   • Sexual activity: Defer      Objective     Vital Signs:   /78 (BP Location: Left arm, Patient Position: Sitting, Cuff Size: Adult)   Pulse 77   Temp 96.1 °F (35.6 °C) (Temporal)   Resp 16   Ht 165.1 cm (65\")   Wt 72.6 kg (160 lb)   SpO2 98%   BMI 26.63 kg/m²   Physical Exam  Constitutional:       General: She is not in acute distress.  HENT:      Head: Normocephalic.   Cardiovascular:      Rate and Rhythm: Regular rhythm.      Heart sounds: Normal heart sounds.   Pulmonary:      Breath sounds: Normal breath sounds.   Abdominal:      General: There is no distension.      Palpations: Abdomen is soft.      Tenderness: There is no abdominal tenderness. There is no guarding.   Neurological:      Mental Status: She is alert.        Result Review  Data Reviewed:{ Labs  Result Review  Imaging  Med Tab  Media :23}   The following data was reviewed by: Josselyn Redmond MD on 06/27/2022  Lab Results - Last 18 Months   Lab Units 06/23/22  1803 05/31/22  0856 " 04/04/22  1036 03/07/22  0000 02/07/22  1022 12/13/21  1002 06/17/21  1128 05/06/21  1003   BUN mg/dL 15  --  16 16  --  12  --  17   CREATININE mg/dL 0.62  --  0.66 0.74  --  0.70  --  0.66   EGFR IF NONAFRICN AM mL/min/1.73  --   --   --   --   --  81  --  87   SODIUM mmol/L 139  --  140 142  --  140  --  139   POTASSIUM mmol/L 3.9  --  4.2 4.5  --  3.8  --  4.1   CHLORIDE mmol/L 100  --  102 103  --  101  --  103   CALCIUM mg/dL 9.4  --  9.7 9.7  --  10.0  --  9.7   ALBUMIN g/dL 4.30  --  4.20  --   --  4.50  --  4.30   BILIRUBIN mg/dL 0.3  --  0.3  --   --  0.6  --  0.6   ALK PHOS U/L 57  --  60  --   --  57  --  53   AST (SGOT) U/L 24  --  18  --   --  24  --  25   ALT (SGPT) U/L 17  --  14  --   --  15  --  14   CHOLESTEROL mg/dL  --   --   --  220*  --   --   --   --    TRIGLYCERIDES mg/dL  --   --   --  129  --   --   --   --    HDL CHOL mg/dL  --   --   --  72  --   --   --   --    VLDL CHOLESTEROL YINKA mg/dL  --   --   --  22  --   --   --   --    LDL CHOL mg/dL  --   --   --  126*  --   --   --   --    WBC 10*3/mm3 6.79 6.46 5.96  --    < > 3.70   < > 3.96   RBC 10*6/mm3 2.35* 3.35* 3.14*  --    < > 2.68*   < > 2.63*   HEMATOCRIT % 28.5* 40.0 38.8  --    < > 35.1   < > 34.3   MCV fL 121.3* 119.4* 123.6*  --    < > 131.0*   < > 130.4*   MCH pg 43.4* 43.3* 43.9*  --    < > 47.0*   < > 46.8*   URIC ACID mg/dL  --   --  3.3  --   --   --   --   --     < > = values in this interval not displayed.              Current Outpatient Medications:   •  Cholecalciferol (VITAMIN D3 PO), Take  by mouth., Disp: , Rfl:   •  Cyanocobalamin (VITAMIN B-12 PO), Take  by mouth., Disp: , Rfl:   •  doxycycline (MONODOX) 50 MG capsule, , Disp: , Rfl:   •  fluorouracil (EFUDEX) 5 % cream, , Disp: , Rfl:   •  FLUoxetine (PROzac) 20 MG capsule, Take 1 capsule by mouth Daily., Disp: 90 capsule, Rfl: 1  •  fluticasone (FLONASE) 50 MCG/ACT nasal spray, , Disp: , Rfl:   •  hydroxyurea (HYDREA) 500 MG capsule, Take 1000 mg daily unless  prescribed by physician, Disp: 180 capsule, Rfl: 2  •  omeprazole (priLOSEC) 40 MG capsule, Take 1 capsule by mouth Daily for 30 days., Disp: 30 capsule, Rfl: 1  •  pravastatin (PRAVACHOL) 20 MG tablet, Take 1 tablet by mouth Daily., Disp: 90 tablet, Rfl: 1  •  SUMAtriptan (IMITREX) 25 MG tablet, TAKE ONE TABLET BY MOUTH AT ONSET OF HEADACHE; MAY REPEAT ONE TABLET IN 2 HOURS IF NEEDED., Disp: 9 tablet, Rfl: 1  •  Triamcinolone Acetonide 0.025 % lotion, , Disp: , Rfl:   •  triamterene-hydrochlorothiazide (MAXZIDE-25) 37.5-25 MG per tablet, Take 1 tablet by mouth Daily., Disp: 90 tablet, Rfl: 1  •  omeprazole (priLOSEC) 40 MG capsule, Take 1 capsule by mouth Daily., Disp: 30 capsule, Rfl: 0      Assessment and Plan {CC Problem List  Visit Diagnosis  ROS  Review (Popup)  Health Maintenance  Quality  BestPractice  Medications  SmartSets  SnapShot Encounters  Media :23}   Problem List Items Addressed This Visit        Hematology and Neoplasia    Anemia - Primary    Current Assessment & Plan     Recheck CBC  Continue taking omperazole  Avoid NSAID             Relevant Orders    CBC w AUTO Differential          Follow Up   Return for Next scheduled follow up. Keep July 6th appointment   Patient was given instructions and counseling regarding her condition or for health maintenance advice. Please see specific information pulled into the AVS if appropriate.    EpicAct:[email or web address],RunParams:STARTUPTYPE=FOLLOW    [email or web address]

## 2022-06-28 LAB
BASOPHILS # BLD AUTO: 0.1 X10E3/UL (ref 0–0.2)
BASOPHILS NFR BLD AUTO: 1 %
EOSINOPHIL # BLD AUTO: 0 X10E3/UL (ref 0–0.4)
EOSINOPHIL NFR BLD AUTO: 1 %
ERYTHROCYTE [DISTWIDTH] IN BLOOD BY AUTOMATED COUNT: 13.6 % (ref 11.7–15.4)
HCT VFR BLD AUTO: 29.9 % (ref 34–46.6)
HGB BLD-MCNC: 10.2 G/DL (ref 11.1–15.9)
IMM GRANULOCYTES # BLD AUTO: 0 X10E3/UL (ref 0–0.1)
IMM GRANULOCYTES NFR BLD AUTO: 0 %
LYMPHOCYTES # BLD AUTO: 1.9 X10E3/UL (ref 0.7–3.1)
LYMPHOCYTES NFR BLD AUTO: 36 %
MCH RBC QN AUTO: 43 PG (ref 26.6–33)
MCHC RBC AUTO-ENTMCNC: 34.1 G/DL (ref 31.5–35.7)
MCV RBC AUTO: 126 FL (ref 79–97)
MONOCYTES # BLD AUTO: 0.8 X10E3/UL (ref 0.1–0.9)
MONOCYTES NFR BLD AUTO: 15 %
MORPHOLOGY BLD-IMP: ABNORMAL
NEUTROPHILS # BLD AUTO: 2.5 X10E3/UL (ref 1.4–7)
NEUTROPHILS NFR BLD AUTO: 47 %
PLATELET # BLD AUTO: 689 X10E3/UL (ref 150–450)
RBC # BLD AUTO: 2.37 X10E6/UL (ref 3.77–5.28)
WBC # BLD AUTO: 5.3 X10E3/UL (ref 3.4–10.8)

## 2022-07-06 ENCOUNTER — OFFICE VISIT (OUTPATIENT)
Dept: INTERNAL MEDICINE | Facility: CLINIC | Age: 77
End: 2022-07-06

## 2022-07-06 VITALS
SYSTOLIC BLOOD PRESSURE: 132 MMHG | DIASTOLIC BLOOD PRESSURE: 84 MMHG | TEMPERATURE: 96 F | HEART RATE: 107 BPM | OXYGEN SATURATION: 98 % | RESPIRATION RATE: 15 BRPM | WEIGHT: 158 LBS | BODY MASS INDEX: 26.33 KG/M2 | HEIGHT: 65 IN

## 2022-07-06 DIAGNOSIS — K92.1 BLOOD IN STOOL: Primary | ICD-10-CM

## 2022-07-06 DIAGNOSIS — D47.1 MYELOPROLIFERATIVE DISORDER: ICD-10-CM

## 2022-07-06 PROCEDURE — 99213 OFFICE O/P EST LOW 20 MIN: CPT | Performed by: FAMILY MEDICINE

## 2022-07-06 NOTE — PROGRESS NOTES
"    Chief Complaint  Black or Bloody Stool (Follow up )    Subjective    History of Present Illness {CC  Problem List  Visit  Diagnosis   Encounters  Notes  Medications  Labs  Result Review Imaging  Media :23}     Sonia Wooten presents to [facility] for   History of Present Illness   78 yo female present for follow up visit. She states she is doing well. Has not had any episodes since the last evaluation. She is taking omeprazole as prescribed.   Unsure when last colonoscopy, completed outside of [facility]      Social History     Socioeconomic History   • Marital status:      Spouse name: Temo   Tobacco Use   • Smoking status: Never Smoker   • Smokeless tobacco: Never Used   Substance and Sexual Activity   • Alcohol use: Never   • Drug use: Never   • Sexual activity: Defer      Objective     Vital Signs:   /84 (BP Location: Left arm, Patient Position: Sitting, Cuff Size: Adult)   Pulse 107   Temp 96 °F (35.6 °C) (Temporal)   Resp 15   Ht 165.1 cm (65\")   Wt 71.7 kg (158 lb)   SpO2 98%   BMI 26.29 kg/m²   Physical Exam  Constitutional:       General: She is not in acute distress.     Appearance: She is not ill-appearing.   Cardiovascular:      Rate and Rhythm: Regular rhythm.      Heart sounds: Normal heart sounds.   Pulmonary:      Effort: No respiratory distress.      Breath sounds: Normal breath sounds. No wheezing.   Neurological:      Mental Status: She is alert.        Result Review  Data Reviewed:{ Labs  Result Review  Imaging  Med Tab  Media :23}   The following data was reviewed by: Josselyn Redmond MD on 07/06/2022  Lab Results - Last 18 Months   Lab Units 06/27/22  1039 06/23/22  1803 05/31/22  0856 04/04/22  1036 03/07/22  0000 02/07/22  1022 12/13/21  1002 06/17/21  1128 05/06/21  1003   BUN mg/dL  --  15  --  16 16  --  12  --  17   CREATININE mg/dL  --  0.62  --  0.66 0.74  --  0.70  --  0.66   EGFR IF NONAFRICN AM mL/min/1.73  --   --   " --   --   --   --  81  --  87   SODIUM mmol/L  --  139  --  140 142  --  140  --  139   POTASSIUM mmol/L  --  3.9  --  4.2 4.5  --  3.8  --  4.1   CHLORIDE mmol/L  --  100  --  102 103  --  101  --  103   CALCIUM mg/dL  --  9.4  --  9.7 9.7  --  10.0  --  9.7   ALBUMIN g/dL  --  4.30  --  4.20  --   --  4.50  --  4.30   BILIRUBIN mg/dL  --  0.3  --  0.3  --   --  0.6  --  0.6   ALK PHOS U/L  --  57  --  60  --   --  57  --  53   AST (SGOT) U/L  --  24  --  18  --   --  24  --  25   ALT (SGPT) U/L  --  17  --  14  --   --  15  --  14   CHOLESTEROL mg/dL  --   --   --   --  220*  --   --   --   --    TRIGLYCERIDES mg/dL  --   --   --   --  129  --   --   --   --    HDL CHOL mg/dL  --   --   --   --  72  --   --   --   --    VLDL CHOLESTEROL YINKA mg/dL  --   --   --   --  22  --   --   --   --    LDL CHOL mg/dL  --   --   --   --  126*  --   --   --   --    WBC x10E3/uL 5.3 6.79 6.46 5.96  --    < > 3.70   < > 3.96   RBC x10E6/uL 2.37* 2.35* 3.35* 3.14*  --    < > 2.68*   < > 2.63*   HEMATOCRIT % 29.9* 28.5* 40.0 38.8  --    < > 35.1   < > 34.3   MCV fL 126* 121.3* 119.4* 123.6*  --    < > 131.0*   < > 130.4*   MCH pg 43.0* 43.4* 43.3* 43.9*  --    < > 47.0*   < > 46.8*   URIC ACID mg/dL  --   --   --  3.3  --   --   --   --   --     < > = values in this interval not displayed.              Current Outpatient Medications:   •  Cholecalciferol (VITAMIN D3 PO), Take  by mouth., Disp: , Rfl:   •  Cyanocobalamin (VITAMIN B-12 PO), Take  by mouth., Disp: , Rfl:   •  doxycycline (MONODOX) 50 MG capsule, , Disp: , Rfl:   •  fluorouracil (EFUDEX) 5 % cream, , Disp: , Rfl:   •  FLUoxetine (PROzac) 20 MG capsule, Take 1 capsule by mouth Daily., Disp: 90 capsule, Rfl: 1  •  fluticasone (FLONASE) 50 MCG/ACT nasal spray, , Disp: , Rfl:   •  hydroxyurea (HYDREA) 500 MG capsule, Take 1000 mg daily unless prescribed by physician, Disp: 180 capsule, Rfl: 2  •  omeprazole (priLOSEC) 40 MG capsule, Take 1 capsule by mouth Daily for 30  days., Disp: 30 capsule, Rfl: 1  •  omeprazole (priLOSEC) 40 MG capsule, Take 1 capsule by mouth Daily., Disp: 30 capsule, Rfl: 0  •  pravastatin (PRAVACHOL) 20 MG tablet, Take 1 tablet by mouth Daily., Disp: 90 tablet, Rfl: 1  •  SUMAtriptan (IMITREX) 25 MG tablet, TAKE ONE TABLET BY MOUTH AT ONSET OF HEADACHE; MAY REPEAT ONE TABLET IN 2 HOURS IF NEEDED., Disp: 9 tablet, Rfl: 1  •  Triamcinolone Acetonide 0.025 % lotion, , Disp: , Rfl:   •  triamterene-hydrochlorothiazide (MAXZIDE-25) 37.5-25 MG per tablet, Take 1 tablet by mouth Daily., Disp: 90 tablet, Rfl: 1      Assessment and Plan {CC Problem List  Visit Diagnosis  ROS  Review (Popup)  Health Maintenance  Quality  BestPractice  Medications  SmartSets  SnapShot Encounters  Media :23}   Problem List Items Addressed This Visit        Hematology and Neoplasia    Myeloproliferative disorder (HCC)    Current Assessment & Plan     Chronic  Follow with onocology  Repeat cbc              Other Visit Diagnoses     Blood in stool    -  Primary    Relevant Orders    Ambulatory Referral For Screening Colonoscopy        Advise need to follow up with GI for repeat colonoscopy she has had polyps in the past. Continue omeprazloe daily       Follow Up   Return for Next scheduled follow up keep scheduled appt in Sept .  Patient was given instructions and counseling regarding her condition or for health maintenance advice. Please see specific information pulled into the AVS if appropriate.    EpicAct:[email or web address]_ORDERS,RunParams:STARTUPTYPE=FOLLOW    [email or web address]_DISCHARGE

## 2022-07-07 ENCOUNTER — TELEPHONE (OUTPATIENT)
Dept: INTERNAL MEDICINE | Facility: CLINIC | Age: 77
End: 2022-07-07

## 2022-07-07 NOTE — TELEPHONE ENCOUNTER
I returned call back to patient Sonia Wooten and her spouse Temo Sugar today 07/07/2022 regarding her previous colonoscopy was done at [facility] [place] back in May of 2018 also regarding current referral for colonoscopy Sonia Wooten patient and patients spouse Temo Wooten wish not to have the colonoscopy right now and wish to wait till next year which will be 05/11/2023

## 2022-07-07 NOTE — TELEPHONE ENCOUNTER
Hub staff attempted to follow warm transfer process and was unsuccessful     Caller: Temo Wooten    Relationship to patient: Emergency Contact    Best call back number: [phone]    Patient is needing: PATIENT'S SPOUSE IS RETURNING A CALL FROM [facility] (SP?) REGARDING A REFERRAL FOR A COLONOSCOPY. THE [facility] NUMBER WAS THE NUMBER GIVEN FOR A CALL BACK. PLEASE ADVISE.

## 2022-07-08 NOTE — TELEPHONE ENCOUNTER
Please call pt advise continue to monitor stool. Will post pone colonoscopy as she wishes however if she has any issue or concern for blood in stool will need to follow up with GI specialist.

## 2022-07-25 DIAGNOSIS — Z15.89 MONOALLELIC MUTATION OF CALR3 GENE: ICD-10-CM

## 2022-07-25 DIAGNOSIS — D75.839 THROMBOCYTOSIS: ICD-10-CM

## 2022-07-25 DIAGNOSIS — D47.1 MYELOPROLIFERATIVE DISORDER: ICD-10-CM

## 2022-07-25 RX ORDER — HYDROXYUREA 500 MG/1
CAPSULE ORAL
Qty: 192 CAPSULE | Refills: 1 | Status: SHIPPED | OUTPATIENT
Start: 2022-07-25 | End: 2022-08-03 | Stop reason: DRUGHIGH

## 2022-07-27 ENCOUNTER — LAB (OUTPATIENT)
Dept: LAB | Facility: HOSPITAL | Age: 77
End: 2022-07-27

## 2022-07-27 ENCOUNTER — CLINICAL SUPPORT (OUTPATIENT)
Dept: ONCOLOGY | Facility: HOSPITAL | Age: 77
End: 2022-07-27

## 2022-07-27 DIAGNOSIS — D75.839 THROMBOCYTOSIS: ICD-10-CM

## 2022-07-27 DIAGNOSIS — Z15.89 MONOALLELIC MUTATION OF CALR3 GENE: ICD-10-CM

## 2022-07-27 DIAGNOSIS — D47.1 MYELOPROLIFERATIVE DISORDER: ICD-10-CM

## 2022-07-27 LAB
BASOPHILS # BLD AUTO: 0.05 10*3/MM3 (ref 0–0.2)
BASOPHILS NFR BLD AUTO: 1 % (ref 0–1.5)
DEPRECATED RDW RBC AUTO: 53.6 FL (ref 37–54)
EOSINOPHIL # BLD AUTO: 0.04 10*3/MM3 (ref 0–0.4)
EOSINOPHIL NFR BLD AUTO: 0.8 % (ref 0.3–6.2)
ERYTHROCYTE [DISTWIDTH] IN BLOOD BY AUTOMATED COUNT: 12.6 % (ref 12.3–15.4)
HCT VFR BLD AUTO: 32.6 % (ref 34–46.6)
HGB BLD-MCNC: 11.1 G/DL (ref 12–15.9)
IMM GRANULOCYTES # BLD AUTO: 0.02 10*3/MM3 (ref 0–0.05)
IMM GRANULOCYTES NFR BLD AUTO: 0.4 % (ref 0–0.5)
LYMPHOCYTES # BLD AUTO: 2.28 10*3/MM3 (ref 0.7–3.1)
LYMPHOCYTES NFR BLD AUTO: 44.6 % (ref 19.6–45.3)
MCH RBC QN AUTO: 39.4 PG (ref 26.6–33)
MCHC RBC AUTO-ENTMCNC: 34 G/DL (ref 31.5–35.7)
MCV RBC AUTO: 115.6 FL (ref 79–97)
MONOCYTES # BLD AUTO: 0.7 10*3/MM3 (ref 0.1–0.9)
MONOCYTES NFR BLD AUTO: 13.7 % (ref 5–12)
NEUTROPHILS NFR BLD AUTO: 2.02 10*3/MM3 (ref 1.7–7)
NEUTROPHILS NFR BLD AUTO: 39.5 % (ref 42.7–76)
NRBC BLD AUTO-RTO: 0 /100 WBC (ref 0–0.2)
PLATELET # BLD AUTO: 762 10*3/MM3 (ref 140–450)
PMV BLD AUTO: 9.1 FL (ref 6–12)
RBC # BLD AUTO: 2.82 10*6/MM3 (ref 3.77–5.28)
WBC NRBC COR # BLD: 5.11 10*3/MM3 (ref 3.4–10.8)

## 2022-07-27 PROCEDURE — 36415 COLL VENOUS BLD VENIPUNCTURE: CPT

## 2022-07-27 PROCEDURE — 85025 COMPLETE CBC W/AUTO DIFF WBC: CPT

## 2022-07-27 PROCEDURE — G0463 HOSPITAL OUTPT CLINIC VISIT: HCPCS

## 2022-07-27 NOTE — PROGRESS NOTES
Pt present for visit. Pt confirms taking hydrea 1500 mg Sat and Sunday and 1000 mg aod with no issues. Denies any new concerns. Reports stopping baby asa a week ago. Instructed pt to restart this according to Dr. Brown's note. Pt v/u and given copy of labs. Plts are elevated today. Reviewed schedule.    Lab Results   Component Value Date    WBC 5.11 07/27/2022    HGB 11.1 (L) 07/27/2022    HCT 32.6 (L) 07/27/2022    .6 (H) 07/27/2022     (H) 07/27/2022

## 2022-08-03 ENCOUNTER — SPECIALTY PHARMACY (OUTPATIENT)
Dept: PHARMACY | Facility: HOSPITAL | Age: 77
End: 2022-08-03

## 2022-08-03 RX ORDER — HYDROXYUREA 500 MG/1
CAPSULE ORAL
Qty: 72 CAPSULE | Refills: 2 | Status: SHIPPED | OUTPATIENT
Start: 2022-08-03 | End: 2022-09-19

## 2022-08-04 ENCOUNTER — SPECIALTY PHARMACY (OUTPATIENT)
Dept: ONCOLOGY | Facility: CLINIC | Age: 77
End: 2022-08-04

## 2022-08-25 ENCOUNTER — SPECIALTY PHARMACY (OUTPATIENT)
Dept: PHARMACY | Facility: HOSPITAL | Age: 77
End: 2022-08-25

## 2022-08-25 NOTE — PROGRESS NOTES
MTM Encounter-Re: Adherence and side effects (Hydrea)    Today's encounter was conducted via telephone.    Medication:  Hydrea  Take three capsules (1500 mg) on Monday, Wednesday, Saturday and Sunday. Then take two capsules (1000 mg) on Tuesday, Thursday and Friday.    - Reason for outreach: Routine medication check-in .  - Administration: Patient is taking every day at the same time .  - Missed doses: Patient reports missing 0 doses in the last 30 days.  - Self-administration: Patient demonstrates ability to self-administer medication. No barriers to adherence identified.   - Diagnosis/Indication: Myeloproliferative disease. Progress toward achieving therapeutic goals reviewed.   - Patient denies side effects.    - Medication availability/affordability: Patient has had no issues obtaining medication from pharmacy.   - Questions/concerns about medications: None at this time       Completed medication reconciliation today to assess for drug interactions.   Reviewed allergies, medical history, labs, quality of life, and medication history with patient.   Patient denies starting or stopping any medications.  Assessed medication list for interactions, no significant drug interactions noted.   Advised pt to call the clinic if any new medications are started so we can assess for drug-drug interactions.     All questions addressed. Patient had no additional concerns for MTM office.     Ginny Garland, Pharmacy Intern  8/25/2022  10:28 EDT     Genna Rodriguez, ChanningD

## 2022-08-29 DIAGNOSIS — G43.009 MIGRAINE WITHOUT AURA AND WITHOUT STATUS MIGRAINOSUS, NOT INTRACTABLE: ICD-10-CM

## 2022-08-30 RX ORDER — SUMATRIPTAN 25 MG/1
TABLET, FILM COATED ORAL
Qty: 9 TABLET | Refills: 1 | Status: SHIPPED | OUTPATIENT
Start: 2022-08-30 | End: 2022-11-09

## 2022-09-07 ENCOUNTER — OFFICE VISIT (OUTPATIENT)
Dept: INTERNAL MEDICINE | Facility: CLINIC | Age: 77
End: 2022-09-07

## 2022-09-07 VITALS
DIASTOLIC BLOOD PRESSURE: 68 MMHG | HEIGHT: 65 IN | BODY MASS INDEX: 26.33 KG/M2 | SYSTOLIC BLOOD PRESSURE: 114 MMHG | OXYGEN SATURATION: 98 % | TEMPERATURE: 97.9 F | HEART RATE: 75 BPM | WEIGHT: 158 LBS

## 2022-09-07 DIAGNOSIS — E78.00 ELEVATED LDL CHOLESTEROL LEVEL: Primary | ICD-10-CM

## 2022-09-07 DIAGNOSIS — Z23 NEED FOR PNEUMOCOCCAL VACCINE: ICD-10-CM

## 2022-09-07 DIAGNOSIS — I10 PRIMARY HYPERTENSION: ICD-10-CM

## 2022-09-07 DIAGNOSIS — F41.9 ANXIETY: ICD-10-CM

## 2022-09-07 PROCEDURE — G0009 ADMIN PNEUMOCOCCAL VACCINE: HCPCS | Performed by: FAMILY MEDICINE

## 2022-09-07 PROCEDURE — 90677 PCV20 VACCINE IM: CPT | Performed by: FAMILY MEDICINE

## 2022-09-07 PROCEDURE — 99214 OFFICE O/P EST MOD 30 MIN: CPT | Performed by: FAMILY MEDICINE

## 2022-09-07 RX ORDER — FLUOXETINE HYDROCHLORIDE 20 MG/1
20 CAPSULE ORAL DAILY
Qty: 90 CAPSULE | Refills: 1 | Status: SHIPPED | OUTPATIENT
Start: 2022-09-07

## 2022-09-07 RX ORDER — TRIAMTERENE AND HYDROCHLOROTHIAZIDE 37.5; 25 MG/1; MG/1
1 TABLET ORAL DAILY
Qty: 90 TABLET | Refills: 1 | Status: SHIPPED | OUTPATIENT
Start: 2022-09-07

## 2022-09-07 RX ORDER — PRAVASTATIN SODIUM 20 MG
20 TABLET ORAL DAILY
Qty: 90 TABLET | Refills: 1 | Status: SHIPPED | OUTPATIENT
Start: 2022-09-07

## 2022-09-07 NOTE — ASSESSMENT & PLAN NOTE
Hypertension is chronic, stable .  Continue current treatment regimen.  Dietary sodium restriction.  Regular aerobic exercise.  Continue current medications.  Ambulatory blood pressure monitoring.   Tolerating medication with no issues.   Refilled today   Blood pressure will be reassessed at the next regular appointment.

## 2022-09-07 NOTE — PROGRESS NOTES
"    Chief Complaint  Hypertension and Anxiety    Subjective    History of Present Illness {CC  Problem List  Visit  Diagnosis   Encounters  Notes  Medications  Labs  Result Review Imaging  Media :23}     Sonia Wooten presents to [facility] for   History of Present Illness     76 yo female present for follow up visit. States mood is doing well with fluoxetine.   Headache doing well, occassional.     Eating well   Walking daily and exercise class twice a week.      Reviewed vaccine today, due for tdap, shingles and pna.       Social History     Socioeconomic History   • Marital status:      Spouse name: Temo   Tobacco Use   • Smoking status: Former Smoker     Packs/day: 1.00     Years: 15.00     Pack years: 15.00     Types: Cigarettes     Quit date: 10/1/1979     Years since quittin.9   • Smokeless tobacco: Never Used   Vaping Use   • Vaping Use: Never used   Substance and Sexual Activity   • Alcohol use: Yes     Alcohol/week: 3.0 standard drinks     Types: 1 Glasses of wine, 1 Cans of beer, 1 Shots of liquor per week   • Drug use: Never   • Sexual activity: Not Currently     Partners: Male      Objective     Vital Signs:   /68   Pulse 75   Temp 97.9 °F (36.6 °C)   Ht 165.1 cm (65\")   Wt 71.7 kg (158 lb)   SpO2 98%   BMI 26.29 kg/m²   Physical Exam  Constitutional:       General: She is not in acute distress.     Appearance: She is not ill-appearing.   HENT:      Head: Normocephalic.   Eyes:      Conjunctiva/sclera: Conjunctivae normal.   Cardiovascular:      Rate and Rhythm: Regular rhythm.      Heart sounds: Normal heart sounds.   Pulmonary:      Effort: No respiratory distress.      Breath sounds: Normal breath sounds.   Neurological:      Mental Status: She is alert.   Psychiatric:         Mood and Affect: Mood normal.        Result Review  Data Reviewed:{ Labs  Result Review  Imaging  Med Tab  Media :23}   The following data was reviewed by: " Josselyn Redmond MD on 09/07/2022  Lab Results - Last 18 Months   Lab Units 07/27/22  0856 06/27/22  1039 06/23/22  1803 05/31/22  0856 04/04/22  1036 03/07/22  0000 02/07/22  1022 12/13/21  1002 06/17/21  1128 05/06/21  1003   BUN mg/dL  --   --  15  --  16 16  --  12  --  17   CREATININE mg/dL  --   --  0.62  --  0.66 0.74  --  0.70  --  0.66   EGFR IF NONAFRICN AM mL/min/1.73  --   --   --   --   --   --   --  81  --  87   SODIUM mmol/L  --   --  139  --  140 142  --  140  --  139   POTASSIUM mmol/L  --   --  3.9  --  4.2 4.5  --  3.8  --  4.1   CHLORIDE mmol/L  --   --  100  --  102 103  --  101  --  103   CALCIUM mg/dL  --   --  9.4  --  9.7 9.7  --  10.0  --  9.7   ALBUMIN g/dL  --   --  4.30  --  4.20  --   --  4.50  --  4.30   BILIRUBIN mg/dL  --   --  0.3  --  0.3  --   --  0.6  --  0.6   ALK PHOS U/L  --   --  57  --  60  --   --  57  --  53   AST (SGOT) U/L  --   --  24  --  18  --   --  24  --  25   ALT (SGPT) U/L  --   --  17  --  14  --   --  15  --  14   CHOLESTEROL mg/dL  --   --   --   --   --  220*  --   --   --   --    TRIGLYCERIDES mg/dL  --   --   --   --   --  129  --   --   --   --    HDL CHOL mg/dL  --   --   --   --   --  72  --   --   --   --    VLDL CHOLESTEROL YINKA mg/dL  --   --   --   --   --  22  --   --   --   --    LDL CHOL mg/dL  --   --   --   --   --  126*  --   --   --   --    WBC 10*3/mm3 5.11 5.3 6.79   < > 5.96  --    < > 3.70   < > 3.96   RBC 10*6/mm3 2.82* 2.37* 2.35*   < > 3.14*  --    < > 2.68*   < > 2.63*   HEMATOCRIT % 32.6* 29.9* 28.5*   < > 38.8  --    < > 35.1   < > 34.3   MCV fL 115.6* 126* 121.3*   < > 123.6*  --    < > 131.0*   < > 130.4*   MCH pg 39.4* 43.0* 43.4*   < > 43.9*  --    < > 47.0*   < > 46.8*   URIC ACID mg/dL  --   --   --   --  3.3  --   --   --   --   --     < > = values in this interval not displayed.              Current Outpatient Medications:   •  fluorouracil (EFUDEX) 5 % cream, , Disp: , Rfl:   •  FLUoxetine (PROzac) 20 MG capsule, Take 1  capsule by mouth Daily., Disp: 90 capsule, Rfl: 1  •  fluticasone (FLONASE) 50 MCG/ACT nasal spray, , Disp: , Rfl:   •  hydroxyurea (Hydrea) 500 MG capsule, Take three capsules (1500 mg) on Monday, Wednesday, Saturday and Sunday. Then take two capsules (1000 mg) on Tuesday, Thursday and Friday., Disp: 72 capsule, Rfl: 2  •  omeprazole (priLOSEC) 40 MG capsule, Take 1 capsule by mouth Daily., Disp: 30 capsule, Rfl: 0  •  pravastatin (PRAVACHOL) 20 MG tablet, Take 1 tablet by mouth Daily., Disp: 90 tablet, Rfl: 1  •  SUMAtriptan (IMITREX) 25 MG tablet, TAKE ONE TABLET BY MOUTH AT ONSET OF HEADACHE; MAY REPEAT ONE TABLET IN 2 HOURS IF NEEDED., Disp: 9 tablet, Rfl: 1  •  Triamcinolone Acetonide 0.025 % lotion, , Disp: , Rfl:   •  triamterene-hydrochlorothiazide (MAXZIDE-25) 37.5-25 MG per tablet, Take 1 tablet by mouth Daily., Disp: 90 tablet, Rfl: 1      Assessment and Plan {CC Problem List  Visit Diagnosis  ROS  Review (Popup)  Health Maintenance  Quality  BestPractice  Medications  SmartSets  SnapShot Encounters  Media :23}   Problem List Items Addressed This Visit        Cardiac and Vasculature    Primary hypertension    Current Assessment & Plan     Hypertension is chronic, stable .  Continue current treatment regimen.  Dietary sodium restriction.  Regular aerobic exercise.  Continue current medications.  Ambulatory blood pressure monitoring.   Tolerating medication with no issues.   Refilled today   Blood pressure will be reassessed at the next regular appointment.         Relevant Medications    triamterene-hydrochlorothiazide (MAXZIDE-25) 37.5-25 MG per tablet    Elevated LDL cholesterol level - Primary    Current Assessment & Plan     Elevated previously   Recheck lipid panel today   High fiber, low fat diet recommended  Avoid fried food and red meat.          Relevant Orders    Lipid panel       Mental Health    Anxiety    Current Assessment & Plan     Chronic, stable on current  medication  Continue fluoxetine as prescribed.          Relevant Medications    FLUoxetine (PROzac) 20 MG capsule      Other Visit Diagnoses     Need for pneumococcal vaccine        Relevant Orders    Pneumococcal Conjugate Vaccine 20-Valent (PCV20)          Follow Up   Return in about 6 months (around 3/7/2023) for Medicare Wellness.  Patient was given instructions and counseling regarding her condition or for health maintenance advice. Please see specific information pulled into the AVS if appropriate.    EpicAct:MR_WS_AMB_ORDERS,RunParams:STARTUPTYPE=FOLLOW    MR_WS_AMB_DISCHARGE

## 2022-09-07 NOTE — ASSESSMENT & PLAN NOTE
Elevated previously   Recheck lipid panel today   High fiber, low fat diet recommended  Avoid fried food and red meat.

## 2022-09-09 LAB
CHOLEST SERPL-MCNC: 205 MG/DL (ref 100–199)
HDLC SERPL-MCNC: 52 MG/DL
LDLC SERPL CALC-MCNC: 130 MG/DL (ref 0–99)
TRIGL SERPL-MCNC: 127 MG/DL (ref 0–149)
VLDLC SERPL CALC-MCNC: 23 MG/DL (ref 5–40)

## 2022-09-19 ENCOUNTER — OFFICE VISIT (OUTPATIENT)
Dept: ONCOLOGY | Facility: CLINIC | Age: 77
End: 2022-09-19

## 2022-09-19 ENCOUNTER — LAB (OUTPATIENT)
Dept: LAB | Facility: HOSPITAL | Age: 77
End: 2022-09-19

## 2022-09-19 VITALS
WEIGHT: 159 LBS | SYSTOLIC BLOOD PRESSURE: 114 MMHG | HEART RATE: 98 BPM | BODY MASS INDEX: 26.49 KG/M2 | OXYGEN SATURATION: 97 % | TEMPERATURE: 97.3 F | HEIGHT: 65 IN | DIASTOLIC BLOOD PRESSURE: 79 MMHG

## 2022-09-19 DIAGNOSIS — D75.839 THROMBOCYTOSIS: ICD-10-CM

## 2022-09-19 DIAGNOSIS — C73 THYROID CANCER: ICD-10-CM

## 2022-09-19 DIAGNOSIS — Z15.89 MONOALLELIC MUTATION OF CALR3 GENE: ICD-10-CM

## 2022-09-19 DIAGNOSIS — D47.1 MYELOPROLIFERATIVE DISORDER: Primary | ICD-10-CM

## 2022-09-19 DIAGNOSIS — D47.1 MYELOPROLIFERATIVE DISORDER: ICD-10-CM

## 2022-09-19 LAB
ALBUMIN SERPL-MCNC: 4.6 G/DL (ref 3.5–5.2)
ALBUMIN/GLOB SERPL: 1.5 G/DL (ref 1.1–2.4)
ALP SERPL-CCNC: 64 U/L (ref 38–116)
ALT SERPL W P-5'-P-CCNC: 17 U/L (ref 0–33)
ANION GAP SERPL CALCULATED.3IONS-SCNC: 13.4 MMOL/L (ref 5–15)
AST SERPL-CCNC: 29 U/L (ref 0–32)
BASOPHILS # BLD AUTO: 0.07 10*3/MM3 (ref 0–0.2)
BASOPHILS NFR BLD AUTO: 1.2 % (ref 0–1.5)
BILIRUB SERPL-MCNC: 0.4 MG/DL (ref 0.2–1.2)
BUN SERPL-MCNC: 12 MG/DL (ref 6–20)
BUN/CREAT SERPL: 17.6 (ref 7.3–30)
CALCIUM SPEC-SCNC: 10.3 MG/DL (ref 8.5–10.2)
CHLORIDE SERPL-SCNC: 103 MMOL/L (ref 98–107)
CO2 SERPL-SCNC: 24.6 MMOL/L (ref 22–29)
CREAT SERPL-MCNC: 0.68 MG/DL (ref 0.6–1.1)
DEPRECATED RDW RBC AUTO: 65.2 FL (ref 37–54)
EGFRCR SERPLBLD CKD-EPI 2021: 89.8 ML/MIN/1.73
EOSINOPHIL # BLD AUTO: 0.02 10*3/MM3 (ref 0–0.4)
EOSINOPHIL NFR BLD AUTO: 0.4 % (ref 0.3–6.2)
ERYTHROCYTE [DISTWIDTH] IN BLOOD BY AUTOMATED COUNT: 15.4 % (ref 12.3–15.4)
GLOBULIN UR ELPH-MCNC: 3 GM/DL (ref 1.8–3.5)
GLUCOSE SERPL-MCNC: 101 MG/DL (ref 74–124)
HCT VFR BLD AUTO: 37.4 % (ref 34–46.6)
HGB BLD-MCNC: 12 G/DL (ref 12–15.9)
IMM GRANULOCYTES # BLD AUTO: 0.02 10*3/MM3 (ref 0–0.05)
IMM GRANULOCYTES NFR BLD AUTO: 0.4 % (ref 0–0.5)
LDH SERPL-CCNC: 313 U/L (ref 99–259)
LYMPHOCYTES # BLD AUTO: 2.27 10*3/MM3 (ref 0.7–3.1)
LYMPHOCYTES NFR BLD AUTO: 39.9 % (ref 19.6–45.3)
MCH RBC QN AUTO: 37.2 PG (ref 26.6–33)
MCHC RBC AUTO-ENTMCNC: 32.1 G/DL (ref 31.5–35.7)
MCV RBC AUTO: 115.8 FL (ref 79–97)
MONOCYTES # BLD AUTO: 0.9 10*3/MM3 (ref 0.1–0.9)
MONOCYTES NFR BLD AUTO: 15.8 % (ref 5–12)
NEUTROPHILS NFR BLD AUTO: 2.41 10*3/MM3 (ref 1.7–7)
NEUTROPHILS NFR BLD AUTO: 42.3 % (ref 42.7–76)
NRBC BLD AUTO-RTO: 0 /100 WBC (ref 0–0.2)
PLATELET # BLD AUTO: 696 10*3/MM3 (ref 140–450)
PMV BLD AUTO: 8.8 FL (ref 6–12)
POTASSIUM SERPL-SCNC: 4.7 MMOL/L (ref 3.5–4.7)
PROT SERPL-MCNC: 7.6 G/DL (ref 6.3–8)
RBC # BLD AUTO: 3.23 10*6/MM3 (ref 3.77–5.28)
SODIUM SERPL-SCNC: 141 MMOL/L (ref 134–145)
URATE SERPL-MCNC: 4.3 MG/DL (ref 2.8–7.4)
WBC NRBC COR # BLD: 5.69 10*3/MM3 (ref 3.4–10.8)

## 2022-09-19 PROCEDURE — 36415 COLL VENOUS BLD VENIPUNCTURE: CPT

## 2022-09-19 PROCEDURE — 84550 ASSAY OF BLOOD/URIC ACID: CPT

## 2022-09-19 PROCEDURE — 99214 OFFICE O/P EST MOD 30 MIN: CPT | Performed by: INTERNAL MEDICINE

## 2022-09-19 PROCEDURE — 80053 COMPREHEN METABOLIC PANEL: CPT

## 2022-09-19 PROCEDURE — 83615 LACTATE (LD) (LDH) ENZYME: CPT

## 2022-09-19 PROCEDURE — 85025 COMPLETE CBC W/AUTO DIFF WBC: CPT

## 2022-09-19 RX ORDER — HYDROXYUREA 500 MG/1
CAPSULE ORAL
Qty: 90 CAPSULE | Refills: 2 | Status: SHIPPED | OUTPATIENT
Start: 2022-09-19 | End: 2023-01-16 | Stop reason: SDUPTHER

## 2022-09-19 NOTE — PROGRESS NOTES
Subjective     REASON FOR FOLLOW UP: Essential thrombocytosis controlled with Hydrea    HISTORY OF PRESENT ILLNESS:  The patient is a 77 y.o. year old female who was referred to us from her primary care office with abnormal CBC showing her platelet count over 1.5 million. She was started on 81 mg aspirin and Hydrea.  We continue to follow her and titrate her Hydrea dose based on her platelet count.  Her last instructions were to take 1500 mg of Hydrea Monday, Wednesday, Saturday and Sunday and 1000 mg on Tuesday Thursday and Friday.    Her platelet count today remains elevated at 696,000.  Hemoglobin and white cells are within normal limits.  Her MCV is markedly elevated suggesting good compliance with Hydrea.  She has been taking 1500 mg 4 days a week and 1000 mg 3 days of the week.  Because her platelet count is continuing to stay on the higher side and her white cells and hemoglobin are in good shape we did make another adjustment in her dose and instructed her to take 1500 mg daily.      History of Present Illness     Past Medical History:   Diagnosis Date   • ADHD (attention deficit hyperactivity disorder) 1995    have had since childhood   • Allergic exema   • Anxiety    • Cancer (HCC) blood    in treatment CBC   • Cataract removed   • Colon polyp checked every 5 years    none at present   • DDD (degenerative disc disease), lumbar    • Fibromyalgia, primary off and on   • GERD (gastroesophageal reflux disease)    • H/o Hip pain    • Headache occasional   • History of depression    • History of low back pain    • History of thrombocytosis    • HL (hearing loss) left ear   • Hypercholesterolemia    • Irritable bowel syndrome occasional   • Low back pain    • Scoliosis slight curve   • Sleep apnea    • Tremor slight in leg   • Urinary tract infection October this year    treated        Past Surgical History:   Procedure Laterality Date   • APPENDECTOMY  removed 1972   • BACK SURGERY     • CATARACT EXTRACTION   2018   • COLONOSCOPY  regular checkups   • COSMETIC SURGERY  15 years ago   • HIP SURGERY     • JOINT REPLACEMENT  right hip & left knee     &    • KNEE SURGERY     • SPINE SURGERY     • SUBTOTAL HYSTERECTOMY     • TOTAL HIP ARTHROPLASTY Right    • TOTAL KNEE ARTHROPLASTY Right    • TUBAL ABDOMINAL LIGATION          Current Outpatient Medications on File Prior to Visit   Medication Sig Dispense Refill   • fluorouracil (EFUDEX) 5 % cream      • FLUoxetine (PROzac) 20 MG capsule Take 1 capsule by mouth Daily. 90 capsule 1   • fluticasone (FLONASE) 50 MCG/ACT nasal spray      • hydroxyurea (Hydrea) 500 MG capsule Take three capsules (1500 mg) on Monday, Wednesday, Saturday and . Then take two capsules (1000 mg) on Tuesday, Thursday and Friday. 72 capsule 2   • omeprazole (priLOSEC) 40 MG capsule Take 1 capsule by mouth Daily. 30 capsule 0   • pravastatin (PRAVACHOL) 20 MG tablet Take 1 tablet by mouth Daily. 90 tablet 1   • SUMAtriptan (IMITREX) 25 MG tablet TAKE ONE TABLET BY MOUTH AT ONSET OF HEADACHE; MAY REPEAT ONE TABLET IN 2 HOURS IF NEEDED. 9 tablet 1   • Triamcinolone Acetonide 0.025 % lotion      • triamterene-hydrochlorothiazide (MAXZIDE-25) 37.5-25 MG per tablet Take 1 tablet by mouth Daily. 90 tablet 1     No current facility-administered medications on file prior to visit.        ALLERGIES:    Allergies   Allergen Reactions   • Codeine Nausea And Vomiting   • Penicillins Hives        Social History     Socioeconomic History   • Marital status:      Spouse name: Temo   Tobacco Use   • Smoking status: Former Smoker     Packs/day: 1.00     Years: 15.00     Pack years: 15.00     Types: Cigarettes     Quit date: 10/1/1979     Years since quittin.9   • Smokeless tobacco: Never Used   Vaping Use   • Vaping Use: Never used   Substance and Sexual Activity   • Alcohol use: Yes     Alcohol/week: 3.0 standard drinks     Types: 1 Glasses of wine, 1 Cans of beer, 1 Shots of  "liquor per week   • Drug use: Never   • Sexual activity: Not Currently     Partners: Male        No family history on file.     Review of Systems   Constitutional: Negative for activity change, chills, fatigue and fever.   HENT: Negative for mouth sores, trouble swallowing and voice change.    Eyes: Negative for pain and visual disturbance.   Respiratory: Negative for cough, shortness of breath and wheezing.    Cardiovascular: Negative for chest pain and palpitations.   Gastrointestinal: Negative for abdominal pain, constipation, diarrhea, nausea and vomiting.   Genitourinary: Negative for difficulty urinating, frequency and urgency.   Musculoskeletal: Negative for arthralgias and joint swelling.   Skin: Negative for rash.   Neurological: Negative for dizziness, seizures, weakness and headaches.   Hematological: Negative for adenopathy. Does not bruise/bleed easily.   Psychiatric/Behavioral: Negative for behavioral problems and confusion. The patient is not nervous/anxious.        Objective     Vitals:    09/19/22 0947   BP: 114/79   Pulse: 98   Temp: 97.3 °F (36.3 °C)   SpO2: 97%   Weight: 72.1 kg (159 lb)   Height: 165.1 cm (65\")   PainSc: 0-No pain     Current Status 9/19/2022   ECOG score 0       Physical Exam   Constitutional: She is oriented to person, place, and time. She appears well-developed. No distress.   HENT:   Head: Normocephalic.   Eyes: Pupils are equal, round, and reactive to light. Conjunctivae are normal. No scleral icterus.   Neck: No JVD present. No thyromegaly present.   Cardiovascular: Normal rate and regular rhythm. Exam reveals no gallop and no friction rub.   No murmur heard.  Pulmonary/Chest: Effort normal and breath sounds normal. She has no wheezes. She has no rales.   Abdominal: Soft. She exhibits no distension and no mass. There is no abdominal tenderness.   Musculoskeletal: Normal range of motion. No deformity.   Lymphadenopathy:     She has no cervical adenopathy.   Neurological: " She is alert and oriented to person, place, and time. She has normal reflexes. No cranial nerve deficit.   Skin: Skin is warm and dry. No rash noted. No erythema.   Nailbed discoloration   Psychiatric: Her behavior is normal. Judgment normal.            RECENT LABS:  Hematology WBC   Date Value Ref Range Status   09/19/2022 5.69 3.40 - 10.80 10*3/mm3 Final   06/27/2022 5.3 3.4 - 10.8 x10E3/uL Final     RBC   Date Value Ref Range Status   09/19/2022 3.23 (L) 3.77 - 5.28 10*6/mm3 Final   06/27/2022 2.37 (L) 3.77 - 5.28 x10E6/uL Final     Comment:     Polychromasia present     Hemoglobin   Date Value Ref Range Status   09/19/2022 12.0 12.0 - 15.9 g/dL Final     Hematocrit   Date Value Ref Range Status   09/19/2022 37.4 34.0 - 46.6 % Final     Platelets   Date Value Ref Range Status   09/19/2022 696 (H) 140 - 450 10*3/mm3 Final          Assessment & Plan   1.  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  Good response and tolerance to Hydrea thus far.      Recommendations:  1.  Continue aspirin 81 mg po daily  2.  Increase Hydrea dose to 1500 mg daily  3.  Lab + RN review in 2 months in 4 months.  4.  MD follow up 6 months to review results and check CBC, and adjust Hydrea dose further as needed.

## 2022-11-08 DIAGNOSIS — G43.009 MIGRAINE WITHOUT AURA AND WITHOUT STATUS MIGRAINOSUS, NOT INTRACTABLE: ICD-10-CM

## 2022-11-09 RX ORDER — SUMATRIPTAN 25 MG/1
TABLET, FILM COATED ORAL
Qty: 9 TABLET | Refills: 1 | Status: SHIPPED | OUTPATIENT
Start: 2022-11-09 | End: 2023-01-20

## 2022-11-14 ENCOUNTER — LAB (OUTPATIENT)
Dept: LAB | Facility: HOSPITAL | Age: 77
End: 2022-11-14

## 2022-11-14 ENCOUNTER — TELEPHONE (OUTPATIENT)
Dept: ONCOLOGY | Facility: HOSPITAL | Age: 77
End: 2022-11-14

## 2022-11-14 ENCOUNTER — CLINICAL SUPPORT (OUTPATIENT)
Dept: ONCOLOGY | Facility: HOSPITAL | Age: 77
End: 2022-11-14

## 2022-11-14 DIAGNOSIS — D75.839 THROMBOCYTOSIS: ICD-10-CM

## 2022-11-14 DIAGNOSIS — Z15.89 MONOALLELIC MUTATION OF CALR3 GENE: ICD-10-CM

## 2022-11-14 DIAGNOSIS — C73 THYROID CANCER: ICD-10-CM

## 2022-11-14 DIAGNOSIS — D47.1 MYELOPROLIFERATIVE DISORDER: ICD-10-CM

## 2022-11-14 LAB
BASOPHILS # BLD AUTO: 0.08 10*3/MM3 (ref 0–0.2)
BASOPHILS NFR BLD AUTO: 1.1 % (ref 0–1.5)
DEPRECATED RDW RBC AUTO: 62.2 FL (ref 37–54)
EOSINOPHIL # BLD AUTO: 0.04 10*3/MM3 (ref 0–0.4)
EOSINOPHIL NFR BLD AUTO: 0.6 % (ref 0.3–6.2)
ERYTHROCYTE [DISTWIDTH] IN BLOOD BY AUTOMATED COUNT: 14.7 % (ref 12.3–15.4)
HCT VFR BLD AUTO: 36.7 % (ref 34–46.6)
HGB BLD-MCNC: 12.8 G/DL (ref 12–15.9)
IMM GRANULOCYTES # BLD AUTO: 0.03 10*3/MM3 (ref 0–0.05)
IMM GRANULOCYTES NFR BLD AUTO: 0.4 % (ref 0–0.5)
LYMPHOCYTES # BLD AUTO: 2.96 10*3/MM3 (ref 0.7–3.1)
LYMPHOCYTES NFR BLD AUTO: 42.4 % (ref 19.6–45.3)
MCH RBC QN AUTO: 39.9 PG (ref 26.6–33)
MCHC RBC AUTO-ENTMCNC: 34.9 G/DL (ref 31.5–35.7)
MCV RBC AUTO: 114.3 FL (ref 79–97)
MONOCYTES # BLD AUTO: 0.9 10*3/MM3 (ref 0.1–0.9)
MONOCYTES NFR BLD AUTO: 12.9 % (ref 5–12)
NEUTROPHILS NFR BLD AUTO: 2.97 10*3/MM3 (ref 1.7–7)
NEUTROPHILS NFR BLD AUTO: 42.6 % (ref 42.7–76)
NRBC BLD AUTO-RTO: 0 /100 WBC (ref 0–0.2)
PLATELET # BLD AUTO: 622 10*3/MM3 (ref 140–450)
PMV BLD AUTO: 8.9 FL (ref 6–12)
RBC # BLD AUTO: 3.21 10*6/MM3 (ref 3.77–5.28)
WBC NRBC COR # BLD: 6.98 10*3/MM3 (ref 3.4–10.8)

## 2022-11-14 PROCEDURE — 85025 COMPLETE CBC W/AUTO DIFF WBC: CPT

## 2022-11-14 PROCEDURE — 36415 COLL VENOUS BLD VENIPUNCTURE: CPT

## 2022-11-14 PROCEDURE — G0463 HOSPITAL OUTPT CLINIC VISIT: HCPCS

## 2022-11-14 NOTE — NURSING NOTE
Pt here for RN review, she is without complaints.   Lab Results   Component Value Date    WBC 6.98 11/14/2022    HGB 12.8 11/14/2022    HCT 36.7 11/14/2022    .3 (H) 11/14/2022     (H) 11/14/2022     She reports taking Hydrea 1500 mg Fri/Sat/Sun and 1000mg AOD.  Per last MD office note in September, pt was to increase Hydrea to 1500 mg daily which she was not aware.  Informed her that I would update Dr Brown and see if he wants her to keep current dose or go to 1500 mg daily, she v/u

## 2022-11-14 NOTE — TELEPHONE ENCOUNTER
Spoke with Dr Brown, he does want pt to increase Hydrea dosing to 1500 mg.  He previously sent in new script with last MD visit. Called to discuss with pt, she v/u on new dosing. Reviewed as well f/u appt's and educated to call office for concerns, she v/u

## 2022-11-15 ENCOUNTER — TELEPHONE (OUTPATIENT)
Dept: ONCOLOGY | Facility: CLINIC | Age: 77
End: 2022-11-15

## 2022-11-15 NOTE — TELEPHONE ENCOUNTER
Called Mendez and Pt back, she reports she has been taking 3 tablets daily.  I reviewed with he that she had reported differently to me in conversation yesterday.  Informed pt she should be taking 1500 mg (3 tabs) daily, she v/u

## 2022-11-15 NOTE — TELEPHONE ENCOUNTER
Caller: Temo Wooten    Relationship: Emergency Contact    Best call back number: [phone]    What is the best time to reach you: ANYTIME     Who are you requesting to speak with (clinical staff, provider,  specific staff member): DR WHITE NURSE MOI LARSON         What was the call regarding:     NANO SPOKE WITH MOI YESTERDAY REGARDING CHANGING SCRIPT FOR HYDREA, BUT CONFUSED REGARDING THE CHANGE DUE TO TOLD TO TAKE 3 TIMES DAILY, BUT SHE WAS ALREADY TAKING 3 TIMES DAILY    NEEDING SOME CLARIFICATION ON THIS.     Do you require a callback: YES

## 2023-01-16 ENCOUNTER — CLINICAL SUPPORT (OUTPATIENT)
Dept: ONCOLOGY | Facility: HOSPITAL | Age: 78
End: 2023-01-16
Payer: MEDICARE

## 2023-01-16 ENCOUNTER — LAB (OUTPATIENT)
Dept: LAB | Facility: HOSPITAL | Age: 78
End: 2023-01-16
Payer: MEDICARE

## 2023-01-16 DIAGNOSIS — D47.1 MYELOPROLIFERATIVE DISORDER: ICD-10-CM

## 2023-01-16 DIAGNOSIS — D75.839 THROMBOCYTOSIS: ICD-10-CM

## 2023-01-16 DIAGNOSIS — Z15.89 MONOALLELIC MUTATION OF CALR3 GENE: ICD-10-CM

## 2023-01-16 DIAGNOSIS — C73 THYROID CANCER: ICD-10-CM

## 2023-01-16 LAB
BASOPHILS # BLD AUTO: 0.09 10*3/MM3 (ref 0–0.2)
BASOPHILS NFR BLD AUTO: 1.5 % (ref 0–1.5)
DEPRECATED RDW RBC AUTO: 59.6 FL (ref 37–54)
EOSINOPHIL # BLD AUTO: 0.05 10*3/MM3 (ref 0–0.4)
EOSINOPHIL NFR BLD AUTO: 0.8 % (ref 0.3–6.2)
ERYTHROCYTE [DISTWIDTH] IN BLOOD BY AUTOMATED COUNT: 13.4 % (ref 12.3–15.4)
HCT VFR BLD AUTO: 37.7 % (ref 34–46.6)
HGB BLD-MCNC: 13.5 G/DL (ref 12–15.9)
IMM GRANULOCYTES # BLD AUTO: 0.04 10*3/MM3 (ref 0–0.05)
IMM GRANULOCYTES NFR BLD AUTO: 0.7 % (ref 0–0.5)
LYMPHOCYTES # BLD AUTO: 2.34 10*3/MM3 (ref 0.7–3.1)
LYMPHOCYTES NFR BLD AUTO: 39.3 % (ref 19.6–45.3)
MCH RBC QN AUTO: 42.7 PG (ref 26.6–33)
MCHC RBC AUTO-ENTMCNC: 35.8 G/DL (ref 31.5–35.7)
MCV RBC AUTO: 119.3 FL (ref 79–97)
MONOCYTES # BLD AUTO: 0.73 10*3/MM3 (ref 0.1–0.9)
MONOCYTES NFR BLD AUTO: 12.3 % (ref 5–12)
NEUTROPHILS NFR BLD AUTO: 2.7 10*3/MM3 (ref 1.7–7)
NEUTROPHILS NFR BLD AUTO: 45.4 % (ref 42.7–76)
NRBC BLD AUTO-RTO: 0 /100 WBC (ref 0–0.2)
PLATELET # BLD AUTO: 528 10*3/MM3 (ref 140–450)
PMV BLD AUTO: 9 FL (ref 6–12)
RBC # BLD AUTO: 3.16 10*6/MM3 (ref 3.77–5.28)
WBC NRBC COR # BLD: 5.95 10*3/MM3 (ref 3.4–10.8)

## 2023-01-16 PROCEDURE — 85025 COMPLETE CBC W/AUTO DIFF WBC: CPT

## 2023-01-16 PROCEDURE — G0463 HOSPITAL OUTPT CLINIC VISIT: HCPCS

## 2023-01-16 PROCEDURE — 36415 COLL VENOUS BLD VENIPUNCTURE: CPT

## 2023-01-16 RX ORDER — HYDROXYUREA 500 MG/1
CAPSULE ORAL
Qty: 90 CAPSULE | Refills: 2 | Status: SHIPPED | OUTPATIENT
Start: 2023-01-16

## 2023-01-16 NOTE — NURSING NOTE
Patient is here for lab review with RN.  CBC reviewed, counts are stable for this patient at this time. Patient has no complaints. Pt reports she has only been taking Hydrea 2 tabs daily, last note indicates patient is supposed to take 3 tablets daily.Copy of labs given to patient w/ written instructions to take 3 tablets of Hydrea daily and follow up appointment reviewed. Patient is instructed to call the office with any concerns or new symptoms prior to next visit. Patient verbalized understanding and discharged in stable condition.    Lab Results   Component Value Date    WBC 5.95 01/16/2023    HGB 13.5 01/16/2023    HCT 37.7 01/16/2023    .3 (H) 01/16/2023     (H) 01/16/2023

## 2023-01-19 DIAGNOSIS — G43.009 MIGRAINE WITHOUT AURA AND WITHOUT STATUS MIGRAINOSUS, NOT INTRACTABLE: ICD-10-CM

## 2023-01-20 RX ORDER — SUMATRIPTAN 25 MG/1
TABLET, FILM COATED ORAL
Qty: 9 TABLET | Refills: 1 | Status: SHIPPED | OUTPATIENT
Start: 2023-01-20 | End: 2023-03-13

## 2023-02-14 ENCOUNTER — SPECIALTY PHARMACY (OUTPATIENT)
Dept: PHARMACY | Facility: HOSPITAL | Age: 78
End: 2023-02-14
Payer: MEDICARE

## 2023-02-14 NOTE — PROGRESS NOTES
MTM Encounter-Re: Adherence and side effects (hydrea)    Today's encounter was conducted via telephone.    Medication:  Hydrea 1500 mg po daily  - Reason for outreach: Routine medication check-in .  - Administration: Patient is taking every day at the same time .  - Missed doses: Patient reports missing 2-3 doses in the last 30 days.  - Self-administration: Patient demonstrates ability to self-administer medication. No barriers to adherence identified.   - Diagnosis/Indication: essential thrombocytosis. Progress toward achieving therapeutic goals reviewed.   - Patient denies side effects.    - Medication availability/affordability: Patient has had no issues obtaining medication from pharmacy.   - Questions/concerns about medications: none       Completed medication reconciliation today to assess for drug interactions.   Reviewed allergies, medical history, labs, quality of life, and medication history with patient.   Patient denies starting or stopping any medications.  Assessed medication list for interactions, no significant drug interactions noted.   Advised pt to call the clinic if any new medications are started so we can assess for drug-drug interactions.     All questions addressed. Patient had no additional concerns for MTM office.     Genna Rodriguez RPH  2/14/2023  13:27 EST

## 2023-03-06 ENCOUNTER — OFFICE VISIT (OUTPATIENT)
Dept: FAMILY MEDICINE CLINIC | Facility: CLINIC | Age: 78
End: 2023-03-06
Payer: MEDICARE

## 2023-03-06 VITALS
HEIGHT: 65 IN | RESPIRATION RATE: 16 BRPM | DIASTOLIC BLOOD PRESSURE: 76 MMHG | SYSTOLIC BLOOD PRESSURE: 124 MMHG | BODY MASS INDEX: 26.99 KG/M2 | WEIGHT: 162 LBS | OXYGEN SATURATION: 97 % | TEMPERATURE: 96.4 F | HEART RATE: 91 BPM

## 2023-03-06 DIAGNOSIS — E78.00 ELEVATED LDL CHOLESTEROL LEVEL: ICD-10-CM

## 2023-03-06 DIAGNOSIS — I10 PRIMARY HYPERTENSION: ICD-10-CM

## 2023-03-06 DIAGNOSIS — Z00.00 ENCOUNTER FOR SUBSEQUENT ANNUAL WELLNESS VISIT (AWV) IN MEDICARE PATIENT: Primary | ICD-10-CM

## 2023-03-06 DIAGNOSIS — Z12.31 ENCOUNTER FOR SCREENING MAMMOGRAM FOR MALIGNANT NEOPLASM OF BREAST: ICD-10-CM

## 2023-03-06 DIAGNOSIS — Z12.11 SCREEN FOR COLON CANCER: ICD-10-CM

## 2023-03-06 DIAGNOSIS — Z78.0 POST-MENOPAUSAL: ICD-10-CM

## 2023-03-06 PROCEDURE — 1159F MED LIST DOCD IN RCRD: CPT | Performed by: FAMILY MEDICINE

## 2023-03-06 PROCEDURE — G0439 PPPS, SUBSEQ VISIT: HCPCS | Performed by: FAMILY MEDICINE

## 2023-03-06 PROCEDURE — 1170F FXNL STATUS ASSESSED: CPT | Performed by: FAMILY MEDICINE

## 2023-03-06 NOTE — PROGRESS NOTES
The ABCs of the Annual Wellness Visit  Subsequent Medicare Wellness Visit    Subjective      Sonia Wooten is a 77 y.o. female who presents for a Subsequent Medicare Wellness Visit.    The following portions of the patient's history were reviewed and   updated as appropriate: allergies, current medications, past family history, past medical history, past social history, past surgical history and problem list.    Compared to one year ago, the patient feels her physical   health is the same.    Compared to one year ago, the patient feels her mental   health is the same. States winter affects her mental.     Recent Hospitalizations:  She was not admitted to the hospital during the last year.       Current Medical Providers:  Patient Care Team:  Josselyn Redmond MD as PCP - General (Family Medicine)  Ester Rivera APRN as Referring Physician (Nurse Practitioner)  Umberto Brown MD as Consulting Physician (Hematology and Oncology)  Pallares, Clara Ann, MD as Consulting Physician (Internal Medicine)    Outpatient Medications Prior to Visit   Medication Sig Dispense Refill   • fluorouracil (EFUDEX) 5 % cream      • fluticasone (FLONASE) 50 MCG/ACT nasal spray      • hydroxyurea (Hydrea) 500 MG capsule Take three capsules (1500 mg) daily 90 capsule 2   • omeprazole (priLOSEC) 40 MG capsule Take 1 capsule by mouth Daily. 30 capsule 0   • pravastatin (PRAVACHOL) 20 MG tablet Take 1 tablet by mouth Daily. 90 tablet 1   • triamterene-hydrochlorothiazide (MAXZIDE-25) 37.5-25 MG per tablet Take 1 tablet by mouth Daily. 90 tablet 1   • FLUoxetine (PROzac) 20 MG capsule Take 1 capsule by mouth Daily. 90 capsule 1   • SUMAtriptan (IMITREX) 25 MG tablet TAKE ONE TABLET BY MOUTH AT ONSET OF HEADACHE; MAY REPEAT ONE TABLET IN 2 HOURS IF NEEDED. 9 tablet 1   • Triamcinolone Acetonide 0.025 % lotion        No facility-administered medications prior to visit.       No opioid medication identified on active medication list. I  "have reviewed chart for other potential  high risk medication/s and harmful drug interactions in the elderly.          Aspirin is not on active medication list.  Aspirin use is not indicated based on review of current medical condition/s. Risk of harm outweighs potential benefits.  .    Patient Active Problem List   Diagnosis   • Thrombocytosis   • Myeloproliferative disorder (HCC)   • Monoallelic mutation of CALR3 gene   • Eczema   • Primary hypertension   • Anxiety   • Migraine without aura and without status migrainosus, not intractable   • Anemia   • Elevated LDL cholesterol level     Advance Care Planning  Advance Directive is on file.  ACP discussion was held with the patient during this visit. Patient has an advance directive in EMR which is still valid.      Objective    Vitals:    03/06/23 1012   BP: 124/76   BP Location: Left arm   Patient Position: Sitting   Cuff Size: Adult   Pulse: 91   Resp: 16   Temp: 96.4 °F (35.8 °C)   TempSrc: Infrared   SpO2: 97%   Weight: 73.5 kg (162 lb)   Height: 165.1 cm (65\")     Estimated body mass index is 26.96 kg/m² as calculated from the following:    Height as of this encounter: 165.1 cm (65\").    Weight as of this encounter: 73.5 kg (162 lb).    Review of Systems   Constitutional: Negative for chills and fever.   HENT: Negative for rhinorrhea and sneezing.    Eyes: Positive for itching.   Respiratory: Negative for cough and shortness of breath.    Cardiovascular: Negative for chest pain and leg swelling.   Gastrointestinal: Negative for abdominal pain, constipation, diarrhea, nausea and vomiting.   Genitourinary: Negative for difficulty urinating, vaginal bleeding and vaginal discharge.   Musculoskeletal: Positive for arthralgias. Negative for back pain.        Shoulder and knees   Skin: Negative for rash.   Neurological: Negative for dizziness and headache.   Psychiatric/Behavioral: Negative for dysphoric mood and sleep disturbance.        Physical Exam         BMI is " >= 25 and <30. (Overweight) The following options were offered after discussion;: low fat high fiber diet, at least 30 min exercise daily       Does the patient have evidence of cognitive impairment?   Yes: Follow up in 4weeks.  discussed need to exercise brain, with reading, games and exercise.      [facility]               HEALTH RISK ASSESSMENT    Smoking Status:  Social History     Tobacco Use   Smoking Status Former   • Packs/day: 1.00   • Years: 15.00   • Pack years: 15.00   • Types: Cigarettes   • Quit date: 10/1/1979   • Years since quittin.4   Smokeless Tobacco Never     Alcohol Consumption:  Social History     Substance and Sexual Activity   Alcohol Use Yes   • Alcohol/week: 3.0 standard drinks   • Types: 1 Glasses of wine, 1 Cans of beer, 1 Shots of liquor per week     Fall Risk Screen:    PRESLEY Fall Risk Assessment has not been completed.    Depression Screening:  No flowsheet data found.    Health Habits and Functional and Cognitive Screening:  Functional & Cognitive Status 3/6/2023   Do you have difficulty preparing food and eating? No   Do you have difficulty bathing yourself, getting dressed or grooming yourself? No   Do you have difficulty using the toilet? No   Do you have difficulty moving around from place to place? No   Do you have trouble with steps or getting out of a bed or a chair? No   Current Diet Well Balanced Diet   Dental Exam Up to date   Eye Exam Up to date   Exercise (times per week) 3 times per week   Current Exercises Include Walking   Do you need help using the phone?  No   Are you deaf or do you have serious difficulty hearing?  No   Do you need help with transportation? No   Do you need help shopping? No   Do you need help preparing meals?  No   Do you need help with housework?  No   Do you need help with laundry? No   Do you need help taking your medications? No   Do you need help managing money? No   Do you ever drive or ride in a car without  wearing a seat belt? No   Have you felt unusual stress, anger or loneliness in the last month? No   Who do you live with? Spouse   If you need help, do you have trouble finding someone available to you? No   Have you been bothered in the last four weeks by sexual problems? No   Do you have difficulty concentrating, remembering or making decisions? No       Age-appropriate Screening Schedule:  Refer to the list below for future screening recommendations based on patient's age, sex and/or medical conditions. Orders for these recommended tests are listed in the plan section. The patient has been provided with a written plan.    Health Maintenance   Topic Date Due   • TDAP/TD VACCINES (1 - Tdap) Never done   • DXA SCAN  03/01/2023   • ZOSTER VACCINE (2 of 2) 03/06/2023   • LIPID PANEL  09/07/2023   • ANNUAL WELLNESS VISIT  03/06/2024   • HEPATITIS C SCREENING  Completed   • COVID-19 Vaccine  Completed   • INFLUENZA VACCINE  Completed   • Pneumococcal Vaccine 65+  Completed   • COLONOSCOPY  Discontinued                CMS Preventative Services Quick Reference  Risk Factors Identified During Encounter:    Immunizations Discussed/Encouraged: Tdap and Shingrix    The above risks/problems have been discussed with the patient.  Pertinent information has been shared with the patient in the After Visit Summary.    Diagnoses and all orders for this visit:    1. Encounter for subsequent annual wellness visit (AWV) in Medicare patient (Primary)    2. Post-menopausal  -     DEXA Bone Density Axial; Future    3. Encounter for screening mammogram for malignant neoplasm of breast  -     Mammo Screening Bilateral With CAD; Future    4. Screen for colon cancer  -     Ambulatory Referral For Screening Colonoscopy    5. Elevated LDL cholesterol level  -     Lipid panel; Future    6. Primary hypertension  -     Comprehensive metabolic panel; Future        Follow Up:   Next Medicare Wellness visit to be scheduled in 1 year.      An After  Visit Summary and PPPS were made available to the patient.

## 2023-03-07 ENCOUNTER — TRANSCRIBE ORDERS (OUTPATIENT)
Dept: ADMINISTRATIVE | Facility: HOSPITAL | Age: 78
End: 2023-03-07
Payer: MEDICARE

## 2023-03-07 DIAGNOSIS — Z12.31 SCREENING MAMMOGRAM FOR BREAST CANCER: Primary | ICD-10-CM

## 2023-03-08 ENCOUNTER — LAB (OUTPATIENT)
Dept: LAB | Facility: HOSPITAL | Age: 78
End: 2023-03-08
Payer: MEDICARE

## 2023-03-08 PROCEDURE — 80061 LIPID PANEL: CPT | Performed by: FAMILY MEDICINE

## 2023-03-08 PROCEDURE — 80053 COMPREHEN METABOLIC PANEL: CPT | Performed by: FAMILY MEDICINE

## 2023-03-12 DIAGNOSIS — G43.009 MIGRAINE WITHOUT AURA AND WITHOUT STATUS MIGRAINOSUS, NOT INTRACTABLE: ICD-10-CM

## 2023-03-13 RX ORDER — SUMATRIPTAN 25 MG/1
TABLET, FILM COATED ORAL
Qty: 9 TABLET | Refills: 1 | Status: SHIPPED | OUTPATIENT
Start: 2023-03-13

## 2023-03-16 ENCOUNTER — HOSPITAL ENCOUNTER (EMERGENCY)
Facility: HOSPITAL | Age: 78
Discharge: HOME | End: 2023-03-16
Attending: EMERGENCY MEDICINE | Admitting: EMERGENCY MEDICINE
Payer: MEDICARE

## 2023-03-16 ENCOUNTER — APPOINTMENT (OUTPATIENT)
Dept: CT IMAGING | Facility: HOSPITAL | Age: 78
End: 2023-03-16
Payer: MEDICARE

## 2023-03-16 VITALS
BODY MASS INDEX: 26.99 KG/M2 | OXYGEN SATURATION: 97 % | DIASTOLIC BLOOD PRESSURE: 91 MMHG | HEIGHT: 65 IN | SYSTOLIC BLOOD PRESSURE: 147 MMHG | WEIGHT: 162 LBS | RESPIRATION RATE: 17 BRPM | HEART RATE: 71 BPM | TEMPERATURE: 97.9 F

## 2023-03-16 DIAGNOSIS — S05.11XA PERIORBITAL CONTUSION OF RIGHT EYE, INITIAL ENCOUNTER: Primary | ICD-10-CM

## 2023-03-16 DIAGNOSIS — S09.90XA TRAUMATIC INJURY OF HEAD, INITIAL ENCOUNTER: ICD-10-CM

## 2023-03-16 PROCEDURE — 70450 CT HEAD/BRAIN W/O DYE: CPT

## 2023-03-16 PROCEDURE — 70486 CT MAXILLOFACIAL W/O DYE: CPT

## 2023-03-16 PROCEDURE — 99283 EMERGENCY DEPT VISIT LOW MDM: CPT

## 2023-03-16 NOTE — ED TRIAGE NOTES
Pt tripped and fell last Friday.  She landed on her face.  No loc.  No blood thinners    Patient was placed in face mask during first look triage.  Patient was wearing a face mask throughout encounter.  I wore personal protective equipment throughout the encounter.  Hand hygiene was performed before and after patient encounter.

## 2023-03-16 NOTE — ED PROVIDER NOTES
MD ATTESTATION NOTE    The DONOVAN and I have discussed this patient's history, physical exam, and treatment plan.  I have reviewed the documentation and personally had a face to face interaction with the patient. I affirm the documentation and agree with the treatment and plan.  The attached note describes my personal findings.      I provided a substantive portion of the care of the patient.  I personally performed the physical exam in its entirety, and below are my findings.  For this patient encounter, the patient wore surgical mask, I wore full protective PPE including N95 and eye protection.      Brief HPI: Patient presents for head injury..  States she tripped over her own feet on last Friday.  Hit her head.  She then went on vacation and came in today because of increasing bruising.  Patient has no chest pain abdominal pain hip pain.  Was not seen by doctor at that point    PHYSICAL EXAM  ED Triage Vitals   Temp Heart Rate Resp BP SpO2   03/16/23 1004 03/16/23 1004 03/16/23 1004 03/16/23 1014 03/16/23 1004   97.7 °F (36.5 °C) 105 16 145/85 95 %      Temp src Heart Rate Source Patient Position BP Location FiO2 (%)   03/16/23 1004 03/16/23 1004 03/16/23 1014 03/16/23 1014 --   Tympanic Monitor Lying Right arm          GENERAL: no acute distress  HENT: nares patent.  Hematoma to right forehead.  Periorbital ecchymosis.  EYES: no scleral icterus  CV: regular rhythm, normal rate  RESPIRATORY: normal effort  ABDOMEN: soft  MUSCULOSKELETAL: no deformity  NEURO: alert, moves all extremities, follows commands  PSYCH:  calm, cooperative  SKIN: warm, dry    Vital signs and nursing notes reviewed.        Plan: CT scan       Flynn Neil MD  03/16/23 2666

## 2023-03-16 NOTE — ED NOTES
Mechanical fall on Friday, bruising on right eye, pt reports generalized headache. Denies blood thinners.

## 2023-03-16 NOTE — ED PROVIDER NOTES
EMERGENCY DEPARTMENT ENCOUNTER    Room Number:  24/24  Date seen:  3/16/2023  PCP: Josselyn Redmond MD  Discussed/ obtained information from independent historians: patient      HPI:  Chief Complaint: head injury  A complete HPI/ROS/PMH/PSH/SH/FH are unobtainable due to: none  Context: Sonia Wooten is a 77 y.o. female who presents to the ED c/o persistent involving facial bruising after suffering a head injury 1 week ago.  She states she is walking the dog, got tripped up on her own feet and fell forward striking her head on the concrete.  She developed bruising and swelling to the right forehead and eyebrow area.  Over the course of the last week she has had spreading bruising to her face and persistent swelling in the area of direct trauma.  Presents for further evaluation of her injuries.  Had not sought evaluation before this.  Not anticoagulated or on any antiplatelet medications.  Denies headache nausea vomiting or vision changes.  Has some generalized soreness from her fall, has been taking Aleve once daily.      External (non-ED) record review: PCP visit 3/6/2023, routine labs and plan of care maintenance ordered, no medication changes made.      PAST MEDICAL HISTORY  Active Ambulatory Problems     Diagnosis Date Noted   • Thrombocytosis 04/17/2020   • Myeloproliferative disorder (HCC) 04/17/2020   • Monoallelic mutation of CALR3 gene 06/22/2020   • Eczema 11/15/2021   • Primary hypertension 11/15/2021   • Anxiety 11/15/2021   • Migraine without aura and without status migrainosus, not intractable 03/02/2022   • Anemia 06/27/2022   • Elevated LDL cholesterol level 09/07/2022     Resolved Ambulatory Problems     Diagnosis Date Noted   • No Resolved Ambulatory Problems     Past Medical History:   Diagnosis Date   • ADHD (attention deficit hyperactivity disorder) 1995   • Allergic exema   • Cancer (HCC) blood   • Cataract removed   • Colon polyp checked every 5 years   • DDD (degenerative disc disease),  lumbar    • Fibromyalgia, primary off and on   • GERD (gastroesophageal reflux disease)    • H/o Hip pain    • Headache occasional   • History of depression    • History of low back pain    • History of thrombocytosis    • HL (hearing loss) left ear   • Hypercholesterolemia    • Irritable bowel syndrome occasional   • Low back pain    • Scoliosis slight curve   • Sleep apnea    • Tremor slight in leg   • Urinary tract infection          PAST SURGICAL HISTORY  Past Surgical History:   Procedure Laterality Date   • APPENDECTOMY  removed    • BACK SURGERY     • CATARACT EXTRACTION     • COLONOSCOPY  regular checkups   • COSMETIC SURGERY  15 years ago   • HIP SURGERY     • JOINT REPLACEMENT  right hip & left knee     &    • KNEE SURGERY     • SPINE SURGERY     • SUBTOTAL HYSTERECTOMY     • TOTAL HIP ARTHROPLASTY Right    • TOTAL KNEE ARTHROPLASTY Right    • TUBAL ABDOMINAL LIGATION           FAMILY HISTORY  History reviewed. No pertinent family history.      SOCIAL HISTORY  Social History     Socioeconomic History   • Marital status:      Spouse name: Temo   Tobacco Use   • Smoking status: Former     Packs/day: 1.00     Years: 15.00     Pack years: 15.00     Types: Cigarettes     Quit date: 10/1/1979     Years since quittin.4   • Smokeless tobacco: Never   Vaping Use   • Vaping Use: Never used   Substance and Sexual Activity   • Alcohol use: Yes     Alcohol/week: 3.0 standard drinks     Types: 1 Glasses of wine, 1 Cans of beer, 1 Shots of liquor per week   • Drug use: Never   • Sexual activity: Not Currently     Partners: Male         ALLERGIES  Codeine and Penicillins        REVIEW OF SYSTEMS  Review of Systems         PHYSICAL EXAM  ED Triage Vitals   Temp Heart Rate Resp BP SpO2   23 1004 23 1004 23 1004 23 1014 23 1004   97.7 °F (36.5 °C) 105 16 145/85 95 %      Temp src Heart Rate Source Patient Position BP Location FiO2  (%)   03/16/23 1004 03/16/23 1004 03/16/23 1014 03/16/23 1014 --   Tympanic Monitor Lying Right arm        Physical Exam      GENERAL: no acute distress  HENT: normocephalic.  Large hematoma to the right frontal and eyebrow area, diffuse ecchymosis to bilateral periorbital and cheeks, trace ecchymosis to the right anterior neck.  Right TM normal, left TM obscured by cerumen.  No septal hematoma, no otorrhea or rhinorrhea  EYES: no scleral icterus, PERRL, extraocular movements intact  CV: regular rhythm, normal rate  RESPIRATORY: normal effort, CTA B  ABDOMEN: nondistended  MUSCULOSKELETAL: no deformity.  No C, T, L-spine tenderness  NEURO: alert, moves all extremities, follows commands  PSYCH:  calm, cooperative  SKIN: warm, dry    Vital signs and nursing notes reviewed.              RADIOLOGY  CT Head Without Contrast, CT Facial Bones Without Contrast    Result Date: 3/16/2023  CT SCAN OF THE HEAD AND MAXILLOFACIAL REGION WITHOUT CONTRAST  CLINICAL HISTORY: Head and facial injury following fall 1 week ago.  TECHNIQUE: CT scan of the head was obtained with 2 mm axial bone algorithm and 3 mm axial soft tissue algorithm images. Sagittal and coronal reconstructed images were obtained. Additionally, a dedicated CT scan of the maxillofacial region was obtained with 1 mm axial, coronal, and sagittal bone algorithm images and 2 mm axial soft tissue algorithm images.  FINDINGS:  HEAD CT: There is no evidence for a calvarial fracture. There is no evidence for an acute extra-axial hemorrhage. Otherwise, the ventricles, sulci, and cisterns are age-appropriate. The gray-white matter differentiation is within normal limits. Old lacunar disease is seen within the lentiform nuclei. The posterior fossa structures are within normal limits.  MAXILLOFACIAL CT: There is no evidence for acute fracture or bony malalignment involving the maxillofacial region.  There is a prominent size right frontal convexity scalp hematoma extending into  the right preseptal soft tissues.  There is complete opacification of the right maxillary sinus with centrally located mineralized and inspissated secretions. There is thickening of the walls of the right maxillary sinus compatible with chronic osteitic changes.       No evidence for acute traumatic intracranial pathology.  Prominent size right frontal convexity scalp hematoma extending into the right preseptal soft tissues.  Complete opacification right maxillary sinus with centrally located inspissated secretions and chronic osteitic changes within the walls of the right maxillary sinus.  Radiation dose reduction techniques were utilized, including automated exposure control and exposure modulation based on body size.          Ordered the above noted radiological studies. Reviewed by me in PACS.            PROCEDURES  Procedures              MEDICATIONS GIVEN IN ER  Medications - No data to display                MEDICAL DECISION MAKING, PROGRESS, and CONSULTS    All labs have been independently reviewed by me.  All radiology studies have been reviewed by me and I have also reviewed the radiology report.   EKG's independently viewed and interpreted by me.  Discussion below represents my analysis of pertinent findings related to patient's condition, differential diagnosis, treatment plan and final disposition.      Additional sources:        Orders placed during this visit:  Orders Placed This Encounter   Procedures   • CT Head Without Contrast   • CT Facial Bones Without Contrast         Differential diagnosis:  Contusion, skull fracture, intracranial hemorrhage, orbital fracture      Independent interpretation of labs, radiology studies, and discussions with consultants:  ED Course as of 03/16/23 1226   Thu Mar 16, 2023   1223 I discussed the patient with Dr. Bruner, radiologist.  CT head shows no acute intracranial findings, CT face shows large hematoma to the right frontal and periorbital area, no facial  fractures,  sinus disease in the right maxillary sinus, no other acute findings. [KA]   1224 I reassessed the patient, she is resting, no change in condition, no new symptoms.  I counseled her on her imaging results and plan for discharge.  She has no symptoms of acute sinusitis [KA]   1224 , no treatment indicated of the sinus disease in the maxillary sinus at this time, she can follow-up with her PCP if she does develop symptoms.  Counseled her on the nature of her diagnosis and she is ready for discharge. [KA]      ED Course User Index  [KA] Erum Wong PA             Patient was wearing a face mask when I entered the room and they continued to wear a mask throughout their stay in the ED.  I wore PPE, including  gloves, face mask with shield or face mask with goggles whenever I was in the room with patient.     DIAGNOSIS  Final diagnoses:   Periorbital contusion of right eye, initial encounter   Traumatic injury of head, initial encounter           Follow Up:  Josselyn Redmond MD  [address] [address]  [phone]    In 1 week  As needed      RX:     Medication List      No changes were made to your prescriptions during this visit.         Latest Documented Vital Signs:  As of 12:26 EDT  BP- 145/85 HR- 78 Temp- 97.7 °F (36.5 °C) (Tympanic) O2 sat- 97%              --    Please note that portions of this were completed with a voice recognition program.       Note Disclaimer: At [facility], we believe that sharing information builds trust and better relationships. You are receiving this note because you are receiving care at [facility] or recently visited. It is possible you will see health information before a provider has talked with you about it. This kind of information can be easy to misunderstand. To help you fully understand what it means for your health, we urge you to discuss this note with your provider.           Erum Wong PA  03/16/23 2445

## 2023-03-16 NOTE — ED NOTES
Report received and care assumed from Michelle LARSON. Pt is here for head pain post fall on Friday. Pt states she was out walking her dog when she tripped over her own feet. No dizziness or weakness reported. Pt fell face first onto asphalt. No LOC. NO blood thinners. Pt was able to ambulate back home. Pt has a hematoma to right eyebrow as well as bilateral bruising under both eyes. Pupils are 2/1 and reactive. Reports HA 2/10. No vision abnormalities.  Pt also reporting soreness to right shoulder, neck and back. Pt is awake alert and oriented x4. NAD noted. Lungs are CBL. Even chest rise and fall. Abd soft and non tender. Pt on CRM. VSS. Waiting on CT results. Family at [facility]

## 2023-03-20 ENCOUNTER — LAB (OUTPATIENT)
Dept: LAB | Facility: HOSPITAL | Age: 78
End: 2023-03-20
Payer: MEDICARE

## 2023-03-20 ENCOUNTER — OFFICE VISIT (OUTPATIENT)
Dept: ONCOLOGY | Facility: CLINIC | Age: 78
End: 2023-03-20
Payer: MEDICARE

## 2023-03-20 VITALS
DIASTOLIC BLOOD PRESSURE: 80 MMHG | WEIGHT: 162.3 LBS | BODY MASS INDEX: 27.04 KG/M2 | OXYGEN SATURATION: 96 % | SYSTOLIC BLOOD PRESSURE: 114 MMHG | HEIGHT: 65 IN | HEART RATE: 91 BPM | TEMPERATURE: 96.8 F | RESPIRATION RATE: 16 BRPM

## 2023-03-20 DIAGNOSIS — C73 THYROID CANCER: ICD-10-CM

## 2023-03-20 DIAGNOSIS — D64.9 ANEMIA, UNSPECIFIED TYPE: ICD-10-CM

## 2023-03-20 DIAGNOSIS — D47.1 MYELOPROLIFERATIVE DISORDER: ICD-10-CM

## 2023-03-20 DIAGNOSIS — D47.1 MYELOPROLIFERATIVE DISORDER: Primary | ICD-10-CM

## 2023-03-20 DIAGNOSIS — Z15.89 MONOALLELIC MUTATION OF CALR3 GENE: ICD-10-CM

## 2023-03-20 DIAGNOSIS — D75.839 THROMBOCYTOSIS: ICD-10-CM

## 2023-03-20 LAB
ALBUMIN SERPL-MCNC: 4.4 G/DL (ref 3.5–5.2)
ALBUMIN/GLOB SERPL: 1.4 G/DL
ALP SERPL-CCNC: 64 U/L (ref 39–117)
ALT SERPL W P-5'-P-CCNC: 16 U/L (ref 1–33)
ANION GAP SERPL CALCULATED.3IONS-SCNC: 9 MMOL/L (ref 5–15)
AST SERPL-CCNC: 25 U/L (ref 1–32)
BASOPHILS # BLD AUTO: 0.08 10*3/MM3 (ref 0–0.2)
BASOPHILS NFR BLD AUTO: 1.3 % (ref 0–1.5)
BILIRUB SERPL-MCNC: 0.3 MG/DL (ref 0–1.2)
BUN SERPL-MCNC: 14 MG/DL (ref 8–23)
BUN/CREAT SERPL: 18.7 (ref 7–25)
CALCIUM SPEC-SCNC: 9.6 MG/DL (ref 8.6–10.5)
CHLORIDE SERPL-SCNC: 102 MMOL/L (ref 98–107)
CO2 SERPL-SCNC: 28 MMOL/L (ref 22–29)
CREAT SERPL-MCNC: 0.75 MG/DL (ref 0.57–1)
DEPRECATED RDW RBC AUTO: 59.5 FL (ref 37–54)
EGFRCR SERPLBLD CKD-EPI 2021: 82.1 ML/MIN/1.73
EOSINOPHIL # BLD AUTO: 0.05 10*3/MM3 (ref 0–0.4)
EOSINOPHIL NFR BLD AUTO: 0.8 % (ref 0.3–6.2)
ERYTHROCYTE [DISTWIDTH] IN BLOOD BY AUTOMATED COUNT: 13.1 % (ref 12.3–15.4)
GLOBULIN UR ELPH-MCNC: 3.1 GM/DL
GLUCOSE SERPL-MCNC: 98 MG/DL (ref 65–99)
HCT VFR BLD AUTO: 39.7 % (ref 34–46.6)
HGB BLD-MCNC: 13.6 G/DL (ref 12–15.9)
IMM GRANULOCYTES # BLD AUTO: 0.04 10*3/MM3 (ref 0–0.05)
IMM GRANULOCYTES NFR BLD AUTO: 0.6 % (ref 0–0.5)
LDH SERPL-CCNC: 353 U/L (ref 135–214)
LYMPHOCYTES # BLD AUTO: 2.73 10*3/MM3 (ref 0.7–3.1)
LYMPHOCYTES NFR BLD AUTO: 43.5 % (ref 19.6–45.3)
MCH RBC QN AUTO: 42.4 PG (ref 26.6–33)
MCHC RBC AUTO-ENTMCNC: 34.3 G/DL (ref 31.5–35.7)
MCV RBC AUTO: 123.7 FL (ref 79–97)
MONOCYTES # BLD AUTO: 0.73 10*3/MM3 (ref 0.1–0.9)
MONOCYTES NFR BLD AUTO: 11.6 % (ref 5–12)
NEUTROPHILS NFR BLD AUTO: 2.65 10*3/MM3 (ref 1.7–7)
NEUTROPHILS NFR BLD AUTO: 42.2 % (ref 42.7–76)
NRBC BLD AUTO-RTO: 0 /100 WBC (ref 0–0.2)
PLATELET # BLD AUTO: 906 10*3/MM3 (ref 140–450)
PMV BLD AUTO: 8.6 FL (ref 6–12)
POTASSIUM SERPL-SCNC: 4.2 MMOL/L (ref 3.5–5.2)
PROT SERPL-MCNC: 7.5 G/DL (ref 6–8.5)
RBC # BLD AUTO: 3.21 10*6/MM3 (ref 3.77–5.28)
SODIUM SERPL-SCNC: 139 MMOL/L (ref 136–145)
URATE SERPL-MCNC: 4.2 MG/DL (ref 2.8–7.4)
WBC NRBC COR # BLD: 6.28 10*3/MM3 (ref 3.4–10.8)

## 2023-03-20 PROCEDURE — 85025 COMPLETE CBC W/AUTO DIFF WBC: CPT

## 2023-03-20 PROCEDURE — 80053 COMPREHEN METABOLIC PANEL: CPT | Performed by: INTERNAL MEDICINE

## 2023-03-20 PROCEDURE — 1125F AMNT PAIN NOTED PAIN PRSNT: CPT | Performed by: INTERNAL MEDICINE

## 2023-03-20 PROCEDURE — 83615 LACTATE (LD) (LDH) ENZYME: CPT | Performed by: INTERNAL MEDICINE

## 2023-03-20 PROCEDURE — 84550 ASSAY OF BLOOD/URIC ACID: CPT

## 2023-03-20 PROCEDURE — 3074F SYST BP LT 130 MM HG: CPT | Performed by: INTERNAL MEDICINE

## 2023-03-20 PROCEDURE — 99214 OFFICE O/P EST MOD 30 MIN: CPT | Performed by: INTERNAL MEDICINE

## 2023-03-20 PROCEDURE — 36415 COLL VENOUS BLD VENIPUNCTURE: CPT

## 2023-03-20 PROCEDURE — 3079F DIAST BP 80-89 MM HG: CPT | Performed by: INTERNAL MEDICINE

## 2023-03-20 RX ORDER — NAPROXEN SODIUM 220 MG
220 TABLET ORAL 2 TIMES DAILY PRN
COMMUNITY

## 2023-03-20 NOTE — PROGRESS NOTES
Subjective     REASON FOR FOLLOW UP: Essential thrombocytosis controlled with Hydrea    HISTORY OF PRESENT ILLNESS:  The patient is a 77 y.o. year old female who was referred to us from her primary care office with abnormal CBC showing her platelet count over 1.5 million. She was started on 81 mg aspirin and Hydrea.  Current Hydrea dose is 1500 mg p.o. daily    Her platelet count today remains elevated at 906,000.  Hemoglobin and white cells are within normal limits.  Her MCV is markedly elevated suggesting good compliance with Hydrea.      Since her last visit here she did have a fall which resulted in a facial contusion and an ER visit on 3/16/2023.  CT scan of the head showed significant hematoma formation in the soft tissue but no intracranial pathology.          History of Present Illness     Past Medical History:   Diagnosis Date   • ADHD (attention deficit hyperactivity disorder) 1995    have had since childhood   • Allergic exema   • Anxiety    • Cancer (HCC) blood    in treatment CBC   • Cataract removed   • Colon polyp checked every 5 years    none at present   • DDD (degenerative disc disease), lumbar    • Fibromyalgia, primary off and on   • GERD (gastroesophageal reflux disease)    • H/o Hip pain    • Headache occasional   • History of depression    • History of low back pain    • History of thrombocytosis    • HL (hearing loss) left ear   • Hypercholesterolemia    • Irritable bowel syndrome occasional   • Low back pain    • Scoliosis slight curve   • Sleep apnea    • Tremor slight in leg   • Urinary tract infection October this year    treated        Past Surgical History:   Procedure Laterality Date   • APPENDECTOMY  removed 1972   • BACK SURGERY     • CATARACT EXTRACTION  2018   • COLONOSCOPY  regular checkups   • COSMETIC SURGERY  15 years ago   • HIP SURGERY     • JOINT REPLACEMENT  right hip & left knee    2011 & 2013   • KNEE SURGERY     • SPINE SURGERY  1971   • SUBTOTAL HYSTERECTOMY  1972   •  TOTAL HIP ARTHROPLASTY Right 2011   • TOTAL KNEE ARTHROPLASTY Right 2013   • TUBAL ABDOMINAL LIGATION  1972        Current Outpatient Medications on File Prior to Visit   Medication Sig Dispense Refill   • fluorouracil (EFUDEX) 5 % cream      • FLUoxetine (PROzac) 20 MG capsule Take 1 capsule by mouth Daily. 90 capsule 1   • fluticasone (FLONASE) 50 MCG/ACT nasal spray      • hydroxyurea (Hydrea) 500 MG capsule Take three capsules (1500 mg) daily 90 capsule 2   • naproxen sodium (ALEVE) 220 MG tablet Take 1 tablet by mouth 2 (Two) Times a Day As Needed.     • omeprazole (priLOSEC) 40 MG capsule Take 1 capsule by mouth Daily. 30 capsule 0   • pravastatin (PRAVACHOL) 20 MG tablet Take 1 tablet by mouth Daily. 90 tablet 1   • SUMAtriptan (IMITREX) 25 MG tablet TAKE ONE TABLET BY MOUTH AT ONSET OF HEADACHE; MAY REPEAT ONE TABLET IN 2 HOURS IF NEEDED. 9 tablet 1   • Triamcinolone Acetonide 0.025 % lotion      • triamterene-hydrochlorothiazide (MAXZIDE-25) 37.5-25 MG per tablet Take 1 tablet by mouth Daily. (Patient not taking: Reported on 3/20/2023) 90 tablet 1     No current facility-administered medications on file prior to visit.        ALLERGIES:    Allergies   Allergen Reactions   • Codeine Nausea And Vomiting   • Penicillins Hives        Social History     Socioeconomic History   • Marital status:      Spouse name: Temo   Tobacco Use   • Smoking status: Former     Packs/day: 1.00     Years: 15.00     Pack years: 15.00     Types: Cigarettes     Quit date: 10/1/1979     Years since quittin.4   • Smokeless tobacco: Never   Vaping Use   • Vaping Use: Never used   Substance and Sexual Activity   • Alcohol use: Yes     Alcohol/week: 3.0 standard drinks     Types: 1 Glasses of wine, 1 Cans of beer, 1 Shots of liquor per week   • Drug use: Never   • Sexual activity: Not Currently     Partners: Male        No family history on file.     Review of Systems   Constitutional: Negative for activity change, chills,  "fatigue and fever.   HENT: Negative for mouth sores, trouble swallowing and voice change.    Eyes: Negative for pain and visual disturbance.   Respiratory: Negative for cough, shortness of breath and wheezing.    Cardiovascular: Negative for chest pain and palpitations.   Gastrointestinal: Negative for abdominal pain, constipation, diarrhea, nausea and vomiting.   Genitourinary: Negative for difficulty urinating, frequency and urgency.   Musculoskeletal: Negative for arthralgias and joint swelling.   Skin: Negative for rash.   Neurological: Negative for dizziness, seizures, weakness and headaches.   Hematological: Negative for adenopathy. Does not bruise/bleed easily.   Psychiatric/Behavioral: Negative for behavioral problems and confusion. The patient is not nervous/anxious.        Objective     Vitals:    03/20/23 1034   BP: 114/80   Pulse: 91   Resp: 16   Temp: 96.8 °F (36 °C)   TempSrc: Temporal   SpO2: 96%   Weight: 73.6 kg (162 lb 4.8 oz)   Height: 165.1 cm (65\")   PainSc:   3   PainLoc: Neck  Comment: stiff     Current Status 3/20/2023   ECOG score 0       Physical Exam   Constitutional: She is oriented to person, place, and time. She appears well-developed. No distress.   HENT:   Head: Normocephalic.   Eyes: Pupils are equal, round, and reactive to light. Conjunctivae are normal. No scleral icterus.   Neck: No JVD present. No thyromegaly present.   Cardiovascular: Normal rate and regular rhythm. Exam reveals no gallop and no friction rub.   No murmur heard.  Pulmonary/Chest: Effort normal and breath sounds normal. She has no wheezes. She has no rales.   Abdominal: Soft. She exhibits no distension and no mass. There is no abdominal tenderness.   Musculoskeletal: Normal range of motion. No deformity.   Lymphadenopathy:     She has no cervical adenopathy.   Neurological: She is alert and oriented to person, place, and time. She has normal reflexes. No cranial nerve deficit.   Skin: Skin is warm and dry. No rash " noted. No erythema.   Nailbed discoloration   Psychiatric: Her behavior is normal. Judgment normal.            RECENT LABS:  Hematology WBC   Date Value Ref Range Status   03/20/2023 6.28 3.40 - 10.80 10*3/mm3 Final   06/27/2022 5.3 3.4 - 10.8 x10E3/uL Final     RBC   Date Value Ref Range Status   03/20/2023 3.21 (L) 3.77 - 5.28 10*6/mm3 Final   06/27/2022 2.37 (L) 3.77 - 5.28 x10E6/uL Final     Comment:     Polychromasia present     Hemoglobin   Date Value Ref Range Status   03/20/2023 13.6 12.0 - 15.9 g/dL Final     Hematocrit   Date Value Ref Range Status   03/20/2023 39.7 34.0 - 46.6 % Final     Platelets   Date Value Ref Range Status   03/20/2023 906 (H) 140 - 450 10*3/mm3 Final      CT SCAN OF THE HEAD AND MAXILLOFACIAL REGION WITHOUT CONTRAST 3/16/2023     CLINICAL HISTORY: Head and facial injury following fall 1 week ago.     TECHNIQUE: CT scan of the head was obtained with 2 mm axial bone  algorithm and 3 mm axial soft tissue algorithm images. Sagittal and  coronal reconstructed images were obtained. Additionally, a dedicated CT  scan of the maxillofacial region was obtained with 1 mm axial, coronal,  and sagittal bone algorithm images and 2 mm axial soft tissue algorithm  images.     FINDINGS:     HEAD CT: There is no evidence for a calvarial fracture. There is no  evidence for an acute extra-axial hemorrhage. Otherwise, the ventricles,  sulci, and cisterns are age-appropriate. The gray-white matter  differentiation is within normal limits. Old lacunar disease is seen  within the lentiform nuclei. The posterior fossa structures are within  normal limits.     MAXILLOFACIAL CT: There is no evidence for acute fracture or bony  malalignment involving the maxillofacial region.     There is a prominent size right frontal convexity scalp hematoma  extending into the right preseptal soft tissues.     There is complete opacification of the right maxillary sinus with  centrally located mineralized and inspissated  secretions. There is  thickening of the walls of the right maxillary sinus compatible with  chronic osteitic changes.     IMPRESSION:     No evidence for acute traumatic intracranial pathology.     Prominent size right frontal convexity scalp hematoma extending into the  right preseptal soft tissues.    Complete opacification right maxillary sinus with centrally located  inspissated secretions and chronic osteitic changes within the walls of  the right maxillary sinus    Assessment & Plan   1.  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  Good response and tolerance to Hydrea thus far.  3.  Recent fall with extensive right forehead hematoma.  No evidence of intracranial abnormalities on CT of the head from the ER visit 3/16/2023    Recommendations:  1.  Continue aspirin 81 mg po daily  2.  Hydrea 1500 mg daily  3.  Lab + RN review monthly  4.  MD follow up 4 months to review results and check CBC, and adjust Hydrea dose further as needed.

## 2023-03-22 ENCOUNTER — HOSPITAL ENCOUNTER (OUTPATIENT)
Dept: MAMMOGRAPHY | Facility: HOSPITAL | Age: 78
Discharge: HOME | End: 2023-03-22
Admitting: FAMILY MEDICINE
Payer: MEDICARE

## 2023-03-22 DIAGNOSIS — Z12.31 SCREENING MAMMOGRAM FOR BREAST CANCER: ICD-10-CM

## 2023-03-22 PROCEDURE — 77067 SCR MAMMO BI INCL CAD: CPT

## 2023-03-22 PROCEDURE — 77063 BREAST TOMOSYNTHESIS BI: CPT

## 2023-03-27 ENCOUNTER — CLINICAL SUPPORT (OUTPATIENT)
Dept: ONCOLOGY | Facility: HOSPITAL | Age: 78
End: 2023-03-27
Payer: MEDICARE

## 2023-03-27 ENCOUNTER — LAB (OUTPATIENT)
Dept: LAB | Facility: HOSPITAL | Age: 78
End: 2023-03-27
Payer: MEDICARE

## 2023-03-27 DIAGNOSIS — D47.1 MYELOPROLIFERATIVE DISORDER: ICD-10-CM

## 2023-03-27 DIAGNOSIS — D75.839 THROMBOCYTOSIS: ICD-10-CM

## 2023-03-27 DIAGNOSIS — Z15.89 MONOALLELIC MUTATION OF CALR3 GENE: ICD-10-CM

## 2023-03-27 DIAGNOSIS — D64.9 ANEMIA, UNSPECIFIED TYPE: ICD-10-CM

## 2023-03-27 LAB
BASOPHILS # BLD AUTO: 0.11 10*3/MM3 (ref 0–0.2)
BASOPHILS NFR BLD AUTO: 2 % (ref 0–1.5)
DEPRECATED RDW RBC AUTO: 57.1 FL (ref 37–54)
EOSINOPHIL # BLD AUTO: 0.02 10*3/MM3 (ref 0–0.4)
EOSINOPHIL NFR BLD AUTO: 0.4 % (ref 0.3–6.2)
ERYTHROCYTE [DISTWIDTH] IN BLOOD BY AUTOMATED COUNT: 13.2 % (ref 12.3–15.4)
HCT VFR BLD AUTO: 38.2 % (ref 34–46.6)
HGB BLD-MCNC: 14 G/DL (ref 12–15.9)
IMM GRANULOCYTES # BLD AUTO: 0.02 10*3/MM3 (ref 0–0.05)
IMM GRANULOCYTES NFR BLD AUTO: 0.4 % (ref 0–0.5)
LYMPHOCYTES # BLD AUTO: 2.7 10*3/MM3 (ref 0.7–3.1)
LYMPHOCYTES NFR BLD AUTO: 48.4 % (ref 19.6–45.3)
MCH RBC QN AUTO: 43.5 PG (ref 26.6–33)
MCHC RBC AUTO-ENTMCNC: 36.6 G/DL (ref 31.5–35.7)
MCV RBC AUTO: 118.6 FL (ref 79–97)
MONOCYTES # BLD AUTO: 0.89 10*3/MM3 (ref 0.1–0.9)
MONOCYTES NFR BLD AUTO: 15.9 % (ref 5–12)
NEUTROPHILS NFR BLD AUTO: 1.84 10*3/MM3 (ref 1.7–7)
NEUTROPHILS NFR BLD AUTO: 32.9 % (ref 42.7–76)
NRBC BLD AUTO-RTO: 0.4 /100 WBC (ref 0–0.2)
PLATELET # BLD AUTO: 827 10*3/MM3 (ref 140–450)
PMV BLD AUTO: 8.9 FL (ref 6–12)
RBC # BLD AUTO: 3.22 10*6/MM3 (ref 3.77–5.28)
WBC NRBC COR # BLD: 5.58 10*3/MM3 (ref 3.4–10.8)

## 2023-03-27 PROCEDURE — 36415 COLL VENOUS BLD VENIPUNCTURE: CPT

## 2023-03-27 PROCEDURE — G0463 HOSPITAL OUTPT CLINIC VISIT: HCPCS

## 2023-03-27 PROCEDURE — 85025 COMPLETE CBC W/AUTO DIFF WBC: CPT

## 2023-03-27 NOTE — NURSING NOTE
Lab Results   Component Value Date    WBC 5.58 03/27/2023    HGB 14.0 03/27/2023    HCT 38.2 03/27/2023    .6 (H) 03/27/2023     (H) 03/27/2023     Patient is here for lab review with RN.  CBC reviewed, counts are stable for this patient at this time. Patient has no complaints. Copy of labs given to patient and follow up appointment reviewed. Patient is instructed to call the office with any concerns or new symptoms prior to next visit. Patient verbalized understanding and discharged in stable condition.      Pt is on the schedule for weekly lab rn reviews, however MD note from 1 week ago clearly states monthly lab checks. Walked patient up to scheduling to get this fixed.

## 2023-04-11 ENCOUNTER — LAB (OUTPATIENT)
Dept: LAB | Facility: HOSPITAL | Age: 78
End: 2023-04-11
Payer: MEDICARE

## 2023-04-11 ENCOUNTER — CLINICAL SUPPORT (OUTPATIENT)
Dept: ONCOLOGY | Facility: HOSPITAL | Age: 78
End: 2023-04-11
Payer: MEDICARE

## 2023-04-11 DIAGNOSIS — D75.839 THROMBOCYTOSIS: ICD-10-CM

## 2023-04-11 DIAGNOSIS — D64.9 ANEMIA, UNSPECIFIED TYPE: ICD-10-CM

## 2023-04-11 DIAGNOSIS — D47.1 MYELOPROLIFERATIVE DISORDER: ICD-10-CM

## 2023-04-11 DIAGNOSIS — Z15.89 MONOALLELIC MUTATION OF CALR3 GENE: ICD-10-CM

## 2023-04-11 LAB
BASOPHILS # BLD AUTO: 0.08 10*3/MM3 (ref 0–0.2)
BASOPHILS NFR BLD AUTO: 1.5 % (ref 0–1.5)
DEPRECATED RDW RBC AUTO: 60.5 FL (ref 37–54)
EOSINOPHIL # BLD AUTO: 0.03 10*3/MM3 (ref 0–0.4)
EOSINOPHIL NFR BLD AUTO: 0.6 % (ref 0.3–6.2)
ERYTHROCYTE [DISTWIDTH] IN BLOOD BY AUTOMATED COUNT: 13.7 % (ref 12.3–15.4)
HCT VFR BLD AUTO: 37 % (ref 34–46.6)
HGB BLD-MCNC: 13.3 G/DL (ref 12–15.9)
IMM GRANULOCYTES # BLD AUTO: 0.01 10*3/MM3 (ref 0–0.05)
IMM GRANULOCYTES NFR BLD AUTO: 0.2 % (ref 0–0.5)
LYMPHOCYTES # BLD AUTO: 2.08 10*3/MM3 (ref 0.7–3.1)
LYMPHOCYTES NFR BLD AUTO: 38.5 % (ref 19.6–45.3)
MCH RBC QN AUTO: 43.2 PG (ref 26.6–33)
MCHC RBC AUTO-ENTMCNC: 35.9 G/DL (ref 31.5–35.7)
MCV RBC AUTO: 120.1 FL (ref 79–97)
MONOCYTES # BLD AUTO: 0.55 10*3/MM3 (ref 0.1–0.9)
MONOCYTES NFR BLD AUTO: 10.2 % (ref 5–12)
NEUTROPHILS NFR BLD AUTO: 2.65 10*3/MM3 (ref 1.7–7)
NEUTROPHILS NFR BLD AUTO: 49 % (ref 42.7–76)
NRBC BLD AUTO-RTO: 0 /100 WBC (ref 0–0.2)
PLATELET # BLD AUTO: 612 10*3/MM3 (ref 140–450)
PMV BLD AUTO: 9 FL (ref 6–12)
RBC # BLD AUTO: 3.08 10*6/MM3 (ref 3.77–5.28)
WBC NRBC COR # BLD: 5.4 10*3/MM3 (ref 3.4–10.8)

## 2023-04-11 PROCEDURE — 36415 COLL VENOUS BLD VENIPUNCTURE: CPT

## 2023-04-11 PROCEDURE — 85025 COMPLETE CBC W/AUTO DIFF WBC: CPT

## 2023-04-11 PROCEDURE — G0463 HOSPITAL OUTPT CLINIC VISIT: HCPCS

## 2023-04-11 NOTE — NURSING NOTE
Patient is here for lab review with RN.  CBC reviewed, counts are stable for this patient at this time. Pt continues taking Hydrea as prescribed. She has no complaints. Copy of labs given to patient and follow up appointment reviewed. Patient is instructed to call the office with any concerns or new symptoms prior to next visit. Patient verbalized understanding and discharged in stable condition.    Lab Results   Component Value Date    WBC 5.40 04/11/2023    HGB 13.3 04/11/2023    HCT 37.0 04/11/2023    .1 (H) 04/11/2023     (H) 04/11/2023

## 2023-04-17 DIAGNOSIS — E78.00 ELEVATED LDL CHOLESTEROL LEVEL: Primary | ICD-10-CM

## 2023-04-17 RX ORDER — PRAVASTATIN SODIUM 20 MG
TABLET ORAL
Qty: 90 TABLET | Refills: 1 | OUTPATIENT
Start: 2023-04-17

## 2023-04-17 RX ORDER — PRAVASTATIN SODIUM 20 MG
20 TABLET ORAL DAILY
Qty: 90 TABLET | Refills: 1 | Status: SHIPPED | OUTPATIENT
Start: 2023-04-17

## 2023-04-25 VITALS
TEMPERATURE: 97.9 F | SYSTOLIC BLOOD PRESSURE: 121 MMHG | DIASTOLIC BLOOD PRESSURE: 69 MMHG | DIASTOLIC BLOOD PRESSURE: 73 MMHG | DIASTOLIC BLOOD PRESSURE: 63 MMHG | SYSTOLIC BLOOD PRESSURE: 111 MMHG | RESPIRATION RATE: 18 BRPM | HEART RATE: 75 BPM | DIASTOLIC BLOOD PRESSURE: 65 MMHG | DIASTOLIC BLOOD PRESSURE: 67 MMHG | HEART RATE: 89 BPM | SYSTOLIC BLOOD PRESSURE: 119 MMHG | RESPIRATION RATE: 15 BRPM | RESPIRATION RATE: 12 BRPM | TEMPERATURE: 97.1 F | SYSTOLIC BLOOD PRESSURE: 93 MMHG | RESPIRATION RATE: 20 BRPM | HEART RATE: 78 BPM | DIASTOLIC BLOOD PRESSURE: 66 MMHG | HEART RATE: 73 BPM | DIASTOLIC BLOOD PRESSURE: 57 MMHG | OXYGEN SATURATION: 99 % | OXYGEN SATURATION: 100 % | RESPIRATION RATE: 16 BRPM | SYSTOLIC BLOOD PRESSURE: 102 MMHG | RESPIRATION RATE: 7 BRPM | OXYGEN SATURATION: 97 % | HEART RATE: 79 BPM | SYSTOLIC BLOOD PRESSURE: 104 MMHG | OXYGEN SATURATION: 96 % | HEART RATE: 87 BPM | HEART RATE: 90 BPM | OXYGEN SATURATION: 98 % | TEMPERATURE: 97.8 F | HEART RATE: 84 BPM | WEIGHT: 155 LBS | HEART RATE: 82 BPM | DIASTOLIC BLOOD PRESSURE: 64 MMHG | SYSTOLIC BLOOD PRESSURE: 115 MMHG | SYSTOLIC BLOOD PRESSURE: 118 MMHG | SYSTOLIC BLOOD PRESSURE: 97 MMHG | SYSTOLIC BLOOD PRESSURE: 94 MMHG | HEIGHT: 65 IN | DIASTOLIC BLOOD PRESSURE: 70 MMHG | SYSTOLIC BLOOD PRESSURE: 125 MMHG | RESPIRATION RATE: 13 BRPM

## 2023-04-27 ENCOUNTER — AMBULATORY SURGICAL CENTER (AMBULATORY)
Dept: URBAN - METROPOLITAN AREA SURGERY 17 | Facility: SURGERY | Age: 78
End: 2023-04-27

## 2023-04-27 DIAGNOSIS — Z83.71 FAMILY HISTORY OF COLONIC POLYPS: ICD-10-CM

## 2023-04-27 DIAGNOSIS — Z80.0 FAMILY HISTORY OF MALIGNANT NEOPLASM OF DIGESTIVE ORGANS: ICD-10-CM

## 2023-04-27 DIAGNOSIS — Z86.010 PERSONAL HISTORY OF COLONIC POLYPS: ICD-10-CM

## 2023-04-27 PROCEDURE — 45378 DIAGNOSTIC COLONOSCOPY: CPT | Mod: 33 | Performed by: INTERNAL MEDICINE

## 2023-04-28 DIAGNOSIS — I10 PRIMARY HYPERTENSION: ICD-10-CM

## 2023-04-28 RX ORDER — TRIAMTERENE AND HYDROCHLOROTHIAZIDE 37.5; 25 MG/1; MG/1
TABLET ORAL
Qty: 90 TABLET | Refills: 1 | OUTPATIENT
Start: 2023-04-28

## 2023-04-28 RX ORDER — TRIAMTERENE AND HYDROCHLOROTHIAZIDE 37.5; 25 MG/1; MG/1
1 TABLET ORAL DAILY
Qty: 90 TABLET | Refills: 1 | Status: SHIPPED | OUTPATIENT
Start: 2023-04-28

## 2023-05-09 ENCOUNTER — LAB (OUTPATIENT)
Dept: LAB | Facility: HOSPITAL | Age: 78
End: 2023-05-09
Payer: MEDICARE

## 2023-05-09 ENCOUNTER — CLINICAL SUPPORT (OUTPATIENT)
Dept: ONCOLOGY | Facility: HOSPITAL | Age: 78
End: 2023-05-09
Payer: MEDICARE

## 2023-05-09 DIAGNOSIS — D75.839 THROMBOCYTOSIS: ICD-10-CM

## 2023-05-09 DIAGNOSIS — Z15.89 MONOALLELIC MUTATION OF CALR3 GENE: ICD-10-CM

## 2023-05-09 DIAGNOSIS — D47.1 MYELOPROLIFERATIVE DISORDER: ICD-10-CM

## 2023-05-09 DIAGNOSIS — D64.9 ANEMIA, UNSPECIFIED TYPE: ICD-10-CM

## 2023-05-09 LAB
BASOPHILS # BLD AUTO: 0.04 10*3/MM3 (ref 0–0.2)
BASOPHILS NFR BLD AUTO: 1.1 % (ref 0–1.5)
DEPRECATED RDW RBC AUTO: 69.4 FL (ref 37–54)
EOSINOPHIL # BLD AUTO: 0.01 10*3/MM3 (ref 0–0.4)
EOSINOPHIL NFR BLD AUTO: 0.3 % (ref 0.3–6.2)
ERYTHROCYTE [DISTWIDTH] IN BLOOD BY AUTOMATED COUNT: 15.7 % (ref 12.3–15.4)
HCT VFR BLD AUTO: 35.3 % (ref 34–46.6)
HGB BLD-MCNC: 13.2 G/DL (ref 12–15.9)
IMM GRANULOCYTES # BLD AUTO: 0.01 10*3/MM3 (ref 0–0.05)
IMM GRANULOCYTES NFR BLD AUTO: 0.3 % (ref 0–0.5)
LYMPHOCYTES # BLD AUTO: 1.79 10*3/MM3 (ref 0.7–3.1)
LYMPHOCYTES NFR BLD AUTO: 47.9 % (ref 19.6–45.3)
MCH RBC QN AUTO: 45.8 PG (ref 26.6–33)
MCHC RBC AUTO-ENTMCNC: 37.4 G/DL (ref 31.5–35.7)
MCV RBC AUTO: 122.6 FL (ref 79–97)
MONOCYTES # BLD AUTO: 0.36 10*3/MM3 (ref 0.1–0.9)
MONOCYTES NFR BLD AUTO: 9.6 % (ref 5–12)
NEUTROPHILS NFR BLD AUTO: 1.53 10*3/MM3 (ref 1.7–7)
NEUTROPHILS NFR BLD AUTO: 40.8 % (ref 42.7–76)
NRBC BLD AUTO-RTO: 0.5 /100 WBC (ref 0–0.2)
PLATELET # BLD AUTO: 586 10*3/MM3 (ref 140–450)
PMV BLD AUTO: 9.1 FL (ref 6–12)
RBC # BLD AUTO: 2.88 10*6/MM3 (ref 3.77–5.28)
WBC NRBC COR # BLD: 3.74 10*3/MM3 (ref 3.4–10.8)

## 2023-05-09 PROCEDURE — 85025 COMPLETE CBC W/AUTO DIFF WBC: CPT

## 2023-05-09 PROCEDURE — 36415 COLL VENOUS BLD VENIPUNCTURE: CPT

## 2023-05-09 NOTE — NURSING NOTE
Patient is here for lab review with RN.  CBC reviewed, counts are stable for this patient at this time. Patient has no complaints. Copy of labs given to patient and follow up appointment reviewed. Patient is instructed to call the office with any concerns or new symptoms prior to next visit. Patient verbalized understanding and discharged in stable condition.    Lab Results   Component Value Date    WBC 3.74 05/09/2023    HGB 13.2 05/09/2023    HCT 35.3 05/09/2023    .6 (H) 05/09/2023     (H) 05/09/2023

## 2023-05-20 DIAGNOSIS — F41.9 ANXIETY: ICD-10-CM

## 2023-05-22 RX ORDER — FLUOXETINE HYDROCHLORIDE 20 MG/1
CAPSULE ORAL
Qty: 90 CAPSULE | Refills: 1 | OUTPATIENT
Start: 2023-05-22

## 2023-06-05 ENCOUNTER — CLINICAL SUPPORT (OUTPATIENT)
Dept: ONCOLOGY | Facility: HOSPITAL | Age: 78
End: 2023-06-05
Payer: MEDICARE

## 2023-06-05 ENCOUNTER — LAB (OUTPATIENT)
Dept: LAB | Facility: HOSPITAL | Age: 78
End: 2023-06-05
Payer: MEDICARE

## 2023-06-05 DIAGNOSIS — D75.839 THROMBOCYTOSIS: ICD-10-CM

## 2023-06-05 DIAGNOSIS — D47.1 MYELOPROLIFERATIVE DISORDER: ICD-10-CM

## 2023-06-05 DIAGNOSIS — D64.9 ANEMIA, UNSPECIFIED TYPE: ICD-10-CM

## 2023-06-05 DIAGNOSIS — Z15.89 MONOALLELIC MUTATION OF CALR3 GENE: ICD-10-CM

## 2023-06-05 LAB
BASOPHILS # BLD AUTO: 0.05 10*3/MM3 (ref 0–0.2)
BASOPHILS NFR BLD AUTO: 1.1 % (ref 0–1.5)
DEPRECATED RDW RBC AUTO: 74.4 FL (ref 37–54)
EOSINOPHIL # BLD AUTO: 0.03 10*3/MM3 (ref 0–0.4)
EOSINOPHIL NFR BLD AUTO: 0.7 % (ref 0.3–6.2)
ERYTHROCYTE [DISTWIDTH] IN BLOOD BY AUTOMATED COUNT: 15.7 % (ref 12.3–15.4)
HCT VFR BLD AUTO: 33.2 % (ref 34–46.6)
HGB BLD-MCNC: 12.3 G/DL (ref 12–15.9)
IMM GRANULOCYTES # BLD AUTO: 0.03 10*3/MM3 (ref 0–0.05)
IMM GRANULOCYTES NFR BLD AUTO: 0.7 % (ref 0–0.5)
LYMPHOCYTES # BLD AUTO: 1.81 10*3/MM3 (ref 0.7–3.1)
LYMPHOCYTES NFR BLD AUTO: 41 % (ref 19.6–45.3)
MCH RBC QN AUTO: 47.7 PG (ref 26.6–33)
MCHC RBC AUTO-ENTMCNC: 37 G/DL (ref 31.5–35.7)
MCV RBC AUTO: 128.7 FL (ref 79–97)
MONOCYTES # BLD AUTO: 0.47 10*3/MM3 (ref 0.1–0.9)
MONOCYTES NFR BLD AUTO: 10.6 % (ref 5–12)
NEUTROPHILS NFR BLD AUTO: 2.03 10*3/MM3 (ref 1.7–7)
NEUTROPHILS NFR BLD AUTO: 45.9 % (ref 42.7–76)
NRBC BLD AUTO-RTO: 0.5 /100 WBC (ref 0–0.2)
PLATELET # BLD AUTO: 451 10*3/MM3 (ref 140–450)
PMV BLD AUTO: 9.2 FL (ref 6–12)
RBC # BLD AUTO: 2.58 10*6/MM3 (ref 3.77–5.28)
WBC NRBC COR # BLD: 4.42 10*3/MM3 (ref 3.4–10.8)

## 2023-06-05 PROCEDURE — 85025 COMPLETE CBC W/AUTO DIFF WBC: CPT

## 2023-06-05 PROCEDURE — G0463 HOSPITAL OUTPT CLINIC VISIT: HCPCS

## 2023-06-05 PROCEDURE — 36415 COLL VENOUS BLD VENIPUNCTURE: CPT

## 2023-06-05 NOTE — PROGRESS NOTES
Patient is here for lab review with RN.  CBC reviewed with Dr Brown, counts are stable for this patient at this time. Patient has no complaints. Copy of labs given to patient and follow up appointment reviewed. Patient is instructed to call the office with any concerns or new symptoms prior to next visit. Patient verbalized understanding and discharged in stable condition.     Lab Results   Component Value Date    WBC 4.42 06/05/2023    HGB 12.3 06/05/2023    HCT 33.2 (L) 06/05/2023    .7 (H) 06/05/2023     (H) 06/05/2023

## 2023-06-06 ENCOUNTER — OFFICE VISIT (OUTPATIENT)
Dept: FAMILY MEDICINE CLINIC | Facility: CLINIC | Age: 78
End: 2023-06-06
Payer: MEDICARE

## 2023-06-06 VITALS
HEART RATE: 91 BPM | WEIGHT: 162.6 LBS | DIASTOLIC BLOOD PRESSURE: 68 MMHG | TEMPERATURE: 97.1 F | HEIGHT: 65 IN | OXYGEN SATURATION: 97 % | BODY MASS INDEX: 27.09 KG/M2 | SYSTOLIC BLOOD PRESSURE: 110 MMHG

## 2023-06-06 DIAGNOSIS — I10 PRIMARY HYPERTENSION: ICD-10-CM

## 2023-06-06 DIAGNOSIS — G43.009 MIGRAINE WITHOUT AURA AND WITHOUT STATUS MIGRAINOSUS, NOT INTRACTABLE: Primary | ICD-10-CM

## 2023-06-06 DIAGNOSIS — E78.00 ELEVATED LDL CHOLESTEROL LEVEL: ICD-10-CM

## 2023-06-06 PROCEDURE — 1160F RVW MEDS BY RX/DR IN RCRD: CPT | Performed by: FAMILY MEDICINE

## 2023-06-06 PROCEDURE — 3074F SYST BP LT 130 MM HG: CPT | Performed by: FAMILY MEDICINE

## 2023-06-06 PROCEDURE — 99214 OFFICE O/P EST MOD 30 MIN: CPT | Performed by: FAMILY MEDICINE

## 2023-06-06 PROCEDURE — 1159F MED LIST DOCD IN RCRD: CPT | Performed by: FAMILY MEDICINE

## 2023-06-06 PROCEDURE — 3078F DIAST BP <80 MM HG: CPT | Performed by: FAMILY MEDICINE

## 2023-06-06 RX ORDER — DONEPEZIL HYDROCHLORIDE 5 MG/1
5 TABLET, FILM COATED ORAL NIGHTLY
Qty: 30 TABLET | Refills: 3 | Status: SHIPPED | OUTPATIENT
Start: 2023-06-06

## 2023-06-06 RX ORDER — ASPIRIN 81 MG/1
81 TABLET ORAL DAILY
COMMUNITY

## 2023-06-06 NOTE — ASSESSMENT & PLAN NOTE
Hypertension is  chronic, stable .  Continue current treatment regimen.  Dietary sodium restriction.  Regular aerobic exercise.  Continue current medications.  Ambulatory blood pressure monitoring.  Blood pressure will be reassessed at the next regular appointment.

## 2023-06-06 NOTE — ASSESSMENT & PLAN NOTE
Headaches are  frequent per  complaining of headaches multiple times a week, taking imitrex .  Advised to keep a headache diary.  Medication changes per orders.  Given samples of ubrevy.  will check to see if covered by insurance.

## 2023-06-06 NOTE — PROGRESS NOTES
"    Chief Complaint  elevated ldl (6 month follow up/Not Fasting/) and check up    Subjective    History of Present Illness {CC  Problem List  Visit  Diagnosis   Encounters  Notes  Medications  Labs  Result Review Imaging  Media :23}     Sonia Wooten presents to [facility] for   History of Present Illness     79 yo female present for follow up visit.  She states she has been doing well.  Taking medication for cholesterol daily. States has not been exercising much this week.    States she is having headaches often using imitrex a few times a week per . Imitrex no longer covered by insurance, letter mailed to     She feel in 2023, states she feel about 5 days before.  She states still a small area over R eye.      concern with memory changes,however notice some improved recently.  Pt states she is doing well, notice some changes with memory but does not want to go see anyone.         Social History     Socioeconomic History    Marital status:      Spouse name: Temo   Tobacco Use    Smoking status: Former     Packs/day: 1.00     Years: 15.00     Pack years: 15.00     Types: Cigarettes     Quit date: 10/1/1979     Years since quittin.7    Smokeless tobacco: Never   Vaping Use    Vaping Use: Never used   Substance and Sexual Activity    Alcohol use: Yes     Alcohol/week: 3.0 standard drinks     Types: 1 Glasses of wine, 1 Cans of beer, 1 Shots of liquor per week    Drug use: Never    Sexual activity: Not Currently     Partners: Male      Objective     Vital Signs:   /68 (BP Location: Left arm, Patient Position: Sitting, Cuff Size: Adult)   Pulse 91   Temp 97.1 °F (36.2 °C)   Ht 165.1 cm (65\")   Wt 73.8 kg (162 lb 9.6 oz)   SpO2 97%   BMI 27.06 kg/m²   Physical Exam  Constitutional:       General: She is not in acute distress.     Appearance: She is not ill-appearing.   HENT:      Head: Normocephalic.   Cardiovascular:      Rate and " Rhythm: Regular rhythm.      Heart sounds: Normal heart sounds.   Pulmonary:      Effort: No respiratory distress.      Breath sounds: Normal breath sounds. No wheezing.   Skin:     Comments: Small hematoma above R eye   Neurological:      Mental Status: She is alert.      Result Review  Data Reviewed:{ Labs  Result Review  Imaging  Med Tab  Media :23}   The following data was reviewed by: Josselyn Redmond MD on 06/06/2023  Lab Results - Last 18 Months   Lab Units 06/05/23  0849 05/09/23  0951 04/11/23  0938 03/27/23  1301 03/20/23  0947 03/08/23  0854 11/14/22  0734 09/19/22  0914 09/07/22  0000 05/31/22  0856 04/04/22  1036 03/07/22  0000 02/07/22  1022 12/13/21  1002   BUN mg/dL  --   --   --   --  14 14  --  12  --    < > 16 16  --  12   CREATININE mg/dL  --   --   --   --  0.75 0.65  --  0.68  --    < > 0.66 0.74  --  0.70   EGFR IF NONAFRICN AM mL/min/1.73  --   --   --   --   --   --   --   --   --   --   --   --   --  81   SODIUM mmol/L  --   --   --   --  139 141  --  141  --    < > 140 142  --  140   POTASSIUM mmol/L  --   --   --   --  4.2 3.8  --  4.7  --    < > 4.2 4.5  --  3.8   CHLORIDE mmol/L  --   --   --   --  102 103  --  103  --    < > 102 103  --  101   CALCIUM mg/dL  --   --   --   --  9.6 9.7  --  10.3*  --    < > 9.7 9.7  --  10.0   ALBUMIN g/dL  --   --   --   --  4.4 4.5  --  4.60  --    < > 4.20  --   --  4.50   BILIRUBIN mg/dL  --   --   --   --  0.3 0.4  --  0.4  --    < > 0.3  --   --  0.6   ALK PHOS U/L  --   --   --   --  64 63  --  64  --    < > 60  --   --  57   AST (SGOT) U/L  --   --   --   --  25 21  --  29  --    < > 18  --   --  24   ALT (SGPT) U/L  --   --   --   --  16 16  --  17  --    < > 14  --   --  15   CHOLESTEROL mg/dL  --   --   --   --   --   --   --   --  205*  --   --  220*  --   --    TRIGLYCERIDES mg/dL  --   --   --   --   --  160*  --   --  127  --   --  129  --   --    HDL CHOL mg/dL  --   --   --   --   --  53  --   --  52  --   --  72  --   --    VLDL  CHOL mg/dL  --   --   --   --   --  29  --   --   --   --   --   --   --   --    VLDL CHOLESTEROL YINKA mg/dL  --   --   --   --   --   --   --   --  23  --   --  22  --   --    LDL CHOL mg/dL  --   --   --   --   --  139*  --   --  130*  --   --  126*  --   --    LDL/HDL RATIO   --   --   --   --   --  2.57  --   --   --   --   --   --   --   --    WBC 10*3/mm3 4.42 3.74 5.40   < > 6.28  --    < > 5.69  --    < > 5.96  --    < > 3.70   RBC 10*6/mm3 2.58* 2.88* 3.08*   < > 3.21*  --    < > 3.23*  --    < > 3.14*  --    < > 2.68*   HEMATOCRIT % 33.2* 35.3 37.0   < > 39.7  --    < > 37.4  --    < > 38.8  --    < > 35.1   MCV fL 128.7* 122.6* 120.1*   < > 123.7*  --    < > 115.8*  --    < > 123.6*  --    < > 131.0*   MCH pg 47.7* 45.8* 43.2*   < > 42.4*  --    < > 37.2*  --    < > 43.9*  --    < > 47.0*   URIC ACID mg/dL  --   --   --   --  4.2  --   --  4.3  --   --  3.3  --   --   --     < > = values in this interval not displayed.              Current Outpatient Medications:     fluorouracil (EFUDEX) 5 % cream, , Disp: , Rfl:     FLUoxetine (PROzac) 20 MG capsule, Take 1 capsule by mouth Daily., Disp: 90 capsule, Rfl: 1    fluticasone (FLONASE) 50 MCG/ACT nasal spray, , Disp: , Rfl:     hydroxyurea (Hydrea) 500 MG capsule, Take three capsules (1500 mg) daily, Disp: 90 capsule, Rfl: 2    omeprazole (priLOSEC) 40 MG capsule, Take 1 capsule by mouth Daily., Disp: 30 capsule, Rfl: 0    pravastatin (PRAVACHOL) 20 MG tablet, Take 1 tablet by mouth Daily., Disp: 90 tablet, Rfl: 1    Triamcinolone Acetonide 0.025 % lotion, , Disp: , Rfl:     triamterene-hydrochlorothiazide (MAXZIDE-25) 37.5-25 MG per tablet, Take 1 tablet by mouth Daily., Disp: 90 tablet, Rfl: 1    aspirin 81 MG EC tablet, Take 1 tablet by mouth Daily. (Patient not taking: Reported on 6/6/2023), Disp: , Rfl:     donepezil (Aricept) 5 MG tablet, Take 1 tablet by mouth Every Night., Disp: 30 tablet, Rfl: 3    naproxen sodium (ALEVE) 220 MG tablet, Take 1 tablet  by mouth 2 (Two) Times a Day As Needed. (Patient not taking: Reported on 6/6/2023), Disp: , Rfl:     ubrogepant (Ubrelvy) 100 MG tablet, Take 1 tablet by mouth 1 (One) Time As Needed (for migraine) for up to 1 dose., Disp: 2 tablet, Rfl: 0      Assessment and Plan {CC Problem List  Visit Diagnosis  ROS  Review (Popup)  Health Maintenance  Quality  BestPractice  Medications  SmartSets  SnapShot Encounters  Media :23}   Problem List Items Addressed This Visit          Cardiac and Vasculature    Primary hypertension    Current Assessment & Plan     Hypertension is  chronic, stable .  Continue current treatment regimen.  Dietary sodium restriction.  Regular aerobic exercise.  Continue current medications.  Ambulatory blood pressure monitoring.  Blood pressure will be reassessed at the next regular appointment.         Relevant Orders    Comprehensive metabolic panel    Elevated LDL cholesterol level    Current Assessment & Plan     Continue to monitor diet, continue current medication   Lipid panel ordered fasting lab in a few weeks            Relevant Orders    Lipid panel       Neuro    Migraine without aura and without status migrainosus, not intractable - Primary    Current Assessment & Plan     Headaches are  frequent per  complaining of headaches multiple times a week, taking imitrex .  Advised to keep a headache diary.  Medication changes per orders.  Given samples of ubrevy.  will check to see if covered by insurance.             Relevant Medications    aspirin 81 MG EC tablet    ubrogepant (Ubrelvy) 100 MG tablet     She has declined going to have neurocog testing, she is willing to try a medication to help with memory.       Follow Up   Return in about 4 months (around 10/6/2023) for Recheck migraine and memory.  Patient was given instructions and counseling regarding her condition or for health maintenance advice. Please see specific information pulled into the AVS if  appropriate.    EpicAct:[email or web address]_AMB_ORDERS,RunParams:STARTUPTYPE=FOLLOW    MR_[email or web address]_DISCHARGE

## 2023-06-06 NOTE — ASSESSMENT & PLAN NOTE
Continue to monitor diet, continue current medication   Lipid panel ordered fasting lab in a few weeks

## 2023-06-08 ENCOUNTER — LAB (OUTPATIENT)
Dept: LAB | Facility: HOSPITAL | Age: 78
End: 2023-06-08
Payer: MEDICARE

## 2023-06-08 PROCEDURE — 80053 COMPREHEN METABOLIC PANEL: CPT | Performed by: FAMILY MEDICINE

## 2023-06-08 PROCEDURE — 80061 LIPID PANEL: CPT | Performed by: FAMILY MEDICINE

## 2023-06-19 DIAGNOSIS — F41.9 ANXIETY: ICD-10-CM

## 2023-06-19 RX ORDER — FLUOXETINE HYDROCHLORIDE 20 MG/1
CAPSULE ORAL
Qty: 90 CAPSULE | Refills: 1 | Status: SHIPPED | OUTPATIENT
Start: 2023-06-19

## 2023-08-10 ENCOUNTER — SPECIALTY PHARMACY (OUTPATIENT)
Dept: PHARMACY | Facility: HOSPITAL | Age: 78
End: 2023-08-10
Payer: MEDICARE

## 2023-08-10 NOTE — PROGRESS NOTES
Specialty Pharmacy Patient Management Program  Oncology 6-Month Clinical Assessment       Sonia Wooten is a 78 y.o. female with essential thrombocytosis called today to assess adherence and side effects.    Regimen: hydrea 1000 mg daily alternating with 1500 mg daily     Reason for Outreach: Routine medication check-in .       Problem list reviewed by Genna Rodriguez RPH on 8/10/2023 at  3:14 PM  Medicines reviewed by Genna Rodriguez RPH on 8/10/2023 at  3:14 PM  Allergies reviewed by Genna Rodriguez RPH on 8/10/2023 at  3:14 PM     Goals        Specialty Pharmacy General Goal      Adherence            Medication Assessment:  Medication Assessment  Follow Up Clinical Assessment  Medication(s) assessed: hydrea  Therapeutic appropriateness: Appropriate  Medication tolerability: Tolerating with no to minimal ADRs  Medication plan: Continue therapy with normal follow-up  Quality of Life Improvement Scale: No change  Administration: Patient is taking every day at the same time .   Patient can self administer medications: yes  Medication Follow-Up Plan: Next clinical assessment  Lab Review: The labs listed below have been reviewed.  Dose increased last visit . Patient will have repeat labs next week to assess need for dose adjustment.     Lab Results   Component Value Date    GLUCOSE 128 (H) 07/03/2023    CALCIUM 9.4 07/03/2023     07/03/2023    K 4.0 07/03/2023    CO2 27.8 07/03/2023     07/03/2023    BUN 21 (H) 07/03/2023    CREATININE 0.68 07/03/2023    EGFRIFNONA 81 12/13/2021    BCR 30.9 (H) 07/03/2023    ANIONGAP 9.2 07/03/2023     Lab Results   Component Value Date    WBC 5.45 07/03/2023    RBC 2.59 (L) 07/03/2023    HGB 12.3 07/03/2023    HCT 34.5 07/03/2023    .2 (H) 07/03/2023    MCH 47.5 (H) 07/03/2023    MCHC 35.7 07/03/2023    RDW 12.8 07/03/2023    RDWSD 62.9 (H) 07/03/2023    MPV 8.7 07/03/2023     (H) 07/03/2023    NEUTRORELPCT 45.6 07/03/2023    LYMPHORELPCT 40.6 07/03/2023     MONORELPCT 10.8 07/03/2023    EOSRELPCT 0.7 07/03/2023    BASORELPCT 1.7 (H) 07/03/2023    AUTOIGPER 0.6 (H) 07/03/2023    NEUTROABS 2.49 07/03/2023    LYMPHSABS 2.21 07/03/2023    MONOSABS 0.59 07/03/2023    EOSABS 0.04 07/03/2023    BASOSABS 0.09 07/03/2023    AUTOIGNUM 0.03 07/03/2023    NRBC 0.0 07/03/2023     Drug-drug interactions  Completed medication reconciliation today to assess for drug interactions. Patient denies starting or stopping any medications.    Assessed medication list for interactions, no significant drug interactions noted.   Advised patient to call the clinic if any new medications are started so we can assess for drug-drug interactions.  Drug-food interactions discussed:  na  Vaccines are coordinated by the patient's oncologist and primary care provider.    Allergies  Known allergies and reactions were discussed with the patient. The patient's chart has been reviewed for allergy information and updated as necessary.   Allergies   Allergen Reactions    Penicillins Hives     Shortness of breath    Codeine Nausea And Vomiting        Hospitalizations and Urgent Care Visits Since Last Assessment:  Unplanned hospitalizations or inpatient admissions: no  ED Visits: no  Urgent Office Visits: no    Adherence Assessment:  Adherence Questions  Medication(s) assessed: hydrea  On average, how many doses/injections does the patient miss per month?: 0  What are the identified reasons for non-adherence or missed doses? : no problems identfied  What is the estimated medication adherence level?: %  Based on the patient/caregiver response and refill history, does this patient require an MTP to track adherence improvements?: no    Quality of Life Assessment:  Quality of Life Assessment  Quality of Life Improvement Scale: No change  -- Quality of Life: 7/10    Financial Assessment:  Medication availability/affordability: Patient has had no issues obtaining medication from pharmacy.      All questions  addressed and patient had no additional concerns. Patient has pharmacy contact information.    Name/Credentials: Genna Rodriguez PharmD, BCPS    8/10/2023  15:15 EDT

## 2023-08-14 ENCOUNTER — LAB (OUTPATIENT)
Dept: LAB | Facility: HOSPITAL | Age: 78
End: 2023-08-14
Payer: MEDICARE

## 2023-08-14 ENCOUNTER — CLINICAL SUPPORT (OUTPATIENT)
Dept: ONCOLOGY | Facility: HOSPITAL | Age: 78
End: 2023-08-14
Payer: MEDICARE

## 2023-08-14 DIAGNOSIS — D64.9 ANEMIA, UNSPECIFIED TYPE: ICD-10-CM

## 2023-08-14 DIAGNOSIS — D47.1 MYELOPROLIFERATIVE DISORDER: ICD-10-CM

## 2023-08-14 LAB
BASOPHILS # BLD AUTO: 0.07 10*3/MM3 (ref 0–0.2)
BASOPHILS NFR BLD AUTO: 1.4 % (ref 0–1.5)
DEPRECATED RDW RBC AUTO: 61.2 FL (ref 37–54)
EOSINOPHIL # BLD AUTO: 0.03 10*3/MM3 (ref 0–0.4)
EOSINOPHIL NFR BLD AUTO: 0.6 % (ref 0.3–6.2)
ERYTHROCYTE [DISTWIDTH] IN BLOOD BY AUTOMATED COUNT: 13.1 % (ref 12.3–15.4)
HCT VFR BLD AUTO: 37.3 % (ref 34–46.6)
HGB BLD-MCNC: 13.8 G/DL (ref 12–15.9)
IMM GRANULOCYTES # BLD AUTO: 0.03 10*3/MM3 (ref 0–0.05)
IMM GRANULOCYTES NFR BLD AUTO: 0.6 % (ref 0–0.5)
LYMPHOCYTES # BLD AUTO: 2.28 10*3/MM3 (ref 0.7–3.1)
LYMPHOCYTES NFR BLD AUTO: 46.5 % (ref 19.6–45.3)
MCH RBC QN AUTO: 46.2 PG (ref 26.6–33)
MCHC RBC AUTO-ENTMCNC: 37 G/DL (ref 31.5–35.7)
MCV RBC AUTO: 124.7 FL (ref 79–97)
MONOCYTES # BLD AUTO: 0.78 10*3/MM3 (ref 0.1–0.9)
MONOCYTES NFR BLD AUTO: 15.9 % (ref 5–12)
NEUTROPHILS NFR BLD AUTO: 1.71 10*3/MM3 (ref 1.7–7)
NEUTROPHILS NFR BLD AUTO: 35 % (ref 42.7–76)
NRBC BLD AUTO-RTO: 0 /100 WBC (ref 0–0.2)
PLATELET # BLD AUTO: 691 10*3/MM3 (ref 140–450)
PMV BLD AUTO: 9 FL (ref 6–12)
RBC # BLD AUTO: 2.99 10*6/MM3 (ref 3.77–5.28)
WBC NRBC COR # BLD: 4.9 10*3/MM3 (ref 3.4–10.8)

## 2023-08-14 PROCEDURE — 36415 COLL VENOUS BLD VENIPUNCTURE: CPT

## 2023-08-14 PROCEDURE — 85025 COMPLETE CBC W/AUTO DIFF WBC: CPT

## 2023-08-14 NOTE — PROGRESS NOTES
Patient contacted via telephone for RN Review. CBC reviewed with Dr Brown. Pt reports to take Hydrea 3 pills alternate with 2 pills. Pt advised to take Hydrea 1500mg (3pills) daily. Patient has no other complaints. Follow up appointment reviewed. Patient knows to call the office with any concerns or new symptoms prior to next visit. Patient verbalized understanding.     Lab Results   Component Value Date    WBC 4.90 08/14/2023    HGB 13.8 08/14/2023    HCT 37.3 08/14/2023    .7 (H) 08/14/2023     (H) 08/14/2023

## 2023-09-11 ENCOUNTER — TELEPHONE (OUTPATIENT)
Dept: FAMILY MEDICINE CLINIC | Facility: CLINIC | Age: 78
End: 2023-09-11
Payer: MEDICARE

## 2023-09-11 NOTE — TELEPHONE ENCOUNTER
"----- Message from Josselyn Redmond MD sent at 9/11/2023  7:55 AM EDT -----  Regarding: FW: Medication   Contact: [phone]        ----- Message -----  From: Huong Cary MA  Sent: 9/11/2023   7:52 AM EDT  To: Josselyn Redmond MD  Subject: FW: Medication                                     ----- Message -----  From: Sonia Wooetn \"Yohana\"  Sent: 9/10/2023  11:32 AM EDT  To: [facility]  Subject: Medication                                       I began taking Donepezil in June.  I am experiencing diarrhea, loss of appetite, some nausea, some trouble sleeping, tiredness.  It seems like my body has had enough time to adjust.  Should I stop taking?    "

## 2023-09-11 NOTE — TELEPHONE ENCOUNTER
Called patient's home phone and spoke with her . Advised that  instructed her to stop taking Donepezil. Pt's  verbalized understanding.

## 2023-09-18 ENCOUNTER — HOSPITAL ENCOUNTER (OUTPATIENT)
Dept: BONE DENSITY | Facility: HOSPITAL | Age: 78
Discharge: HOME | End: 2023-09-18
Admitting: FAMILY MEDICINE
Payer: MEDICARE

## 2023-09-18 DIAGNOSIS — Z78.0 POST-MENOPAUSAL: ICD-10-CM

## 2023-09-18 PROCEDURE — 77080 DXA BONE DENSITY AXIAL: CPT

## 2023-09-25 ENCOUNTER — LAB (OUTPATIENT)
Dept: LAB | Facility: HOSPITAL | Age: 78
End: 2023-09-25
Payer: MEDICARE

## 2023-09-25 ENCOUNTER — APPOINTMENT (OUTPATIENT)
Dept: ONCOLOGY | Facility: HOSPITAL | Age: 78
End: 2023-09-25
Payer: MEDICARE

## 2023-09-25 DIAGNOSIS — D64.9 ANEMIA, UNSPECIFIED TYPE: ICD-10-CM

## 2023-09-25 DIAGNOSIS — D47.1 MYELOPROLIFERATIVE DISORDER: ICD-10-CM

## 2023-09-25 LAB
BASOPHILS # BLD AUTO: 0.05 10*3/MM3 (ref 0–0.2)
BASOPHILS NFR BLD AUTO: 1.2 % (ref 0–1.5)
DEPRECATED RDW RBC AUTO: 63.1 FL (ref 37–54)
EOSINOPHIL # BLD AUTO: 0.02 10*3/MM3 (ref 0–0.4)
EOSINOPHIL NFR BLD AUTO: 0.5 % (ref 0.3–6.2)
ERYTHROCYTE [DISTWIDTH] IN BLOOD BY AUTOMATED COUNT: 13.8 % (ref 12.3–15.4)
HCT VFR BLD AUTO: 37.3 % (ref 34–46.6)
HGB BLD-MCNC: 14 G/DL (ref 12–15.9)
IMM GRANULOCYTES # BLD AUTO: 0.02 10*3/MM3 (ref 0–0.05)
IMM GRANULOCYTES NFR BLD AUTO: 0.5 % (ref 0–0.5)
LYMPHOCYTES # BLD AUTO: 1.77 10*3/MM3 (ref 0.7–3.1)
LYMPHOCYTES NFR BLD AUTO: 43.8 % (ref 19.6–45.3)
MCH RBC QN AUTO: 46.8 PG (ref 26.6–33)
MCHC RBC AUTO-ENTMCNC: 37.5 G/DL (ref 31.5–35.7)
MCV RBC AUTO: 124.7 FL (ref 79–97)
MONOCYTES # BLD AUTO: 0.47 10*3/MM3 (ref 0.1–0.9)
MONOCYTES NFR BLD AUTO: 11.6 % (ref 5–12)
NEUTROPHILS NFR BLD AUTO: 1.71 10*3/MM3 (ref 1.7–7)
NEUTROPHILS NFR BLD AUTO: 42.4 % (ref 42.7–76)
NRBC BLD AUTO-RTO: 0 /100 WBC (ref 0–0.2)
PLATELET # BLD AUTO: 676 10*3/MM3 (ref 140–450)
PMV BLD AUTO: 8.9 FL (ref 6–12)
RBC # BLD AUTO: 2.99 10*6/MM3 (ref 3.77–5.28)
WBC NRBC COR # BLD: 4.04 10*3/MM3 (ref 3.4–10.8)

## 2023-09-25 PROCEDURE — 85025 COMPLETE CBC W/AUTO DIFF WBC: CPT

## 2023-09-25 PROCEDURE — 36415 COLL VENOUS BLD VENIPUNCTURE: CPT

## 2023-10-02 RX ORDER — DONEPEZIL HYDROCHLORIDE 5 MG/1
TABLET, FILM COATED ORAL
Qty: 30 TABLET | Refills: 3 | Status: SHIPPED | OUTPATIENT
Start: 2023-10-02

## 2023-10-10 ENCOUNTER — SPECIALTY PHARMACY (OUTPATIENT)
Dept: PHARMACY | Facility: HOSPITAL | Age: 78
End: 2023-10-10
Payer: MEDICARE

## 2023-10-12 DIAGNOSIS — E78.00 ELEVATED LDL CHOLESTEROL LEVEL: ICD-10-CM

## 2023-10-12 RX ORDER — PRAVASTATIN SODIUM 20 MG
20 TABLET ORAL DAILY
Qty: 90 TABLET | Refills: 1 | Status: SHIPPED | OUTPATIENT
Start: 2023-10-12

## 2023-10-20 ENCOUNTER — TELEPHONE (OUTPATIENT)
Dept: ONCOLOGY | Facility: CLINIC | Age: 78
End: 2023-10-20
Payer: MEDICARE

## 2023-10-20 NOTE — TELEPHONE ENCOUNTER
"Pt called c/o ongoing vague GI symptoms for the past \"month or so\". Denies nausea, vomiting and abdominal pain, but has occasional diarrhea and a decreased appetite. She would like to stop her hydrea because of this. Discussed with pt that she has been on hydrea for some time without issues and it is not likely to be having an issue now, but if she does not feel well enough to take the medication, it would be up to her discretion. Advised pt she would need to get in touch with her PCP if she is not feeling well as she has been in contact with them quite a bit recently. She has an appointment there in December and discussed that if she is having a problem they could probably see her sooner. Pt will be here for CBC in November. If she continues to have any worsening symptoms and stops her hydrea advised her to let us know.  "

## 2023-10-22 DIAGNOSIS — I10 PRIMARY HYPERTENSION: ICD-10-CM

## 2023-10-23 RX ORDER — TRIAMTERENE AND HYDROCHLOROTHIAZIDE 37.5; 25 MG/1; MG/1
1 TABLET ORAL DAILY
Qty: 90 TABLET | Refills: 1 | Status: SHIPPED | OUTPATIENT
Start: 2023-10-23

## 2023-10-30 ENCOUNTER — OFFICE VISIT (OUTPATIENT)
Dept: FAMILY MEDICINE CLINIC | Facility: CLINIC | Age: 78
End: 2023-10-30
Payer: MEDICARE

## 2023-10-30 VITALS
OXYGEN SATURATION: 97 % | TEMPERATURE: 97.7 F | HEIGHT: 65 IN | DIASTOLIC BLOOD PRESSURE: 58 MMHG | WEIGHT: 151.2 LBS | HEART RATE: 110 BPM | SYSTOLIC BLOOD PRESSURE: 90 MMHG | BODY MASS INDEX: 25.19 KG/M2

## 2023-10-30 DIAGNOSIS — R10.84 DIFFUSE ABDOMINAL PAIN: ICD-10-CM

## 2023-10-30 DIAGNOSIS — R19.7 DIARRHEA, UNSPECIFIED TYPE: ICD-10-CM

## 2023-10-30 DIAGNOSIS — I10 PRIMARY HYPERTENSION: Primary | ICD-10-CM

## 2023-10-30 PROCEDURE — 3074F SYST BP LT 130 MM HG: CPT | Performed by: FAMILY MEDICINE

## 2023-10-30 PROCEDURE — 99214 OFFICE O/P EST MOD 30 MIN: CPT | Performed by: FAMILY MEDICINE

## 2023-10-30 PROCEDURE — 1160F RVW MEDS BY RX/DR IN RCRD: CPT | Performed by: FAMILY MEDICINE

## 2023-10-30 PROCEDURE — 3078F DIAST BP <80 MM HG: CPT | Performed by: FAMILY MEDICINE

## 2023-10-30 PROCEDURE — 1159F MED LIST DOCD IN RCRD: CPT | Performed by: FAMILY MEDICINE

## 2023-10-30 RX ORDER — ONDANSETRON 4 MG/1
4 TABLET, FILM COATED ORAL EVERY 8 HOURS PRN
Qty: 30 TABLET | Refills: 0 | Status: SHIPPED | OUTPATIENT
Start: 2023-10-30

## 2023-10-30 RX ORDER — OMEPRAZOLE 40 MG/1
40 CAPSULE, DELAYED RELEASE ORAL DAILY
Qty: 30 CAPSULE | Refills: 0 | Status: SHIPPED | OUTPATIENT
Start: 2023-10-30

## 2023-10-30 NOTE — PROGRESS NOTES
"    Chief Complaint  Abdominal Pain (Pt is here today presents stomach pain and fatigue for about 2-8 weeks.)    Subjective    History of Present Illness {CC  Problem List  Visit  Diagnosis   Encounters  Notes  Medications  Labs  Result Review Imaging  Media :23}     Sonia Wooten presents to [facility] for   History of Present Illness     77 yo female present for acute visit.  Pt states pain begin about 2 weeks ago,  states has been going on for longer about 8 weeks.   State having some loose dark, BM nearly everday.    Some nausea and no vomiting.  Not eating or drinking much the last few weeks.    Stop the donepezil a month ago thinking it was the problem but the symptoms have not improved.      Described tightness all over abdomen.  No one particular area. NO radiation down around back or into groin.   Urinating well.      Low Bp today, not eating and drinking much over the last few weeks. Weight down about 10 pounds over the last few months.       Social History     Socioeconomic History    Marital status:      Spouse name: Temo   Tobacco Use    Smoking status: Former     Packs/day: 1.00     Years: 15.00     Additional pack years: 0.00     Total pack years: 15.00     Types: Cigarettes     Quit date: 10/1/1979     Years since quittin.1    Smokeless tobacco: Never   Vaping Use    Vaping Use: Never used   Substance and Sexual Activity    Alcohol use: Yes     Alcohol/week: 3.0 standard drinks of alcohol     Types: 1 Glasses of wine, 1 Cans of beer, 1 Shots of liquor per week    Drug use: Never    Sexual activity: Not Currently     Partners: Male      Objective     Vital Signs:   BP 90/58   Pulse 110   Temp 97.7 °F (36.5 °C)   Ht 165.1 cm (65\")   Wt 68.6 kg (151 lb 3.2 oz)   SpO2 97%   BMI 25.16 kg/m²   Physical Exam  HENT:      Head: Normocephalic.   Cardiovascular:      Rate and Rhythm: Regular rhythm.      Heart sounds: Normal heart sounds. "   Pulmonary:      Breath sounds: Normal breath sounds. No wheezing.   Abdominal:      Palpations: Abdomen is soft.      Tenderness: There is abdominal tenderness. There is no guarding or rebound.      Comments: Ttp epigastric and upper L quadrant   Musculoskeletal:      Right lower leg: No edema.      Left lower leg: No edema.   Neurological:      Mental Status: She is alert.        Result Review  Data Reviewed:{ Labs  Result Review  Imaging  Med Tab  Media :23}   The following data was reviewed by: Josselyn Redmond MD on 10/30/2023  Lab Results - Last 18 Months   Lab Units 09/25/23  0907 08/14/23  0912 07/03/23  1048 06/08/23  0838 03/27/23  1301 03/20/23  0947 03/08/23  0854 11/14/22  0734 09/19/22  0914 09/07/22  0000   BUN mg/dL  --   --  21* 13  --  14 14  --  12  --    CREATININE mg/dL  --   --  0.68 0.69  --  0.75 0.65  --  0.68  --    SODIUM mmol/L  --   --  140 139  --  139 141  --  141  --    POTASSIUM mmol/L  --   --  4.0 3.9  --  4.2 3.8  --  4.7  --    CHLORIDE mmol/L  --   --  103 103  --  102 103  --  103  --    CALCIUM mg/dL  --   --  9.4 9.3  --  9.6 9.7  --  10.3*  --    ALBUMIN g/dL  --   --  4.1 4.6  --  4.4 4.5  --  4.60  --    BILIRUBIN mg/dL  --   --  0.5 0.7  --  0.3 0.4  --  0.4  --    ALK PHOS U/L  --   --  57 51  --  64 63  --  64  --    AST (SGOT) U/L  --   --  24 22  --  25 21  --  29  --    ALT (SGPT) U/L  --   --  19 16  --  16 16  --  17  --    CHOLESTEROL mg/dL  --   --   --   --   --   --   --   --   --  205*   TRIGLYCERIDES mg/dL  --   --   --  185*  --   --  160*  --   --  127   HDL CHOL mg/dL  --   --   --  49  --   --  53  --   --  52   VLDL CHOL mg/dL  --   --   --  32  --   --  29  --   --   --    VLDL CHOLESTEROL YINKA mg/dL  --   --   --   --   --   --   --   --   --  23   LDL CHOL mg/dL  --   --   --  95  --   --  139*  --   --  130*   LDL/HDL RATIO   --   --   --  1.84  --   --  2.57  --   --   --    WBC 10*3/mm3 4.04 4.90 5.45  --    < > 6.28  --    < > 5.69  --    RBC  10*6/mm3 2.99* 2.99* 2.59*  --    < > 3.21*  --    < > 3.23*  --    HEMATOCRIT % 37.3 37.3 34.5  --    < > 39.7  --    < > 37.4  --    MCV fL 124.7* 124.7* 133.2*  --    < > 123.7*  --    < > 115.8*  --    MCH pg 46.8* 46.2* 47.5*  --    < > 42.4*  --    < > 37.2*  --    URIC ACID mg/dL  --   --   --   --   --  4.2  --   --  4.3  --     < > = values in this interval not displayed.              Current Outpatient Medications:     aspirin 81 MG EC tablet, Take 1 tablet by mouth Daily., Disp: , Rfl:     FLUoxetine (PROzac) 20 MG capsule, TAKE ONE CAPSULE BY MOUTH DAILY, Disp: 90 capsule, Rfl: 1    fluticasone (FLONASE) 50 MCG/ACT nasal spray, , Disp: , Rfl:     hydroxyurea (Hydrea) 500 MG capsule, Take three capsules (1500 mg) daily, Disp: 90 capsule, Rfl: 5    omeprazole (priLOSEC) 40 MG capsule, Take 1 capsule by mouth Daily., Disp: 30 capsule, Rfl: 0    pravastatin (PRAVACHOL) 20 MG tablet, TAKE ONE TABLET BY MOUTH DAILY, Disp: 90 tablet, Rfl: 1    triamterene-hydrochlorothiazide (MAXZIDE-25) 37.5-25 MG per tablet, TAKE ONE TABLET BY MOUTH DAILY, Disp: 90 tablet, Rfl: 1    donepezil (ARICEPT) 5 MG tablet, TAKE ONE TABLET BY MOUTH ONCE NIGHTLY (Patient not taking: Reported on 10/30/2023), Disp: 30 tablet, Rfl: 3    fluorouracil (EFUDEX) 5 % cream, , Disp: , Rfl:     naproxen sodium (ALEVE) 220 MG tablet, Take 1 tablet by mouth 2 (Two) Times a Day As Needed. (Patient not taking: Reported on 10/30/2023), Disp: , Rfl:     Triamcinolone Acetonide 0.025 % lotion, , Disp: , Rfl:     ubrogepant (Ubrelvy) 100 MG tablet, Take 1 tablet by mouth 1 (One) Time As Needed (for migraine) for up to 1 dose. (Patient not taking: Reported on 10/30/2023), Disp: 2 tablet, Rfl: 0      Assessment and Plan {CC Problem List  Visit Diagnosis  ROS  Review (Popup)  Health Maintenance  Quality  BestPractice  Medications  SmartSets  SnapShot Encounters  Media :23}   Problem List Items Addressed This Visit       Primary hypertension -  Primary    Current Assessment & Plan     BP low today  Hold BP medication   Keep log            Relevant Orders    Comprehensive Metabolic Panel     Other Visit Diagnoses       Diffuse abdominal pain        Relevant Orders    CBC w AUTO Differential    CT Abdomen Pelvis With & Without Contrast    Comprehensive Metabolic Panel    Clostridioides difficile EIA - Stool, Per Rectum    Stool Culture (Reference Lab) - Stool, Per Rectum    Diarrhea, unspecified type        Relevant Orders    CBC w AUTO Differential    CT Abdomen Pelvis With & Without Contrast    Clostridioides difficile EIA - Stool, Per Rectum    Stool Culture (Reference Lab) - Stool, Per Rectum        Rule out diverticulitis or c. diff, cbc, CT Scan, and stool studies.           Follow Up   Return if symptoms worsen or fail to improve.  Patient was given instructions and counseling regarding her condition or for health maintenance advice. Please see specific information pulled into the AVS if appropriate.    EpicAct:[email or web address]_ORDERS,RunParams:STARTUPTYPE=FOLLOW    [email or web address]_DISCHARGE

## 2023-10-31 ENCOUNTER — HOSPITAL ENCOUNTER (OUTPATIENT)
Dept: CT IMAGING | Facility: HOSPITAL | Age: 78
Discharge: HOME | End: 2023-10-31
Admitting: FAMILY MEDICINE
Payer: MEDICARE

## 2023-10-31 DIAGNOSIS — R19.7 DIARRHEA, UNSPECIFIED TYPE: ICD-10-CM

## 2023-10-31 DIAGNOSIS — R10.84 DIFFUSE ABDOMINAL PAIN: ICD-10-CM

## 2023-10-31 LAB
ALBUMIN SERPL-MCNC: 4.9 G/DL (ref 3.8–4.8)
ALBUMIN/GLOB SERPL: 1.8 {RATIO} (ref 1.2–2.2)
ALP SERPL-CCNC: 65 IU/L (ref 44–121)
ALT SERPL-CCNC: 21 IU/L (ref 0–32)
AST SERPL-CCNC: 31 IU/L (ref 0–40)
BASOPHILS # BLD AUTO: 0 X10E3/UL (ref 0–0.2)
BASOPHILS NFR BLD AUTO: 1 %
BILIRUB SERPL-MCNC: 1 MG/DL (ref 0–1.2)
BUN SERPL-MCNC: 19 MG/DL (ref 8–27)
BUN/CREAT SERPL: 21 (ref 12–28)
CALCIUM SERPL-MCNC: 10.5 MG/DL (ref 8.7–10.3)
CHLORIDE SERPL-SCNC: 95 MMOL/L (ref 96–106)
CO2 SERPL-SCNC: 24 MMOL/L (ref 20–29)
CREAT SERPL-MCNC: 0.92 MG/DL (ref 0.57–1)
EGFRCR SERPLBLD CKD-EPI 2021: 64 ML/MIN/1.73
EOSINOPHIL # BLD AUTO: 0 X10E3/UL (ref 0–0.4)
EOSINOPHIL NFR BLD AUTO: 1 %
ERYTHROCYTE [DISTWIDTH] IN BLOOD BY AUTOMATED COUNT: 17.3 % (ref 11.7–15.4)
GLOBULIN SER CALC-MCNC: 2.8 G/DL (ref 1.5–4.5)
GLUCOSE SERPL-MCNC: 102 MG/DL (ref 70–99)
HCT VFR BLD AUTO: 35 % (ref 34–46.6)
HGB BLD-MCNC: 13.3 G/DL (ref 11.1–15.9)
IMM GRANULOCYTES # BLD AUTO: 0 X10E3/UL (ref 0–0.1)
IMM GRANULOCYTES NFR BLD AUTO: 1 %
LYMPHOCYTES # BLD AUTO: 1.9 X10E3/UL (ref 0.7–3.1)
LYMPHOCYTES NFR BLD AUTO: 47 %
MCH RBC QN AUTO: 48.7 PG (ref 26.6–33)
MCHC RBC AUTO-ENTMCNC: 38 G/DL (ref 31.5–35.7)
MCV RBC AUTO: 128 FL (ref 79–97)
MONOCYTES # BLD AUTO: 0.5 X10E3/UL (ref 0.1–0.9)
MONOCYTES NFR BLD AUTO: 12 %
MORPHOLOGY BLD-IMP: ABNORMAL
NEUTROPHILS # BLD AUTO: 1.5 X10E3/UL (ref 1.4–7)
NEUTROPHILS NFR BLD AUTO: 38 %
NRBC BLD AUTO-RTO: 2 % (ref 0–0)
PLATELET # BLD AUTO: 822 X10E3/UL (ref 150–450)
POTASSIUM SERPL-SCNC: 5.2 MMOL/L (ref 3.5–5.2)
PROT SERPL-MCNC: 7.7 G/DL (ref 6–8.5)
RBC # BLD AUTO: 2.73 X10E6/UL (ref 3.77–5.28)
SODIUM SERPL-SCNC: 136 MMOL/L (ref 134–144)
WBC # BLD AUTO: 4 X10E3/UL (ref 3.4–10.8)

## 2023-10-31 PROCEDURE — 74177 CT ABD & PELVIS W/CONTRAST: CPT

## 2023-10-31 PROCEDURE — 0 DIATRIZOATE MEGLUMINE & SODIUM PER 1 ML: Performed by: FAMILY MEDICINE

## 2023-10-31 PROCEDURE — 25510000001 IOPAMIDOL 61 % SOLUTION: Performed by: FAMILY MEDICINE

## 2023-10-31 RX ADMIN — IOPAMIDOL 85 ML: 612 INJECTION, SOLUTION INTRAVENOUS at 15:24

## 2023-10-31 RX ADMIN — DIATRIZOATE MEGLUMINE AND DIATRIZOATE SODIUM 30 ML: 660; 100 LIQUID ORAL; RECTAL at 15:23

## 2023-11-01 DIAGNOSIS — R19.7 DIARRHEA, UNSPECIFIED TYPE: ICD-10-CM

## 2023-11-01 DIAGNOSIS — R10.84 DIFFUSE ABDOMINAL PAIN: ICD-10-CM

## 2023-11-01 LAB — HEMOCCULT STL QL IA: NEGATIVE

## 2023-11-06 ENCOUNTER — LAB (OUTPATIENT)
Dept: LAB | Facility: HOSPITAL | Age: 78
End: 2023-11-06
Payer: MEDICARE

## 2023-11-06 ENCOUNTER — CLINICAL SUPPORT (OUTPATIENT)
Dept: ONCOLOGY | Facility: HOSPITAL | Age: 78
End: 2023-11-06
Payer: MEDICARE

## 2023-11-06 ENCOUNTER — TELEPHONE (OUTPATIENT)
Dept: ONCOLOGY | Facility: CLINIC | Age: 78
End: 2023-11-06
Payer: MEDICARE

## 2023-11-06 DIAGNOSIS — D47.1 MYELOPROLIFERATIVE DISORDER: ICD-10-CM

## 2023-11-06 DIAGNOSIS — D64.9 ANEMIA, UNSPECIFIED TYPE: ICD-10-CM

## 2023-11-06 LAB
BASOPHILS # BLD AUTO: 0.04 10*3/MM3 (ref 0–0.2)
BASOPHILS NFR BLD AUTO: 1.5 % (ref 0–1.5)
DEPRECATED RDW RBC AUTO: 72.6 FL (ref 37–54)
EOSINOPHIL # BLD AUTO: 0.02 10*3/MM3 (ref 0–0.4)
EOSINOPHIL NFR BLD AUTO: 0.7 % (ref 0.3–6.2)
ERYTHROCYTE [DISTWIDTH] IN BLOOD BY AUTOMATED COUNT: 15.5 % (ref 12.3–15.4)
HCT VFR BLD AUTO: 27.7 % (ref 34–46.6)
HGB BLD-MCNC: 10.7 G/DL (ref 12–15.9)
IMM GRANULOCYTES # BLD AUTO: 0.2 10*3/MM3 (ref 0–0.05)
IMM GRANULOCYTES NFR BLD AUTO: 7.4 % (ref 0–0.5)
LYMPHOCYTES # BLD AUTO: 1.19 10*3/MM3 (ref 0.7–3.1)
LYMPHOCYTES NFR BLD AUTO: 43.9 % (ref 19.6–45.3)
MCH RBC QN AUTO: 50 PG (ref 26.6–33)
MCHC RBC AUTO-ENTMCNC: 38.6 G/DL (ref 31.5–35.7)
MCV RBC AUTO: 129.4 FL (ref 79–97)
MONOCYTES # BLD AUTO: 0.34 10*3/MM3 (ref 0.1–0.9)
MONOCYTES NFR BLD AUTO: 12.5 % (ref 5–12)
NEUTROPHILS NFR BLD AUTO: 0.92 10*3/MM3 (ref 1.7–7)
NEUTROPHILS NFR BLD AUTO: 34 % (ref 42.7–76)
NRBC BLD AUTO-RTO: 3.7 /100 WBC (ref 0–0.2)
PLATELET # BLD AUTO: 888 10*3/MM3 (ref 140–450)
PMV BLD AUTO: 9.1 FL (ref 6–12)
RBC # BLD AUTO: 2.14 10*6/MM3 (ref 3.77–5.28)
WBC NRBC COR # BLD: 2.71 10*3/MM3 (ref 3.4–10.8)

## 2023-11-06 PROCEDURE — 85025 COMPLETE CBC W/AUTO DIFF WBC: CPT

## 2023-11-06 PROCEDURE — 36415 COLL VENOUS BLD VENIPUNCTURE: CPT

## 2023-11-07 ENCOUNTER — SPECIALTY PHARMACY (OUTPATIENT)
Dept: PHARMACY | Facility: HOSPITAL | Age: 78
End: 2023-11-07
Payer: MEDICARE

## 2023-11-07 LAB
BACTERIA SPEC CULT: NORMAL
BACTERIA SPEC CULT: NORMAL
CAMPYLOBACTER STL CULT: NORMAL
E COLI SXT STL QL IA: NEGATIVE
SALM + SHIG STL CULT: NORMAL

## 2023-11-22 ENCOUNTER — LAB (OUTPATIENT)
Dept: LAB | Facility: HOSPITAL | Age: 78
End: 2023-11-22
Payer: MEDICARE

## 2023-11-22 ENCOUNTER — OFFICE VISIT (OUTPATIENT)
Dept: ONCOLOGY | Facility: CLINIC | Age: 78
End: 2023-11-22
Payer: MEDICARE

## 2023-11-22 VITALS
HEIGHT: 65 IN | RESPIRATION RATE: 16 BRPM | HEART RATE: 89 BPM | BODY MASS INDEX: 25.47 KG/M2 | WEIGHT: 152.9 LBS | DIASTOLIC BLOOD PRESSURE: 74 MMHG | OXYGEN SATURATION: 98 % | TEMPERATURE: 97 F | SYSTOLIC BLOOD PRESSURE: 106 MMHG

## 2023-11-22 DIAGNOSIS — D64.9 ANEMIA, UNSPECIFIED TYPE: ICD-10-CM

## 2023-11-22 DIAGNOSIS — D47.1 MYELOPROLIFERATIVE DISORDER: Primary | ICD-10-CM

## 2023-11-22 DIAGNOSIS — D47.1 MYELOPROLIFERATIVE DISORDER: ICD-10-CM

## 2023-11-22 DIAGNOSIS — D75.839 THROMBOCYTOSIS: ICD-10-CM

## 2023-11-22 LAB
ALBUMIN SERPL-MCNC: 4.4 G/DL (ref 3.5–5.2)
ALBUMIN/GLOB SERPL: 1.8 G/DL
ALP SERPL-CCNC: 45 U/L (ref 39–117)
ALT SERPL W P-5'-P-CCNC: 22 U/L (ref 1–33)
ANION GAP SERPL CALCULATED.3IONS-SCNC: 10.7 MMOL/L (ref 5–15)
AST SERPL-CCNC: 34 U/L (ref 1–32)
BASOPHILS # BLD AUTO: 0.04 10*3/MM3 (ref 0–0.2)
BASOPHILS NFR BLD AUTO: 1.2 % (ref 0–1.5)
BILIRUB SERPL-MCNC: 1.4 MG/DL (ref 0–1.2)
BUN SERPL-MCNC: 12 MG/DL (ref 8–23)
BUN/CREAT SERPL: 17.1 (ref 7–25)
CALCIUM SPEC-SCNC: 9.1 MG/DL (ref 8.6–10.5)
CHLORIDE SERPL-SCNC: 100 MMOL/L (ref 98–107)
CO2 SERPL-SCNC: 25.3 MMOL/L (ref 22–29)
CREAT SERPL-MCNC: 0.7 MG/DL (ref 0.6–1.1)
DEPRECATED RDW RBC AUTO: 78.8 FL (ref 37–54)
EGFRCR SERPLBLD CKD-EPI 2021: 88.7 ML/MIN/1.73
EOSINOPHIL # BLD AUTO: 0.02 10*3/MM3 (ref 0–0.4)
EOSINOPHIL NFR BLD AUTO: 0.6 % (ref 0.3–6.2)
ERYTHROCYTE [DISTWIDTH] IN BLOOD BY AUTOMATED COUNT: 15.7 % (ref 12.3–15.4)
FERRITIN SERPL-MCNC: 311 NG/ML (ref 13–150)
GLOBULIN UR ELPH-MCNC: 2.5 GM/DL
GLUCOSE SERPL-MCNC: 102 MG/DL (ref 65–99)
HCT VFR BLD AUTO: 29.9 % (ref 34–46.6)
HGB BLD-MCNC: 10.7 G/DL (ref 12–15.9)
HGB RETIC QN AUTO: 46.2 PG (ref 29.8–36.1)
IMM GRANULOCYTES # BLD AUTO: 0.01 10*3/MM3 (ref 0–0.05)
IMM GRANULOCYTES NFR BLD AUTO: 0.3 % (ref 0–0.5)
IMM RETICS NFR: 28.4 % (ref 3–15.8)
IRON 24H UR-MRATE: 245 MCG/DL (ref 37–145)
IRON SATN MFR SERPL: 71 % (ref 20–50)
LDH SERPL-CCNC: 433 U/L (ref 135–214)
LYMPHOCYTES # BLD AUTO: 1.68 10*3/MM3 (ref 0.7–3.1)
LYMPHOCYTES NFR BLD AUTO: 48.6 % (ref 19.6–45.3)
MCH RBC QN AUTO: 49.5 PG (ref 26.6–33)
MCHC RBC AUTO-ENTMCNC: 35.8 G/DL (ref 31.5–35.7)
MCV RBC AUTO: 138.4 FL (ref 79–97)
MONOCYTES # BLD AUTO: 0.2 10*3/MM3 (ref 0.1–0.9)
MONOCYTES NFR BLD AUTO: 5.8 % (ref 5–12)
NEUTROPHILS NFR BLD AUTO: 1.51 10*3/MM3 (ref 1.7–7)
NEUTROPHILS NFR BLD AUTO: 43.5 % (ref 42.7–76)
NRBC BLD AUTO-RTO: 2 /100 WBC (ref 0–0.2)
PLATELET # BLD AUTO: 360 10*3/MM3 (ref 140–450)
PMV BLD AUTO: 9 FL (ref 6–12)
POTASSIUM SERPL-SCNC: 4 MMOL/L (ref 3.5–5.2)
PROT SERPL-MCNC: 6.9 G/DL (ref 6–8.5)
RBC # BLD AUTO: 2.16 10*6/MM3 (ref 3.77–5.28)
RETICS # AUTO: 0.06 10*6/MM3 (ref 0.02–0.13)
RETICS/RBC NFR AUTO: 2.97 % (ref 0.7–1.9)
SODIUM SERPL-SCNC: 136 MMOL/L (ref 136–145)
TIBC SERPL-MCNC: 347 MCG/DL (ref 298–536)
TRANSFERRIN SERPL-MCNC: 248 MG/DL (ref 200–360)
WBC NRBC COR # BLD AUTO: 3.46 10*3/MM3 (ref 3.4–10.8)

## 2023-11-22 PROCEDURE — 85025 COMPLETE CBC W/AUTO DIFF WBC: CPT

## 2023-11-22 PROCEDURE — 80053 COMPREHEN METABOLIC PANEL: CPT

## 2023-11-22 PROCEDURE — 82728 ASSAY OF FERRITIN: CPT | Performed by: INTERNAL MEDICINE

## 2023-11-22 PROCEDURE — 36415 COLL VENOUS BLD VENIPUNCTURE: CPT

## 2023-11-22 PROCEDURE — 84466 ASSAY OF TRANSFERRIN: CPT | Performed by: INTERNAL MEDICINE

## 2023-11-22 PROCEDURE — 83615 LACTATE (LD) (LDH) ENZYME: CPT

## 2023-11-22 PROCEDURE — 83540 ASSAY OF IRON: CPT | Performed by: INTERNAL MEDICINE

## 2023-11-22 PROCEDURE — 85046 RETICYTE/HGB CONCENTRATE: CPT | Performed by: INTERNAL MEDICINE

## 2023-11-22 NOTE — PROGRESS NOTES
Subjective     REASON FOR FOLLOW UP: Essential thrombocytosis controlled with Hydrea    HISTORY OF PRESENT ILLNESS:  The patient is a 78 y.o. year old female who was referred to us from her primary care office with abnormal CBC showing her platelet count over 1.5 million. She was started on 81 mg aspirin and Hydrea.  Current Hydrea dose is 1500 mg p.o. daily    Her recent labs in our office have shown her platelet count was increasing and her hemoglobin and white cells had decreased and for this reason we will concerned that we may need to modify her treatment and have him return to the office today for for MD visit and labs.    She had recently also had some GI issues with diarrhea and I think it is possible that she may not have been absorbing the Hydrea well during that period.  Her blood count in the office today does look better.  Her platelet count is back down to 360,000 and her hemoglobin remains stable at 10.7 g/dL.  Her white count was improved at 3460 compared 2710 on the previous lab.  She tells me that she did not interrupt her Hydrea when she was having the GI issues.    For now we will continue her current dose of Hydrea 1500 mg daily but we will check her blood counts monthly and if her platelet control remains good we may be able to decrease the dose of Hydrea in the future.  We also will be checking additional iron studies today due to the decline in her hemoglobin.      History of Present Illness     Past Medical History:   Diagnosis Date    ADHD (attention deficit hyperactivity disorder) 1995    have had since childhood    Allergic exema    Anxiety     Cancer blood    in treatment CBC    Cataract removed    Colon polyp checked every 5 years    none at present    DDD (degenerative disc disease), lumbar     Fibromyalgia, primary off and on    GERD (gastroesophageal reflux disease)     H/o Hip pain     Headache occasional    History of depression     History of low back pain     History of  thrombocytosis     HL (hearing loss) left ear    Hypercholesterolemia     Irritable bowel syndrome occasional    Low back pain     Scoliosis slight curve    Sleep apnea     Tremor slight in leg    Urinary tract infection October this year    treated        Past Surgical History:   Procedure Laterality Date    APPENDECTOMY  removed 1972    BACK SURGERY      CATARACT EXTRACTION  2018    COLONOSCOPY  regular checkups    COSMETIC SURGERY  15 years ago    HIP SURGERY      JOINT REPLACEMENT  right hip & left knee    2011 & 2013    KNEE SURGERY      SPINE SURGERY  1971    SUBTOTAL HYSTERECTOMY  1972    TOTAL HIP ARTHROPLASTY Right 2011    TOTAL KNEE ARTHROPLASTY Right 2013    TUBAL ABDOMINAL LIGATION  1972        Current Outpatient Medications on File Prior to Visit   Medication Sig Dispense Refill    aspirin 81 MG EC tablet Take 1 tablet by mouth Daily.      fluorouracil (EFUDEX) 5 % cream       FLUoxetine (PROzac) 20 MG capsule TAKE ONE CAPSULE BY MOUTH DAILY 90 capsule 1    fluticasone (FLONASE) 50 MCG/ACT nasal spray       hydroxyurea (Hydrea) 500 MG capsule Take three capsules (1500 mg) daily 90 capsule 5    omeprazole (priLOSEC) 40 MG capsule Take 1 capsule by mouth Daily. 30 capsule 0    ondansetron (Zofran) 4 MG tablet Take 1 tablet by mouth Every 8 (Eight) Hours As Needed for Nausea or Vomiting. 30 tablet 0    pravastatin (PRAVACHOL) 20 MG tablet TAKE ONE TABLET BY MOUTH DAILY 90 tablet 1    Triamcinolone Acetonide 0.025 % lotion       triamterene-hydrochlorothiazide (MAXZIDE-25) 37.5-25 MG per tablet TAKE ONE TABLET BY MOUTH DAILY 90 tablet 1    ubrogepant (Ubrelvy) 100 MG tablet Take 1 tablet by mouth 1 (One) Time As Needed (for migraine) for up to 1 dose. 2 tablet 0     No current facility-administered medications on file prior to visit.        ALLERGIES:    Allergies   Allergen Reactions    Penicillins Hives     Shortness of breath    Codeine Nausea And Vomiting        Social History     Socioeconomic History  "   Marital status:      Spouse name: Temo   Tobacco Use    Smoking status: Former     Packs/day: 1.00     Years: 15.00     Additional pack years: 0.00     Total pack years: 15.00     Types: Cigarettes     Quit date: 10/1/1979     Years since quittin.1    Smokeless tobacco: Never   Vaping Use    Vaping Use: Never used   Substance and Sexual Activity    Alcohol use: Yes     Alcohol/week: 3.0 standard drinks of alcohol     Types: 1 Glasses of wine, 1 Cans of beer, 1 Shots of liquor per week    Drug use: Never    Sexual activity: Not Currently     Partners: Male        Family History   Problem Relation Age of Onset    Breast cancer Neg Hx         Review of Systems   Constitutional:  Negative for activity change, chills, fatigue and fever.   HENT:  Negative for mouth sores, trouble swallowing and voice change.    Eyes:  Negative for pain and visual disturbance.   Respiratory:  Negative for cough, shortness of breath and wheezing.    Cardiovascular:  Negative for chest pain and palpitations.   Gastrointestinal:  Positive for abdominal pain and diarrhea. Negative for constipation, nausea and vomiting.   Genitourinary:  Negative for difficulty urinating, frequency and urgency.   Musculoskeletal:  Negative for arthralgias and joint swelling.   Skin:  Negative for rash.   Neurological:  Negative for dizziness, seizures, weakness and headaches.   Hematological:  Negative for adenopathy. Does not bruise/bleed easily.   Psychiatric/Behavioral:  Negative for behavioral problems and confusion. The patient is not nervous/anxious.        Objective     Vitals:    23 0816   BP: 106/74   Pulse: 89   Resp: 16   Temp: 97 °F (36.1 °C)   TempSrc: Temporal   SpO2: 98%   Weight: 69.4 kg (152 lb 14.4 oz)   Height: 165.1 cm (65\")   PainSc: 0-No pain         2023     8:11 AM   Current Status   ECOG score 0       Physical Exam   Constitutional: She is oriented to person, place, and time. She appears well-developed. No " distress.   HENT:   Head: Normocephalic.   Eyes: Pupils are equal, round, and reactive to light. Conjunctivae are normal. No scleral icterus.   Neck: No JVD present. No thyromegaly present.   Cardiovascular: Normal rate and regular rhythm. Exam reveals no gallop and no friction rub.   No murmur heard.  Pulmonary/Chest: Effort normal and breath sounds normal. She has no wheezes. She has no rales.   Abdominal: Soft. She exhibits no distension and no mass. There is no abdominal tenderness.   Musculoskeletal: Normal range of motion. No deformity.   Lymphadenopathy:     She has no cervical adenopathy.   Neurological: She is alert and oriented to person, place, and time. She has normal reflexes. No cranial nerve deficit.   Skin: Skin is warm and dry. No rash noted. No erythema.   Nailbed discoloration   Psychiatric: Her behavior is normal. Judgment normal.            RECENT LABS:  Hematology WBC   Date Value Ref Range Status   11/22/2023 3.46 3.40 - 10.80 10*3/mm3 Final   10/30/2023 4.0 3.4 - 10.8 x10E3/uL Final     RBC   Date Value Ref Range Status   11/22/2023 2.16 (L) 3.77 - 5.28 10*6/mm3 Final   10/30/2023 2.73 (L) 3.77 - 5.28 x10E6/uL Final     Comment:     Polychromasia present     Hemoglobin   Date Value Ref Range Status   11/22/2023 10.7 (L) 12.0 - 15.9 g/dL Final     Hematocrit   Date Value Ref Range Status   11/22/2023 29.9 (L) 34.0 - 46.6 % Final     Platelets   Date Value Ref Range Status   11/22/2023 360 140 - 450 10*3/mm3 Final        CT SCAN OF THE HEAD AND MAXILLOFACIAL REGION WITHOUT CONTRAST 3/16/2023     CLINICAL HISTORY: Head and facial injury following fall 1 week ago.     TECHNIQUE: CT scan of the head was obtained with 2 mm axial bone  algorithm and 3 mm axial soft tissue algorithm images. Sagittal and  coronal reconstructed images were obtained. Additionally, a dedicated CT  scan of the maxillofacial region was obtained with 1 mm axial, coronal,  and sagittal bone algorithm images and 2 mm axial  soft tissue algorithm  images.     FINDINGS:     HEAD CT: There is no evidence for a calvarial fracture. There is no  evidence for an acute extra-axial hemorrhage. Otherwise, the ventricles,  sulci, and cisterns are age-appropriate. The gray-white matter  differentiation is within normal limits. Old lacunar disease is seen  within the lentiform nuclei. The posterior fossa structures are within  normal limits.     MAXILLOFACIAL CT: There is no evidence for acute fracture or bony  malalignment involving the maxillofacial region.     There is a prominent size right frontal convexity scalp hematoma  extending into the right preseptal soft tissues.     There is complete opacification of the right maxillary sinus with  centrally located mineralized and inspissated secretions. There is  thickening of the walls of the right maxillary sinus compatible with  chronic osteitic changes.     IMPRESSION:     No evidence for acute traumatic intracranial pathology.     Prominent size right frontal convexity scalp hematoma extending into the  right preseptal soft tissues.    Complete opacification right maxillary sinus with centrally located  inspissated secretions and chronic osteitic changes within the walls of  the right maxillary sinus    Assessment & Plan   1.  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  Decent tolerance to Hydrea.  Current dose is 1500 mg daily.  3.  Recent increase in her platelet count coinciding with increased GI symptoms.  I wonder if she may have had gastroenteritis that was affecting her Hydrea absorption at that time.      Recommendations:  1.  Continue aspirin 81 mg po daily  2.  Hydrea 1500 mg daily  3.  Lab + RN review every 4 wks  4.  MD follow up 3 months to review results and check CBC, and adjust Hydrea dose further as needed.   5.  We anuel an iron panel, ferritin level and reticulocyte count today to assess her anemia.  We will also be  repeating the iron studies with her next visit in 3 months.

## 2023-11-27 ENCOUNTER — SPECIALTY PHARMACY (OUTPATIENT)
Dept: PHARMACY | Facility: HOSPITAL | Age: 78
End: 2023-11-27
Payer: MEDICARE

## 2023-11-27 RX ORDER — OMEPRAZOLE 40 MG/1
40 CAPSULE, DELAYED RELEASE ORAL DAILY
Qty: 30 CAPSULE | Refills: 0 | Status: SHIPPED | OUTPATIENT
Start: 2023-11-27

## 2023-12-04 ENCOUNTER — TELEPHONE (OUTPATIENT)
Dept: ONCOLOGY | Facility: CLINIC | Age: 78
End: 2023-12-04
Payer: MEDICARE

## 2023-12-04 NOTE — TELEPHONE ENCOUNTER
Patient is aware of insurance not being in network. They are using out of network benefits per her spouse

## 2023-12-13 ENCOUNTER — OFFICE VISIT (OUTPATIENT)
Dept: INTERNAL MEDICINE | Age: 78
End: 2023-12-13
Payer: MEDICARE

## 2023-12-13 VITALS
HEIGHT: 65 IN | HEART RATE: 99 BPM | BODY MASS INDEX: 24.83 KG/M2 | TEMPERATURE: 97.6 F | WEIGHT: 149 LBS | OXYGEN SATURATION: 98 % | SYSTOLIC BLOOD PRESSURE: 118 MMHG | DIASTOLIC BLOOD PRESSURE: 70 MMHG

## 2023-12-13 DIAGNOSIS — R19.7 DIARRHEA, UNSPECIFIED TYPE: ICD-10-CM

## 2023-12-13 DIAGNOSIS — Z12.11 COLON CANCER SCREENING: ICD-10-CM

## 2023-12-13 DIAGNOSIS — D64.9 ANEMIA, UNSPECIFIED TYPE: ICD-10-CM

## 2023-12-13 DIAGNOSIS — Z76.89 ENCOUNTER TO ESTABLISH CARE: Primary | ICD-10-CM

## 2023-12-13 NOTE — PROGRESS NOTES
"    I N T E R N A L  M E D I C I N E  Nafisa Kebede, ANÍBAL    ENCOUNTER DATE:  12/13/2023    Sonia Wooten / 78 y.o. / female      CHIEF COMPLAINT / REASON FOR OFFICE VISIT     Establish Care      ASSESSMENT & PLAN     Diagnoses and all orders for this visit:    1. Encounter to establish care (Primary)    2. Diarrhea, unspecified type  -     Ambulatory Referral to Gastroenterology    3. Anemia, unspecified type  -     Occult Blood, Fecal By Immunoassay - Stool, Per Rectum    4. Colon cancer screening  -     Ambulatory Referral to Gastroenterology         SUMMARY/DISCUSSION  I recommend that she follow up closely to monitor CBC and iron studies as ordered by hematology, Dr. Brown for next week.  Concern that her ongoing fatigue may be related to underlying anemia.  I recommend rechecking FOBT and also establishing with GI office to update colonoscopy screening.  She is agreeable for referral.  She will start daily probiotic and psyllium fiber supplement.        Next Appointment with me: Visit date not found    Return in about 3 months (around 3/7/2024) for Medicare Wellness.      VITAL SIGNS     Visit Vitals  /70   Pulse 99   Temp 97.6 °F (36.4 °C)   Ht 165.1 cm (65\")   Wt 67.6 kg (149 lb)   SpO2 98%   BMI 24.79 kg/m²             Wt Readings from Last 3 Encounters:   12/13/23 67.6 kg (149 lb)   11/22/23 69.4 kg (152 lb 14.4 oz)   10/30/23 68.6 kg (151 lb 3.2 oz)     Body mass index is 24.79 kg/m².        MEDICATIONS AT THE TIME OF OFFICE VISIT     Current Outpatient Medications on File Prior to Visit   Medication Sig Dispense Refill    FLUoxetine (PROzac) 20 MG capsule TAKE ONE CAPSULE BY MOUTH DAILY 90 capsule 1    fluticasone (FLONASE) 50 MCG/ACT nasal spray       hydroxyurea (Hydrea) 500 MG capsule Take three capsules (1500 mg) daily 90 capsule 5    omeprazole (priLOSEC) 40 MG capsule TAKE 1 CAPSULE BY MOUTH DAILY 30 capsule 0    pravastatin (PRAVACHOL) 20 MG tablet TAKE ONE TABLET BY MOUTH DAILY 90 tablet 1 "    Triamcinolone Acetonide 0.025 % lotion       [DISCONTINUED] ondansetron (Zofran) 4 MG tablet Take 1 tablet by mouth Every 8 (Eight) Hours As Needed for Nausea or Vomiting. 30 tablet 0    aspirin 81 MG EC tablet Take 1 tablet by mouth Daily. (Patient not taking: Reported on 12/13/2023)      fluorouracil (EFUDEX) 5 % cream       [DISCONTINUED] triamterene-hydrochlorothiazide (MAXZIDE-25) 37.5-25 MG per tablet TAKE ONE TABLET BY MOUTH DAILY 90 tablet 1    [DISCONTINUED] ubrogepant (Ubrelvy) 100 MG tablet Take 1 tablet by mouth 1 (One) Time As Needed (for migraine) for up to 1 dose. (Patient not taking: Reported on 12/13/2023) 2 tablet 0     No current facility-administered medications on file prior to visit.        HISTORY OF PRESENT ILLNESS     Here to establish care.  Former patient of Josselyn Redmond MD.    HTN: BP remains stable off of prescribed blood pressure medication, triamterene-HCTZ 37.5-25 mg daily.  At today's appointment, BP is 118/70.  BP was low at recent office visit with PCP on 10/30/2023 and she was advised to HOLD BP medication.       HLD: Pravastatin 20 mg daily.  June 2023 with normal LDL 95; mildly elevated triglycerides 185.      GERD: Symptoms well controlled on omeprazole 40 mg PRN.  No dysphagia, early satiety.  October 31, 2023 CT Abd showed renal cyst, diverticular but no acute diverticulitis.  Reports ongoing, intermittent episodes of diarrhea.  Has had prior negative FOBT, negative stool culture and negative C diff screening.  No abdominal pain, blood in stool, melena.  Due to update colonoscopy this year.    Followed by hematology/ oncology, Dr. Brown, for myeloproliferative disorder, thrombocytosis, anemia.  Remains on aspirin 81 mg daily, Hydrea 1500 mg daily.  She reports ongoing fatigue x 6 months, but no sleep problems, chest pain, dizziness, shortness of breath.     Anxiety/ Depression: Symptoms well controlled on fluoxetine 20 mg daily x 10 years.    Allergic rhinitis: Flonase  nasal spray daily.      Mammogram up to date as of March 22, 2023.  Declines to update mammogram at this time.      September 2023 DEXA with osteopenia.  She is aware of recommendation to take daily Vitamin D3 with calcium.      Patient Care Team:  Nafisa Kebede APRN as PCP - General (Family Medicine)  Ester Rivera APRN as Referring Physician (Nurse Practitioner)  Umberto Brown MD as Consulting Physician (Hematology and Oncology)  Pallares, Clara Ann, MD as Consulting Physician (Internal Medicine)    REVIEW OF SYSTEMS     Review of Systems   Constitutional:  Positive for fatigue. Negative for chills, fever and unexpected weight change.   Respiratory:  Negative for cough, chest tightness and shortness of breath.    Cardiovascular:  Negative for chest pain, palpitations and leg swelling.   Gastrointestinal:  Positive for diarrhea. Negative for abdominal pain and blood in stool.   Neurological:  Negative for dizziness, weakness, light-headedness and headaches.   Psychiatric/Behavioral:  The patient is not nervous/anxious.           PHYSICAL EXAMINATION     Physical Exam  Vitals reviewed.   Constitutional:       General: She is not in acute distress.     Appearance: Normal appearance. She is not ill-appearing, toxic-appearing or diaphoretic.   HENT:      Head: Normocephalic and atraumatic.   Cardiovascular:      Rate and Rhythm: Normal rate and regular rhythm.      Heart sounds: Normal heart sounds.   Pulmonary:      Effort: Pulmonary effort is normal.      Breath sounds: Normal breath sounds.   Abdominal:      Palpations: Abdomen is soft.      Tenderness: There is no abdominal tenderness.   Musculoskeletal:      Right lower leg: No edema.      Left lower leg: No edema.   Neurological:      Mental Status: She is alert and oriented to person, place, and time. Mental status is at baseline.   Psychiatric:         Mood and Affect: Mood normal.         Behavior: Behavior normal.         Thought Content: Thought  content normal.         Judgment: Judgment normal.           REVIEWED DATA     Labs:           Imaging:            Medical Tests:           Summary of old records / correspondence / consultant report:           Request outside records:

## 2023-12-14 DIAGNOSIS — F41.9 ANXIETY: ICD-10-CM

## 2023-12-14 RX ORDER — FLUOXETINE HYDROCHLORIDE 20 MG/1
20 CAPSULE ORAL DAILY
Qty: 90 CAPSULE | Refills: 1 | OUTPATIENT
Start: 2023-12-14

## 2023-12-20 ENCOUNTER — CLINICAL SUPPORT (OUTPATIENT)
Dept: ONCOLOGY | Facility: HOSPITAL | Age: 78
End: 2023-12-20
Payer: MEDICARE

## 2023-12-20 ENCOUNTER — TELEPHONE (OUTPATIENT)
Dept: ONCOLOGY | Facility: CLINIC | Age: 78
End: 2023-12-20
Payer: MEDICARE

## 2023-12-20 ENCOUNTER — LAB (OUTPATIENT)
Dept: LAB | Facility: HOSPITAL | Age: 78
End: 2023-12-20
Payer: MEDICARE

## 2023-12-20 DIAGNOSIS — D47.1 MYELOPROLIFERATIVE DISORDER: ICD-10-CM

## 2023-12-20 DIAGNOSIS — D64.9 ANEMIA, UNSPECIFIED TYPE: ICD-10-CM

## 2023-12-20 DIAGNOSIS — D75.839 THROMBOCYTOSIS: Primary | ICD-10-CM

## 2023-12-20 DIAGNOSIS — D75.839 THROMBOCYTOSIS: ICD-10-CM

## 2023-12-20 LAB
ALBUMIN SERPL-MCNC: 4.1 G/DL (ref 3.5–5.2)
ALBUMIN/GLOB SERPL: 1.6 G/DL
ALP SERPL-CCNC: 44 U/L (ref 39–117)
ALT SERPL W P-5'-P-CCNC: 13 U/L (ref 1–33)
ANION GAP SERPL CALCULATED.3IONS-SCNC: 12.3 MMOL/L (ref 5–15)
AST SERPL-CCNC: 27 U/L (ref 1–32)
BASOPHILS # BLD AUTO: 0.04 10*3/MM3 (ref 0–0.2)
BASOPHILS NFR BLD AUTO: 0.8 % (ref 0–1.5)
BILIRUB SERPL-MCNC: 1.6 MG/DL (ref 0–1.2)
BUN SERPL-MCNC: 14 MG/DL (ref 8–23)
BUN/CREAT SERPL: 19.7 (ref 7–25)
CALCIUM SPEC-SCNC: 8.7 MG/DL (ref 8.6–10.5)
CHLORIDE SERPL-SCNC: 100 MMOL/L (ref 98–107)
CO2 SERPL-SCNC: 23.7 MMOL/L (ref 22–29)
CREAT SERPL-MCNC: 0.71 MG/DL (ref 0.57–1)
DEPRECATED RDW RBC AUTO: 79.3 FL (ref 37–54)
EGFRCR SERPLBLD CKD-EPI 2021: 87.2 ML/MIN/1.73
EOSINOPHIL # BLD AUTO: 0 10*3/MM3 (ref 0–0.4)
EOSINOPHIL NFR BLD AUTO: 0 % (ref 0.3–6.2)
ERYTHROCYTE [DISTWIDTH] IN BLOOD BY AUTOMATED COUNT: 15.7 % (ref 12.3–15.4)
GLOBULIN UR ELPH-MCNC: 2.5 GM/DL
GLUCOSE SERPL-MCNC: 132 MG/DL (ref 65–99)
HCT VFR BLD AUTO: 21.2 % (ref 34–46.6)
HGB BLD-MCNC: 7.6 G/DL (ref 12–15.9)
IMM GRANULOCYTES # BLD AUTO: 0.02 10*3/MM3 (ref 0–0.05)
IMM GRANULOCYTES NFR BLD AUTO: 0.4 % (ref 0–0.5)
LDH SERPL-CCNC: 419 U/L (ref 135–214)
LYMPHOCYTES # BLD AUTO: 2.06 10*3/MM3 (ref 0.7–3.1)
LYMPHOCYTES NFR BLD AUTO: 42.7 % (ref 19.6–45.3)
MCH RBC QN AUTO: 51.7 PG (ref 26.6–33)
MCHC RBC AUTO-ENTMCNC: 35.8 G/DL (ref 31.5–35.7)
MCV RBC AUTO: 144.2 FL (ref 79–97)
MONOCYTES # BLD AUTO: 0.3 10*3/MM3 (ref 0.1–0.9)
MONOCYTES NFR BLD AUTO: 6.2 % (ref 5–12)
NEUTROPHILS NFR BLD AUTO: 2.41 10*3/MM3 (ref 1.7–7)
NEUTROPHILS NFR BLD AUTO: 49.9 % (ref 42.7–76)
NRBC BLD AUTO-RTO: 1.9 /100 WBC (ref 0–0.2)
PLATELET # BLD AUTO: 305 10*3/MM3 (ref 140–450)
PMV BLD AUTO: 9.4 FL (ref 6–12)
POTASSIUM SERPL-SCNC: 3.6 MMOL/L (ref 3.5–5.2)
PROT SERPL-MCNC: 6.6 G/DL (ref 6–8.5)
RBC # BLD AUTO: 1.47 10*6/MM3 (ref 3.77–5.28)
SODIUM SERPL-SCNC: 136 MMOL/L (ref 136–145)
URATE SERPL-MCNC: 2.5 MG/DL (ref 2.4–5.7)
WBC NRBC COR # BLD AUTO: 4.83 10*3/MM3 (ref 3.4–10.8)

## 2023-12-20 PROCEDURE — 84550 ASSAY OF BLOOD/URIC ACID: CPT

## 2023-12-20 PROCEDURE — 83615 LACTATE (LD) (LDH) ENZYME: CPT

## 2023-12-20 PROCEDURE — 80053 COMPREHEN METABOLIC PANEL: CPT

## 2023-12-20 PROCEDURE — 36415 COLL VENOUS BLD VENIPUNCTURE: CPT

## 2023-12-20 PROCEDURE — 85025 COMPLETE CBC W/AUTO DIFF WBC: CPT

## 2023-12-20 NOTE — PROGRESS NOTES
Lab Results   Component Value Date    WBC 4.83 12/20/2023    HGB 7.6 (C) 12/20/2023    HCT 21.2 (L) 12/20/2023    .2 (H) 12/20/2023     12/20/2023     Reviewed labs with Dr. Brown. Pt is not having any SOA, VILLEGAS or dizziness. Just feeling more fatigued than her baseline. She does not feel like she needs a blood transfusion at this time. Per Dr. Brown advised her to hold hydrea and we will recheck her labs next week. Instructed pt to call us back if she begins to experience worsening symptoms of her anemia and she v/u. Orders placed for labs in one week and message sent to scheduling for appt.

## 2023-12-25 LAB — HEMOCCULT STL QL IA: NEGATIVE

## 2023-12-27 ENCOUNTER — CLINICAL SUPPORT (OUTPATIENT)
Dept: ONCOLOGY | Facility: HOSPITAL | Age: 78
End: 2023-12-27
Payer: MEDICARE

## 2023-12-27 ENCOUNTER — LAB (OUTPATIENT)
Dept: LAB | Facility: HOSPITAL | Age: 78
End: 2023-12-27
Payer: MEDICARE

## 2023-12-27 DIAGNOSIS — D47.1 MYELOPROLIFERATIVE DISORDER: Primary | ICD-10-CM

## 2023-12-27 DIAGNOSIS — D75.839 THROMBOCYTOSIS: ICD-10-CM

## 2023-12-27 DIAGNOSIS — D64.9 ANEMIA, UNSPECIFIED TYPE: ICD-10-CM

## 2023-12-27 LAB
ABO GROUP BLD: NORMAL
BASOPHILS # BLD AUTO: 0.04 10*3/MM3 (ref 0–0.2)
BASOPHILS NFR BLD AUTO: 1.4 % (ref 0–1.5)
BLD GP AB SCN SERPL QL: NEGATIVE
DEPRECATED RDW RBC AUTO: 85.5 FL (ref 37–54)
EOSINOPHIL # BLD AUTO: 0.02 10*3/MM3 (ref 0–0.4)
EOSINOPHIL NFR BLD AUTO: 0.7 % (ref 0.3–6.2)
ERYTHROCYTE [DISTWIDTH] IN BLOOD BY AUTOMATED COUNT: 15.9 % (ref 12.3–15.4)
HCT VFR BLD AUTO: 20.3 % (ref 34–46.6)
HGB BLD-MCNC: 6.9 G/DL (ref 12–15.9)
IMM GRANULOCYTES # BLD AUTO: 0.01 10*3/MM3 (ref 0–0.05)
IMM GRANULOCYTES NFR BLD AUTO: 0.4 % (ref 0–0.5)
LYMPHOCYTES # BLD AUTO: 1.38 10*3/MM3 (ref 0.7–3.1)
LYMPHOCYTES NFR BLD AUTO: 48.8 % (ref 19.6–45.3)
MCH RBC QN AUTO: 50.4 PG (ref 26.6–33)
MCHC RBC AUTO-ENTMCNC: 34 G/DL (ref 31.5–35.7)
MCV RBC AUTO: 148.2 FL (ref 79–97)
MONOCYTES # BLD AUTO: 0.35 10*3/MM3 (ref 0.1–0.9)
MONOCYTES NFR BLD AUTO: 12.4 % (ref 5–12)
NEUTROPHILS NFR BLD AUTO: 1.03 10*3/MM3 (ref 1.7–7)
NEUTROPHILS NFR BLD AUTO: 36.3 % (ref 42.7–76)
NRBC BLD AUTO-RTO: 1.4 /100 WBC (ref 0–0.2)
PLATELET # BLD AUTO: 386 10*3/MM3 (ref 140–450)
PMV BLD AUTO: 9.3 FL (ref 6–12)
RBC # BLD AUTO: 1.37 10*6/MM3 (ref 3.77–5.28)
RH BLD: POSITIVE
T&S EXPIRATION DATE: NORMAL
WBC NRBC COR # BLD AUTO: 2.83 10*3/MM3 (ref 3.4–10.8)

## 2023-12-27 PROCEDURE — 86901 BLOOD TYPING SEROLOGIC RH(D): CPT | Performed by: INTERNAL MEDICINE

## 2023-12-27 PROCEDURE — 86923 COMPATIBILITY TEST ELECTRIC: CPT

## 2023-12-27 PROCEDURE — 85025 COMPLETE CBC W/AUTO DIFF WBC: CPT

## 2023-12-27 PROCEDURE — 36415 COLL VENOUS BLD VENIPUNCTURE: CPT

## 2023-12-27 PROCEDURE — 86900 BLOOD TYPING SEROLOGIC ABO: CPT | Performed by: INTERNAL MEDICINE

## 2023-12-27 PROCEDURE — 86850 RBC ANTIBODY SCREEN: CPT | Performed by: INTERNAL MEDICINE

## 2023-12-27 RX ORDER — ACETAMINOPHEN 325 MG/1
650 TABLET ORAL ONCE
Status: CANCELLED | OUTPATIENT
Start: 2023-12-27 | End: 2023-12-27

## 2023-12-27 RX ORDER — DIPHENHYDRAMINE HCL 25 MG
25 CAPSULE ORAL ONCE
Status: CANCELLED | OUTPATIENT
Start: 2023-12-27 | End: 2023-12-27

## 2023-12-27 RX ORDER — SODIUM CHLORIDE 9 MG/ML
250 INJECTION, SOLUTION INTRAVENOUS AS NEEDED
Status: CANCELLED | OUTPATIENT
Start: 2023-12-27

## 2023-12-27 NOTE — PROGRESS NOTES
Lab Results   Component Value Date    WBC 2.83 (L) 12/27/2023    HGB 6.9 (C) 12/27/2023    HCT 20.3 (L) 12/27/2023    .2 (H) 12/27/2023     12/27/2023     Pt here for f/u CBC and RN review. Hydrea has been on hold for 1 week, however hgb worsening. Reviewed with MD #2 Dr. Baldwin. Will order 2 units PRBC each unit over 3 hours for transfusion and pt will return next week for repeat CBC with RN review. Pt has no s/s of active bleeding, and other than fatigue and dizziness when she stands up initially she has no other c/o. Stool for occult blood was checked at PCP last week and was negative. Denies dark stool, sarah blood per rectum or hematuria. Hydrea remains on hold at this time. Pt and spouse v/u. Will have iron labs re-checked when pt is in for her blood transfusions on Friday. Advised  to call us if she has any other symptoms or report to ER for evaulation.

## 2023-12-29 ENCOUNTER — HOSPITAL ENCOUNTER (OUTPATIENT)
Dept: INFUSION THERAPY | Facility: HOSPITAL | Age: 78
Discharge: HOME | End: 2023-12-29
Payer: MEDICARE

## 2023-12-29 VITALS
DIASTOLIC BLOOD PRESSURE: 63 MMHG | SYSTOLIC BLOOD PRESSURE: 116 MMHG | RESPIRATION RATE: 16 BRPM | OXYGEN SATURATION: 100 % | HEART RATE: 84 BPM | TEMPERATURE: 97.6 F

## 2023-12-29 DIAGNOSIS — D64.9 ANEMIA, UNSPECIFIED TYPE: ICD-10-CM

## 2023-12-29 DIAGNOSIS — D47.1 MYELOPROLIFERATIVE DISORDER: ICD-10-CM

## 2023-12-29 LAB
FERRITIN SERPL-MCNC: 275 NG/ML (ref 13–150)
IRON 24H UR-MRATE: 86 MCG/DL (ref 37–145)
IRON SATN MFR SERPL: 28 % (ref 20–50)
TIBC SERPL-MCNC: 308 MCG/DL (ref 298–536)
TRANSFERRIN SERPL-MCNC: 207 MG/DL (ref 200–360)
VIT B12 BLD-MCNC: 183 PG/ML (ref 211–946)

## 2023-12-29 PROCEDURE — A9270 NON-COVERED ITEM OR SERVICE: HCPCS | Performed by: INTERNAL MEDICINE

## 2023-12-29 PROCEDURE — 82607 VITAMIN B-12: CPT | Performed by: INTERNAL MEDICINE

## 2023-12-29 PROCEDURE — 36415 COLL VENOUS BLD VENIPUNCTURE: CPT

## 2023-12-29 PROCEDURE — 84466 ASSAY OF TRANSFERRIN: CPT | Performed by: INTERNAL MEDICINE

## 2023-12-29 PROCEDURE — 63710000001 ACETAMINOPHEN 325 MG TABLET: Performed by: INTERNAL MEDICINE

## 2023-12-29 PROCEDURE — 83540 ASSAY OF IRON: CPT | Performed by: INTERNAL MEDICINE

## 2023-12-29 PROCEDURE — 82728 ASSAY OF FERRITIN: CPT | Performed by: INTERNAL MEDICINE

## 2023-12-29 PROCEDURE — 86900 BLOOD TYPING SEROLOGIC ABO: CPT

## 2023-12-29 PROCEDURE — 36430 TRANSFUSION BLD/BLD COMPNT: CPT

## 2023-12-29 PROCEDURE — 63710000001 DIPHENHYDRAMINE PER 50 MG: Performed by: INTERNAL MEDICINE

## 2023-12-29 PROCEDURE — P9016 RBC LEUKOCYTES REDUCED: HCPCS

## 2023-12-29 RX ORDER — SODIUM CHLORIDE 9 MG/ML
250 INJECTION, SOLUTION INTRAVENOUS AS NEEDED
Status: DISCONTINUED | OUTPATIENT
Start: 2023-12-29 | End: 2023-12-31 | Stop reason: HOSPADM

## 2023-12-29 RX ORDER — DIPHENHYDRAMINE HCL 25 MG
25 CAPSULE ORAL ONCE
Status: COMPLETED | OUTPATIENT
Start: 2023-12-29 | End: 2023-12-29

## 2023-12-29 RX ORDER — ACETAMINOPHEN 325 MG/1
650 TABLET ORAL ONCE
Status: COMPLETED | OUTPATIENT
Start: 2023-12-29 | End: 2023-12-29

## 2023-12-29 RX ADMIN — DIPHENHYDRAMINE HYDROCHLORIDE 25 MG: 25 CAPSULE ORAL at 08:41

## 2023-12-29 RX ADMIN — ACETAMINOPHEN 650 MG: 325 TABLET, FILM COATED ORAL at 08:41

## 2023-12-30 LAB
BH BB BLOOD EXPIRATION DATE: NORMAL
BH BB BLOOD EXPIRATION DATE: NORMAL
BH BB BLOOD TYPE BARCODE: 5100
BH BB BLOOD TYPE BARCODE: NORMAL
BH BB DISPENSE STATUS: NORMAL
BH BB DISPENSE STATUS: NORMAL
BH BB PRODUCT CODE: NORMAL
BH BB PRODUCT CODE: NORMAL
BH BB UNIT NUMBER: NORMAL
BH BB UNIT NUMBER: NORMAL
CROSSMATCH INTERPRETATION: NORMAL
CROSSMATCH INTERPRETATION: NORMAL
UNIT  ABO: NORMAL
UNIT  ABO: NORMAL
UNIT  RH: NORMAL
UNIT  RH: NORMAL

## 2024-01-04 ENCOUNTER — CLINICAL SUPPORT (OUTPATIENT)
Dept: ONCOLOGY | Facility: HOSPITAL | Age: 79
End: 2024-01-04
Payer: MEDICARE

## 2024-01-04 ENCOUNTER — LAB (OUTPATIENT)
Dept: LAB | Facility: HOSPITAL | Age: 79
End: 2024-01-04
Payer: MEDICARE

## 2024-01-04 DIAGNOSIS — D75.839 THROMBOCYTOSIS: ICD-10-CM

## 2024-01-04 DIAGNOSIS — D47.1 MYELOPROLIFERATIVE DISORDER: ICD-10-CM

## 2024-01-04 DIAGNOSIS — R19.7 DIARRHEA, UNSPECIFIED TYPE: ICD-10-CM

## 2024-01-04 DIAGNOSIS — R53.83 OTHER FATIGUE: ICD-10-CM

## 2024-01-04 DIAGNOSIS — D47.1 MYELOPROLIFERATIVE DISORDER: Primary | ICD-10-CM

## 2024-01-04 LAB
ALBUMIN SERPL-MCNC: 3.7 G/DL (ref 3.5–5.2)
ALBUMIN/GLOB SERPL: 1 G/DL
ALP SERPL-CCNC: 68 U/L (ref 39–117)
ALT SERPL W P-5'-P-CCNC: 28 U/L (ref 1–33)
ANION GAP SERPL CALCULATED.3IONS-SCNC: 12.8 MMOL/L (ref 5–15)
AST SERPL-CCNC: 34 U/L (ref 1–32)
BASOPHILS # BLD AUTO: 0.05 10*3/MM3 (ref 0–0.2)
BASOPHILS NFR BLD AUTO: 0.6 % (ref 0–1.5)
BILIRUB SERPL-MCNC: 1 MG/DL (ref 0–1.2)
BUN SERPL-MCNC: 14 MG/DL (ref 8–23)
BUN/CREAT SERPL: 18.9 (ref 7–25)
CALCIUM SPEC-SCNC: 9.1 MG/DL (ref 8.6–10.5)
CHLORIDE SERPL-SCNC: 96 MMOL/L (ref 98–107)
CO2 SERPL-SCNC: 24.2 MMOL/L (ref 22–29)
CORTIS SERPL-MCNC: 23.6 MCG/DL
CREAT SERPL-MCNC: 0.74 MG/DL (ref 0.57–1)
EGFRCR SERPLBLD CKD-EPI 2021: 82.9 ML/MIN/1.73
EOSINOPHIL # BLD AUTO: 0.02 10*3/MM3 (ref 0–0.4)
EOSINOPHIL NFR BLD AUTO: 0.2 % (ref 0.3–6.2)
ERYTHROCYTE [DISTWIDTH] IN BLOOD BY AUTOMATED COUNT: ABNORMAL %
ERYTHROCYTE [SEDIMENTATION RATE] IN BLOOD: 63 MM/HR (ref 0–30)
GLOBULIN UR ELPH-MCNC: 3.6 GM/DL
GLUCOSE SERPL-MCNC: 108 MG/DL (ref 65–99)
HCT VFR BLD AUTO: 30.4 % (ref 34–46.6)
HGB BLD-MCNC: 10.3 G/DL (ref 12–15.9)
IMM GRANULOCYTES # BLD AUTO: 0.11 10*3/MM3 (ref 0–0.05)
IMM GRANULOCYTES NFR BLD AUTO: 1.2 % (ref 0–0.5)
LYMPHOCYTES # BLD AUTO: 1.07 10*3/MM3 (ref 0.7–3.1)
LYMPHOCYTES NFR BLD AUTO: 12.1 % (ref 19.6–45.3)
MAGNESIUM SERPL-MCNC: 2.2 MG/DL (ref 1.6–2.4)
MCH RBC QN AUTO: 39.5 PG (ref 26.6–33)
MCHC RBC AUTO-ENTMCNC: 33.9 G/DL (ref 31.5–35.7)
MCV RBC AUTO: 116.5 FL (ref 79–97)
MONOCYTES # BLD AUTO: 1.29 10*3/MM3 (ref 0.1–0.9)
MONOCYTES NFR BLD AUTO: 14.6 % (ref 5–12)
NEUTROPHILS NFR BLD AUTO: 6.3 10*3/MM3 (ref 1.7–7)
NEUTROPHILS NFR BLD AUTO: 71.3 % (ref 42.7–76)
NRBC BLD AUTO-RTO: 0 /100 WBC (ref 0–0.2)
PLATELET # BLD AUTO: 700 10*3/MM3 (ref 140–450)
PMV BLD AUTO: 8.8 FL (ref 6–12)
POTASSIUM SERPL-SCNC: 4 MMOL/L (ref 3.5–5.2)
PROT SERPL-MCNC: 7.3 G/DL (ref 6–8.5)
RBC # BLD AUTO: 2.61 10*6/MM3 (ref 3.77–5.28)
SODIUM SERPL-SCNC: 133 MMOL/L (ref 136–145)
T4 FREE SERPL-MCNC: 1.4 NG/DL (ref 0.93–1.7)
TSH SERPL DL<=0.05 MIU/L-ACNC: 2.66 UIU/ML (ref 0.27–4.2)
WBC NRBC COR # BLD AUTO: 8.84 10*3/MM3 (ref 3.4–10.8)

## 2024-01-04 PROCEDURE — 84439 ASSAY OF FREE THYROXINE: CPT | Performed by: INTERNAL MEDICINE

## 2024-01-04 PROCEDURE — 36415 COLL VENOUS BLD VENIPUNCTURE: CPT

## 2024-01-04 PROCEDURE — 84443 ASSAY THYROID STIM HORMONE: CPT | Performed by: INTERNAL MEDICINE

## 2024-01-04 PROCEDURE — 85652 RBC SED RATE AUTOMATED: CPT | Performed by: INTERNAL MEDICINE

## 2024-01-04 PROCEDURE — 85025 COMPLETE CBC W/AUTO DIFF WBC: CPT

## 2024-01-04 PROCEDURE — 80053 COMPREHEN METABOLIC PANEL: CPT

## 2024-01-04 PROCEDURE — 82533 TOTAL CORTISOL: CPT | Performed by: INTERNAL MEDICINE

## 2024-01-04 PROCEDURE — 83735 ASSAY OF MAGNESIUM: CPT

## 2024-01-04 NOTE — PROGRESS NOTES
"Lab Results   Component Value Date    WBC 8.84 01/04/2024    HGB 10.3 (L) 01/04/2024    HCT 30.4 (L) 01/04/2024    .5 (H) 01/04/2024     (H) 01/04/2024     Pt here for RN review. Hgb much improved after receiving blood transfusion last week, however since she has been off hydrea, platelet count has climbed to 700. Pt has not had any improvement in her fatigue since the transfusion and it has in fact worsened. She also had a few days of diarrhea over the weekend which was not accompanied by any other symptoms and has since resolved. Denies fever, nausea, vomiting, cough or shortness of breath. Has had some headaches, but usually go away after her morning coffee, but  has had to give her some extra strength tylenol a few times. Pt is poor historian and  provides most of information re: her symptoms. Pt states \"I don't remember anything. I am just so tired\". Reviewed labs and pt's symptoms with DR. Nielsen as Dr. Brown is unavailable. Will draw additional labs today: CMP, Mag, thyroid studies, ESR, cortisol, cold agglutinin titer. Reviewed WNL thyroid and cortisol with  over the phone; Sodium 133 and she she did have some diarrhea recently recommended that he make she she is staying hydrated and has some electrolyte replacement like pedilyte. Cold Agglutinin titer is pending.  Pt will resume hydrea at 500mg daily and we will plan to recheck CBC in one week. Will follow up with Dr. Brown when he returns to the office re: any other recommendations for f/u. Also advised  to take pt to ER should she begin to have any other symptoms such as SOA, headache, weakness and he also has my direct number to call me if he has questions or concerns.  "

## 2024-01-05 DIAGNOSIS — F41.9 ANXIETY: ICD-10-CM

## 2024-01-05 DIAGNOSIS — E78.00 ELEVATED LDL CHOLESTEROL LEVEL: ICD-10-CM

## 2024-01-05 RX ORDER — FLUOXETINE HYDROCHLORIDE 20 MG/1
20 CAPSULE ORAL DAILY
Qty: 90 CAPSULE | Refills: 1 | Status: SHIPPED | OUTPATIENT
Start: 2024-01-05

## 2024-01-05 RX ORDER — FLUOXETINE HYDROCHLORIDE 20 MG/1
20 CAPSULE ORAL DAILY
Qty: 90 CAPSULE | Refills: 1 | Status: CANCELLED | OUTPATIENT
Start: 2024-01-05

## 2024-01-05 RX ORDER — PRAVASTATIN SODIUM 20 MG
20 TABLET ORAL DAILY
Qty: 90 TABLET | Refills: 1 | Status: SHIPPED | OUTPATIENT
Start: 2024-01-05

## 2024-01-08 ENCOUNTER — TELEPHONE (OUTPATIENT)
Dept: ONCOLOGY | Facility: CLINIC | Age: 79
End: 2024-01-08
Payer: MEDICARE

## 2024-01-09 LAB — CA TITR SERPL AGGL: NEGATIVE {TITER}

## 2024-01-10 ENCOUNTER — PREP FOR SURGERY (OUTPATIENT)
Dept: SURGERY | Facility: SURGERY CENTER | Age: 79
End: 2024-01-10
Payer: MEDICARE

## 2024-01-10 ENCOUNTER — OFFICE VISIT (OUTPATIENT)
Dept: GASTROENTEROLOGY | Facility: CLINIC | Age: 79
End: 2024-01-10
Payer: MEDICARE

## 2024-01-10 VITALS
BODY MASS INDEX: 24.06 KG/M2 | DIASTOLIC BLOOD PRESSURE: 70 MMHG | SYSTOLIC BLOOD PRESSURE: 110 MMHG | HEART RATE: 110 BPM | HEIGHT: 65 IN | WEIGHT: 144.4 LBS | OXYGEN SATURATION: 95 % | TEMPERATURE: 97.1 F

## 2024-01-10 DIAGNOSIS — R63.0 POOR APPETITE: ICD-10-CM

## 2024-01-10 DIAGNOSIS — D64.9 ANEMIA, UNSPECIFIED TYPE: Primary | ICD-10-CM

## 2024-01-10 DIAGNOSIS — R19.8 ALTERNATING CONSTIPATION AND DIARRHEA: ICD-10-CM

## 2024-01-10 DIAGNOSIS — R10.13 DYSPEPSIA: ICD-10-CM

## 2024-01-10 PROCEDURE — 3074F SYST BP LT 130 MM HG: CPT | Performed by: NURSE PRACTITIONER

## 2024-01-10 PROCEDURE — 99214 OFFICE O/P EST MOD 30 MIN: CPT | Performed by: NURSE PRACTITIONER

## 2024-01-10 PROCEDURE — 1159F MED LIST DOCD IN RCRD: CPT | Performed by: NURSE PRACTITIONER

## 2024-01-10 PROCEDURE — 1160F RVW MEDS BY RX/DR IN RCRD: CPT | Performed by: NURSE PRACTITIONER

## 2024-01-10 PROCEDURE — 3078F DIAST BP <80 MM HG: CPT | Performed by: NURSE PRACTITIONER

## 2024-01-10 RX ORDER — SODIUM CHLORIDE, SODIUM LACTATE, POTASSIUM CHLORIDE, CALCIUM CHLORIDE 600; 310; 30; 20 MG/100ML; MG/100ML; MG/100ML; MG/100ML
30 INJECTION, SOLUTION INTRAVENOUS CONTINUOUS PRN
OUTPATIENT
Start: 2024-01-10

## 2024-01-10 RX ORDER — SODIUM CHLORIDE 0.9 % (FLUSH) 0.9 %
10 SYRINGE (ML) INJECTION AS NEEDED
OUTPATIENT
Start: 2024-01-10

## 2024-01-10 RX ORDER — SODIUM CHLORIDE 0.9 % (FLUSH) 0.9 %
3 SYRINGE (ML) INJECTION EVERY 12 HOURS SCHEDULED
OUTPATIENT
Start: 2024-01-10

## 2024-01-10 NOTE — PATIENT INSTRUCTIONS
Schedule EGD for further evaluation of symptoms.     Recommend taking a daily fiber supplement such as Citrucel, Metamucil, or FiberCon available over-the-counter.

## 2024-01-10 NOTE — H&P (VIEW-ONLY)
"Chief Complaint   Patient presents with    GI Problem         History of Present Illness  Patient is a 78-year-old female who presents today for evaluation. She was referred for diarrhea and colon cancer screening. She is also noted to have anemia, most recent CBC last week with hemoglobin of 10.3.  Hemoglobin in December was 6.9 and she received blood transfusion.  She has follow-up pending with hematology within the next few weeks, follows for myeloproliferative disorder.  She was on hydroxyurea which was discontinued when she was found to be anemic, she has since resumed taking this at a low dose.    She is here today with her  Temo who helps provide history.    She reports she had a colonoscopy in 2023 that was normal.  She has never had an EGD.    Patient denies any GI complaints in the office today.  Denies any abdominal pain.  She has seen no visible blood in the stool and no black stool.  She completed fecal occult blood testing x 2, both of which were negative.  She denies any abdominal pain, nausea, or vomiting.  She does report poor appetite and has lost around 10 pounds over the last few months due to not eating much.    Her  reports she has been fatigued and complaining at times that her stomach feels upset and has not been eating much.  She has had some alternating diarrhea and constipation.  He has given her Dulcolax on a few occasions to help with this and she started taking Metamucil daily.     Result Review :       SCANNED - COLONOSCOPY (05/11/2018)    CT Abdomen Pelvis With Contrast (10/31/2023 15:29)    CBC & Differential (01/04/2024 09:54)    Office Visit with Nafisa Kebede APRN (12/13/2023)    Referral to Gastroenterology for Diarrhea, unspecified type; Colon cancer screening (12/13/2023)    Vital Signs:   /70   Pulse 110   Temp 97.1 °F (36.2 °C)   Ht 165.1 cm (65\")   Wt 65.5 kg (144 lb 6.4 oz)   SpO2 95%   BMI 24.03 kg/m²     Body mass index is 24.03 kg/m².   "   Physical Exam  Vitals reviewed.   Constitutional:       General: She is not in acute distress.     Appearance: She is well-developed.   HENT:      Head: Normocephalic and atraumatic.   Pulmonary:      Effort: Pulmonary effort is normal. No respiratory distress.   Abdominal:      General: Abdomen is flat. Bowel sounds are normal. There is no distension.      Palpations: Abdomen is soft.      Tenderness: There is no abdominal tenderness.   Skin:     General: Skin is dry.      Coloration: Skin is not pale.   Neurological:      Mental Status: She is alert and oriented to person, place, and time.   Psychiatric:         Thought Content: Thought content normal.           Assessment and Plan    Diagnoses and all orders for this visit:    1. Anemia, unspecified type (Primary)    2. Dyspepsia    3. Alternating constipation and diarrhea    4. Poor appetite         Discussion  Patient presents today for evaluation with concerns about anemia, dyspepsia, alternating bowel habits, and poor appetite.  Recommended EGD for further evaluation to assess for any upper GI source of bleeding, evaluate for peptic ulcer disease, gastritis, H. pylori infection, or celiac disease.  We will obtain her previous colonoscopy report for review but do not feel updated colonoscopy needed at this time as she reports a normal colonoscopy April 2023.  If EGD is unremarkable and anemia persist, may need to consider capsule endoscopy.  Recommended continuing daily fiber supplement to help promote regular bowel movements.  Will provide further recommendations dependent upon endoscopic findings and clinical course.          Follow Up   Return for Follow up to review results after testing complete.    Patient Instructions   Schedule EGD for further evaluation of symptoms.     Recommend taking a daily fiber supplement such as Citrucel, Metamucil, or FiberCon available over-the-counter.

## 2024-01-11 ENCOUNTER — LAB (OUTPATIENT)
Dept: LAB | Facility: HOSPITAL | Age: 79
End: 2024-01-11
Payer: MEDICARE

## 2024-01-11 ENCOUNTER — APPOINTMENT (OUTPATIENT)
Dept: ONCOLOGY | Facility: HOSPITAL | Age: 79
End: 2024-01-11
Payer: MEDICARE

## 2024-01-11 DIAGNOSIS — D75.839 THROMBOCYTOSIS: ICD-10-CM

## 2024-01-11 DIAGNOSIS — D47.1 MYELOPROLIFERATIVE DISORDER: ICD-10-CM

## 2024-01-11 LAB
BASOPHILS # BLD AUTO: 0.05 10*3/MM3 (ref 0–0.2)
BASOPHILS NFR BLD AUTO: 0.7 % (ref 0–1.5)
EOSINOPHIL # BLD AUTO: 0.04 10*3/MM3 (ref 0–0.4)
EOSINOPHIL NFR BLD AUTO: 0.5 % (ref 0.3–6.2)
ERYTHROCYTE [DISTWIDTH] IN BLOOD BY AUTOMATED COUNT: ABNORMAL %
HCT VFR BLD AUTO: 32.1 % (ref 34–46.6)
HGB BLD-MCNC: 10.6 G/DL (ref 12–15.9)
IMM GRANULOCYTES # BLD AUTO: 0.16 10*3/MM3 (ref 0–0.05)
IMM GRANULOCYTES NFR BLD AUTO: 2.1 % (ref 0–0.5)
LYMPHOCYTES # BLD AUTO: 2.31 10*3/MM3 (ref 0.7–3.1)
LYMPHOCYTES NFR BLD AUTO: 30.2 % (ref 19.6–45.3)
MCH RBC QN AUTO: 38.8 PG (ref 26.6–33)
MCHC RBC AUTO-ENTMCNC: 33 G/DL (ref 31.5–35.7)
MCV RBC AUTO: 117.6 FL (ref 79–97)
MONOCYTES # BLD AUTO: 0.6 10*3/MM3 (ref 0.1–0.9)
MONOCYTES NFR BLD AUTO: 7.9 % (ref 5–12)
NEUTROPHILS NFR BLD AUTO: 4.48 10*3/MM3 (ref 1.7–7)
NEUTROPHILS NFR BLD AUTO: 58.6 % (ref 42.7–76)
NRBC BLD AUTO-RTO: 0.3 /100 WBC (ref 0–0.2)
PLATELET # BLD AUTO: 1594 10*3/MM3 (ref 140–450)
PMV BLD AUTO: 8.6 FL (ref 6–12)
RBC # BLD AUTO: 2.73 10*6/MM3 (ref 3.77–5.28)
WBC NRBC COR # BLD AUTO: 7.64 10*3/MM3 (ref 3.4–10.8)

## 2024-01-11 PROCEDURE — 36415 COLL VENOUS BLD VENIPUNCTURE: CPT

## 2024-01-11 PROCEDURE — 85025 COMPLETE CBC W/AUTO DIFF WBC: CPT

## 2024-01-17 ENCOUNTER — LAB (OUTPATIENT)
Dept: LAB | Facility: HOSPITAL | Age: 79
End: 2024-01-17
Payer: MEDICARE

## 2024-01-17 ENCOUNTER — CLINICAL SUPPORT (OUTPATIENT)
Dept: ONCOLOGY | Facility: HOSPITAL | Age: 79
End: 2024-01-17
Payer: MEDICARE

## 2024-01-17 DIAGNOSIS — D64.9 ANEMIA, UNSPECIFIED TYPE: ICD-10-CM

## 2024-01-17 DIAGNOSIS — D47.1 MYELOPROLIFERATIVE DISORDER: ICD-10-CM

## 2024-01-17 LAB
BASOPHILS # BLD AUTO: 0.06 10*3/MM3 (ref 0–0.2)
BASOPHILS NFR BLD AUTO: 0.9 % (ref 0–1.5)
EOSINOPHIL # BLD AUTO: 0.03 10*3/MM3 (ref 0–0.4)
EOSINOPHIL NFR BLD AUTO: 0.4 % (ref 0.3–6.2)
ERYTHROCYTE [DISTWIDTH] IN BLOOD BY AUTOMATED COUNT: ABNORMAL %
HCT VFR BLD AUTO: 32.9 % (ref 34–46.6)
HGB BLD-MCNC: 11.1 G/DL (ref 12–15.9)
IMM GRANULOCYTES # BLD AUTO: 0.06 10*3/MM3 (ref 0–0.05)
IMM GRANULOCYTES NFR BLD AUTO: 0.9 % (ref 0–0.5)
LYMPHOCYTES # BLD AUTO: 2.11 10*3/MM3 (ref 0.7–3.1)
LYMPHOCYTES NFR BLD AUTO: 30.4 % (ref 19.6–45.3)
MCH RBC QN AUTO: 39.6 PG (ref 26.6–33)
MCHC RBC AUTO-ENTMCNC: 33.7 G/DL (ref 31.5–35.7)
MCV RBC AUTO: 117.5 FL (ref 79–97)
MONOCYTES # BLD AUTO: 0.66 10*3/MM3 (ref 0.1–0.9)
MONOCYTES NFR BLD AUTO: 9.5 % (ref 5–12)
NEUTROPHILS NFR BLD AUTO: 4.03 10*3/MM3 (ref 1.7–7)
NEUTROPHILS NFR BLD AUTO: 57.9 % (ref 42.7–76)
NRBC BLD AUTO-RTO: 0.4 /100 WBC (ref 0–0.2)
PLATELET # BLD AUTO: 1451 10*3/MM3 (ref 140–450)
PMV BLD AUTO: 8.8 FL (ref 6–12)
RBC # BLD AUTO: 2.8 10*6/MM3 (ref 3.77–5.28)
WBC NRBC COR # BLD AUTO: 6.95 10*3/MM3 (ref 3.4–10.8)

## 2024-01-17 PROCEDURE — 36415 COLL VENOUS BLD VENIPUNCTURE: CPT

## 2024-01-17 PROCEDURE — 85025 COMPLETE CBC W/AUTO DIFF WBC: CPT

## 2024-01-17 NOTE — PROGRESS NOTES
"Lab Results   Component Value Date    WBC 6.95 01/17/2024    HGB 11.1 (L) 01/17/2024    HCT 32.9 (L) 01/17/2024    .5 (H) 01/17/2024    PLT 1,451 (C) 01/17/2024     Pt continues to have a little improvement in her fatigue, still states she likes to nap frequently.  feels she is a little more \"clear\". Reviewed labs with Dr. Brown. Per instructions, advised pt and  to dose Hydrea 3 tabs daily alternating with  tabs daily. Reviewed dosing with [facility] pharmacy as well and advised  to utilize BID dosing schedule with 1 tab BID on 2 tab days and 1tab am 2 tabs pm on the 3 tab days. Per Dr. Brown, pt will return on Monday 1/29 for labs and MD f/u.   "

## 2024-01-17 NOTE — PROGRESS NOTES
Pt's continuing with some fatigue, but no other symptoms; no further GI issues. Reviewed labs with Dr. Brown and pt will increase hydrea to 2 tabs daily.  v/u and was given written information as well. Will return next week for re-check

## 2024-01-29 ENCOUNTER — OFFICE VISIT (OUTPATIENT)
Dept: ONCOLOGY | Facility: CLINIC | Age: 79
End: 2024-01-29
Payer: MEDICARE

## 2024-01-29 ENCOUNTER — LAB (OUTPATIENT)
Dept: LAB | Facility: HOSPITAL | Age: 79
End: 2024-01-29
Payer: MEDICARE

## 2024-01-29 ENCOUNTER — INFUSION (OUTPATIENT)
Dept: ONCOLOGY | Facility: HOSPITAL | Age: 79
End: 2024-01-29
Payer: MEDICARE

## 2024-01-29 VITALS
OXYGEN SATURATION: 97 % | SYSTOLIC BLOOD PRESSURE: 106 MMHG | HEART RATE: 114 BPM | RESPIRATION RATE: 16 BRPM | DIASTOLIC BLOOD PRESSURE: 70 MMHG | BODY MASS INDEX: 24.84 KG/M2 | WEIGHT: 149.1 LBS | TEMPERATURE: 97.3 F | HEIGHT: 65 IN

## 2024-01-29 DIAGNOSIS — Z15.89 MONOALLELIC MUTATION OF CALR3 GENE: ICD-10-CM

## 2024-01-29 DIAGNOSIS — D75.839 THROMBOCYTOSIS: ICD-10-CM

## 2024-01-29 DIAGNOSIS — D51.9 ANEMIA DUE TO VITAMIN B12 DEFICIENCY, UNSPECIFIED B12 DEFICIENCY TYPE: Primary | ICD-10-CM

## 2024-01-29 DIAGNOSIS — D47.1 MYELOPROLIFERATIVE DISORDER: Primary | ICD-10-CM

## 2024-01-29 DIAGNOSIS — D47.1 MYELOPROLIFERATIVE DISORDER: ICD-10-CM

## 2024-01-29 LAB
ALBUMIN SERPL-MCNC: 3.9 G/DL (ref 3.5–5.2)
ALBUMIN/GLOB SERPL: 1.3 G/DL
ALP SERPL-CCNC: 54 U/L (ref 39–117)
ALT SERPL W P-5'-P-CCNC: 22 U/L (ref 1–33)
ANION GAP SERPL CALCULATED.3IONS-SCNC: 10.4 MMOL/L (ref 5–15)
AST SERPL-CCNC: 30 U/L (ref 1–32)
BASOPHILS # BLD AUTO: 0.06 10*3/MM3 (ref 0–0.2)
BASOPHILS NFR BLD AUTO: 1.1 % (ref 0–1.5)
BILIRUB SERPL-MCNC: 0.6 MG/DL (ref 0–1.2)
BUN SERPL-MCNC: 8 MG/DL (ref 8–23)
BUN/CREAT SERPL: 12.1 (ref 7–25)
CALCIUM SPEC-SCNC: 9.5 MG/DL (ref 8.6–10.5)
CHLORIDE SERPL-SCNC: 104 MMOL/L (ref 98–107)
CO2 SERPL-SCNC: 27.6 MMOL/L (ref 22–29)
CREAT SERPL-MCNC: 0.66 MG/DL (ref 0.57–1)
EGFRCR SERPLBLD CKD-EPI 2021: 89.9 ML/MIN/1.73
EOSINOPHIL # BLD AUTO: 0.06 10*3/MM3 (ref 0–0.4)
EOSINOPHIL NFR BLD AUTO: 1.1 % (ref 0.3–6.2)
ERYTHROCYTE [DISTWIDTH] IN BLOOD BY AUTOMATED COUNT: ABNORMAL %
FERRITIN SERPL-MCNC: 266 NG/ML (ref 13–150)
GLOBULIN UR ELPH-MCNC: 2.9 GM/DL
GLUCOSE SERPL-MCNC: 125 MG/DL (ref 65–99)
HCT VFR BLD AUTO: 36.8 % (ref 34–46.6)
HGB BLD-MCNC: 12.3 G/DL (ref 12–15.9)
IMM GRANULOCYTES # BLD AUTO: 0.02 10*3/MM3 (ref 0–0.05)
IMM GRANULOCYTES NFR BLD AUTO: 0.4 % (ref 0–0.5)
IRON 24H UR-MRATE: 74 MCG/DL (ref 37–145)
IRON SATN MFR SERPL: 25 % (ref 20–50)
LDH SERPL-CCNC: 309 U/L (ref 135–214)
LYMPHOCYTES # BLD AUTO: 1.99 10*3/MM3 (ref 0.7–3.1)
LYMPHOCYTES NFR BLD AUTO: 36.4 % (ref 19.6–45.3)
MCH RBC QN AUTO: 40.6 PG (ref 26.6–33)
MCHC RBC AUTO-ENTMCNC: 33.4 G/DL (ref 31.5–35.7)
MCV RBC AUTO: 121.5 FL (ref 79–97)
MONOCYTES # BLD AUTO: 0.56 10*3/MM3 (ref 0.1–0.9)
MONOCYTES NFR BLD AUTO: 10.2 % (ref 5–12)
NEUTROPHILS NFR BLD AUTO: 2.78 10*3/MM3 (ref 1.7–7)
NEUTROPHILS NFR BLD AUTO: 50.8 % (ref 42.7–76)
NRBC BLD AUTO-RTO: 0 /100 WBC (ref 0–0.2)
PLATELET # BLD AUTO: 1048 10*3/MM3 (ref 140–450)
PMV BLD AUTO: 8.7 FL (ref 6–12)
POTASSIUM SERPL-SCNC: 4.5 MMOL/L (ref 3.5–5.2)
PROT SERPL-MCNC: 6.8 G/DL (ref 6–8.5)
RBC # BLD AUTO: 3.03 10*6/MM3 (ref 3.77–5.28)
SODIUM SERPL-SCNC: 142 MMOL/L (ref 136–145)
TIBC SERPL-MCNC: 294 MCG/DL (ref 298–536)
TRANSFERRIN SERPL-MCNC: 210 MG/DL (ref 200–360)
VIT B12 BLD-MCNC: 331 PG/ML (ref 211–946)
WBC NRBC COR # BLD AUTO: 5.47 10*3/MM3 (ref 3.4–10.8)

## 2024-01-29 PROCEDURE — 25010000002 CYANOCOBALAMIN PER 1000 MCG: Performed by: INTERNAL MEDICINE

## 2024-01-29 PROCEDURE — 83540 ASSAY OF IRON: CPT

## 2024-01-29 PROCEDURE — 83615 LACTATE (LD) (LDH) ENZYME: CPT

## 2024-01-29 PROCEDURE — 36415 COLL VENOUS BLD VENIPUNCTURE: CPT

## 2024-01-29 PROCEDURE — 84466 ASSAY OF TRANSFERRIN: CPT

## 2024-01-29 PROCEDURE — 82728 ASSAY OF FERRITIN: CPT

## 2024-01-29 PROCEDURE — 80053 COMPREHEN METABOLIC PANEL: CPT

## 2024-01-29 PROCEDURE — 85025 COMPLETE CBC W/AUTO DIFF WBC: CPT

## 2024-01-29 PROCEDURE — 96372 THER/PROPH/DIAG INJ SC/IM: CPT

## 2024-01-29 PROCEDURE — 82607 VITAMIN B-12: CPT | Performed by: INTERNAL MEDICINE

## 2024-01-29 RX ORDER — CYANOCOBALAMIN 1000 UG/ML
1000 INJECTION, SOLUTION INTRAMUSCULAR; SUBCUTANEOUS
Status: DISPENSED | OUTPATIENT
Start: 2024-01-29

## 2024-01-29 RX ADMIN — CYANOCOBALAMIN 1000 MCG: 1000 INJECTION INTRAMUSCULAR; SUBCUTANEOUS at 14:08

## 2024-01-29 NOTE — PROGRESS NOTES
Subjective     REASON FOR FOLLOW UP: Essential thrombocytosis controlled with Hydrea    HISTORY OF PRESENT ILLNESS:  The patient is a 78 y.o. year old female who was referred to us from her primary care office with abnormal CBC showing her platelet count over 1.5 million. She was started on 81 mg aspirin and Hydrea.  Current Hydrea dose is 1500 mg p.o. daily    Her recent labs in our office have shown her platelet count was increasing and her hemoglobin and white cells had decreased and for this reason we will concerned that we may need to modify her treatment and have him return to the office today for for MD visit and labs.    She had recently also had some GI issues with diarrhea and I think it is possible that she may not have been absorbing the Hydrea well during that period.  Her blood count in the office today does look better.  Her platelet count is back down to 360,000 and her hemoglobin remains stable at 10.7 g/dL.  Her white count was improved at 3460 compared 2710 on the previous lab.  She tells me that she did not interrupt her Hydrea when she was having the GI issues.    INTERVAL HISTORY:  After her office visit in November, she was continued on 1500 mg of Hydrea daily.  Her blood counts in December showed that she had developed severe anemia with hemoglobin of 7.6 g/dL.  Her white count was 4800 and platelets were 300,000.  At that point we had her hold Hydrea and repeat blood counts from 12/27/2023 showed the white count was down to 2800 and the hemoglobin was down to 6.9 g/dL.  At that point we arranged outpatient transfusion of 2 units packed red blood cells.    While off Hydrea her platelet count did escalate significantly and with lab visit of 1/17/2024 her platelet count was up to 1,450,000.  Her white count was normal at 6950 and hemoglobin further improved to 11.1 g/dL.  Her Hydrea dose was adjusted to 1500 mg alternating with 1000 mg daily and she returns today for reevaluation at this dose  level.    Her platelet count is improving and today was 1,048,000.  Her hemoglobin is further improved at 12.3 g/dL and her white count is normal at 5470.    She is accompanied today by her .  He reports that he is now taking over management of her medication due to some increased issues with confusion and short-term memory problems.  I wonder if this may have had something to do with her earlier severe drop in her blood counts (perhaps she was taking extra doses and did not realize it).    In December her B12 level was also low.  We will be initiating B12 shots today.    History of Present Illness     Past Medical History:   Diagnosis Date    ADHD (attention deficit hyperactivity disorder) 1995    have had since childhood    Allergic exema    Anxiety     Cancer blood    in treatment CBC    Cataract removed    Colon polyp checked every 5 years    none at present    DDD (degenerative disc disease), lumbar     Fibromyalgia, primary off and on    GERD (gastroesophageal reflux disease)     H/o Hip pain     Headache occasional    History of depression     History of low back pain     History of thrombocytosis     HL (hearing loss) left ear    Hypercholesterolemia     Irritable bowel syndrome occasional    Low back pain     Scoliosis slight curve    Sleep apnea     Tremor slight in leg    Urinary tract infection October this year    treated        Past Surgical History:   Procedure Laterality Date    APPENDECTOMY  removed 1972    BACK SURGERY      CATARACT EXTRACTION  2018    COLONOSCOPY  regular checkups    COSMETIC SURGERY  15 years ago    HIP SURGERY      JOINT REPLACEMENT  right hip & left knee    2011 & 2013    KNEE SURGERY      SPINE SURGERY  1971    SUBTOTAL HYSTERECTOMY  1972    TOTAL HIP ARTHROPLASTY Right 2011    TOTAL KNEE ARTHROPLASTY Right 2013    TUBAL ABDOMINAL LIGATION  1972        Current Outpatient Medications on File Prior to Visit   Medication Sig Dispense Refill    aspirin 81 MG EC tablet Take  1 tablet by mouth Daily.      Cholecalciferol (VITAMIN D-3 PO) Take  by mouth.      fluorouracil (EFUDEX) 5 % cream       FLUoxetine (PROzac) 20 MG capsule Take 1 capsule by mouth Daily. 90 capsule 1    fluticasone (FLONASE) 50 MCG/ACT nasal spray       hydroxyurea (Hydrea) 500 MG capsule Take three capsules (1500 mg) daily 90 capsule 5    omeprazole (priLOSEC) 40 MG capsule TAKE 1 CAPSULE BY MOUTH DAILY 30 capsule 0    pravastatin (PRAVACHOL) 20 MG tablet Take 1 tablet by mouth Daily. 90 tablet 1    Triamcinolone Acetonide 0.025 % lotion        No current facility-administered medications on file prior to visit.        ALLERGIES:    Allergies   Allergen Reactions    Penicillins Hives     Shortness of breath    Codeine Nausea And Vomiting        Social History     Socioeconomic History    Marital status:      Spouse name: Temo   Tobacco Use    Smoking status: Former     Packs/day: 1.00     Years: 15.00     Additional pack years: 0.00     Total pack years: 15.00     Types: Cigarettes     Quit date: 10/1/1979     Years since quittin.3    Smokeless tobacco: Never   Vaping Use    Vaping Use: Never used   Substance and Sexual Activity    Alcohol use: Yes     Alcohol/week: 3.0 standard drinks of alcohol     Types: 1 Glasses of wine, 1 Cans of beer, 1 Shots of liquor per week     Comment: Social    Drug use: Never    Sexual activity: Not Currently     Partners: Male        Family History   Problem Relation Age of Onset    Hyperlipidemia Mother     Stroke Maternal Grandfather     Breast cancer Neg Hx     Colon cancer Neg Hx     Colon polyps Neg Hx     Crohn's disease Neg Hx     Irritable bowel syndrome Neg Hx     Ulcerative colitis Neg Hx         Review of Systems   Constitutional:  Negative for activity change, chills, fatigue and fever.   HENT:  Negative for mouth sores, trouble swallowing and voice change.    Eyes:  Negative for pain and visual disturbance.   Respiratory:  Negative for cough, shortness of  "breath and wheezing.    Cardiovascular:  Negative for chest pain and palpitations.   Gastrointestinal:  Positive for abdominal pain and diarrhea. Negative for constipation, nausea and vomiting.   Genitourinary:  Negative for difficulty urinating, frequency and urgency.   Musculoskeletal:  Negative for arthralgias and joint swelling.   Skin:  Negative for rash.   Neurological:  Negative for dizziness, seizures, weakness and headaches.   Hematological:  Negative for adenopathy. Does not bruise/bleed easily.   Psychiatric/Behavioral:  Negative for behavioral problems and confusion. The patient is not nervous/anxious.        Objective     Vitals:    01/29/24 1324   BP: 106/70   Pulse: 114   Resp: 16   Temp: 97.3 °F (36.3 °C)   TempSrc: Temporal   SpO2: 97%   Weight: 67.6 kg (149 lb 1.6 oz)   Height: 165.1 cm (65\")   PainSc: 0-No pain           1/29/2024     1:18 PM   Current Status   ECOG score 0       Physical Exam   Constitutional: She is oriented to person, place, and time. She appears well-developed. No distress.   HENT:   Head: Normocephalic.   Eyes: Pupils are equal, round, and reactive to light. Conjunctivae are normal. No scleral icterus.   Neck: No JVD present. No thyromegaly present.   Cardiovascular: Normal rate and regular rhythm. Exam reveals no gallop and no friction rub.   No murmur heard.  Pulmonary/Chest: Effort normal and breath sounds normal. She has no wheezes. She has no rales.   Abdominal: Soft. She exhibits no distension and no mass. There is no abdominal tenderness.   Musculoskeletal: Normal range of motion. No deformity.   Lymphadenopathy:     She has no cervical adenopathy.   Neurological: She is alert and oriented to person, place, and time. She has normal reflexes. No cranial nerve deficit.   Skin: Skin is warm and dry. No rash noted. No erythema.   Nailbed discoloration   Psychiatric: Her behavior is normal. Judgment normal.            RECENT LABS:  Hematology WBC   Date Value Ref Range " Status   01/29/2024 5.47 3.40 - 10.80 10*3/mm3 Final   10/30/2023 4.0 3.4 - 10.8 x10E3/uL Final     RBC   Date Value Ref Range Status   01/29/2024 3.03 (L) 3.77 - 5.28 10*6/mm3 Final   10/30/2023 2.73 (L) 3.77 - 5.28 x10E6/uL Final     Comment:     Polychromasia present     Hemoglobin   Date Value Ref Range Status   01/29/2024 12.3 12.0 - 15.9 g/dL Final     Hematocrit   Date Value Ref Range Status   01/29/2024 36.8 34.0 - 46.6 % Final     Platelets   Date Value Ref Range Status   01/29/2024 1,048 (C) 140 - 450 10*3/mm3 Final        CT SCAN OF THE HEAD AND MAXILLOFACIAL REGION WITHOUT CONTRAST 3/16/2023     CLINICAL HISTORY: Head and facial injury following fall 1 week ago.     TECHNIQUE: CT scan of the head was obtained with 2 mm axial bone  algorithm and 3 mm axial soft tissue algorithm images. Sagittal and  coronal reconstructed images were obtained. Additionally, a dedicated CT  scan of the maxillofacial region was obtained with 1 mm axial, coronal,  and sagittal bone algorithm images and 2 mm axial soft tissue algorithm  images.     FINDINGS:     HEAD CT: There is no evidence for a calvarial fracture. There is no  evidence for an acute extra-axial hemorrhage. Otherwise, the ventricles,  sulci, and cisterns are age-appropriate. The gray-white matter  differentiation is within normal limits. Old lacunar disease is seen  within the lentiform nuclei. The posterior fossa structures are within  normal limits.     MAXILLOFACIAL CT: There is no evidence for acute fracture or bony  malalignment involving the maxillofacial region.     There is a prominent size right frontal convexity scalp hematoma  extending into the right preseptal soft tissues.     There is complete opacification of the right maxillary sinus with  centrally located mineralized and inspissated secretions. There is  thickening of the walls of the right maxillary sinus compatible with  chronic osteitic changes.     IMPRESSION:     No evidence for acute  traumatic intracranial pathology.     Prominent size right frontal convexity scalp hematoma extending into the  right preseptal soft tissues.    Complete opacification right maxillary sinus with centrally located  inspissated secretions and chronic osteitic changes within the walls of  the right maxillary sinus    Assessment & Plan   1.  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  Current dose of Hydrea is 1000 mg alternating with 1500 mg every other day.  3.  B12 deficiency  4.  Some signs of early dementia.  It is possible that she could have inadvertently been taking too much Hydrea which may have led to her recent issues with severe anemia and leukopenia.  As noted above her  is now managing her medications.      Recommendations:  1.  Continue aspirin 81 mg po daily  2.  Continue Hydrea 1000 mg alternating with 1500 mg.  3.  Lab + RN review every 2 wks  4.  She will receive B12 1000 mcg IM in the office today.  5.  She will have a B12 shot every 2 weeks  6.  MD follow-up in 6 weeks  7.  She also is undergoing an EGD next week and we will review those results on her next visit also.

## 2024-02-01 ENCOUNTER — ANESTHESIA EVENT (OUTPATIENT)
Dept: SURGERY | Facility: SURGERY CENTER | Age: 79
End: 2024-02-01
Payer: MEDICARE

## 2024-02-01 ENCOUNTER — HOSPITAL ENCOUNTER (OUTPATIENT)
Facility: SURGERY CENTER | Age: 79
Setting detail: HOSPITAL OUTPATIENT SURGERY
Discharge: HOME | End: 2024-02-01
Attending: INTERNAL MEDICINE | Admitting: INTERNAL MEDICINE
Payer: MEDICARE

## 2024-02-01 ENCOUNTER — ANESTHESIA (OUTPATIENT)
Dept: SURGERY | Facility: SURGERY CENTER | Age: 79
End: 2024-02-01
Payer: MEDICARE

## 2024-02-01 ENCOUNTER — PREP FOR SURGERY (OUTPATIENT)
Dept: SURGERY | Facility: SURGERY CENTER | Age: 79
End: 2024-02-01
Payer: MEDICARE

## 2024-02-01 VITALS
HEIGHT: 65 IN | WEIGHT: 151 LBS | OXYGEN SATURATION: 96 % | TEMPERATURE: 98 F | RESPIRATION RATE: 16 BRPM | SYSTOLIC BLOOD PRESSURE: 135 MMHG | HEART RATE: 78 BPM | BODY MASS INDEX: 25.16 KG/M2 | DIASTOLIC BLOOD PRESSURE: 78 MMHG

## 2024-02-01 DIAGNOSIS — D64.9 ANEMIA, UNSPECIFIED TYPE: Primary | ICD-10-CM

## 2024-02-01 DIAGNOSIS — D64.9 ANEMIA, UNSPECIFIED TYPE: ICD-10-CM

## 2024-02-01 LAB — METHYLMALONATE SERPL-SCNC: 200 NMOL/L (ref 0–378)

## 2024-02-01 PROCEDURE — 25810000003 LACTATED RINGERS PER 1000 ML: Performed by: STUDENT IN AN ORGANIZED HEALTH CARE EDUCATION/TRAINING PROGRAM

## 2024-02-01 PROCEDURE — 43239 EGD BIOPSY SINGLE/MULTIPLE: CPT | Performed by: INTERNAL MEDICINE

## 2024-02-01 PROCEDURE — 25810000003 LACTATED RINGERS PER 1000 ML: Performed by: INTERNAL MEDICINE

## 2024-02-01 PROCEDURE — 87081 CULTURE SCREEN ONLY: CPT | Performed by: INTERNAL MEDICINE

## 2024-02-01 PROCEDURE — 25010000002 PROPOFOL 200 MG/20ML EMULSION: Performed by: STUDENT IN AN ORGANIZED HEALTH CARE EDUCATION/TRAINING PROGRAM

## 2024-02-01 PROCEDURE — 88305 TISSUE EXAM BY PATHOLOGIST: CPT | Performed by: INTERNAL MEDICINE

## 2024-02-01 PROCEDURE — 25010000002 PROPOFOL 10 MG/ML EMULSION: Performed by: STUDENT IN AN ORGANIZED HEALTH CARE EDUCATION/TRAINING PROGRAM

## 2024-02-01 RX ORDER — SODIUM CHLORIDE 0.9 % (FLUSH) 0.9 %
3 SYRINGE (ML) INJECTION EVERY 12 HOURS SCHEDULED
Status: DISCONTINUED | OUTPATIENT
Start: 2024-02-01 | End: 2024-02-01 | Stop reason: HOSPADM

## 2024-02-01 RX ORDER — PROPOFOL 10 MG/ML
INJECTION, EMULSION INTRAVENOUS AS NEEDED
Status: DISCONTINUED | OUTPATIENT
Start: 2024-02-01 | End: 2024-02-01 | Stop reason: SURG

## 2024-02-01 RX ORDER — SODIUM CHLORIDE, SODIUM LACTATE, POTASSIUM CHLORIDE, CALCIUM CHLORIDE 600; 310; 30; 20 MG/100ML; MG/100ML; MG/100ML; MG/100ML
INJECTION, SOLUTION INTRAVENOUS CONTINUOUS PRN
Status: DISCONTINUED | OUTPATIENT
Start: 2024-02-01 | End: 2024-02-01 | Stop reason: SURG

## 2024-02-01 RX ORDER — SODIUM CHLORIDE, SODIUM LACTATE, POTASSIUM CHLORIDE, CALCIUM CHLORIDE 600; 310; 30; 20 MG/100ML; MG/100ML; MG/100ML; MG/100ML
30 INJECTION, SOLUTION INTRAVENOUS CONTINUOUS PRN
OUTPATIENT
Start: 2024-02-01

## 2024-02-01 RX ORDER — LIDOCAINE HYDROCHLORIDE 20 MG/ML
INJECTION, SOLUTION EPIDURAL; INFILTRATION; INTRACAUDAL; PERINEURAL AS NEEDED
Status: DISCONTINUED | OUTPATIENT
Start: 2024-02-01 | End: 2024-02-01 | Stop reason: SURG

## 2024-02-01 RX ORDER — SODIUM CHLORIDE 0.9 % (FLUSH) 0.9 %
10 SYRINGE (ML) INJECTION AS NEEDED
Status: DISCONTINUED | OUTPATIENT
Start: 2024-02-01 | End: 2024-02-01 | Stop reason: HOSPADM

## 2024-02-01 RX ORDER — SODIUM CHLORIDE, SODIUM LACTATE, POTASSIUM CHLORIDE, CALCIUM CHLORIDE 600; 310; 30; 20 MG/100ML; MG/100ML; MG/100ML; MG/100ML
30 INJECTION, SOLUTION INTRAVENOUS CONTINUOUS PRN
Status: DISCONTINUED | OUTPATIENT
Start: 2024-02-01 | End: 2024-02-01 | Stop reason: HOSPADM

## 2024-02-01 RX ORDER — MAGNESIUM HYDROXIDE 1200 MG/15ML
LIQUID ORAL AS NEEDED
Status: DISCONTINUED | OUTPATIENT
Start: 2024-02-01 | End: 2024-02-01 | Stop reason: HOSPADM

## 2024-02-01 RX ORDER — SODIUM CHLORIDE 0.9 % (FLUSH) 0.9 %
10 SYRINGE (ML) INJECTION AS NEEDED
OUTPATIENT
Start: 2024-02-01

## 2024-02-01 RX ORDER — SODIUM CHLORIDE 0.9 % (FLUSH) 0.9 %
3 SYRINGE (ML) INJECTION EVERY 12 HOURS SCHEDULED
OUTPATIENT
Start: 2024-02-01

## 2024-02-01 RX ADMIN — LIDOCAINE HYDROCHLORIDE 30 MG: 20 INJECTION, SOLUTION EPIDURAL; INFILTRATION; INTRACAUDAL; PERINEURAL at 13:16

## 2024-02-01 RX ADMIN — PROPOFOL 50 MG: 10 INJECTION, EMULSION INTRAVENOUS at 13:16

## 2024-02-01 RX ADMIN — PROPOFOL 160 MCG/KG/MIN: 10 INJECTION, EMULSION INTRAVENOUS at 13:16

## 2024-02-01 RX ADMIN — SODIUM CHLORIDE, SODIUM LACTATE, POTASSIUM CHLORIDE, AND CALCIUM CHLORIDE: .6; .31; .03; .02 INJECTION, SOLUTION INTRAVENOUS at 13:14

## 2024-02-01 RX ADMIN — SODIUM CHLORIDE, POTASSIUM CHLORIDE, SODIUM LACTATE AND CALCIUM CHLORIDE 30 ML/HR: 600; 310; 30; 20 INJECTION, SOLUTION INTRAVENOUS at 12:01

## 2024-02-01 NOTE — ANESTHESIA PREPROCEDURE EVALUATION
Anesthesia Evaluation     Patient summary reviewed and Nursing notes reviewed   no history of anesthetic complications:   NPO Solid Status: > 8 hours  NPO Liquid Status: > 2 hours           Airway   Dental      Pulmonary    (+) ,sleep apnea  Cardiovascular     (+) hypertension      Neuro/Psych  (+) psychiatric history Anxiety  GI/Hepatic/Renal/Endo    (+) GERD    Musculoskeletal     Abdominal    Substance History      OB/GYN          Other   blood dyscrasia (thrombocytosis),                       Anesthesia Plan    ASA 3     MAC     intravenous induction     Anesthetic plan, risks, benefits, and alternatives have been provided, discussed and informed consent has been obtained with: patient.        CODE STATUS:

## 2024-02-01 NOTE — ANESTHESIA POSTPROCEDURE EVALUATION
Patient: Sonia Wooten    Procedure Summary       Date: 02/01/24 Room / Location: [facility] / [facility]    Anesthesia Start: 1314 Anesthesia Stop: 1329    Procedure: ESOPHAGOGASTRODUODENOSCOPY with biopsy Diagnosis:       Anemia, unspecified type      (Anemia, unspecified type [D64.9])    Surgeons: Kal Guzman MD Provider: Bryon Fuentes MD    Anesthesia Type: MAC ASA Status: 3            Anesthesia Type: MAC    Vitals  Vitals Value Taken Time   /73 02/01/24 1342   Temp     Pulse 78 02/01/24 1342   Resp 16 02/01/24 1342   SpO2 94 % 02/01/24 1342           Post Anesthesia Care and Evaluation    Patient location during evaluation: bedside  Patient participation: complete - patient participated  Level of consciousness: awake and alert  Pain management: adequate    Airway patency: patent  Anesthetic complications: No anesthetic complications  PONV Status: controlled  Cardiovascular status: blood pressure returned to baseline and acceptable  Respiratory status: acceptable  Hydration status: acceptable

## 2024-02-02 LAB
LAB AP CASE REPORT: NORMAL
LAB AP CLINICAL INFORMATION: NORMAL
PATH REPORT.FINAL DX SPEC: NORMAL
PATH REPORT.GROSS SPEC: NORMAL

## 2024-02-03 LAB — UREASE TISS QL: NEGATIVE

## 2024-02-07 DIAGNOSIS — R10.13 DYSPEPSIA: Primary | ICD-10-CM

## 2024-02-07 RX ORDER — OMEPRAZOLE 40 MG/1
40 CAPSULE, DELAYED RELEASE ORAL DAILY
Qty: 90 CAPSULE | Refills: 1 | Status: SHIPPED | OUTPATIENT
Start: 2024-02-07

## 2024-02-14 ENCOUNTER — APPOINTMENT (OUTPATIENT)
Dept: ONCOLOGY | Facility: HOSPITAL | Age: 79
End: 2024-02-14
Payer: MEDICARE

## 2024-02-14 ENCOUNTER — LAB (OUTPATIENT)
Dept: LAB | Facility: HOSPITAL | Age: 79
End: 2024-02-14
Payer: MEDICARE

## 2024-02-14 ENCOUNTER — INFUSION (OUTPATIENT)
Dept: ONCOLOGY | Facility: HOSPITAL | Age: 79
End: 2024-02-14
Payer: MEDICARE

## 2024-02-14 DIAGNOSIS — E53.8 B12 DEFICIENCY: Primary | ICD-10-CM

## 2024-02-14 DIAGNOSIS — Z15.89 MONOALLELIC MUTATION OF CALR3 GENE: ICD-10-CM

## 2024-02-14 DIAGNOSIS — D47.1 MYELOPROLIFERATIVE DISORDER: ICD-10-CM

## 2024-02-14 DIAGNOSIS — D75.839 THROMBOCYTOSIS: ICD-10-CM

## 2024-02-14 LAB
BASOPHILS # BLD AUTO: 0.06 10*3/MM3 (ref 0–0.2)
BASOPHILS NFR BLD AUTO: 1 % (ref 0–1.5)
DEPRECATED RDW RBC AUTO: 89.7 FL (ref 37–54)
EOSINOPHIL # BLD AUTO: 0.07 10*3/MM3 (ref 0–0.4)
EOSINOPHIL NFR BLD AUTO: 1.2 % (ref 0.3–6.2)
ERYTHROCYTE [DISTWIDTH] IN BLOOD BY AUTOMATED COUNT: 21.9 % (ref 12.3–15.4)
HCT VFR BLD AUTO: 35.8 % (ref 34–46.6)
HGB BLD-MCNC: 12.9 G/DL (ref 12–15.9)
IMM GRANULOCYTES # BLD AUTO: 0.03 10*3/MM3 (ref 0–0.05)
IMM GRANULOCYTES NFR BLD AUTO: 0.5 % (ref 0–0.5)
LYMPHOCYTES # BLD AUTO: 1.79 10*3/MM3 (ref 0.7–3.1)
LYMPHOCYTES NFR BLD AUTO: 31 % (ref 19.6–45.3)
MCH RBC QN AUTO: 42 PG (ref 26.6–33)
MCHC RBC AUTO-ENTMCNC: 36 G/DL (ref 31.5–35.7)
MCV RBC AUTO: 116.6 FL (ref 79–97)
MONOCYTES # BLD AUTO: 0.63 10*3/MM3 (ref 0.1–0.9)
MONOCYTES NFR BLD AUTO: 10.9 % (ref 5–12)
NEUTROPHILS NFR BLD AUTO: 3.19 10*3/MM3 (ref 1.7–7)
NEUTROPHILS NFR BLD AUTO: 55.4 % (ref 42.7–76)
NRBC BLD AUTO-RTO: 0 /100 WBC (ref 0–0.2)
PLATELET # BLD AUTO: 527 10*3/MM3 (ref 140–450)
PMV BLD AUTO: 8.9 FL (ref 6–12)
RBC # BLD AUTO: 3.07 10*6/MM3 (ref 3.77–5.28)
WBC NRBC COR # BLD AUTO: 5.77 10*3/MM3 (ref 3.4–10.8)

## 2024-02-14 PROCEDURE — 25010000002 CYANOCOBALAMIN PER 1000 MCG: Performed by: INTERNAL MEDICINE

## 2024-02-14 PROCEDURE — 96372 THER/PROPH/DIAG INJ SC/IM: CPT

## 2024-02-14 PROCEDURE — 36415 COLL VENOUS BLD VENIPUNCTURE: CPT

## 2024-02-14 PROCEDURE — 85025 COMPLETE CBC W/AUTO DIFF WBC: CPT

## 2024-02-14 RX ORDER — CYANOCOBALAMIN 1000 UG/ML
1000 INJECTION, SOLUTION INTRAMUSCULAR; SUBCUTANEOUS ONCE
Status: COMPLETED | OUTPATIENT
Start: 2024-02-14 | End: 2024-02-14

## 2024-02-14 RX ADMIN — CYANOCOBALAMIN 1000 MCG: 1000 INJECTION INTRAMUSCULAR; SUBCUTANEOUS at 09:28

## 2024-02-21 ENCOUNTER — OFFICE VISIT (OUTPATIENT)
Dept: GASTROENTEROLOGY | Facility: CLINIC | Age: 79
End: 2024-02-21
Payer: MEDICARE

## 2024-02-21 VITALS
TEMPERATURE: 98.2 F | SYSTOLIC BLOOD PRESSURE: 104 MMHG | OXYGEN SATURATION: 95 % | DIASTOLIC BLOOD PRESSURE: 64 MMHG | BODY MASS INDEX: 24.86 KG/M2 | HEART RATE: 98 BPM | WEIGHT: 149.2 LBS | HEIGHT: 65 IN

## 2024-02-21 DIAGNOSIS — K25.9 GASTRIC ULCER WITHOUT HEMORRHAGE OR PERFORATION, UNSPECIFIED CHRONICITY: Primary | ICD-10-CM

## 2024-02-21 DIAGNOSIS — K29.71 GASTRITIS WITH HEMORRHAGE, UNSPECIFIED CHRONICITY, UNSPECIFIED GASTRITIS TYPE: ICD-10-CM

## 2024-02-21 PROCEDURE — 99214 OFFICE O/P EST MOD 30 MIN: CPT | Performed by: NURSE PRACTITIONER

## 2024-02-21 PROCEDURE — 3074F SYST BP LT 130 MM HG: CPT | Performed by: NURSE PRACTITIONER

## 2024-02-21 PROCEDURE — 3078F DIAST BP <80 MM HG: CPT | Performed by: NURSE PRACTITIONER

## 2024-02-21 RX ORDER — GUAIFENESIN 600 MG/1
1200 TABLET, EXTENDED RELEASE ORAL 2 TIMES DAILY
COMMUNITY

## 2024-02-21 RX ORDER — SUCRALFATE 1 G/1
1 TABLET ORAL 2 TIMES DAILY
Qty: 60 TABLET | Refills: 1 | Status: SHIPPED | OUTPATIENT
Start: 2024-02-21

## 2024-02-21 NOTE — PATIENT INSTRUCTIONS
For gastric ulcer and gastritis, continue taking omeprazole daily.     Start taking sucralfate as prescribed.    For GERD with hiatal hernia, follow antireflux precautions.  Recommend avoiding eating within 3 to 4 hours of bedtime.  Avoid foods that can trigger symptoms which may include citrus fruits, spicy foods, tomatoes and red sauces, chocolate, coffee/tea, caffeinated or carbonated beverages, alcohol.     Recommend avoiding NSAID medications. If needed for pain, it is okay to take Tylenol (acetaminophen) available over-the-counter.  Do not exceed recommended daily dose.

## 2024-02-21 NOTE — PROGRESS NOTES
"Chief Complaint   Patient presents with    Follow-up         History of Present Illness  Patient is a 78-year-old female who presents today for follow-up. She was seen in the office January 2024 for anemia, dyspepsia, alternating bowel habits, and poor appetite.  She underwent EGD which showed a 5 cm hiatal hernia, gastric ulcer and gastritis with friability.  Biopsies were benign, negative for H. pylori.    Her most recent CBC was February 14 and showed hemoglobin within normal range at 12.9.    Patient presents today for follow-up after EGD.  She has been taking omeprazole.  She is not having any abdominal pain, nausea, or vomiting.  Denies any heartburn or reflux.  Denies any bowel complaints.     Result Review :       Urease For H Pylori - Tissue, Gastric, Antrum (02/01/2024 13:23)    Tissue Pathology Exam (02/01/2024 13:21)    UPPER GI ENDOSCOPY (02/01/2024 13:11)    Office Visit with Robyn Lopez APRN (01/10/2024)    SCANNED - COLONOSCOPY (05/11/2018)    CT Abdomen Pelvis With Contrast (10/31/2023 15:29)    CBC & Differential (01/04/2024 09:54)  CBC & Differential (02/14/2024 09:09)     Vital Signs:   /64   Pulse 98   Temp 98.2 °F (36.8 °C)   Ht 165.1 cm (65\")   Wt 67.7 kg (149 lb 3.2 oz)   SpO2 95%   BMI 24.83 kg/m²     Body mass index is 24.83 kg/m².     Physical Exam  Vitals reviewed.   Constitutional:       General: She is not in acute distress.     Appearance: She is well-developed.   HENT:      Head: Normocephalic and atraumatic.   Pulmonary:      Effort: Pulmonary effort is normal. No respiratory distress.   Skin:     General: Skin is dry.      Coloration: Skin is not pale.   Neurological:      Mental Status: She is alert and oriented to person, place, and time.   Psychiatric:         Thought Content: Thought content normal.           Assessment and Plan    Diagnoses and all orders for this visit:    1. Gastric ulcer without hemorrhage or perforation, unspecified chronicity " (Primary)    2. Gastritis with hemorrhage, unspecified chronicity, unspecified gastritis type    Other orders  -     sucralfate (CARAFATE) 1 g tablet; Take 1 tablet by mouth 2 (Two) Times a Day.  Dispense: 60 tablet; Refill: 1         Discussion  Patient presents today for follow-up after EGD.  EGD with Evidence of gastritis with tissue friability and gastric ulcer which may have been contributing to her anemia.  Will continue daily proton pump inhibitor to allow for healing and add in Carafate for 1 month.  Reviewed dietary modifications to help with gastritis and recommended avoiding NSAID medication.          Follow Up   Return if symptoms worsen or fail to improve.    Patient Instructions   For gastric ulcer and gastritis, continue taking omeprazole daily.     Start taking sucralfate as prescribed.    For GERD with hiatal hernia, follow antireflux precautions.  Recommend avoiding eating within 3 to 4 hours of bedtime.  Avoid foods that can trigger symptoms which may include citrus fruits, spicy foods, tomatoes and red sauces, chocolate, coffee/tea, caffeinated or carbonated beverages, alcohol.     Recommend avoiding NSAID medications. If needed for pain, it is okay to take Tylenol (acetaminophen) available over-the-counter.  Do not exceed recommended daily dose.

## 2024-02-28 ENCOUNTER — LAB (OUTPATIENT)
Dept: LAB | Facility: HOSPITAL | Age: 79
End: 2024-02-28
Payer: MEDICARE

## 2024-02-28 ENCOUNTER — APPOINTMENT (OUTPATIENT)
Dept: ONCOLOGY | Facility: HOSPITAL | Age: 79
End: 2024-02-28
Payer: MEDICARE

## 2024-02-28 ENCOUNTER — INFUSION (OUTPATIENT)
Dept: ONCOLOGY | Facility: HOSPITAL | Age: 79
End: 2024-02-28
Payer: MEDICARE

## 2024-02-28 DIAGNOSIS — Z15.89 MONOALLELIC MUTATION OF CALR3 GENE: ICD-10-CM

## 2024-02-28 DIAGNOSIS — D47.1 MYELOPROLIFERATIVE DISORDER: ICD-10-CM

## 2024-02-28 DIAGNOSIS — D75.839 THROMBOCYTOSIS: ICD-10-CM

## 2024-02-28 DIAGNOSIS — E53.8 B12 DEFICIENCY: Primary | ICD-10-CM

## 2024-02-28 LAB
BASOPHILS # BLD AUTO: 0.05 10*3/MM3 (ref 0–0.2)
BASOPHILS NFR BLD AUTO: 0.7 % (ref 0–1.5)
DEPRECATED RDW RBC AUTO: 84 FL (ref 37–54)
EOSINOPHIL # BLD AUTO: 0.07 10*3/MM3 (ref 0–0.4)
EOSINOPHIL NFR BLD AUTO: 1 % (ref 0.3–6.2)
ERYTHROCYTE [DISTWIDTH] IN BLOOD BY AUTOMATED COUNT: 20.2 % (ref 12.3–15.4)
HCT VFR BLD AUTO: 37.1 % (ref 34–46.6)
HGB BLD-MCNC: 13.4 G/DL (ref 12–15.9)
IMM GRANULOCYTES # BLD AUTO: 0.07 10*3/MM3 (ref 0–0.05)
IMM GRANULOCYTES NFR BLD AUTO: 1 % (ref 0–0.5)
LYMPHOCYTES # BLD AUTO: 2.72 10*3/MM3 (ref 0.7–3.1)
LYMPHOCYTES NFR BLD AUTO: 39.6 % (ref 19.6–45.3)
MCH RBC QN AUTO: 42.3 PG (ref 26.6–33)
MCHC RBC AUTO-ENTMCNC: 36.1 G/DL (ref 31.5–35.7)
MCV RBC AUTO: 117 FL (ref 79–97)
MONOCYTES # BLD AUTO: 0.9 10*3/MM3 (ref 0.1–0.9)
MONOCYTES NFR BLD AUTO: 13.1 % (ref 5–12)
NEUTROPHILS NFR BLD AUTO: 3.06 10*3/MM3 (ref 1.7–7)
NEUTROPHILS NFR BLD AUTO: 44.6 % (ref 42.7–76)
NRBC BLD AUTO-RTO: 0 /100 WBC (ref 0–0.2)
PLATELET # BLD AUTO: 776 10*3/MM3 (ref 140–450)
PMV BLD AUTO: 9 FL (ref 6–12)
RBC # BLD AUTO: 3.17 10*6/MM3 (ref 3.77–5.28)
WBC NRBC COR # BLD AUTO: 6.87 10*3/MM3 (ref 3.4–10.8)

## 2024-02-28 PROCEDURE — 36415 COLL VENOUS BLD VENIPUNCTURE: CPT

## 2024-02-28 PROCEDURE — 96372 THER/PROPH/DIAG INJ SC/IM: CPT

## 2024-02-28 PROCEDURE — 85025 COMPLETE CBC W/AUTO DIFF WBC: CPT

## 2024-02-28 PROCEDURE — 25010000002 CYANOCOBALAMIN PER 1000 MCG: Performed by: NURSE PRACTITIONER

## 2024-02-28 RX ORDER — CYANOCOBALAMIN 1000 UG/ML
1000 INJECTION, SOLUTION INTRAMUSCULAR; SUBCUTANEOUS ONCE
Status: COMPLETED | OUTPATIENT
Start: 2024-02-28 | End: 2024-02-28

## 2024-02-28 RX ADMIN — CYANOCOBALAMIN 1000 MCG: 1000 INJECTION INTRAMUSCULAR; SUBCUTANEOUS at 09:17

## 2024-02-28 NOTE — PROGRESS NOTES
Patient is here for lab review with RN and B12 injection.  CBC reviewed, counts are stable for this patient at this time. Patient has no complaints. Patient continues Hydrea as previously dosed. Copy of labs given to patient and follow up appointment reviewed. Patient is instructed to call the office with any concerns or new symptoms prior to next visit. Patient verbalized understanding and discharged in stable condition.    Lab Results   Component Value Date    WBC 6.87 02/28/2024    HGB 13.4 02/28/2024    HCT 37.1 02/28/2024    .0 (H) 02/28/2024     (H) 02/28/2024

## 2024-03-11 ENCOUNTER — OFFICE VISIT (OUTPATIENT)
Dept: ONCOLOGY | Facility: CLINIC | Age: 79
End: 2024-03-11
Payer: MEDICARE

## 2024-03-11 ENCOUNTER — INFUSION (OUTPATIENT)
Dept: ONCOLOGY | Facility: HOSPITAL | Age: 79
End: 2024-03-11
Payer: MEDICARE

## 2024-03-11 ENCOUNTER — LAB (OUTPATIENT)
Dept: LAB | Facility: HOSPITAL | Age: 79
End: 2024-03-11
Payer: MEDICARE

## 2024-03-11 VITALS
TEMPERATURE: 98.3 F | BODY MASS INDEX: 25.43 KG/M2 | HEIGHT: 65 IN | OXYGEN SATURATION: 97 % | WEIGHT: 152.6 LBS | DIASTOLIC BLOOD PRESSURE: 88 MMHG | RESPIRATION RATE: 16 BRPM | HEART RATE: 74 BPM | SYSTOLIC BLOOD PRESSURE: 145 MMHG

## 2024-03-11 DIAGNOSIS — E53.8 B12 DEFICIENCY: Primary | ICD-10-CM

## 2024-03-11 DIAGNOSIS — Z15.89 MONOALLELIC MUTATION OF CALR3 GENE: ICD-10-CM

## 2024-03-11 DIAGNOSIS — D47.1 MYELOPROLIFERATIVE DISORDER: ICD-10-CM

## 2024-03-11 DIAGNOSIS — D75.839 THROMBOCYTOSIS: ICD-10-CM

## 2024-03-11 LAB
ALBUMIN SERPL-MCNC: 3.9 G/DL (ref 3.5–5.2)
ALBUMIN/GLOB SERPL: 1.4 G/DL
ALP SERPL-CCNC: 71 U/L (ref 39–117)
ALT SERPL W P-5'-P-CCNC: 21 U/L (ref 1–33)
ANION GAP SERPL CALCULATED.3IONS-SCNC: 10.3 MMOL/L (ref 5–15)
AST SERPL-CCNC: 35 U/L (ref 1–32)
BASOPHILS # BLD AUTO: 0.06 10*3/MM3 (ref 0–0.2)
BASOPHILS NFR BLD AUTO: 1.1 % (ref 0–1.5)
BILIRUB SERPL-MCNC: 0.2 MG/DL (ref 0–1.2)
BUN SERPL-MCNC: 11 MG/DL (ref 8–23)
BUN/CREAT SERPL: 18 (ref 7–25)
CALCIUM SPEC-SCNC: 9.3 MG/DL (ref 8.6–10.5)
CHLORIDE SERPL-SCNC: 104 MMOL/L (ref 98–107)
CO2 SERPL-SCNC: 25.7 MMOL/L (ref 22–29)
CREAT SERPL-MCNC: 0.61 MG/DL (ref 0.57–1)
DEPRECATED RDW RBC AUTO: 81.4 FL (ref 37–54)
EGFRCR SERPLBLD CKD-EPI 2021: 91.6 ML/MIN/1.73
EOSINOPHIL # BLD AUTO: 0.04 10*3/MM3 (ref 0–0.4)
EOSINOPHIL NFR BLD AUTO: 0.7 % (ref 0.3–6.2)
ERYTHROCYTE [DISTWIDTH] IN BLOOD BY AUTOMATED COUNT: 17.7 % (ref 12.3–15.4)
GLOBULIN UR ELPH-MCNC: 2.8 GM/DL
GLUCOSE SERPL-MCNC: 100 MG/DL (ref 65–99)
HCT VFR BLD AUTO: 37.8 % (ref 34–46.6)
HGB BLD-MCNC: 13.4 G/DL (ref 12–15.9)
IMM GRANULOCYTES # BLD AUTO: 0.04 10*3/MM3 (ref 0–0.05)
IMM GRANULOCYTES NFR BLD AUTO: 0.7 % (ref 0–0.5)
LDH SERPL-CCNC: 399 U/L (ref 135–214)
LYMPHOCYTES # BLD AUTO: 2.01 10*3/MM3 (ref 0.7–3.1)
LYMPHOCYTES NFR BLD AUTO: 37.2 % (ref 19.6–45.3)
MCH RBC QN AUTO: 42.9 PG (ref 26.6–33)
MCHC RBC AUTO-ENTMCNC: 35.4 G/DL (ref 31.5–35.7)
MCV RBC AUTO: 121.2 FL (ref 79–97)
MONOCYTES # BLD AUTO: 0.68 10*3/MM3 (ref 0.1–0.9)
MONOCYTES NFR BLD AUTO: 12.6 % (ref 5–12)
NEUTROPHILS NFR BLD AUTO: 2.57 10*3/MM3 (ref 1.7–7)
NEUTROPHILS NFR BLD AUTO: 47.7 % (ref 42.7–76)
NRBC BLD AUTO-RTO: 0 /100 WBC (ref 0–0.2)
PLATELET # BLD AUTO: 664 10*3/MM3 (ref 140–450)
PMV BLD AUTO: 9.1 FL (ref 6–12)
POTASSIUM SERPL-SCNC: 4.3 MMOL/L (ref 3.5–5.2)
PROT SERPL-MCNC: 6.7 G/DL (ref 6–8.5)
RBC # BLD AUTO: 3.12 10*6/MM3 (ref 3.77–5.28)
SODIUM SERPL-SCNC: 140 MMOL/L (ref 136–145)
WBC NRBC COR # BLD AUTO: 5.4 10*3/MM3 (ref 3.4–10.8)

## 2024-03-11 PROCEDURE — 85025 COMPLETE CBC W/AUTO DIFF WBC: CPT

## 2024-03-11 PROCEDURE — 25010000002 CYANOCOBALAMIN PER 1000 MCG: Performed by: INTERNAL MEDICINE

## 2024-03-11 PROCEDURE — 83615 LACTATE (LD) (LDH) ENZYME: CPT

## 2024-03-11 PROCEDURE — 96372 THER/PROPH/DIAG INJ SC/IM: CPT

## 2024-03-11 PROCEDURE — 80053 COMPREHEN METABOLIC PANEL: CPT

## 2024-03-11 PROCEDURE — 36415 COLL VENOUS BLD VENIPUNCTURE: CPT

## 2024-03-11 RX ORDER — CYANOCOBALAMIN 1000 UG/ML
1000 INJECTION, SOLUTION INTRAMUSCULAR; SUBCUTANEOUS ONCE
Status: COMPLETED | OUTPATIENT
Start: 2024-03-11 | End: 2024-03-11

## 2024-03-11 RX ADMIN — CYANOCOBALAMIN 1000 MCG: 1000 INJECTION INTRAMUSCULAR; SUBCUTANEOUS at 08:18

## 2024-03-11 NOTE — PROGRESS NOTES
Subjective     REASON FOR FOLLOW UP:    Essential thrombocytosis controlled with Hydrea  B12 deficiency receiving B12 injections    HISTORY OF PRESENT ILLNESS:  The patient is a 78 y.o. year old female who was referred to us from her primary care office with abnormal CBC showing her platelet count over 1.5 million. She was started on 81 mg aspirin and Hydrea.  Current Hydrea dose is 1500 mg p.o. daily    Her recent labs in our office have shown her platelet count was increasing and her hemoglobin and white cells had decreased and for this reason we will concerned that we may need to modify her treatment and have him return to the office today for for MD visit and labs.    She had recently also had some GI issues with diarrhea and I think it is possible that she may not have been absorbing the Hydrea well during that period.  Her blood count in the office today does look better.  Her platelet count is back down to 360,000 and her hemoglobin remains stable at 10.7 g/dL.  Her white count was improved at 3460 compared 2710 on the previous lab.  She tells me that she did not interrupt her Hydrea when she was having the GI issues.    After her office visit in November, she was continued on 1500 mg of Hydrea daily.  Her blood counts in December showed that she had developed severe anemia with hemoglobin of 7.6 g/dL.  Her white count was 4800 and platelets were 300,000.  At that point we had her hold Hydrea and repeat blood counts from 12/27/2023 showed the white count was down to 2800 and the hemoglobin was down to 6.9 g/dL.  At that point we arranged outpatient transfusion of 2 units packed red blood cells.    While off Hydrea her platelet count did escalate significantly and with lab visit of 1/17/2024 her platelet count was up to 1,450,000.  Her white count was normal at 6950 and hemoglobin further improved to 11.1 g/dL.  Her Hydrea dose was adjusted to 1500 mg alternating with 1000 mg daily.     On the last visit she  was accompanied by her .  He reported that he has takien over management of her medication due to some increased issues with confusion and short-term memory problems.  I wonder if this may have had something to do with her earlier severe drop in her blood counts (perhaps she was taking extra doses and did not realize it).    In December her B12 level was also low.  We have initiated B12 shots.     INTERVAL HISTORY:  Sonia returns today for further evaluation and adjustment of her Hydrea.  Her platelet count today remains elevated but is improving at 664,000 compared to 776,002 weeks ago.  As noted above her  is now making sure she is taking her medication correctly.  Her MCV is significantly elevated consistent with compliance to her Hydrea.  Her hemoglobin is normal at 13.4 g/dL and white count 5400 with an ANC of 2570.    Since her last visit here she underwent an EGD with Dr. Be and was found to have hiatal hernia and gastritis.  She also had a nonbleeding ulcer.  Pathology was negative for malignancy.  She is taking the proton pump inhibitor in the form of omeprazole 40 mg daily.  She also is on Carafate twice daily.      History of Present Illness     Past Medical History:   Diagnosis Date    ADHD (attention deficit hyperactivity disorder) 1995    have had since childhood    Allergic exema    Anxiety     Cancer blood    in treatment CBC    Cataract removed    Colon polyp checked every 5 years    none at present    DDD (degenerative disc disease), lumbar     Fibromyalgia, primary off and on    GERD (gastroesophageal reflux disease)     H/o Hip pain     Headache occasional    History of depression     History of low back pain     History of thrombocytosis     HL (hearing loss) left ear    Hypercholesterolemia     Irritable bowel syndrome occasional    Low back pain     Scoliosis slight curve    Sleep apnea     Tremor slight in leg    Urinary tract infection October this year    treated         Past Surgical History:   Procedure Laterality Date    APPENDECTOMY  removed 1972    BACK SURGERY      CATARACT EXTRACTION  2018    COLONOSCOPY  regular checkups    COSMETIC SURGERY  15 years ago    ENDOSCOPY N/A 2/1/2024    Procedure: ESOPHAGOGASTRODUODENOSCOPY with biopsy;  Surgeon: Kal Guzman MD;  [place] [facility] [place] [facility]  Service: Gastroenterology;  Laterality: N/A;  hiatal hernia, antral ulcer, gastritis,    HIP SURGERY      JOINT REPLACEMENT  right hip & left knee    2011 & 2013    KNEE SURGERY      SPINE SURGERY  1971    SUBTOTAL HYSTERECTOMY  1972    TOTAL HIP ARTHROPLASTY Right 2011    TOTAL KNEE ARTHROPLASTY Right 2013    TUBAL ABDOMINAL LIGATION  1972        Current Outpatient Medications on File Prior to Visit   Medication Sig Dispense Refill    aspirin 81 MG EC tablet Take 1 tablet by mouth Daily.      Cholecalciferol (VITAMIN D-3 PO) Take  by mouth.      fluorouracil (EFUDEX) 5 % cream       FLUoxetine (PROzac) 20 MG capsule Take 1 capsule by mouth Daily. 90 capsule 1    fluticasone (FLONASE) 50 MCG/ACT nasal spray       guaiFENesin (MUCINEX) 600 MG 12 hr tablet Take 2 tablets by mouth 2 (Two) Times a Day.      hydroxyurea (Hydrea) 500 MG capsule Take three capsules (1500 mg) daily 90 capsule 5    omeprazole (priLOSEC) 40 MG capsule Take 1 capsule by mouth Daily. 90 capsule 1    pravastatin (PRAVACHOL) 20 MG tablet Take 1 tablet by mouth Daily. 90 tablet 1    Pseudoeph-Doxylamine-DM-APAP (NYQUIL PO) Take  by mouth.      Pseudoephedrine-APAP-DM (DAYQUIL PO) Take  by mouth.      sucralfate (CARAFATE) 1 g tablet Take 1 tablet by mouth 2 (Two) Times a Day. 60 tablet 1    Triamcinolone Acetonide 0.025 % lotion        Current Facility-Administered Medications on File Prior to Visit   Medication Dose Route Frequency Provider Last Rate Last Admin    cyanocobalamin injection 1,000 mcg  1,000 mcg Intramuscular Q28 Days Umberto Brown MD   1,000 mcg at 01/29/24 1408        ALLERGIES:    Allergies    Allergen Reactions    Penicillins Hives     Shortness of breath    Codeine Nausea And Vomiting        Social History     Socioeconomic History    Marital status:      Spouse name: Temo   Tobacco Use    Smoking status: Former     Current packs/day: 0.00     Average packs/day: 1 pack/day for 15.0 years (15.0 ttl pk-yrs)     Types: Cigarettes     Start date: 10/1/1964     Quit date: 10/1/1979     Years since quittin.4    Smokeless tobacco: Never   Vaping Use    Vaping status: Never Used   Substance and Sexual Activity    Alcohol use: Yes     Alcohol/week: 3.0 standard drinks of alcohol     Types: 1 Glasses of wine, 1 Cans of beer, 1 Shots of liquor per week     Comment: Social    Drug use: Never    Sexual activity: Not Currently     Partners: Male        Family History   Problem Relation Age of Onset    Hyperlipidemia Mother     Stroke Maternal Grandfather     Breast cancer Neg Hx     Colon cancer Neg Hx     Colon polyps Neg Hx     Crohn's disease Neg Hx     Irritable bowel syndrome Neg Hx     Ulcerative colitis Neg Hx         Review of Systems   Constitutional:  Negative for activity change, chills, fatigue and fever.   HENT:  Negative for mouth sores, trouble swallowing and voice change.    Eyes:  Negative for pain and visual disturbance.   Respiratory:  Negative for cough, shortness of breath and wheezing.    Cardiovascular:  Negative for chest pain and palpitations.   Gastrointestinal:  Positive for abdominal pain and diarrhea. Negative for constipation, nausea and vomiting.   Genitourinary:  Negative for difficulty urinating, frequency and urgency.   Musculoskeletal:  Negative for arthralgias and joint swelling.   Skin:  Negative for rash.   Neurological:  Negative for dizziness, seizures, weakness and headaches.   Hematological:  Negative for adenopathy. Does not bruise/bleed easily.   Psychiatric/Behavioral:  Negative for behavioral problems and confusion. The patient is not nervous/anxious.   "      Objective     Vitals:    03/11/24 0829   BP: 145/88   Pulse: 74   Resp: 16   Temp: 98.3 °F (36.8 °C)   TempSrc: Temporal   SpO2: 97%   Weight: 69.2 kg (152 lb 9.6 oz)   Height: 165.1 cm (65\")   PainSc: 0-No pain             3/11/2024     8:27 AM   Current Status   ECOG score 0       Physical Exam   Constitutional: She is oriented to person, place, and time. She appears well-developed. No distress.   HENT:   Head: Normocephalic.   Eyes: Pupils are equal, round, and reactive to light. Conjunctivae are normal. No scleral icterus.   Neck: No JVD present. No thyromegaly present.   Cardiovascular: Normal rate and regular rhythm. Exam reveals no gallop and no friction rub.   No murmur heard.  Pulmonary/Chest: Effort normal and breath sounds normal. She has no wheezes. She has no rales.   Abdominal: Soft. She exhibits no distension and no mass. There is no abdominal tenderness.   Musculoskeletal: Normal range of motion. No deformity.   Lymphadenopathy:     She has no cervical adenopathy.   Neurological: She is alert and oriented to person, place, and time. She has normal reflexes. No cranial nerve deficit.   Skin: Skin is warm and dry. No rash noted. No erythema.   Nailbed discoloration   Psychiatric: Her behavior is normal. Judgment normal.            RECENT LABS:  Hematology WBC   Date Value Ref Range Status   03/11/2024 5.40 3.40 - 10.80 10*3/mm3 Final   10/30/2023 4.0 3.4 - 10.8 x10E3/uL Final     RBC   Date Value Ref Range Status   03/11/2024 3.12 (L) 3.77 - 5.28 10*6/mm3 Final   10/30/2023 2.73 (L) 3.77 - 5.28 x10E6/uL Final     Comment:     Polychromasia present     Hemoglobin   Date Value Ref Range Status   03/11/2024 13.4 12.0 - 15.9 g/dL Final     Hematocrit   Date Value Ref Range Status   03/11/2024 37.8 34.0 - 46.6 % Final     Platelets   Date Value Ref Range Status   03/11/2024 664 (H) 140 - 450 10*3/mm3 Final        CT SCAN OF THE HEAD AND MAXILLOFACIAL REGION WITHOUT CONTRAST 3/16/2023     CLINICAL " HISTORY: Head and facial injury following fall 1 week ago.     TECHNIQUE: CT scan of the head was obtained with 2 mm axial bone  algorithm and 3 mm axial soft tissue algorithm images. Sagittal and  coronal reconstructed images were obtained. Additionally, a dedicated CT  scan of the maxillofacial region was obtained with 1 mm axial, coronal,  and sagittal bone algorithm images and 2 mm axial soft tissue algorithm  images.     FINDINGS:     HEAD CT: There is no evidence for a calvarial fracture. There is no  evidence for an acute extra-axial hemorrhage. Otherwise, the ventricles,  sulci, and cisterns are age-appropriate. The gray-white matter  differentiation is within normal limits. Old lacunar disease is seen  within the lentiform nuclei. The posterior fossa structures are within  normal limits.     MAXILLOFACIAL CT: There is no evidence for acute fracture or bony  malalignment involving the maxillofacial region.     There is a prominent size right frontal convexity scalp hematoma  extending into the right preseptal soft tissues.     There is complete opacification of the right maxillary sinus with  centrally located mineralized and inspissated secretions. There is  thickening of the walls of the right maxillary sinus compatible with  chronic osteitic changes.     IMPRESSION:     No evidence for acute traumatic intracranial pathology.     Prominent size right frontal convexity scalp hematoma extending into the  right preseptal soft tissues.    Complete opacification right maxillary sinus with centrally located  inspissated secretions and chronic osteitic changes within the walls of  the right maxillary sinus    Assessment & Plan   1.  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  Current dose of Hydrea is 1000 mg alternating with 1500 mg every other day.  3.  B12 deficiency  4.  Some signs of early dementia.  It is possible that she could have inadvertently been  taking too much Hydrea which may have led to her recent issues with severe anemia and leukopenia.  As noted above her  is now managing her medications.  5.  Gastritis and peptic ulcer disease.  She now is on proton pump inhibitor therapy and Carafate after undergoing an EGD with Dr. Guzman 2/1/2024.      Recommendations:  1.  Continue aspirin 81 mg po daily  2.  Continue Hydrea 1000 mg alternating with 1500 mg.  3.  Lab + RN review + B12 shot every 4 wks  4.  Nurse practitioner assessment with lab and B12 shot in 3 months  5.  MD follow-up with lab and a B12 shot in 6 months

## 2024-03-20 ENCOUNTER — OFFICE VISIT (OUTPATIENT)
Dept: INTERNAL MEDICINE | Age: 79
End: 2024-03-20
Payer: MEDICARE

## 2024-03-20 ENCOUNTER — SPECIALTY PHARMACY (OUTPATIENT)
Dept: PHARMACY | Facility: HOSPITAL | Age: 79
End: 2024-03-20
Payer: MEDICARE

## 2024-03-20 VITALS
HEART RATE: 93 BPM | DIASTOLIC BLOOD PRESSURE: 74 MMHG | WEIGHT: 153 LBS | OXYGEN SATURATION: 97 % | BODY MASS INDEX: 25.49 KG/M2 | SYSTOLIC BLOOD PRESSURE: 126 MMHG | TEMPERATURE: 97.6 F | HEIGHT: 65 IN

## 2024-03-20 DIAGNOSIS — I10 PRIMARY HYPERTENSION: ICD-10-CM

## 2024-03-20 DIAGNOSIS — R73.09 ELEVATED GLUCOSE: ICD-10-CM

## 2024-03-20 DIAGNOSIS — R31.29 MICROSCOPIC HEMATURIA: Primary | ICD-10-CM

## 2024-03-20 DIAGNOSIS — G31.84 MILD COGNITIVE IMPAIRMENT: ICD-10-CM

## 2024-03-20 DIAGNOSIS — Z00.00 ENCOUNTER FOR ANNUAL WELLNESS VISIT (AWV) IN MEDICARE PATIENT: Primary | ICD-10-CM

## 2024-03-20 DIAGNOSIS — E55.9 VITAMIN D DEFICIENCY: ICD-10-CM

## 2024-03-20 DIAGNOSIS — E78.5 HYPERLIPIDEMIA, UNSPECIFIED HYPERLIPIDEMIA TYPE: ICD-10-CM

## 2024-03-20 LAB
25(OH)D3+25(OH)D2 SERPL-MCNC: 27.2 NG/ML (ref 30–100)
APPEARANCE UR: CLEAR
BACTERIA #/AREA URNS HPF: ABNORMAL /HPF
BILIRUB UR QL STRIP: NEGATIVE
CASTS URNS MICRO: ABNORMAL
CHOLEST SERPL-MCNC: 190 MG/DL (ref 0–200)
CHOLEST/HDLC SERPL: 3.58 {RATIO}
COLOR UR: YELLOW
EPI CELLS #/AREA URNS HPF: ABNORMAL /HPF
GLUCOSE UR QL STRIP: NEGATIVE
HBA1C MFR BLD: 4.7 % (ref 4.8–5.6)
HDLC SERPL-MCNC: 53 MG/DL (ref 40–60)
HGB UR QL STRIP: ABNORMAL
KETONES UR QL STRIP: NEGATIVE
LDLC SERPL CALC-MCNC: 111 MG/DL (ref 0–100)
LEUKOCYTE ESTERASE UR QL STRIP: ABNORMAL
NITRITE UR QL STRIP: NEGATIVE
PH UR STRIP: 7 [PH] (ref 5–8)
PROT UR QL STRIP: ABNORMAL
RBC #/AREA URNS HPF: ABNORMAL /HPF
SP GR UR STRIP: 1.02 (ref 1–1.03)
T4 FREE SERPL-MCNC: 1.03 NG/DL (ref 0.93–1.7)
TRIGL SERPL-MCNC: 148 MG/DL (ref 0–150)
TSH SERPL DL<=0.005 MIU/L-ACNC: 4.14 UIU/ML (ref 0.27–4.2)
UROBILINOGEN UR STRIP-MCNC: ABNORMAL MG/DL
VLDLC SERPL CALC-MCNC: 26 MG/DL (ref 5–40)
WBC #/AREA URNS HPF: ABNORMAL /HPF

## 2024-03-20 NOTE — PROGRESS NOTES
I N T E R N A L  M E D I C I N E  JUNIOR PINEDA, APRN      ENCOUNTER DATE:  03/20/2024    Sonia Wooten / 78 y.o. / female        MEDICARE ANNUAL WELLNESS VISIT       Chief Complaint: Medicare Wellness-subsequent       Patient's general assessment of her health since a year ago:     - Compared to one year ago, she feels her physical health is about the same without significant change.    - Compared to one year ago, she feels her mental health is slightly worse.      HPI for other active medical problems:     HTN:  At today's appointment, BP is elevated, 145/88.  Not currently monitoring BP at home.  Formerly on triamterene-HCTZ 37.5-25 mg daily but this was discontinued in October 2023 due to episodes of hypotension.       HLD: Pravastatin 20 mg daily.  June 2023 with normal LDL 95; mildly elevated triglycerides 185.       GERD: Symptoms well controlled on omeprazole 40 mg PRN.  No dysphagia, early satiety.  Established with GI in January 2024. EGD scheduled done February 1, 2024 with hiatal hernia, non bleeding gastric ulcer, gastritis.  Recommend to follow antireflux regimen.  Colonoscopy completed April 2023.  Recall in 5 years.      Followed by hematology/ oncology, Dr. Brown, for myeloproliferative disorder, thrombocytosis, anemia.  Remains on aspirin 81 mg daily, Hydrea 1500 mg daily.  She reports improved fatigue.       Anxiety/ Depression: Symptoms well controlled on fluoxetine 20 mg daily x 10 years.     has noticed some increased forgetfulness, difficulty with short term recall in patient as of late.  She is no longer able to balance her checkbook.  Prior head CT in March 2023 was normal.  No family history of Alzheimer's/ Dementia.       Allergic rhinitis: Flonase nasal spray daily.       Mammogram up to date as of March 22, 2023.  Declines to update mammogram at this time.       September 2023 DEXA with osteopenia.  She is aware of recommendation to take daily Vitamin D3 with  calcium.          HISTORY       Recent Hospitalizations:    Recent hospitalization?:     If YES, location, date, and diagnoses:     Location:   Date:   Principle Discharge Dx:   Secondary Dx:       Patient Care Team:    Patient Care Team:  Nafisa Kebede APRN as PCP - General (Family Medicine)  Ester Rivera APRN as Referring Physician (Nurse Practitioner)  Umberto Brown MD as Consulting Physician (Hematology and Oncology)  Pallares, Clara Ann, MD as Consulting Physician (Internal Medicine)  Robyn Lopez APRN as Nurse Practitioner (Nurse Practitioner)      Allergies:  Penicillins and Codeine    Medications:  Current Outpatient Medications on File Prior to Visit   Medication Sig Dispense Refill    aspirin 81 MG EC tablet Take 1 tablet by mouth Daily.      Cholecalciferol (VITAMIN D-3 PO) Take  by mouth.      fluorouracil (EFUDEX) 5 % cream       FLUoxetine (PROzac) 20 MG capsule Take 1 capsule by mouth Daily. 90 capsule 1    fluticasone (FLONASE) 50 MCG/ACT nasal spray       hydroxyurea (Hydrea) 500 MG capsule Take three capsules (1500 mg) daily 90 capsule 5    omeprazole (priLOSEC) 40 MG capsule Take 1 capsule by mouth Daily. 90 capsule 1    pravastatin (PRAVACHOL) 20 MG tablet Take 1 tablet by mouth Daily. 90 tablet 1    sucralfate (CARAFATE) 1 g tablet Take 1 tablet by mouth 2 (Two) Times a Day. 60 tablet 1    Triamcinolone Acetonide 0.025 % lotion       [DISCONTINUED] guaiFENesin (MUCINEX) 600 MG 12 hr tablet Take 2 tablets by mouth 2 (Two) Times a Day.      [DISCONTINUED] Pseudoeph-Doxylamine-DM-APAP (NYQUIL PO) Take  by mouth.      [DISCONTINUED] Pseudoephedrine-APAP-DM (DAYQUIL PO) Take  by mouth.       Current Facility-Administered Medications on File Prior to Visit   Medication Dose Route Frequency Provider Last Rate Last Admin    cyanocobalamin injection 1,000 mcg  1,000 mcg Intramuscular Q28 Days Umberto Brown MD   1,000 mcg at 01/29/24 1408        PFSH:     The following portions of the  patient's history were reviewed and updated as appropriate: Allergies / Current Medications / Past Medical History / Surgical History / Social History / Family History    Problem List:  Patient Active Problem List   Diagnosis    Thrombocytosis    Myeloproliferative disorder    Monoallelic mutation of CALR3 gene    Eczema    Primary hypertension    Anxiety    Migraine without aura and without status migrainosus, not intractable    Anemia    Elevated LDL cholesterol level    B12 deficiency       Past Medical History:  Past Medical History:   Diagnosis Date    ADHD (attention deficit hyperactivity disorder) 1995    have had since childhood    Allergic exema    Anxiety     Cancer blood    in treatment CBC    Cataract removed    Colon polyp checked every 5 years    none at present    DDD (degenerative disc disease), lumbar     Fibromyalgia, primary off and on    GERD (gastroesophageal reflux disease)     H/o Hip pain     Headache occasional    History of depression     History of low back pain     History of thrombocytosis     HL (hearing loss) left ear    Hypercholesterolemia     Irritable bowel syndrome occasional    Low back pain     Scoliosis slight curve    Sleep apnea     Tremor slight in leg    Urinary tract infection October this year    treated       Past Surgical History:  Past Surgical History:   Procedure Laterality Date    APPENDECTOMY  removed 1972    BACK SURGERY      CATARACT EXTRACTION  2018    COLONOSCOPY  regular checkups    COSMETIC SURGERY  15 years ago    ENDOSCOPY N/A 2/1/2024    Procedure: ESOPHAGOGASTRODUODENOSCOPY with biopsy;  Surgeon: Kal Guzman MD;  [place] [facility] [place]  Service: Gastroenterology;  Laterality: N/A;  hiatal hernia, antral ulcer, gastritis,    HIP SURGERY      JOINT REPLACEMENT  right hip & left knee    2011 & 2013    KNEE SURGERY      SPINE SURGERY  1971    SUBTOTAL HYSTERECTOMY  1972    TOTAL HIP ARTHROPLASTY Right 2011    TOTAL KNEE ARTHROPLASTY Right 2013     "TUBAL ABDOMINAL LIGATION         Social History:  Social History     Socioeconomic History    Marital status:      Spouse name: Temo   Tobacco Use    Smoking status: Former     Current packs/day: 0.00     Average packs/day: 1 pack/day for 15.0 years (15.0 ttl pk-yrs)     Types: Cigarettes     Start date: 10/1/1964     Quit date: 10/1/1979     Years since quittin.4    Smokeless tobacco: Never   Vaping Use    Vaping status: Never Used   Substance and Sexual Activity    Alcohol use: Yes     Alcohol/week: 3.0 standard drinks of alcohol     Types: 1 Glasses of wine, 1 Cans of beer, 1 Shots of liquor per week     Comment: Social    Drug use: Never    Sexual activity: Not Currently     Partners: Male       Family History:  Family History   Problem Relation Age of Onset    Hyperlipidemia Mother     Stroke Maternal Grandfather     Breast cancer Neg Hx     Colon cancer Neg Hx     Colon polyps Neg Hx     Crohn's disease Neg Hx     Irritable bowel syndrome Neg Hx     Ulcerative colitis Neg Hx          PATIENT ASSESSMENT     Vitals:  Vitals:    24 0919   BP: 126/74   Pulse: 93   Temp: 97.6 °F (36.4 °C)   SpO2: 97%   Weight: 69.4 kg (153 lb)   Height: 165.1 cm (65\")       BP Readings from Last 3 Encounters:   24 126/74   24 145/88   24 114/73     Wt Readings from Last 3 Encounters:   24 69.4 kg (153 lb)   24 69.2 kg (152 lb 9.6 oz)   24 67.7 kg (149 lb 3.2 oz)      Body mass index is 25.46 kg/m².    [unfilled]        Review of Systems:    Review of Systems   Constitutional:  Negative for chills, fever and unexpected weight change.   Respiratory:  Negative for cough, chest tightness and shortness of breath.    Cardiovascular:  Negative for chest pain, palpitations and leg swelling.   Neurological:  Negative for dizziness, weakness, light-headedness and headaches.        +Short term memory difficulty   Psychiatric/Behavioral:  The patient is not nervous/anxious.  "         Physical Exam:    Physical Exam  Vitals reviewed.   Constitutional:       General: She is not in acute distress.     Appearance: Normal appearance. She is not ill-appearing, toxic-appearing or diaphoretic.   HENT:      Head: Normocephalic and atraumatic.      Right Ear: Tympanic membrane, ear canal and external ear normal. There is no impacted cerumen.      Left Ear: Tympanic membrane, ear canal and external ear normal. There is no impacted cerumen.      Nose: Nose normal. No congestion or rhinorrhea.      Mouth/Throat:      Mouth: Mucous membranes are moist.      Pharynx: Oropharynx is clear. No oropharyngeal exudate or posterior oropharyngeal erythema.   Eyes:      Extraocular Movements: Extraocular movements intact.      Conjunctiva/sclera: Conjunctivae normal.      Pupils: Pupils are equal, round, and reactive to light.   Cardiovascular:      Rate and Rhythm: Normal rate and regular rhythm.      Heart sounds: Normal heart sounds.   Pulmonary:      Effort: Pulmonary effort is normal. No respiratory distress.      Breath sounds: Normal breath sounds.   Abdominal:      General: Bowel sounds are normal.      Palpations: Abdomen is soft.      Tenderness: There is no abdominal tenderness.   Musculoskeletal:         General: Normal range of motion.      Cervical back: Normal range of motion and neck supple.      Right lower leg: No edema.      Left lower leg: No edema.   Lymphadenopathy:      Cervical: No cervical adenopathy.   Skin:     General: Skin is warm and dry.   Neurological:      General: No focal deficit present.      Mental Status: She is alert and oriented to person, place, and time. Mental status is at baseline.   Psychiatric:         Mood and Affect: Mood normal.         Behavior: Behavior normal.         Thought Content: Thought content normal.         Judgment: Judgment normal.           Reviewed Data:    Labs:   Lab Results   Component Value Date     03/11/2024    K 4.3 03/11/2024     "CALCIUM 9.3 03/11/2024    AST 35 (H) 03/11/2024    ALT 21 03/11/2024    BUN 11 03/11/2024    CREATININE 0.61 03/11/2024    CREATININE 0.66 01/29/2024    CREATININE 0.74 01/04/2024    EGFRIFNONA 81 12/13/2021       No results found for: \"GLU\", \"HGBA1C\", \"MICROALBUR\"    Lab Results   Component Value Date    LDL 95 06/08/2023     (H) 03/08/2023     (H) 09/07/2022    HDL 49 06/08/2023    TRIG 185 (H) 06/08/2023       Lab Results   Component Value Date    TSH 2.660 01/04/2024    FREET4 1.40 01/04/2024          Lab Results   Component Value Date    WBC 5.40 03/11/2024    HGB 13.4 03/11/2024    HGB 13.4 02/28/2024    HGB 12.9 02/14/2024     (H) 03/11/2024                 No results found for: \"PSA\"    Imaging:          Medical Tests:          Screening for Glaucoma:  Previous screening for glaucoma?: Yes      Hearing Loss Screen:  Finger Rub Hearing Test (right ear): passed  Finger Rub Hearing Test (left ear): passed      Urinary Incontinence Screen:  Episodes of urinary incontinence? : No      Depression Screen:      3/20/2024     9:19 AM   PHQ-2/PHQ-9 Depression Screening   Little Interest or Pleasure in Doing Things 0-->not at all   Feeling Down, Depressed or Hopeless 0-->not at all   PHQ-9: Brief Depression Severity Measure Score 0        PHQ-2: N/A (Has diagnosis of depression and is on treatment)    PHQ-9: 1-4 (Minimal Depression)       FUNCTIONAL, FALL RISK, & COGNITIVE SCREENING (Components below):    DATA:        3/20/2024     9:21 AM   Functional & Cognitive Status   Do you have difficulty preparing food and eating? No   Do you have difficulty bathing yourself, getting dressed or grooming yourself? No   Do you have difficulty using the toilet? No   Do you have difficulty moving around from place to place? No   Do you have trouble with steps or getting out of a bed or a chair? No   Current Diet Well Balanced Diet   Dental Exam Up to date   Eye Exam Up to date   Exercise (times per week) 3 " times per week   Current Exercises Include Walking   Do you need help using the phone?  No   Are you deaf or do you have serious difficulty hearing?  No   Do you need help to go to places out of walking distance? No   Do you need help shopping? No   Do you need help preparing meals?  No   Do you need help with housework?  No   Do you need help with laundry? No   Do you need help taking your medications? No   Do you need help managing money? No   Do you ever drive or ride in a car without wearing a seat belt? No   Do you have difficulty concentrating, remembering or making decisions? No         A) Assessment of Functional Ability:  (Assessment of ability to perform ADL's (showering/bathing, using toilet, dressing, feeding self, moving self around) and IADL's (use telephone, shop, prepare food, housekeep, do laundry, transport independently, take medications independently, and handle finances)    Degree of functional impairment: MILD (based on assessment noted above)      B) Assessment of Fall Risk:  Fall Risk Assessment was completed, and patient is at low risk for falls.       Need for further evaluation of gait, strength, and balance? : No    Timed Up and Go (TUG):   (>= 12 seconds indicates high risk for falling)    Observable abnormalities included: Normal gait pattern       C. Assessment of Cognitive Function:    Mini-Cog Test:     1) Registration (3 objects): No   2) Number of objects recalled: 2   3) Clock Draw: Passed? : No       Further evaluation required? : Yes        COUNSELING       A. Identification of Health Risk Factors:    Risk factors include: cardiovascular risk factors, cognitive impairment, and depression / other psychiatric problems      B. Age-Appropriate Screening Schedule:  (Refer to the list below for future screening recommendations based on patient's age, sex and/or medical conditions. Orders for these recommended tests are listed in the plan section. The patient has been provided with a  written plan)    Health Maintenance Topics  Health Maintenance   Topic Date Due    COVID-19 Vaccine (6 - 2023-24 season) 03/22/2024 (Originally 9/1/2023)    INFLUENZA VACCINE  03/31/2024 (Originally 8/1/2023)    TDAP/TD VACCINES (1 - Tdap) 12/13/2024 (Originally 4/22/1964)    RSV Vaccine - Adults (1 - 1-dose 60+ series) 03/20/2025 (Originally 4/22/2005)    LIPID PANEL  06/08/2024    ANNUAL WELLNESS VISIT  03/20/2025    BMI FOLLOWUP  03/20/2025    DXA SCAN  09/18/2025    HEPATITIS C SCREENING  Completed    Pneumococcal Vaccine 65+  Completed    ZOSTER VACCINE  Completed    COLONOSCOPY  Discontinued       Health Maintenance Topics Due or Over-Due  There are no preventive care reminders to display for this patient.        C. Advanced Care Planning:    Advance Care Planning   ACP discussion was held with the patient during this visit. Patient has an advance directive in EMR which is still valid.        D. Patient Self-Management and Personalized Health Advice:    She has been provided with personalized counseling/information (including brochures/handouts) about:     -- recommended hearing testing, reducing risk for cardiovascular disease (heart, stroke, vascular), fall prevention, evidence of cognitive problems and need for ongoing surveillance for progressive changes, and managing depression/anxiety      She has been recommended for the following preventative services which has been performed today, will be ordered today or ordered/performed on upcoming follow-up visit:     -- OSTEOPOROSIS screening DISCUSSED, screening for breast cancer with mammogram deferred by patient (recommended monthly self breast exam), **COLORECTAL cancer screening (colonoscopy/Cologuard discussed or ordered), COVID-19 vaccination (and/or booster) administered/recommended/discussed, RSV vaccination administered/recommended/discussed, DIABETES screening performed (current/reviewed labs/lab ordered)      E. Miscellaneous Items:    -Aspirin use  counseling: Taking ASA appropriately as indicated    -Discussed BMI with her. The BMI is in the acceptable range    -Reviewed use of high risk medication in the elderly: YES    -Reviewed for potential of harmful drug interactions in the elderly: YES        WRAP UP       Assessment & Plan:    1) MEDICARE ANNUAL WELLNESS VISIT    2) OTHER MEDICAL CONDITIONS ADDRESSED TODAY:            Problem List Items Addressed This Visit       Primary hypertension    Relevant Orders    Urinalysis With Microscopic If Indicated (No Culture) - Urine, Clean Catch     Other Visit Diagnoses       Encounter for annual wellness visit (AWV) in Medicare patient    -  Primary    Hyperlipidemia, unspecified hyperlipidemia type        Relevant Orders    Lipid Panel With / Chol / HDL Ratio    Elevated glucose        Relevant Orders    Hemoglobin A1c    Mild cognitive impairment        Relevant Orders    TSH+Free T4    Ambulatory Referral to Neuropsychology (Completed)    Vitamin D deficiency        Relevant Orders    Vitamin D,25-Hydroxy                      Orders Placed This Encounter   Procedures    Hemoglobin A1c    Lipid Panel With / Chol / HDL Ratio    TSH+Free T4    Urinalysis With Microscopic If Indicated (No Culture) - Urine, Clean Catch    Vitamin D,25-Hydroxy    Ambulatory Referral to Neuropsychology       Discussion / Summary:    Today's BP is elevated.  Patient will monitor BP daily and send me readings for review in 1-2 weeks.  Goal is < 130/80.  Discussed recommendation of neuropsych evaluation given report of recent short term memory deficits, difficulty balancing checkbook, in context of depression history.  Agreeable for referral.  Patient declined brain MRI at this time.  Encouraged patient to engage in regular physical activity, and memory games/ puzzles.  Ensure that she stays up to date with vision/ hearing exams.    BMI is >= 25 and <30. (Overweight) The following options were offered after discussion;: exercise  counseling/recommendations and nutrition counseling/recommendations    Medications as of TODAY:              Current Outpatient Medications   Medication Sig Dispense Refill    aspirin 81 MG EC tablet Take 1 tablet by mouth Daily.      Cholecalciferol (VITAMIN D-3 PO) Take  by mouth.      fluorouracil (EFUDEX) 5 % cream       FLUoxetine (PROzac) 20 MG capsule Take 1 capsule by mouth Daily. 90 capsule 1    fluticasone (FLONASE) 50 MCG/ACT nasal spray       hydroxyurea (Hydrea) 500 MG capsule Take three capsules (1500 mg) daily 90 capsule 5    omeprazole (priLOSEC) 40 MG capsule Take 1 capsule by mouth Daily. 90 capsule 1    pravastatin (PRAVACHOL) 20 MG tablet Take 1 tablet by mouth Daily. 90 tablet 1    sucralfate (CARAFATE) 1 g tablet Take 1 tablet by mouth 2 (Two) Times a Day. 60 tablet 1    Triamcinolone Acetonide 0.025 % lotion        Current Facility-Administered Medications   Medication Dose Route Frequency Provider Last Rate Last Admin    cyanocobalamin injection 1,000 mcg  1,000 mcg Intramuscular Q28 Days Umberto Brown MD   1,000 mcg at 01/29/24 1408         FOLLOW-UP:            Return for 6 month chronic care, 1 year AWV.                 Future Appointments   Date Time Provider Department Center   4/8/2024  8:40 AM LAB CHAIR 3 [facility] [place]   4/8/2024  9:00 AM INJECTION CHAIR [facility] INFUS [facility]   5/6/2024  9:10 AM LAB CHAIR 1 [facility] [place]   5/6/2024  9:30 AM INJECTION CHAIR [facility] INFUS [facility]   6/3/2024  9:20 AM LAB CHAIR 6 [facility] [place]   6/3/2024  9:40 AM Emmanuelle Meyers APRN [facility] [place]   6/3/2024  9:45 AM INJECTION CHAIR [facility] INFUS [facility] [place]   7/1/2024  9:10 AM LAB CHAIR 2 [facility] [place]   7/1/2024  9:30 AM INJECTION CHAIR [facility] INFUS [facility] [place]   7/29/2024  9:10 AM LAB CHAIR 3 [facility] [place]   7/29/2024  9:30 AM INJECTION CHAIR [facility] INF[facility]   8/26/2024  9:40 AM LAB CHAIR 5  [facility]  LAB [facility] [place]   8/26/2024 10:00 AM Russell House MD [facility] [place]   8/26/2024 11:00 AM INJECTION CHAIR [facility] INFUS [facility] [place]   9/20/2024 11:15 AM Nafisa Kebede APRN [facility]  [facility]           After Visit Summary (AVS) including the Personalized Prevention  Plan Services (PPPS) was either printed and given to the patient at check-out today and/or sent to [employer] for review.       @[facility] CenterIM@

## 2024-03-20 NOTE — PROGRESS NOTES
Encounter created to make changes to Provider for SpRx Program enrollment.    Basilia Andrews, Pharmacy Technician  Specialty Pharmacy Technician

## 2024-04-08 ENCOUNTER — LAB (OUTPATIENT)
Dept: LAB | Facility: HOSPITAL | Age: 79
End: 2024-04-08
Payer: MEDICARE

## 2024-04-08 ENCOUNTER — INFUSION (OUTPATIENT)
Dept: ONCOLOGY | Facility: HOSPITAL | Age: 79
End: 2024-04-08
Payer: MEDICARE

## 2024-04-08 DIAGNOSIS — E53.8 B12 DEFICIENCY: ICD-10-CM

## 2024-04-08 DIAGNOSIS — E53.8 B12 DEFICIENCY: Primary | ICD-10-CM

## 2024-04-08 DIAGNOSIS — D75.839 THROMBOCYTOSIS: ICD-10-CM

## 2024-04-08 DIAGNOSIS — D47.1 MYELOPROLIFERATIVE DISORDER: ICD-10-CM

## 2024-04-08 LAB
BASOPHILS # BLD AUTO: 0.06 10*3/MM3 (ref 0–0.2)
BASOPHILS NFR BLD AUTO: 1.1 % (ref 0–1.5)
DEPRECATED RDW RBC AUTO: 55.8 FL (ref 37–54)
EOSINOPHIL # BLD AUTO: 0.17 10*3/MM3 (ref 0–0.4)
EOSINOPHIL NFR BLD AUTO: 3.2 % (ref 0.3–6.2)
ERYTHROCYTE [DISTWIDTH] IN BLOOD BY AUTOMATED COUNT: 13.6 % (ref 12.3–15.4)
HCT VFR BLD AUTO: 36 % (ref 34–46.6)
HGB BLD-MCNC: 12.8 G/DL (ref 12–15.9)
IMM GRANULOCYTES # BLD AUTO: 0.04 10*3/MM3 (ref 0–0.05)
IMM GRANULOCYTES NFR BLD AUTO: 0.8 % (ref 0–0.5)
LYMPHOCYTES # BLD AUTO: 2.1 10*3/MM3 (ref 0.7–3.1)
LYMPHOCYTES NFR BLD AUTO: 39.8 % (ref 19.6–45.3)
MCH RBC QN AUTO: 42.5 PG (ref 26.6–33)
MCHC RBC AUTO-ENTMCNC: 35.6 G/DL (ref 31.5–35.7)
MCV RBC AUTO: 119.6 FL (ref 79–97)
MONOCYTES # BLD AUTO: 0.64 10*3/MM3 (ref 0.1–0.9)
MONOCYTES NFR BLD AUTO: 12.1 % (ref 5–12)
NEUTROPHILS NFR BLD AUTO: 2.26 10*3/MM3 (ref 1.7–7)
NEUTROPHILS NFR BLD AUTO: 43 % (ref 42.7–76)
NRBC BLD AUTO-RTO: 0 /100 WBC (ref 0–0.2)
PLATELET # BLD AUTO: 658 10*3/MM3 (ref 140–450)
PMV BLD AUTO: 8.9 FL (ref 6–12)
RBC # BLD AUTO: 3.01 10*6/MM3 (ref 3.77–5.28)
WBC NRBC COR # BLD AUTO: 5.27 10*3/MM3 (ref 3.4–10.8)

## 2024-04-08 PROCEDURE — 25010000002 CYANOCOBALAMIN PER 1000 MCG: Performed by: INTERNAL MEDICINE

## 2024-04-08 PROCEDURE — 96372 THER/PROPH/DIAG INJ SC/IM: CPT

## 2024-04-08 PROCEDURE — 36415 COLL VENOUS BLD VENIPUNCTURE: CPT

## 2024-04-08 PROCEDURE — 85025 COMPLETE CBC W/AUTO DIFF WBC: CPT

## 2024-04-08 RX ORDER — CYANOCOBALAMIN 1000 UG/ML
1000 INJECTION, SOLUTION INTRAMUSCULAR; SUBCUTANEOUS ONCE
Status: COMPLETED | OUTPATIENT
Start: 2024-04-08 | End: 2024-04-08

## 2024-04-08 RX ADMIN — CYANOCOBALAMIN 1000 MCG: 1000 INJECTION INTRAMUSCULAR; SUBCUTANEOUS at 08:53

## 2024-04-29 ENCOUNTER — SPECIALTY PHARMACY (OUTPATIENT)
Dept: PHARMACY | Facility: HOSPITAL | Age: 79
End: 2024-04-29
Payer: MEDICARE

## 2024-04-29 RX ORDER — HYDROXYUREA 500 MG/1
CAPSULE ORAL
Qty: 75 CAPSULE | Refills: 5 | Status: SHIPPED | OUTPATIENT
Start: 2024-04-29

## 2024-04-29 NOTE — PROGRESS NOTES
Drug: Hydrea (hydroxyurea)  Strength: 500 mg  Directions: Take three capsules by mouth daily alternating with two capsules by mouth daily  Quantity: 75  Refills: 5  [facility]    Completed independent double check on medication order/RX.  Kaushik Hernandez, PharmD, BCOP  Clinical Oncology Pharmacist

## 2024-04-29 NOTE — PROGRESS NOTES
Re: Refills of Oral Specialty Medication - Hydrea (hydroxyurea)    Drug-Drug Interactions: The current medication list was reviewed and there are no relevant drug-drug interactions with the specialty medication.  Medication Allergies: The patient has no relevant allergies as it relates to their oral specialty medication  Review of Labs/Dose Adjustments: NO DOSE CHANGE - I reviewed the most recent note and labs and the patient will continue without any dose changes.  I sent refills as described below.    Drug: Hydrea (hydroxyurea)  Strength: 500 mg  Directions: Take three capsules by mouth daily alternating with two capsules by mouth daily  Quantity: 75  Refills: 5  Pharmacy prescription sent to: [facility]    Name/Credentials: Genna Rodriguez, Lora, BCPS    4/29/2024  09:26 EDT

## 2024-05-06 ENCOUNTER — LAB (OUTPATIENT)
Dept: LAB | Facility: HOSPITAL | Age: 79
End: 2024-05-06
Payer: MEDICARE

## 2024-05-06 ENCOUNTER — INFUSION (OUTPATIENT)
Dept: ONCOLOGY | Facility: HOSPITAL | Age: 79
End: 2024-05-06
Payer: MEDICARE

## 2024-05-06 DIAGNOSIS — E53.8 B12 DEFICIENCY: Primary | ICD-10-CM

## 2024-05-06 DIAGNOSIS — D47.1 MYELOPROLIFERATIVE DISORDER: ICD-10-CM

## 2024-05-06 DIAGNOSIS — E53.8 B12 DEFICIENCY: ICD-10-CM

## 2024-05-06 DIAGNOSIS — D75.839 THROMBOCYTOSIS: ICD-10-CM

## 2024-05-06 LAB
BASOPHILS # BLD AUTO: 0.07 10*3/MM3 (ref 0–0.2)
BASOPHILS NFR BLD AUTO: 1.3 % (ref 0–1.5)
DEPRECATED RDW RBC AUTO: 58.4 FL (ref 37–54)
EOSINOPHIL # BLD AUTO: 0.1 10*3/MM3 (ref 0–0.4)
EOSINOPHIL NFR BLD AUTO: 1.8 % (ref 0.3–6.2)
ERYTHROCYTE [DISTWIDTH] IN BLOOD BY AUTOMATED COUNT: 13.1 % (ref 12.3–15.4)
HCT VFR BLD AUTO: 36.8 % (ref 34–46.6)
HGB BLD-MCNC: 13.2 G/DL (ref 12–15.9)
IMM GRANULOCYTES # BLD AUTO: 0.03 10*3/MM3 (ref 0–0.05)
IMM GRANULOCYTES NFR BLD AUTO: 0.5 % (ref 0–0.5)
LYMPHOCYTES # BLD AUTO: 2.17 10*3/MM3 (ref 0.7–3.1)
LYMPHOCYTES NFR BLD AUTO: 39.1 % (ref 19.6–45.3)
MCH RBC QN AUTO: 44 PG (ref 26.6–33)
MCHC RBC AUTO-ENTMCNC: 35.9 G/DL (ref 31.5–35.7)
MCV RBC AUTO: 122.7 FL (ref 79–97)
MONOCYTES # BLD AUTO: 0.76 10*3/MM3 (ref 0.1–0.9)
MONOCYTES NFR BLD AUTO: 13.7 % (ref 5–12)
NEUTROPHILS NFR BLD AUTO: 2.42 10*3/MM3 (ref 1.7–7)
NEUTROPHILS NFR BLD AUTO: 43.6 % (ref 42.7–76)
NRBC BLD AUTO-RTO: 0.4 /100 WBC (ref 0–0.2)
PLATELET # BLD AUTO: 750 10*3/MM3 (ref 140–450)
PMV BLD AUTO: 9 FL (ref 6–12)
RBC # BLD AUTO: 3 10*6/MM3 (ref 3.77–5.28)
WBC NRBC COR # BLD AUTO: 5.55 10*3/MM3 (ref 3.4–10.8)

## 2024-05-06 PROCEDURE — 85025 COMPLETE CBC W/AUTO DIFF WBC: CPT

## 2024-05-06 PROCEDURE — 36415 COLL VENOUS BLD VENIPUNCTURE: CPT

## 2024-05-06 PROCEDURE — 96372 THER/PROPH/DIAG INJ SC/IM: CPT

## 2024-05-06 PROCEDURE — 25010000002 CYANOCOBALAMIN PER 1000 MCG: Performed by: NURSE PRACTITIONER

## 2024-05-06 RX ORDER — CYANOCOBALAMIN 1000 UG/ML
1000 INJECTION, SOLUTION INTRAMUSCULAR; SUBCUTANEOUS ONCE
Status: COMPLETED | OUTPATIENT
Start: 2024-05-06 | End: 2024-05-06

## 2024-05-06 RX ADMIN — CYANOCOBALAMIN 1000 MCG: 1000 INJECTION INTRAMUSCULAR; SUBCUTANEOUS at 09:39

## 2024-05-31 NOTE — PROGRESS NOTES
Subjective     REASON FOR FOLLOW UP:    Essential thrombocytosis controlled with Hydrea  B12 deficiency receiving B12 injections    HISTORY OF PRESENT ILLNESS:  The patient is a 79 y.o. year old female who was referred to us from her primary care office with abnormal CBC showing her platelet count over 1.5 million. She was started on 81 mg aspirin and Hydrea.  Current Hydrea dose is 1500 mg p.o. daily    Her recent labs in our office have shown her platelet count was increasing and her hemoglobin and white cells had decreased and for this reason we will concerned that we may need to modify her treatment and have him return to the office today for for MD visit and labs.    She had recently also had some GI issues with diarrhea and I think it is possible that she may not have been absorbing the Hydrea well during that period.  Her blood count in the office today does look better.  Her platelet count is back down to 360,000 and her hemoglobin remains stable at 10.7 g/dL.  Her white count was improved at 3460 compared 2710 on the previous lab.  She tells me that she did not interrupt her Hydrea when she was having the GI issues.    After her office visit in November, she was continued on 1500 mg of Hydrea daily.  Her blood counts in December showed that she had developed severe anemia with hemoglobin of 7.6 g/dL.  Her white count was 4800 and platelets were 300,000.  At that point we had her hold Hydrea and repeat blood counts from 12/27/2023 showed the white count was down to 2800 and the hemoglobin was down to 6.9 g/dL.  At that point we arranged outpatient transfusion of 2 units packed red blood cells.    While off Hydrea her platelet count did escalate significantly and with lab visit of 1/17/2024 her platelet count was up to 1,450,000.  Her white count was normal at 6950 and hemoglobin further improved to 11.1 g/dL.  Her Hydrea dose was adjusted to 1500 mg alternating with 1000 mg daily.     On the last visit she  was accompanied by her .  He reported that he has takien over management of her medication due to some increased issues with confusion and short-term memory problems.  I wonder if this may have had something to do with her earlier severe drop in her blood counts (perhaps she was taking extra doses and did not realize it).    In December her B12 level was also low.  We have initiated B12 shots.     INTERVAL HISTORY:  The patient returns to the office today, 6/3/2024 for monthly follow-up and lab review as well as B12 injection.  She continues on hydroxyurea alternating 1000 mg and 1500 mg daily.  She is accompanied by her  who aids with the history as she does have some short-term memory issues.  She has no new pain.  She is eating and drinking adequately.  She does report compliance with her aspirin 81 mg daily.  She has no wounds or ulcers.      History of Present Illness     Past Medical History:   Diagnosis Date    ADHD (attention deficit hyperactivity disorder) 1995    have had since childhood    Allergic exema    Anxiety     Cancer blood    in treatment CBC    Cataract removed    Colon polyp checked every 5 years    none at present    DDD (degenerative disc disease), lumbar     Fibromyalgia, primary off and on    GERD (gastroesophageal reflux disease)     H/o Hip pain     Headache occasional    History of depression     History of low back pain     History of thrombocytosis     HL (hearing loss) left ear    Hypercholesterolemia     Irritable bowel syndrome occasional    Low back pain     Scoliosis slight curve    Sleep apnea     Tremor slight in leg    Urinary tract infection October this year    treated        Past Surgical History:   Procedure Laterality Date    APPENDECTOMY  removed 1972    BACK SURGERY      CATARACT EXTRACTION  2018    COLONOSCOPY  regular checkups    COSMETIC SURGERY  15 years ago    ENDOSCOPY N/A 2/1/2024    Procedure: ESOPHAGOGASTRODUODENOSCOPY with biopsy;  Surgeon:  Kal Guzman MD;  [place] [facility] [place];  Service: Gastroenterology;  Laterality: N/A;  hiatal hernia, antral ulcer, gastritis,    HIP SURGERY      JOINT REPLACEMENT  right hip & left knee    2011 & 2013    KNEE SURGERY      SPINE SURGERY  1971    SUBTOTAL HYSTERECTOMY  1972    TOTAL HIP ARTHROPLASTY Right 2011    TOTAL KNEE ARTHROPLASTY Right 2013    TUBAL ABDOMINAL LIGATION  1972        Current Outpatient Medications on File Prior to Visit   Medication Sig Dispense Refill    aspirin 81 MG EC tablet Take 1 tablet by mouth Daily.      calcium carbonate (OS-YINKA) 600 MG tablet Take 1 tablet by mouth Daily.      Cholecalciferol (VITAMIN D-3 PO) Take  by mouth.      FLUoxetine (PROzac) 20 MG capsule Take 1 capsule by mouth Daily. 90 capsule 1    fluticasone (FLONASE) 50 MCG/ACT nasal spray       hydroxyurea (HYDREA) 500 MG capsule Take three capsules daily alternating with two capsules daily. 75 capsule 5    omeprazole (priLOSEC) 40 MG capsule Take 1 capsule by mouth Daily. 90 capsule 1    pravastatin (PRAVACHOL) 20 MG tablet Take 1 tablet by mouth Daily. 90 tablet 1    probiotic (CULTURELLE) capsule capsule Take  by mouth Daily.      sucralfate (CARAFATE) 1 g tablet Take 1 tablet by mouth 2 (Two) Times a Day. 60 tablet 1    Triamcinolone Acetonide 0.025 % lotion       COVID-19 mRNA Vac-Adán,Pfizer, (Comirnaty) 30 MCG/0.3ML suspension Inject  into the appropriate muscle as directed by prescriber. 0.3 mL 0    fluorouracil (EFUDEX) 5 % cream       RSVPreF3 Vac Recomb Adjuvanted (Arexvy) 120 MCG/0.5ML reconstituted suspension injection Inject  into the appropriate muscle as directed by prescriber. 1 each 0     Current Facility-Administered Medications on File Prior to Visit   Medication Dose Route Frequency Provider Last Rate Last Admin    cyanocobalamin injection 1,000 mcg  1,000 mcg Intramuscular Q28 Days Umberto Brown MD   1,000 mcg at 01/29/24 1408        ALLERGIES:    Allergies   Allergen Reactions     "Penicillins Hives     Shortness of breath    Codeine Nausea And Vomiting        Social History     Socioeconomic History    Marital status:      Spouse name: Temo   Tobacco Use    Smoking status: Former     Current packs/day: 0.00     Average packs/day: 1 pack/day for 15.0 years (15.0 ttl pk-yrs)     Types: Cigarettes     Start date: 10/1/1964     Quit date: 10/1/1979     Years since quittin.7    Smokeless tobacco: Never   Vaping Use    Vaping status: Never Used   Substance and Sexual Activity    Alcohol use: Yes     Alcohol/week: 3.0 standard drinks of alcohol     Types: 1 Glasses of wine, 1 Cans of beer, 1 Shots of liquor per week     Comment: Social    Drug use: Never    Sexual activity: Not Currently     Partners: Male        Family History   Problem Relation Age of Onset    Hyperlipidemia Mother     Stroke Maternal Grandfather     Breast cancer Neg Hx     Colon cancer Neg Hx     Colon polyps Neg Hx     Crohn's disease Neg Hx     Irritable bowel syndrome Neg Hx     Ulcerative colitis Neg Hx         Review of Systems  ROS as per HPI    Objective     Vitals:    24 0940   BP: 129/84   Pulse: 86   Resp: 16   Temp: 97.3 °F (36.3 °C)   TempSrc: Oral   SpO2: 96%   Weight: 70.3 kg (155 lb)   Height: 162.1 cm (63.82\")   PainSc: 0-No pain         6/3/2024     9:41 AM   Current Status   ECOG score 0       Physical Exam   Constitutional: She is oriented to person, place, and time. She appears well-developed. No distress.   HENT:   Head: Normocephalic.   Eyes: Pupils are equal, round, and reactive to light. Conjunctivae are normal. No scleral icterus.   Neck: No JVD present. No thyromegaly present.   Cardiovascular: Normal rate and regular rhythm. Exam reveals no gallop and no friction rub.   No murmur heard.  Pulmonary/Chest: Effort normal and breath sounds normal. She has no wheezes. She has no rales.   Abdominal: Soft. She exhibits no distension and no mass. There is no abdominal tenderness. "   Musculoskeletal: Normal range of motion. No deformity.   Lymphadenopathy:     She has no cervical adenopathy.   Neurological: She is alert and oriented to person, place, and time. She has normal reflexes. No cranial nerve deficit.   Skin: Skin is warm and dry. No rash noted. No erythema.   Nailbed discoloration   Psychiatric: Her behavior is normal. Judgment normal.        RECENT LABS:  Results from last 7 days   Lab Units 06/03/24  0935   WBC 10*3/mm3 4.58   NEUTROS ABS 10*3/mm3 2.33   HEMOGLOBIN g/dL 13.1   HEMATOCRIT % 36.4   PLATELETS 10*3/mm3 619*               CT SCAN OF THE HEAD AND MAXILLOFACIAL REGION WITHOUT CONTRAST 3/16/2023     CLINICAL HISTORY: Head and facial injury following fall 1 week ago.     TECHNIQUE: CT scan of the head was obtained with 2 mm axial bone  algorithm and 3 mm axial soft tissue algorithm images. Sagittal and  coronal reconstructed images were obtained. Additionally, a dedicated CT  scan of the maxillofacial region was obtained with 1 mm axial, coronal,  and sagittal bone algorithm images and 2 mm axial soft tissue algorithm  images.     FINDINGS:     HEAD CT: There is no evidence for a calvarial fracture. There is no  evidence for an acute extra-axial hemorrhage. Otherwise, the ventricles,  sulci, and cisterns are age-appropriate. The gray-white matter  differentiation is within normal limits. Old lacunar disease is seen  within the lentiform nuclei. The posterior fossa structures are within  normal limits.     MAXILLOFACIAL CT: There is no evidence for acute fracture or bony  malalignment involving the maxillofacial region.     There is a prominent size right frontal convexity scalp hematoma  extending into the right preseptal soft tissues.     There is complete opacification of the right maxillary sinus with  centrally located mineralized and inspissated secretions. There is  thickening of the walls of the right maxillary sinus compatible with  chronic osteitic changes.      IMPRESSION:     No evidence for acute traumatic intracranial pathology.     Prominent size right frontal convexity scalp hematoma extending into the  right preseptal soft tissues.    Complete opacification right maxillary sinus with centrally located  inspissated secretions and chronic osteitic changes within the walls of  the right maxillary sinus    Assessment & Plan   1.  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  Current dose of Hydrea is 1000 mg alternating with 1500 mg every other day. With platelets remaining greater then 600, we will slightly dose escalate to 1500mg 4 days a week, 100mg 3 days a week.  3.  B12 deficiency, continued monthly.  4.  Some signs of early dementia.  It is possible that she could have inadvertently been taking too much Hydrea which may have led to her recent issues with severe anemia and leukopenia.  As noted above her  is now managing her medications.  5.  Gastritis and peptic ulcer disease.  She now is on proton pump inhibitor therapy and Carafate after undergoing an EGD with Dr. Guzman 2/1/2024.      Recommendations:  Slightly increase Hydrea to a consistent weekly schedule 1500mg 4 days a week (Dali, M, W, F) 1000 mg 3 days a week (Tues, Thurs, Sat)  Continue aspirin 81 mg po daily  Proceed today with b12 injection which is continued monthly.    Lab + RN review + B12 shot every 4 wks and 8 weeks  MD follow up with CBC B12 injection in 3 months.    Patient is on a high risk medication requiring close monitoring for toxicity    Emmanuelle Meyers, APRN  06/03/2024

## 2024-06-03 ENCOUNTER — OFFICE VISIT (OUTPATIENT)
Dept: ONCOLOGY | Facility: CLINIC | Age: 79
End: 2024-06-03
Payer: MEDICARE

## 2024-06-03 ENCOUNTER — LAB (OUTPATIENT)
Dept: LAB | Facility: HOSPITAL | Age: 79
End: 2024-06-03
Payer: MEDICARE

## 2024-06-03 ENCOUNTER — INFUSION (OUTPATIENT)
Dept: ONCOLOGY | Facility: HOSPITAL | Age: 79
End: 2024-06-03
Payer: MEDICARE

## 2024-06-03 VITALS
SYSTOLIC BLOOD PRESSURE: 129 MMHG | DIASTOLIC BLOOD PRESSURE: 84 MMHG | HEIGHT: 64 IN | OXYGEN SATURATION: 96 % | RESPIRATION RATE: 16 BRPM | WEIGHT: 155 LBS | BODY MASS INDEX: 26.46 KG/M2 | HEART RATE: 86 BPM | TEMPERATURE: 97.3 F

## 2024-06-03 DIAGNOSIS — D47.1 MYELOPROLIFERATIVE DISORDER: ICD-10-CM

## 2024-06-03 DIAGNOSIS — D75.839 THROMBOCYTOSIS: ICD-10-CM

## 2024-06-03 DIAGNOSIS — E53.8 B12 DEFICIENCY: ICD-10-CM

## 2024-06-03 DIAGNOSIS — D47.1 MYELOPROLIFERATIVE DISORDER: Primary | ICD-10-CM

## 2024-06-03 DIAGNOSIS — Z79.899 HIGH RISK MEDICATION USE: ICD-10-CM

## 2024-06-03 DIAGNOSIS — E53.8 B12 DEFICIENCY: Primary | ICD-10-CM

## 2024-06-03 LAB
BASOPHILS # BLD AUTO: 0.05 10*3/MM3 (ref 0–0.2)
BASOPHILS NFR BLD AUTO: 1.1 % (ref 0–1.5)
DEPRECATED RDW RBC AUTO: 57.6 FL (ref 37–54)
EOSINOPHIL # BLD AUTO: 0.09 10*3/MM3 (ref 0–0.4)
EOSINOPHIL NFR BLD AUTO: 2 % (ref 0.3–6.2)
ERYTHROCYTE [DISTWIDTH] IN BLOOD BY AUTOMATED COUNT: 13 % (ref 12.3–15.4)
HCT VFR BLD AUTO: 36.4 % (ref 34–46.6)
HGB BLD-MCNC: 13.1 G/DL (ref 12–15.9)
IMM GRANULOCYTES # BLD AUTO: 0.02 10*3/MM3 (ref 0–0.05)
IMM GRANULOCYTES NFR BLD AUTO: 0.4 % (ref 0–0.5)
LYMPHOCYTES # BLD AUTO: 1.62 10*3/MM3 (ref 0.7–3.1)
LYMPHOCYTES NFR BLD AUTO: 35.4 % (ref 19.6–45.3)
MCH RBC QN AUTO: 44.3 PG (ref 26.6–33)
MCHC RBC AUTO-ENTMCNC: 36 G/DL (ref 31.5–35.7)
MCV RBC AUTO: 123 FL (ref 79–97)
MONOCYTES # BLD AUTO: 0.47 10*3/MM3 (ref 0.1–0.9)
MONOCYTES NFR BLD AUTO: 10.3 % (ref 5–12)
NEUTROPHILS NFR BLD AUTO: 2.33 10*3/MM3 (ref 1.7–7)
NEUTROPHILS NFR BLD AUTO: 50.8 % (ref 42.7–76)
NRBC BLD AUTO-RTO: 0 /100 WBC (ref 0–0.2)
PLATELET # BLD AUTO: 619 10*3/MM3 (ref 140–450)
PMV BLD AUTO: 8.9 FL (ref 6–12)
RBC # BLD AUTO: 2.96 10*6/MM3 (ref 3.77–5.28)
WBC NRBC COR # BLD AUTO: 4.58 10*3/MM3 (ref 3.4–10.8)

## 2024-06-03 PROCEDURE — 36415 COLL VENOUS BLD VENIPUNCTURE: CPT

## 2024-06-03 PROCEDURE — 3074F SYST BP LT 130 MM HG: CPT | Performed by: NURSE PRACTITIONER

## 2024-06-03 PROCEDURE — 25010000002 CYANOCOBALAMIN PER 1000 MCG: Performed by: NURSE PRACTITIONER

## 2024-06-03 PROCEDURE — 99214 OFFICE O/P EST MOD 30 MIN: CPT | Performed by: NURSE PRACTITIONER

## 2024-06-03 PROCEDURE — 96372 THER/PROPH/DIAG INJ SC/IM: CPT

## 2024-06-03 PROCEDURE — 1126F AMNT PAIN NOTED NONE PRSNT: CPT | Performed by: NURSE PRACTITIONER

## 2024-06-03 PROCEDURE — 85025 COMPLETE CBC W/AUTO DIFF WBC: CPT

## 2024-06-03 PROCEDURE — 3079F DIAST BP 80-89 MM HG: CPT | Performed by: NURSE PRACTITIONER

## 2024-06-03 RX ORDER — PHENOL 1.4 %
600 AEROSOL, SPRAY (ML) MUCOUS MEMBRANE DAILY
COMMUNITY

## 2024-06-03 RX ORDER — LACTOBACILLUS RHAMNOSUS GG 10B CELL
CAPSULE ORAL DAILY
COMMUNITY

## 2024-06-03 RX ORDER — CYANOCOBALAMIN 1000 UG/ML
1000 INJECTION, SOLUTION INTRAMUSCULAR; SUBCUTANEOUS ONCE
Status: COMPLETED | OUTPATIENT
Start: 2024-06-03 | End: 2024-06-03

## 2024-06-03 RX ADMIN — CYANOCOBALAMIN 1000 MCG: 1000 INJECTION INTRAMUSCULAR; SUBCUTANEOUS at 10:16

## 2024-06-04 ENCOUNTER — TELEPHONE (OUTPATIENT)
Dept: ONCOLOGY | Facility: CLINIC | Age: 79
End: 2024-06-04
Payer: MEDICARE

## 2024-06-04 NOTE — TELEPHONE ENCOUNTER
Temo verbalized understanding. He requested I send this over to him via [employer] so he has it in writing.

## 2024-06-05 ENCOUNTER — SPECIALTY PHARMACY (OUTPATIENT)
Dept: PHARMACY | Facility: HOSPITAL | Age: 79
End: 2024-06-05
Payer: MEDICARE

## 2024-06-05 RX ORDER — HYDROXYUREA 500 MG/1
1500 CAPSULE ORAL DAILY
Qty: 75 CAPSULE | Refills: 2 | Status: SHIPPED | OUTPATIENT
Start: 2024-06-05

## 2024-06-05 NOTE — PROGRESS NOTES
Specialty Pharmacy Note: Hydrea (hydroxyurea)    Sonia Wooten is a 79 y.o. female with essential thrombocytosis was seen 6/3/24 by APRN. Per provider dictation, slight changes to oral oncology regimen Hydrea (hydroxyurea).  Labs Review: The CMP and CBC from 6/3/24 have been reviewed.     Specialty pharmacy will continue to follow patient.    Re: Refills of Oral Specialty Medication - Hydrea (hydroxyurea)    Drug-Drug Interactions: The current medication list was reviewed and there are no relevant drug-drug interactions with the specialty medication.  Medication Allergies: The patient has no relevant allergies as it relates to their oral specialty medication  Review of Labs/Dose Adjustments: DOSE CHANGE - I reviewed the most recent note and labs. Due to ease of administration the dose is being adjusted. I sent refills as described below.    Drug: Hydrea (hydroxyurea)  Strength: 500 mg  Directions: Take three capsules (1500 mg) daily on Sun, Mon, Wed, Fri; then take two capsules (1000 mg) daily on Sat, Tue, Thu.   Quantity: 75  Refills: 2  [facility]    Name/Credentials: Genna Rodriguez, ChanningD, BCPS    6/5/2024  11:27 EDT

## 2024-06-05 NOTE — PROGRESS NOTES
Re: Refills of Oral Specialty Medication - Hydrea (hydroxyurea) - dose double check    Review of Labs/Dose Adjustments: DOSE CHANGE - I reviewed the most recent note and labs. Due to making schedule more consistent the dose is being adjusted/slightly increased. I sent refills as described below.    Drug: Hydrea (hydroxyurea)  Strength: 500 mg  Directions: Take 3 tablets by mouth daily on Sun, Mon, Wed, and Friday, and take 2 tablets by mouth daily on Tues, Thurs, and Saturday  Quantity: 75  Refills: 2  Pharmacy prescription sent to: [facility] upon MD signature    Independent dose review completed.    Sophia Oliva, Pharm.D.    6/5/2024  12:57 EDT

## 2024-06-26 ENCOUNTER — SPECIALTY PHARMACY (OUTPATIENT)
Dept: PHARMACY | Facility: HOSPITAL | Age: 79
End: 2024-06-26
Payer: MEDICARE

## 2024-06-26 NOTE — PROGRESS NOTES
Specialty Pharmacy Patient Management Program  Oncology 6-Month Clinical Assessment       Sonia Wooten is a 79 y.o. female with Essential Thrombocytosis called today to assess adherence and side effects.    Regimen: Hydroxyurea (Hydrea) 1500 mg (3 capsules) once daily on Sunday, Monday, Wednesday, and Friday, and 1000 mg (2 capsules) once daily on Saturday, Tuesday, and Thursday    Reason for Outreach: Routine medication check-in .       Medicines reviewed by Rupert Malhotra, Pharmacy Intern on 6/26/2024 at  2:53 PM     Goals Addressed Today    None       Medication Assessment:  Medication Assessment  Side Effects: none reported  Administration: Patient is taking every day at the same time .   Patient can self administer medications: yes  Medication Follow-Up Plan: Next clinical assessment  Lab Review: The labs listed below have been reviewed. No dose adjustments are needed for the oral specialty medication(s) based on the labs.    Lab Results   Component Value Date    GLUCOSE 100 (H) 03/11/2024    CALCIUM 9.3 03/11/2024     03/11/2024    K 4.3 03/11/2024    CO2 25.7 03/11/2024     03/11/2024    BUN 11 03/11/2024    CREATININE 0.61 03/11/2024    EGFRIFNONA 81 12/13/2021    BCR 18.0 03/11/2024    ANIONGAP 10.3 03/11/2024     Lab Results   Component Value Date    WBC 4.58 06/03/2024    RBC 2.96 (L) 06/03/2024    HGB 13.1 06/03/2024    HCT 36.4 06/03/2024    .0 (H) 06/03/2024    MCH 44.3 (H) 06/03/2024    MCHC 36.0 (H) 06/03/2024    RDW 13.0 06/03/2024    RDWSD 57.6 (H) 06/03/2024    MPV 8.9 06/03/2024     (H) 06/03/2024    NEUTRORELPCT 50.8 06/03/2024    LYMPHORELPCT 35.4 06/03/2024    MONORELPCT 10.3 06/03/2024    EOSRELPCT 2.0 06/03/2024    BASORELPCT 1.1 06/03/2024    AUTOIGPER 0.4 06/03/2024    NEUTROABS 2.33 06/03/2024    LYMPHSABS 1.62 06/03/2024    MONOSABS 0.47 06/03/2024    EOSABS 0.09 06/03/2024    BASOSABS 0.05 06/03/2024    AUTOIGNUM 0.02 06/03/2024    NRBC 0.0 06/03/2024      Drug-drug interactions  Completed medication reconciliation today to assess for drug interactions. Patient denies starting or stopping any medications.    Assessed medication list for interactions, no significant drug interactions noted.   Advised patient to call the clinic if any new medications are started so we can assess for drug-drug interactions.  Drug-food interactions discussed:  none  Vaccines are coordinated by the patient's oncologist and primary care provider.    Allergies  Known allergies and reactions were discussed with the patient. The patient's chart has been reviewed for allergy information and updated as necessary.   Allergies   Allergen Reactions    Penicillins Hives     Shortness of breath    Codeine Nausea And Vomiting        Hospitalizations and Urgent Care Visits Since Last Assessment:  Unplanned hospitalizations or inpatient admissions: none - patient did have a root canal performed today (6/26/24)  ED Visits: none  Urgent Office Visits: none    Adherence Assessment:  Patient reports no missed doses in the past month.    Quality of Life Assessment:     -- Quality of Life: 7/10    Financial Assessment:  Medication availability/affordability: Patient has had no issues obtaining medication from pharmacy.    Attestation     I attest the patient was actively involved in and has agreed to the above plan of care.  If the prescribed therapy is at any point deemed not appropriate based on the current or future assessments, a consultation will be initiated with the patient's specialty care provider to determine the best course of action. The revised plan of therapy will be documented along with any required assessments and/or additional patient education provided.       All questions addressed and patient had no additional concerns. Patient has pharmacy contact information.    Name/Credentials: Rupert Malhotra, Pharmacy Intern    6/26/2024  14:54 EDT

## 2024-06-30 DIAGNOSIS — F41.9 ANXIETY: ICD-10-CM

## 2024-07-01 ENCOUNTER — INFUSION (OUTPATIENT)
Dept: ONCOLOGY | Facility: HOSPITAL | Age: 79
End: 2024-07-01
Payer: MEDICARE

## 2024-07-01 ENCOUNTER — LAB (OUTPATIENT)
Dept: LAB | Facility: HOSPITAL | Age: 79
End: 2024-07-01
Payer: MEDICARE

## 2024-07-01 DIAGNOSIS — E53.8 B12 DEFICIENCY: Primary | ICD-10-CM

## 2024-07-01 DIAGNOSIS — D75.839 THROMBOCYTOSIS: ICD-10-CM

## 2024-07-01 DIAGNOSIS — E53.8 B12 DEFICIENCY: ICD-10-CM

## 2024-07-01 DIAGNOSIS — D47.1 MYELOPROLIFERATIVE DISORDER: ICD-10-CM

## 2024-07-01 LAB
ALBUMIN SERPL-MCNC: 4.2 G/DL (ref 3.5–5.2)
ALBUMIN/GLOB SERPL: 1.6 G/DL
ALP SERPL-CCNC: 70 U/L (ref 39–117)
ALT SERPL W P-5'-P-CCNC: 8 U/L (ref 1–33)
ANION GAP SERPL CALCULATED.3IONS-SCNC: 8.5 MMOL/L (ref 5–15)
AST SERPL-CCNC: 23 U/L (ref 1–32)
BASOPHILS # BLD AUTO: 0.07 10*3/MM3 (ref 0–0.2)
BASOPHILS NFR BLD AUTO: 1.3 % (ref 0–1.5)
BILIRUB SERPL-MCNC: 0.3 MG/DL (ref 0–1.2)
BUN SERPL-MCNC: 21 MG/DL (ref 8–23)
BUN/CREAT SERPL: 24.4 (ref 7–25)
CALCIUM SPEC-SCNC: 9.3 MG/DL (ref 8.6–10.5)
CHLORIDE SERPL-SCNC: 103 MMOL/L (ref 98–107)
CO2 SERPL-SCNC: 27.5 MMOL/L (ref 22–29)
CREAT SERPL-MCNC: 0.86 MG/DL (ref 0.57–1)
DEPRECATED RDW RBC AUTO: 58.9 FL (ref 37–54)
EGFRCR SERPLBLD CKD-EPI 2021: 68.8 ML/MIN/1.73
EOSINOPHIL # BLD AUTO: 0.08 10*3/MM3 (ref 0–0.4)
EOSINOPHIL NFR BLD AUTO: 1.5 % (ref 0.3–6.2)
ERYTHROCYTE [DISTWIDTH] IN BLOOD BY AUTOMATED COUNT: 12.6 % (ref 12.3–15.4)
FERRITIN SERPL-MCNC: 145 NG/ML (ref 13–150)
GLOBULIN UR ELPH-MCNC: 2.7 GM/DL
GLUCOSE SERPL-MCNC: 94 MG/DL (ref 65–99)
HCT VFR BLD AUTO: 36.5 % (ref 34–46.6)
HGB BLD-MCNC: 13 G/DL (ref 12–15.9)
IMM GRANULOCYTES # BLD AUTO: 0.02 10*3/MM3 (ref 0–0.05)
IMM GRANULOCYTES NFR BLD AUTO: 0.4 % (ref 0–0.5)
IRON 24H UR-MRATE: 51 MCG/DL (ref 37–145)
IRON SATN MFR SERPL: 16 % (ref 20–50)
LYMPHOCYTES # BLD AUTO: 1.75 10*3/MM3 (ref 0.7–3.1)
LYMPHOCYTES NFR BLD AUTO: 32.5 % (ref 19.6–45.3)
MCH RBC QN AUTO: 45 PG (ref 26.6–33)
MCHC RBC AUTO-ENTMCNC: 35.6 G/DL (ref 31.5–35.7)
MCV RBC AUTO: 126.3 FL (ref 79–97)
MONOCYTES # BLD AUTO: 0.59 10*3/MM3 (ref 0.1–0.9)
MONOCYTES NFR BLD AUTO: 11 % (ref 5–12)
NEUTROPHILS NFR BLD AUTO: 2.87 10*3/MM3 (ref 1.7–7)
NEUTROPHILS NFR BLD AUTO: 53.3 % (ref 42.7–76)
NRBC BLD AUTO-RTO: 0 /100 WBC (ref 0–0.2)
PLATELET # BLD AUTO: 702 10*3/MM3 (ref 140–450)
PMV BLD AUTO: 8.9 FL (ref 6–12)
POTASSIUM SERPL-SCNC: 4.2 MMOL/L (ref 3.5–5.2)
PROT SERPL-MCNC: 6.9 G/DL (ref 6–8.5)
RBC # BLD AUTO: 2.89 10*6/MM3 (ref 3.77–5.28)
SODIUM SERPL-SCNC: 139 MMOL/L (ref 136–145)
TIBC SERPL-MCNC: 326 MCG/DL (ref 298–536)
TRANSFERRIN SERPL-MCNC: 219 MG/DL (ref 200–360)
WBC NRBC COR # BLD AUTO: 5.38 10*3/MM3 (ref 3.4–10.8)

## 2024-07-01 PROCEDURE — 80053 COMPREHEN METABOLIC PANEL: CPT

## 2024-07-01 PROCEDURE — 82728 ASSAY OF FERRITIN: CPT

## 2024-07-01 PROCEDURE — 83540 ASSAY OF IRON: CPT

## 2024-07-01 PROCEDURE — 96372 THER/PROPH/DIAG INJ SC/IM: CPT

## 2024-07-01 PROCEDURE — 85025 COMPLETE CBC W/AUTO DIFF WBC: CPT

## 2024-07-01 PROCEDURE — 36415 COLL VENOUS BLD VENIPUNCTURE: CPT

## 2024-07-01 PROCEDURE — 25010000002 CYANOCOBALAMIN PER 1000 MCG: Performed by: NURSE PRACTITIONER

## 2024-07-01 PROCEDURE — 84466 ASSAY OF TRANSFERRIN: CPT

## 2024-07-01 RX ORDER — FLUOXETINE HYDROCHLORIDE 20 MG/1
20 CAPSULE ORAL DAILY
Qty: 90 CAPSULE | Refills: 1 | Status: SHIPPED | OUTPATIENT
Start: 2024-07-01

## 2024-07-01 RX ORDER — CYANOCOBALAMIN 1000 UG/ML
1000 INJECTION, SOLUTION INTRAMUSCULAR; SUBCUTANEOUS ONCE
Status: COMPLETED | OUTPATIENT
Start: 2024-07-01 | End: 2024-07-01

## 2024-07-01 RX ADMIN — CYANOCOBALAMIN 1000 MCG: 1000 INJECTION, SOLUTION INTRAMUSCULAR at 10:04

## 2024-07-01 NOTE — PROGRESS NOTES
CBC reviewed with Emmanuelle REEVES , and patient to increase Hydrea dose to 1500 mg 5 days a week, and 1000 mg 2 days a week.  This was given in writing to patient's  and v/u.

## 2024-07-02 ENCOUNTER — SPECIALTY PHARMACY (OUTPATIENT)
Dept: PHARMACY | Facility: HOSPITAL | Age: 79
End: 2024-07-02
Payer: MEDICARE

## 2024-07-02 RX ORDER — HYDROXYUREA 500 MG/1
1500 CAPSULE ORAL DAILY
Qty: 76 CAPSULE | Refills: 2 | Status: SHIPPED | OUTPATIENT
Start: 2024-07-02

## 2024-07-02 NOTE — PROGRESS NOTES
Drug: Hydrea (hydroxyurea)  Strength: 500 mg  Directions: Take 3 capsules by mouth Daily. Take three capsules (1500 mg) daily Monday- Friday; then take two capsules (1000 mg) daily on Sat, Sun.   Quantity: 76  Refills: 2  Pharmacy prescription sent to: [facility]    Completed independent double check on medication order/RX.  Kaushik Hernandez, ChanningD, BCOP  Clinical Oncology Pharmacist     CBC/CMP/COVID-19

## 2024-07-02 NOTE — PROGRESS NOTES
Specialty Pharmacy Note: Hydrea (hydroxyurea)    Sonia Wooten is a 79 y.o. female with polycythemia vera was seen 7/1/24 for labs. Per RN note increase dose of oral oncology regimen Hydrea (hydroxyurea).  Labs Review: The CMP and CBC from 7/1/24 have been reviewed. Dose adjustments are needed for the oral specialty medication(s) based on plt count.  The dose will be changed to hydrea 1500 mg po daily for 5 days each week and 1000 mg po daily for 2 days each week.    Specialty pharmacy will continue to follow patient.    Re: Refills of Oral Specialty Medication - Hydrea (hydroxyurea)    Drug-Drug Interactions: The current medication list was reviewed and there are no relevant drug-drug interactions with the specialty medication.  Medication Allergies: The patient has no relevant allergies as it relates to their oral specialty medication  Review of Labs/Dose Adjustments: DOSE CHANGE - I reviewed the most recent note and labs. Due to plt count the dose is being increased. I sent refills as described below.    Drug: Hydrea (hydroxyurea)  Strength: 500 mg  Directions: Take 3 capsules by mouth Daily. Take three capsules (1500 mg) daily Monday- Friday; then take two capsules (1000 mg) daily on Sat, Sun.   Quantity: 76  Refills: 2  [facility]    Name/Credentials: Genna Rodriguez, PharmD, BCPS    7/2/2024  10:14 EDT

## 2024-07-29 ENCOUNTER — LAB (OUTPATIENT)
Dept: LAB | Facility: HOSPITAL | Age: 79
End: 2024-07-29
Payer: MEDICARE

## 2024-07-29 ENCOUNTER — INFUSION (OUTPATIENT)
Dept: ONCOLOGY | Facility: HOSPITAL | Age: 79
End: 2024-07-29
Payer: MEDICARE

## 2024-07-29 DIAGNOSIS — D75.839 THROMBOCYTOSIS: ICD-10-CM

## 2024-07-29 DIAGNOSIS — E53.8 B12 DEFICIENCY: Primary | ICD-10-CM

## 2024-07-29 DIAGNOSIS — E53.8 B12 DEFICIENCY: ICD-10-CM

## 2024-07-29 DIAGNOSIS — D47.1 MYELOPROLIFERATIVE DISORDER: ICD-10-CM

## 2024-07-29 LAB
BASOPHILS # BLD AUTO: 0.04 10*3/MM3 (ref 0–0.2)
BASOPHILS NFR BLD AUTO: 0.7 % (ref 0–1.5)
DEPRECATED RDW RBC AUTO: 62.7 FL (ref 37–54)
EOSINOPHIL # BLD AUTO: 0.06 10*3/MM3 (ref 0–0.4)
EOSINOPHIL NFR BLD AUTO: 1.1 % (ref 0.3–6.2)
ERYTHROCYTE [DISTWIDTH] IN BLOOD BY AUTOMATED COUNT: 13.5 % (ref 12.3–15.4)
HCT VFR BLD AUTO: 36.6 % (ref 34–46.6)
HGB BLD-MCNC: 13.1 G/DL (ref 12–15.9)
IMM GRANULOCYTES # BLD AUTO: 0.03 10*3/MM3 (ref 0–0.05)
IMM GRANULOCYTES NFR BLD AUTO: 0.6 % (ref 0–0.5)
LYMPHOCYTES # BLD AUTO: 1.93 10*3/MM3 (ref 0.7–3.1)
LYMPHOCYTES NFR BLD AUTO: 35.5 % (ref 19.6–45.3)
MCH RBC QN AUTO: 44.9 PG (ref 26.6–33)
MCHC RBC AUTO-ENTMCNC: 35.8 G/DL (ref 31.5–35.7)
MCV RBC AUTO: 125.3 FL (ref 79–97)
MONOCYTES # BLD AUTO: 0.73 10*3/MM3 (ref 0.1–0.9)
MONOCYTES NFR BLD AUTO: 13.4 % (ref 5–12)
NEUTROPHILS NFR BLD AUTO: 2.64 10*3/MM3 (ref 1.7–7)
NEUTROPHILS NFR BLD AUTO: 48.7 % (ref 42.7–76)
NRBC BLD AUTO-RTO: 0.4 /100 WBC (ref 0–0.2)
PLATELET # BLD AUTO: 741 10*3/MM3 (ref 140–450)
PMV BLD AUTO: 9.2 FL (ref 6–12)
RBC # BLD AUTO: 2.92 10*6/MM3 (ref 3.77–5.28)
WBC NRBC COR # BLD AUTO: 5.43 10*3/MM3 (ref 3.4–10.8)

## 2024-07-29 PROCEDURE — 25010000002 CYANOCOBALAMIN PER 1000 MCG: Performed by: INTERNAL MEDICINE

## 2024-07-29 PROCEDURE — 96372 THER/PROPH/DIAG INJ SC/IM: CPT

## 2024-07-29 PROCEDURE — 85025 COMPLETE CBC W/AUTO DIFF WBC: CPT

## 2024-07-29 PROCEDURE — 36415 COLL VENOUS BLD VENIPUNCTURE: CPT

## 2024-07-29 RX ORDER — CYANOCOBALAMIN 1000 UG/ML
1000 INJECTION, SOLUTION INTRAMUSCULAR; SUBCUTANEOUS ONCE
Status: COMPLETED | OUTPATIENT
Start: 2024-07-29 | End: 2024-07-29

## 2024-07-29 RX ADMIN — CYANOCOBALAMIN 1000 MCG: 1000 INJECTION, SOLUTION INTRAMUSCULAR at 09:40

## 2024-07-29 NOTE — PROGRESS NOTES
Reviewed CBC with Dr House, and patient to increase Hydrea to 1500 mg daily.  This was discussed with patient and  and given to  in writing, and v/u.

## 2024-07-30 ENCOUNTER — SPECIALTY PHARMACY (OUTPATIENT)
Dept: PHARMACY | Facility: HOSPITAL | Age: 79
End: 2024-07-30
Payer: MEDICARE

## 2024-07-30 RX ORDER — HYDROXYUREA 500 MG/1
1500 CAPSULE ORAL DAILY
Qty: 90 CAPSULE | Refills: 2 | Status: SHIPPED | OUTPATIENT
Start: 2024-07-30

## 2024-07-30 NOTE — PROGRESS NOTES
Drug: Hydrea (hydroxyurea)  Strength: 500 mg  Directions: Take 3 capsules by mouth daily  Quantity: 90  Refills: 2  Pharmacy prescription sent to:  [facility]    Completed independent double check on medication order/RX.  Kaushik Hernandez, PharmD, BCOP  Clinical Oncology Pharmacist

## 2024-07-30 NOTE — PROGRESS NOTES
Re: Refills of Oral Specialty Medication - Hydrea (hydroxyurea)    Drug-Drug Interactions: The current medication list was reviewed and there are no relevant drug-drug interactions with the specialty medication.  Medication Allergies: The patient has no relevant allergies as it relates to their oral specialty medication  Review of Labs/Dose Adjustments: DOSE CHANGE - I reviewed the most recent note and labs. Due to plt count the dose is being increased. I sent refills as described below.    Drug: Hydrea (hydroxyurea)  Strength: 500 mg  Directions: Take 3 capsules by mouth daily  Quantity: 90  Refills: 2  Pharmacy prescription sent to:  [facility]    Name/Credentials: Genna Rodriguez, ChanningD, BCPS    7/30/2024  09:49 EDT

## 2024-08-19 DIAGNOSIS — E78.00 ELEVATED LDL CHOLESTEROL LEVEL: ICD-10-CM

## 2024-08-19 RX ORDER — PRAVASTATIN SODIUM 20 MG
20 TABLET ORAL DAILY
Qty: 90 TABLET | Refills: 1 | Status: SHIPPED | OUTPATIENT
Start: 2024-08-19

## 2024-09-03 ENCOUNTER — SPECIALTY PHARMACY (OUTPATIENT)
Dept: PHARMACY | Facility: HOSPITAL | Age: 79
End: 2024-09-03
Payer: MEDICARE

## 2024-09-05 ENCOUNTER — OFFICE VISIT (OUTPATIENT)
Dept: ONCOLOGY | Facility: CLINIC | Age: 79
End: 2024-09-05
Payer: MEDICARE

## 2024-09-05 ENCOUNTER — INFUSION (OUTPATIENT)
Dept: ONCOLOGY | Facility: HOSPITAL | Age: 79
End: 2024-09-05
Payer: MEDICARE

## 2024-09-05 ENCOUNTER — LAB (OUTPATIENT)
Dept: LAB | Facility: HOSPITAL | Age: 79
End: 2024-09-05
Payer: MEDICARE

## 2024-09-05 VITALS
RESPIRATION RATE: 16 BRPM | BODY MASS INDEX: 26.75 KG/M2 | WEIGHT: 156.7 LBS | SYSTOLIC BLOOD PRESSURE: 113 MMHG | HEIGHT: 64 IN | DIASTOLIC BLOOD PRESSURE: 79 MMHG | HEART RATE: 90 BPM | OXYGEN SATURATION: 96 % | TEMPERATURE: 97.7 F

## 2024-09-05 DIAGNOSIS — E53.8 B12 DEFICIENCY: ICD-10-CM

## 2024-09-05 DIAGNOSIS — D75.839 THROMBOCYTOSIS: ICD-10-CM

## 2024-09-05 DIAGNOSIS — D47.1 MYELOPROLIFERATIVE DISORDER: ICD-10-CM

## 2024-09-05 DIAGNOSIS — D47.1 MYELOPROLIFERATIVE DISORDER: Primary | ICD-10-CM

## 2024-09-05 DIAGNOSIS — Z79.899 HIGH RISK MEDICATION USE: ICD-10-CM

## 2024-09-05 DIAGNOSIS — E53.8 B12 DEFICIENCY: Primary | ICD-10-CM

## 2024-09-05 LAB
ALBUMIN SERPL-MCNC: 4.3 G/DL (ref 3.5–5.2)
ALBUMIN/GLOB SERPL: 1.4 G/DL
ALP SERPL-CCNC: 55 U/L (ref 39–117)
ALT SERPL W P-5'-P-CCNC: 14 U/L (ref 1–33)
ANION GAP SERPL CALCULATED.3IONS-SCNC: 11.2 MMOL/L (ref 5–15)
AST SERPL-CCNC: 29 U/L (ref 1–32)
BASOPHILS # BLD AUTO: 0.05 10*3/MM3 (ref 0–0.2)
BASOPHILS NFR BLD AUTO: 1.5 % (ref 0–1.5)
BILIRUB SERPL-MCNC: 0.6 MG/DL (ref 0–1.2)
BUN SERPL-MCNC: 14 MG/DL (ref 8–23)
BUN/CREAT SERPL: 19.4 (ref 7–25)
CALCIUM SPEC-SCNC: 9.6 MG/DL (ref 8.6–10.5)
CHLORIDE SERPL-SCNC: 102 MMOL/L (ref 98–107)
CO2 SERPL-SCNC: 25.8 MMOL/L (ref 22–29)
CREAT SERPL-MCNC: 0.72 MG/DL (ref 0.57–1)
DEPRECATED RDW RBC AUTO: 64.8 FL (ref 37–54)
EGFRCR SERPLBLD CKD-EPI 2021: 85.2 ML/MIN/1.73
EOSINOPHIL # BLD AUTO: 0.05 10*3/MM3 (ref 0–0.4)
EOSINOPHIL NFR BLD AUTO: 1.5 % (ref 0.3–6.2)
ERYTHROCYTE [DISTWIDTH] IN BLOOD BY AUTOMATED COUNT: 13.8 % (ref 12.3–15.4)
FERRITIN SERPL-MCNC: 211 NG/ML (ref 13–150)
GLOBULIN UR ELPH-MCNC: 3 GM/DL
GLUCOSE SERPL-MCNC: 107 MG/DL (ref 65–99)
HCT VFR BLD AUTO: 34.9 % (ref 34–46.6)
HGB BLD-MCNC: 12.4 G/DL (ref 12–15.9)
IMM GRANULOCYTES # BLD AUTO: 0.01 10*3/MM3 (ref 0–0.05)
IMM GRANULOCYTES NFR BLD AUTO: 0.3 % (ref 0–0.5)
IRON 24H UR-MRATE: 100 MCG/DL (ref 37–145)
IRON SATN MFR SERPL: 31 % (ref 20–50)
LYMPHOCYTES # BLD AUTO: 1.65 10*3/MM3 (ref 0.7–3.1)
LYMPHOCYTES NFR BLD AUTO: 49.3 % (ref 19.6–45.3)
MCH RBC QN AUTO: 46.1 PG (ref 26.6–33)
MCHC RBC AUTO-ENTMCNC: 35.5 G/DL (ref 31.5–35.7)
MCV RBC AUTO: 129.7 FL (ref 79–97)
MONOCYTES # BLD AUTO: 0.45 10*3/MM3 (ref 0.1–0.9)
MONOCYTES NFR BLD AUTO: 13.4 % (ref 5–12)
NEUTROPHILS NFR BLD AUTO: 1.14 10*3/MM3 (ref 1.7–7)
NEUTROPHILS NFR BLD AUTO: 34 % (ref 42.7–76)
NRBC BLD AUTO-RTO: 0.6 /100 WBC (ref 0–0.2)
PLATELET # BLD AUTO: 755 10*3/MM3 (ref 140–450)
PMV BLD AUTO: 8.8 FL (ref 6–12)
POTASSIUM SERPL-SCNC: 4.2 MMOL/L (ref 3.5–5.2)
PROT SERPL-MCNC: 7.3 G/DL (ref 6–8.5)
RBC # BLD AUTO: 2.69 10*6/MM3 (ref 3.77–5.28)
SODIUM SERPL-SCNC: 139 MMOL/L (ref 136–145)
TIBC SERPL-MCNC: 328 MCG/DL (ref 298–536)
TRANSFERRIN SERPL-MCNC: 220 MG/DL (ref 200–360)
VIT B12 BLD-MCNC: 474 PG/ML (ref 211–946)
WBC NRBC COR # BLD AUTO: 3.35 10*3/MM3 (ref 3.4–10.8)

## 2024-09-05 PROCEDURE — 85025 COMPLETE CBC W/AUTO DIFF WBC: CPT

## 2024-09-05 PROCEDURE — 84466 ASSAY OF TRANSFERRIN: CPT

## 2024-09-05 PROCEDURE — 36415 COLL VENOUS BLD VENIPUNCTURE: CPT

## 2024-09-05 PROCEDURE — 80053 COMPREHEN METABOLIC PANEL: CPT

## 2024-09-05 PROCEDURE — 82728 ASSAY OF FERRITIN: CPT

## 2024-09-05 PROCEDURE — 83540 ASSAY OF IRON: CPT

## 2024-09-05 PROCEDURE — 82607 VITAMIN B-12: CPT | Performed by: NURSE PRACTITIONER

## 2024-09-05 PROCEDURE — 25010000002 CYANOCOBALAMIN PER 1000 MCG: Performed by: NURSE PRACTITIONER

## 2024-09-05 PROCEDURE — 96372 THER/PROPH/DIAG INJ SC/IM: CPT

## 2024-09-05 RX ORDER — CYANOCOBALAMIN 1000 UG/ML
1000 INJECTION, SOLUTION INTRAMUSCULAR; SUBCUTANEOUS ONCE
Status: COMPLETED | OUTPATIENT
Start: 2024-09-05 | End: 2024-09-05

## 2024-09-05 RX ADMIN — CYANOCOBALAMIN 1000 MCG: 1000 INJECTION, SOLUTION INTRAMUSCULAR at 11:11

## 2024-09-05 NOTE — PROGRESS NOTES
"[facility] CBC GROUP OUTPATIENT FOLLOW UP CLINIC VISIT    REASON FOR FOLLOW-UP:    Essential thrombocytosis  B12 deficiency    HISTORY OF PRESENT ILLNESS:  Sonia Wooten is a 79 y.o. female who returns today for follow up of the above issue.      She returns to the office today for monthly follow-up, B12 injection and labs.  She continues on hydroxyurea 1500 mg daily.  This is continued since dose increase 7/29/2024 due to ongoing thrombocytosis.  She is tolerating Hydrea well.  She has seen with her  who does assist in history due to her dementia.  She denies fevers or chills, signs or symptoms of infection.    REVIEW OF SYSTEMS:  ROS as per HPI    PHYSICAL EXAMINATION:    Vitals:    09/05/24 1034   BP: 113/79   Pulse: 90   Resp: 16   Temp: 97.7 °F (36.5 °C)   TempSrc: Oral   SpO2: 96%   Weight: 71.1 kg (156 lb 11.2 oz)   Height: 162.1 cm (63.82\")   PainSc: 0-No pain       PHYSICAL EXAM:  General:  No acute distress, awake, alert and oriented  Skin:  Warm and dry, no visible rash  HEENT:  Normocephalic/atraumatic.  Wearing a face mask.  Chest:  Normal respiratory effort  Extremities:  No visible clubbing, cyanosis, or edema  Neuro/psych:  Grossly nonfocal.  Normal mood and affect.    DIAGNOSTIC DATA:  Iron Profile (09/05/2024 10:04)  CBC & Differential (09/05/2024 10:04)  Comprehensive Metabolic Panel (09/05/2024 10:04)  Ferritin (09/05/2024 10:04)    IMAGING:    None reviewed    ASSESSMENT:  This is a 79 y.o. female with:  *.  MPD ( Essential Thrombocytosis )   CALR mutation and initial platelet count > 1.5 million  Undergoing treatment with hydroxyurea  7/29/2024 platelets remained elevated at 741, therefore hydroxyurea was increased to 1500 mg daily  Today, 9/5/2024 despite increased dose of hydroxyurea 1 month ago, the patient continues to have thrombocytosis with platelets 755,000.  She has now developed neutropenia.  This will require a reduced dose of her hydroxyurea.    *B12 " deficiency  Continuing B12 injections monthly.    *Signs of early dementia.   her  is now managing her medications.    *Gastritis and peptic ulcer disease.    She now is on proton pump inhibitor therapy and Carafate after undergoing an EGD with Dr. Guzman 2/1/2024.    *Neutropenia secondary to hydroxyurea  While taking 1500 mg daily, WBC 3.35, ANC 1.14.  We will reduce dose of hydroxyurea    PLAN:  Reduce Hydrea to 1000 mg twice a week, 1500 mg all other days  Continue monthly B12 injections  Return in 1 week for for CBC with RN review to ensure neutropenia is improving with dose reduction of Hydrea  Return in 4 weeks for CBC, B12 injection and RN review  MD follow-up with Dr. House in 8 weeks with CBC, B12 injection    The patient is on a high risk medication requiring close monitoring for toxicity.  Today's plan of care and lab results were discussed and reviewed with Dr. Hawthorne (#2) who is in agreement    Emmanuelle Meyers, ANÍBAL   09/05/2024

## 2024-09-06 ENCOUNTER — TELEPHONE (OUTPATIENT)
Dept: INTERNAL MEDICINE | Age: 79
End: 2024-09-06

## 2024-09-06 ENCOUNTER — SPECIALTY PHARMACY (OUTPATIENT)
Dept: PHARMACY | Facility: HOSPITAL | Age: 79
End: 2024-09-06
Payer: MEDICARE

## 2024-09-06 RX ORDER — HYDROXYUREA 500 MG/1
CAPSULE ORAL
Qty: 76 CAPSULE | Refills: 1 | Status: SHIPPED | OUTPATIENT
Start: 2024-09-06

## 2024-09-06 NOTE — PROGRESS NOTES
Specialty Pharmacy Patient Management Program  Per Protocol Prescription Order or Refill     Patient will be filling or currently fills medications at  [facility]  and is enrolled in the Patient Management Program.    Requested Prescriptions     Signed Prescriptions Disp Refills    hydroxyurea (Hydrea) 500 MG capsule 76 capsule 1     Sig: Take two capsules (1000 mg) for 2 days per week; then take three capsules (1500 mg) all other days.     Prescription orders above were sent to the pharmacy per Collaborative Care Agreement Protocol.     Last Office Visit: 9/5/24  Next Office Visit: 11/5/24    Specialty Pharmacy Note: Hydrea (hydroxyurea)    Sonia Wooten is a 79 y.o. female with essential thrombocytosis was seen 9/5/24 by APRN. Per provider dictation, reduce dose of oral oncology regimen Hydrea (hydroxyurea).  Labs Review: The CMP and CBC from 9/5/24 have been reviewed. Dose adjustments are needed for the oral specialty medication(s) based on neutropenia.  The dose will be changed to 1000 mg two days per week then 1500 mg all other days.    Specialty pharmacy will continue to follow patient.    Genna Rodriguez, PharmD, [facility]  Clinical Specialty Pharmacist, Oncology  9/6/2024  09:05 EDT

## 2024-09-06 NOTE — TELEPHONE ENCOUNTER
Caller: Temo Wooten    Relationship: Emergency Contact    Best call back number: [phone]     What was the call regarding: TEMO ASKS TO CONFIRM IF HE CAN STILL BE WITH HIS WIFE, THE PATIENT, DURING THEIR BACK TO BACK APPOINTMENTS, IN THE SAME ROOM. (11:15 AND 11:30 ON 9/20)

## 2024-09-06 NOTE — PROGRESS NOTES
Specialty Pharmacy Patient Management Program  Per Protocol Prescription Order or Refill       Requested Prescriptions     Signed Prescriptions Disp Refills    hydroxyurea (Hydrea) 500 MG capsule 76 capsule 1     Sig: Take two capsules (1000 mg) for 2 days per week; then take three capsules (1500 mg) all other days.     Prescription orders above were sent to  [facility]  per Collaborative Care Agreement Protocol.     Completed independent double check on medication order/RX.    Babita Henderson Rph, YO  9/6/2024  09:15 EDT

## 2024-09-12 ENCOUNTER — CLINICAL SUPPORT (OUTPATIENT)
Dept: ONCOLOGY | Facility: HOSPITAL | Age: 79
End: 2024-09-12
Payer: MEDICARE

## 2024-09-12 ENCOUNTER — LAB (OUTPATIENT)
Dept: LAB | Facility: HOSPITAL | Age: 79
End: 2024-09-12
Payer: MEDICARE

## 2024-09-12 DIAGNOSIS — D70.9 NEUTROPENIA, UNSPECIFIED TYPE: Primary | ICD-10-CM

## 2024-09-12 DIAGNOSIS — D70.9 NEUTROPENIA, UNSPECIFIED TYPE: ICD-10-CM

## 2024-09-12 LAB
BASOPHILS # BLD AUTO: 0.05 10*3/MM3 (ref 0–0.2)
BASOPHILS NFR BLD AUTO: 1.5 % (ref 0–1.5)
DEPRECATED RDW RBC AUTO: 62.8 FL (ref 37–54)
EOSINOPHIL # BLD AUTO: 0.01 10*3/MM3 (ref 0–0.4)
EOSINOPHIL NFR BLD AUTO: 0.3 % (ref 0.3–6.2)
ERYTHROCYTE [DISTWIDTH] IN BLOOD BY AUTOMATED COUNT: 13.4 % (ref 12.3–15.4)
HCT VFR BLD AUTO: 34.2 % (ref 34–46.6)
HGB BLD-MCNC: 12.3 G/DL (ref 12–15.9)
IMM GRANULOCYTES # BLD AUTO: 0.03 10*3/MM3 (ref 0–0.05)
IMM GRANULOCYTES NFR BLD AUTO: 0.9 % (ref 0–0.5)
LYMPHOCYTES # BLD AUTO: 1.5 10*3/MM3 (ref 0.7–3.1)
LYMPHOCYTES NFR BLD AUTO: 44 % (ref 19.6–45.3)
MCH RBC QN AUTO: 46.4 PG (ref 26.6–33)
MCHC RBC AUTO-ENTMCNC: 36 G/DL (ref 31.5–35.7)
MCV RBC AUTO: 129.1 FL (ref 79–97)
MONOCYTES # BLD AUTO: 0.75 10*3/MM3 (ref 0.1–0.9)
MONOCYTES NFR BLD AUTO: 22 % (ref 5–12)
NEUTROPHILS NFR BLD AUTO: 1.07 10*3/MM3 (ref 1.7–7)
NEUTROPHILS NFR BLD AUTO: 31.3 % (ref 42.7–76)
NRBC BLD AUTO-RTO: 0 /100 WBC (ref 0–0.2)
PLATELET # BLD AUTO: 677 10*3/MM3 (ref 140–450)
PMV BLD AUTO: 9.1 FL (ref 6–12)
RBC # BLD AUTO: 2.65 10*6/MM3 (ref 3.77–5.28)
WBC NRBC COR # BLD AUTO: 3.41 10*3/MM3 (ref 3.4–10.8)

## 2024-09-12 PROCEDURE — 36415 COLL VENOUS BLD VENIPUNCTURE: CPT

## 2024-09-12 PROCEDURE — 85025 COMPLETE CBC W/AUTO DIFF WBC: CPT

## 2024-09-12 NOTE — PROGRESS NOTES
Reviewed labs w/ Dr. House ANC even lower today. Will decrease Hydrea to 1000mg M-F and 1500mg on Sat and Sun. Pt and spouse v/u. Shauna Benoit RN

## 2024-09-15 DIAGNOSIS — R10.13 DYSPEPSIA: ICD-10-CM

## 2024-09-15 RX ORDER — OMEPRAZOLE 40 MG/1
40 CAPSULE, DELAYED RELEASE ORAL DAILY
Qty: 90 CAPSULE | Refills: 1 | Status: SHIPPED | OUTPATIENT
Start: 2024-09-15

## 2024-09-20 ENCOUNTER — OFFICE VISIT (OUTPATIENT)
Dept: INTERNAL MEDICINE | Age: 79
End: 2024-09-20
Payer: MEDICARE

## 2024-09-20 ENCOUNTER — HOSPITAL ENCOUNTER (OUTPATIENT)
Facility: HOSPITAL | Age: 79
Discharge: HOME | End: 2024-09-20
Payer: MEDICARE

## 2024-09-20 VITALS
OXYGEN SATURATION: 98 % | DIASTOLIC BLOOD PRESSURE: 80 MMHG | SYSTOLIC BLOOD PRESSURE: 125 MMHG | WEIGHT: 158 LBS | RESPIRATION RATE: 16 BRPM | HEIGHT: 64 IN | BODY MASS INDEX: 26.98 KG/M2 | TEMPERATURE: 97.2 F | HEART RATE: 68 BPM

## 2024-09-20 DIAGNOSIS — I10 PRIMARY HYPERTENSION: Primary | ICD-10-CM

## 2024-09-20 DIAGNOSIS — R09.89 LUNG CRACKLES: ICD-10-CM

## 2024-09-20 DIAGNOSIS — G31.84 MILD COGNITIVE IMPAIRMENT: ICD-10-CM

## 2024-09-20 DIAGNOSIS — E78.00 ELEVATED LDL CHOLESTEROL LEVEL: ICD-10-CM

## 2024-09-20 PROCEDURE — 3074F SYST BP LT 130 MM HG: CPT

## 2024-09-20 PROCEDURE — 1159F MED LIST DOCD IN RCRD: CPT

## 2024-09-20 PROCEDURE — 1160F RVW MEDS BY RX/DR IN RCRD: CPT

## 2024-09-20 PROCEDURE — 99214 OFFICE O/P EST MOD 30 MIN: CPT

## 2024-09-20 PROCEDURE — 3079F DIAST BP 80-89 MM HG: CPT

## 2024-09-20 PROCEDURE — 71046 X-RAY EXAM CHEST 2 VIEWS: CPT

## 2024-09-20 PROCEDURE — 1126F AMNT PAIN NOTED NONE PRSNT: CPT

## 2024-09-20 PROCEDURE — G2211 COMPLEX E/M VISIT ADD ON: HCPCS

## 2024-09-25 ENCOUNTER — PATIENT MESSAGE (OUTPATIENT)
Dept: INTERNAL MEDICINE | Age: 79
End: 2024-09-25
Payer: MEDICARE

## 2024-09-25 LAB
CHOLEST SERPL-MCNC: 178 MG/DL (ref 0–200)
CHOLEST/HDLC SERPL: 3.63 {RATIO}
HDLC SERPL-MCNC: 49 MG/DL (ref 40–60)
LDLC SERPL CALC-MCNC: 104 MG/DL (ref 0–100)
TRIGL SERPL-MCNC: 141 MG/DL (ref 0–150)
VLDLC SERPL CALC-MCNC: 25 MG/DL (ref 5–40)

## 2024-10-03 ENCOUNTER — INFUSION (OUTPATIENT)
Dept: ONCOLOGY | Facility: HOSPITAL | Age: 79
End: 2024-10-03
Payer: MEDICARE

## 2024-10-03 ENCOUNTER — TELEPHONE (OUTPATIENT)
Dept: INTERNAL MEDICINE | Age: 79
End: 2024-10-03
Payer: MEDICARE

## 2024-10-03 ENCOUNTER — LAB (OUTPATIENT)
Dept: LAB | Facility: HOSPITAL | Age: 79
End: 2024-10-03
Payer: MEDICARE

## 2024-10-03 DIAGNOSIS — E53.8 B12 DEFICIENCY: Primary | ICD-10-CM

## 2024-10-03 DIAGNOSIS — D70.9 NEUTROPENIA, UNSPECIFIED TYPE: ICD-10-CM

## 2024-10-03 DIAGNOSIS — D70.9 NEUTROPENIA, UNSPECIFIED TYPE: Primary | ICD-10-CM

## 2024-10-03 LAB
BASOPHILS # BLD AUTO: 0.07 10*3/MM3 (ref 0–0.2)
BASOPHILS NFR BLD AUTO: 1.3 % (ref 0–1.5)
DEPRECATED RDW RBC AUTO: 60.1 FL (ref 37–54)
EOSINOPHIL # BLD AUTO: 0.06 10*3/MM3 (ref 0–0.4)
EOSINOPHIL NFR BLD AUTO: 1.1 % (ref 0.3–6.2)
ERYTHROCYTE [DISTWIDTH] IN BLOOD BY AUTOMATED COUNT: 12.7 % (ref 12.3–15.4)
HCT VFR BLD AUTO: 34.9 % (ref 34–46.6)
HGB BLD-MCNC: 12.5 G/DL (ref 12–15.9)
IMM GRANULOCYTES # BLD AUTO: 0.05 10*3/MM3 (ref 0–0.05)
IMM GRANULOCYTES NFR BLD AUTO: 1 % (ref 0–0.5)
LYMPHOCYTES # BLD AUTO: 2.02 10*3/MM3 (ref 0.7–3.1)
LYMPHOCYTES NFR BLD AUTO: 38.5 % (ref 19.6–45.3)
MCH RBC QN AUTO: 46 PG (ref 26.6–33)
MCHC RBC AUTO-ENTMCNC: 35.8 G/DL (ref 31.5–35.7)
MCV RBC AUTO: 128.3 FL (ref 79–97)
MONOCYTES # BLD AUTO: 0.58 10*3/MM3 (ref 0.1–0.9)
MONOCYTES NFR BLD AUTO: 11 % (ref 5–12)
NEUTROPHILS NFR BLD AUTO: 2.47 10*3/MM3 (ref 1.7–7)
NEUTROPHILS NFR BLD AUTO: 47.1 % (ref 42.7–76)
NRBC BLD AUTO-RTO: 0 /100 WBC (ref 0–0.2)
PLATELET # BLD AUTO: 544 10*3/MM3 (ref 140–450)
PMV BLD AUTO: 9.2 FL (ref 6–12)
RBC # BLD AUTO: 2.72 10*6/MM3 (ref 3.77–5.28)
WBC NRBC COR # BLD AUTO: 5.25 10*3/MM3 (ref 3.4–10.8)

## 2024-10-03 PROCEDURE — 85025 COMPLETE CBC W/AUTO DIFF WBC: CPT

## 2024-10-03 PROCEDURE — 36415 COLL VENOUS BLD VENIPUNCTURE: CPT

## 2024-10-03 PROCEDURE — 25010000002 CYANOCOBALAMIN PER 1000 MCG: Performed by: INTERNAL MEDICINE

## 2024-10-03 PROCEDURE — 96372 THER/PROPH/DIAG INJ SC/IM: CPT

## 2024-10-03 RX ORDER — CYANOCOBALAMIN 1000 UG/ML
1000 INJECTION, SOLUTION INTRAMUSCULAR; SUBCUTANEOUS ONCE
Status: COMPLETED | OUTPATIENT
Start: 2024-10-03 | End: 2024-10-03

## 2024-10-03 RX ADMIN — CYANOCOBALAMIN 1000 MCG: 1000 INJECTION, SOLUTION INTRAMUSCULAR at 10:45

## 2024-10-03 NOTE — TELEPHONE ENCOUNTER
Called Patient spouse and let him know the first  referral was denied and we had to send out a new referral [facility] and gave him the phone # [phone] and that referral was pended right now.

## 2024-10-25 RX ORDER — CYANOCOBALAMIN 1000 UG/ML
1000 INJECTION, SOLUTION INTRAMUSCULAR; SUBCUTANEOUS ONCE
Status: CANCELLED | OUTPATIENT
Start: 2024-11-05

## 2024-10-25 NOTE — PROGRESS NOTES
"[facility] CBC GROUP OUTPATIENT FOLLOW UP CLINIC VISIT    REASON FOR FOLLOW-UP:    Essential thrombocytosis  B12 deficiency    HISTORY OF PRESENT ILLNESS:  Sonia Wooten is a 79 y.o. female who returns today for follow up of the above issue.      She feels well.  She has been taking hydroxyurea 1000 mg daily Monday through Friday and 1500 mg on Saturdays and Sundays.  She tolerates this well.  No new issues.  Monthly vitamin B12 injections here in the office continue.        REVIEW OF SYSTEMS:  ROS as per HPI    PHYSICAL EXAMINATION:    Vitals:    11/05/24 1447   BP: 128/84   Pulse: 94   Resp: 16   Temp: 97.5 °F (36.4 °C)   TempSrc: Oral   SpO2: 97%   Weight: 72.4 kg (159 lb 9.6 oz)   Height: 162.1 cm (63.82\")   PainSc: 0-No pain       PHYSICAL EXAM:  General:  No acute distress, awake, alert and oriented  Skin:  Warm and dry, no visible rash  HEENT:  Normocephalic/atraumatic.    Chest:  Normal respiratory effort.  Lungs clear to auscultation bilaterally.  Heart: Regular rate and rhythm  Extremities:  No visible clubbing, cyanosis, or edema  Neuro/psych:  Grossly nonfocal.  Normal mood and affect.    DIAGNOSTIC DATA:  CBC & Differential (11/05/2024 14:33)     IMAGING:    None reviewed    ASSESSMENT:  This is a 79 y.o. female with:    *Essential Thrombocytosis  CALR mutation positive and initial platelet count > 1.5 million  Undergoing treatment with hydroxyurea  7/29/2024 platelets remained elevated at 741, therefore hydroxyurea was increased to 1500 mg daily  Today, 9/5/2024 despite increased dose of hydroxyurea 1 month ago, the patient continues to have thrombocytosis with platelets 755,000.  She has now developed neutropenia.  This will require a reduced dose of her hydroxyurea.  11/5/2024: Doing well on 1000 mg hydroxyurea Monday through Friday and 1500 mg daily on the weekends.  Platelet count has increased at 747,000, from 554,000.    *B12 deficiency  Continuing B12 injections monthly.    *Signs of early " dementia.   her  is now managing her medications.    *Gastritis and peptic ulcer disease.    She now is on proton pump inhibitor therapy and Carafate after undergoing an EGD with Dr. Guzman 2/1/2024.    *Neutropenia secondary to hydroxyurea  While taking 1500 mg daily, WBC 3.35, ANC 1.14.  We will reduce dose of hydroxyurea to 1000 mg Monday through Friday and 1500 mg on the weekends.  11/5/2024: Neutropenia has resolved with white blood cell count of 5.33, ANC 2.55    PLAN:  Vitamin B12 injection today and monthly  Continue hydroxyurea with an increase to 1500 mg 3 days/week and 1000 mg now 4 days/week.  Return in 4 weeks for CBC, B12 injection and RN review  I will see her back in about 2 months with labs, B12 injection    The patient remains on a high risk medication requiring close monitoring for toxicity.    I spent 41 minutes in this visit today reviewing her record, communicating with her and her , examining her, placing orders, documenting the encounter.    Russell House MD   11/05/2024

## 2024-11-05 ENCOUNTER — INFUSION (OUTPATIENT)
Dept: ONCOLOGY | Facility: HOSPITAL | Age: 79
End: 2024-11-05
Payer: MEDICARE

## 2024-11-05 ENCOUNTER — OFFICE VISIT (OUTPATIENT)
Dept: ONCOLOGY | Facility: CLINIC | Age: 79
End: 2024-11-05
Payer: MEDICARE

## 2024-11-05 ENCOUNTER — LAB (OUTPATIENT)
Dept: LAB | Facility: HOSPITAL | Age: 79
End: 2024-11-05
Payer: MEDICARE

## 2024-11-05 VITALS
HEART RATE: 94 BPM | OXYGEN SATURATION: 97 % | TEMPERATURE: 97.5 F | DIASTOLIC BLOOD PRESSURE: 84 MMHG | SYSTOLIC BLOOD PRESSURE: 128 MMHG | WEIGHT: 159.6 LBS | HEIGHT: 64 IN | BODY MASS INDEX: 27.25 KG/M2 | RESPIRATION RATE: 16 BRPM

## 2024-11-05 DIAGNOSIS — D70.9 NEUTROPENIA, UNSPECIFIED TYPE: Primary | ICD-10-CM

## 2024-11-05 DIAGNOSIS — D70.9 NEUTROPENIA, UNSPECIFIED TYPE: ICD-10-CM

## 2024-11-05 DIAGNOSIS — D75.839 THROMBOCYTOSIS: ICD-10-CM

## 2024-11-05 DIAGNOSIS — E53.8 B12 DEFICIENCY: Primary | ICD-10-CM

## 2024-11-05 DIAGNOSIS — D47.3 ESSENTIAL THROMBOCYTHEMIA: ICD-10-CM

## 2024-11-05 LAB
BASOPHILS # BLD AUTO: 0.07 10*3/MM3 (ref 0–0.2)
BASOPHILS NFR BLD AUTO: 1.3 % (ref 0–1.5)
DEPRECATED RDW RBC AUTO: 56.3 FL (ref 37–54)
EOSINOPHIL # BLD AUTO: 0.06 10*3/MM3 (ref 0–0.4)
EOSINOPHIL NFR BLD AUTO: 1.1 % (ref 0.3–6.2)
ERYTHROCYTE [DISTWIDTH] IN BLOOD BY AUTOMATED COUNT: 12.1 % (ref 12.3–15.4)
HCT VFR BLD AUTO: 37.4 % (ref 34–46.6)
HGB BLD-MCNC: 13.3 G/DL (ref 12–15.9)
IMM GRANULOCYTES # BLD AUTO: 0.01 10*3/MM3 (ref 0–0.05)
IMM GRANULOCYTES NFR BLD AUTO: 0.2 % (ref 0–0.5)
LYMPHOCYTES # BLD AUTO: 2.01 10*3/MM3 (ref 0.7–3.1)
LYMPHOCYTES NFR BLD AUTO: 37.7 % (ref 19.6–45.3)
MCH RBC QN AUTO: 45.2 PG (ref 26.6–33)
MCHC RBC AUTO-ENTMCNC: 35.6 G/DL (ref 31.5–35.7)
MCV RBC AUTO: 127.2 FL (ref 79–97)
MONOCYTES # BLD AUTO: 0.63 10*3/MM3 (ref 0.1–0.9)
MONOCYTES NFR BLD AUTO: 11.8 % (ref 5–12)
NEUTROPHILS NFR BLD AUTO: 2.55 10*3/MM3 (ref 1.7–7)
NEUTROPHILS NFR BLD AUTO: 47.9 % (ref 42.7–76)
NRBC BLD AUTO-RTO: 0 /100 WBC (ref 0–0.2)
PLATELET # BLD AUTO: 747 10*3/MM3 (ref 140–450)
PMV BLD AUTO: 10 FL (ref 6–12)
RBC # BLD AUTO: 2.94 10*6/MM3 (ref 3.77–5.28)
WBC NRBC COR # BLD AUTO: 5.33 10*3/MM3 (ref 3.4–10.8)

## 2024-11-05 PROCEDURE — 36415 COLL VENOUS BLD VENIPUNCTURE: CPT

## 2024-11-05 PROCEDURE — 85025 COMPLETE CBC W/AUTO DIFF WBC: CPT

## 2024-11-05 PROCEDURE — 25010000002 CYANOCOBALAMIN PER 1000 MCG: Performed by: INTERNAL MEDICINE

## 2024-11-05 PROCEDURE — 96372 THER/PROPH/DIAG INJ SC/IM: CPT

## 2024-11-05 RX ORDER — CYANOCOBALAMIN 1000 UG/ML
1000 INJECTION, SOLUTION INTRAMUSCULAR; SUBCUTANEOUS ONCE
Status: COMPLETED | OUTPATIENT
Start: 2024-11-05 | End: 2024-11-05

## 2024-11-05 RX ADMIN — CYANOCOBALAMIN 1000 MCG: 1000 INJECTION, SOLUTION INTRAMUSCULAR at 15:18

## 2024-12-03 ENCOUNTER — LAB (OUTPATIENT)
Dept: LAB | Facility: HOSPITAL | Age: 79
End: 2024-12-03
Payer: MEDICARE

## 2024-12-03 ENCOUNTER — INFUSION (OUTPATIENT)
Dept: ONCOLOGY | Facility: HOSPITAL | Age: 79
End: 2024-12-03
Payer: MEDICARE

## 2024-12-03 DIAGNOSIS — D70.9 NEUTROPENIA, UNSPECIFIED TYPE: ICD-10-CM

## 2024-12-03 DIAGNOSIS — D75.839 THROMBOCYTOSIS: ICD-10-CM

## 2024-12-03 DIAGNOSIS — E53.8 B12 DEFICIENCY: Primary | ICD-10-CM

## 2024-12-03 DIAGNOSIS — D47.3 ESSENTIAL THROMBOCYTHEMIA: ICD-10-CM

## 2024-12-03 LAB
BASOPHILS # BLD AUTO: 0.08 10*3/MM3 (ref 0–0.2)
BASOPHILS NFR BLD AUTO: 1.4 % (ref 0–1.5)
DEPRECATED RDW RBC AUTO: 58.3 FL (ref 37–54)
EOSINOPHIL # BLD AUTO: 0.09 10*3/MM3 (ref 0–0.4)
EOSINOPHIL NFR BLD AUTO: 1.5 % (ref 0.3–6.2)
ERYTHROCYTE [DISTWIDTH] IN BLOOD BY AUTOMATED COUNT: 12.7 % (ref 12.3–15.4)
HCT VFR BLD AUTO: 37.1 % (ref 34–46.6)
HGB BLD-MCNC: 13.5 G/DL (ref 12–15.9)
HGB RETIC QN AUTO: 45.1 PG (ref 29.8–36.1)
IMM GRANULOCYTES # BLD AUTO: 0.02 10*3/MM3 (ref 0–0.05)
IMM GRANULOCYTES NFR BLD AUTO: 0.3 % (ref 0–0.5)
IMM RETICS NFR: 30.1 % (ref 3–15.8)
LYMPHOCYTES # BLD AUTO: 2.16 10*3/MM3 (ref 0.7–3.1)
LYMPHOCYTES NFR BLD AUTO: 36.6 % (ref 19.6–45.3)
MCH RBC QN AUTO: 45.9 PG (ref 26.6–33)
MCHC RBC AUTO-ENTMCNC: 36.4 G/DL (ref 31.5–35.7)
MCV RBC AUTO: 126.2 FL (ref 79–97)
MONOCYTES # BLD AUTO: 0.6 10*3/MM3 (ref 0.1–0.9)
MONOCYTES NFR BLD AUTO: 10.2 % (ref 5–12)
NEUTROPHILS NFR BLD AUTO: 2.95 10*3/MM3 (ref 1.7–7)
NEUTROPHILS NFR BLD AUTO: 50 % (ref 42.7–76)
NRBC BLD AUTO-RTO: 0 /100 WBC (ref 0–0.2)
PLATELET # BLD AUTO: 692 10*3/MM3 (ref 140–450)
PMV BLD AUTO: 9.1 FL (ref 6–12)
RBC # BLD AUTO: 2.94 10*6/MM3 (ref 3.77–5.28)
RETICS # AUTO: 0.09 10*6/MM3 (ref 0.02–0.13)
RETICS/RBC NFR AUTO: 2.91 % (ref 0.7–1.9)
WBC NRBC COR # BLD AUTO: 5.9 10*3/MM3 (ref 3.4–10.8)

## 2024-12-03 PROCEDURE — 25010000002 CYANOCOBALAMIN PER 1000 MCG: Performed by: INTERNAL MEDICINE

## 2024-12-03 PROCEDURE — 85025 COMPLETE CBC W/AUTO DIFF WBC: CPT

## 2024-12-03 PROCEDURE — 36415 COLL VENOUS BLD VENIPUNCTURE: CPT

## 2024-12-03 PROCEDURE — 85046 RETICYTE/HGB CONCENTRATE: CPT

## 2024-12-03 PROCEDURE — 96372 THER/PROPH/DIAG INJ SC/IM: CPT

## 2024-12-03 RX ORDER — CYANOCOBALAMIN 1000 UG/ML
1000 INJECTION, SOLUTION INTRAMUSCULAR; SUBCUTANEOUS ONCE
Status: COMPLETED | OUTPATIENT
Start: 2024-12-03 | End: 2024-12-03

## 2024-12-03 RX ADMIN — CYANOCOBALAMIN 1000 MCG: 1000 INJECTION, SOLUTION INTRAMUSCULAR at 12:51

## 2024-12-03 NOTE — PROGRESS NOTES
Patient is here for lab review with RN and B12 injection.  CBC reviewed with Dr House and patient to continue current dose of hydrea 1500 mg 3 days a week, and 1000 mg other 4 days of the week.  Reviewed this with patient's ,who takes care of her meds, v/u. Patient has no complaints. Copy of labs given to patient and follow up appointment reviewed. Patient is instructed to call the office with any concerns or new symptoms prior to next visit. Patient verbalized understanding and discharged in stable condition.      WBC   Date Value Ref Range Status   12/03/2024 5.90 3.40 - 10.80 10*3/mm3 Final   10/30/2023 4.0 3.4 - 10.8 x10E3/uL Final     RBC   Date Value Ref Range Status   12/03/2024 2.94 (L) 3.77 - 5.28 10*6/mm3 Final   10/30/2023 2.73 (L) 3.77 - 5.28 x10E6/uL Final     Comment:     Polychromasia present     Hemoglobin   Date Value Ref Range Status   12/03/2024 13.5 12.0 - 15.9 g/dL Final     Hematocrit   Date Value Ref Range Status   12/03/2024 37.1 34.0 - 46.6 % Final     MCV   Date Value Ref Range Status   12/03/2024 126.2 (H) 79.0 - 97.0 fL Final     MCH   Date Value Ref Range Status   12/03/2024 45.9 (H) 26.6 - 33.0 pg Final     MCHC   Date Value Ref Range Status   12/03/2024 36.4 (H) 31.5 - 35.7 g/dL Final     RDW   Date Value Ref Range Status   12/03/2024 12.7 12.3 - 15.4 % Final     RDW-SD   Date Value Ref Range Status   12/03/2024 58.3 (H) 37.0 - 54.0 fl Final     MPV   Date Value Ref Range Status   12/03/2024 9.1 6.0 - 12.0 fL Final     Platelets   Date Value Ref Range Status   12/03/2024 692 (H) 140 - 450 10*3/mm3 Final     Neutrophil %   Date Value Ref Range Status   12/03/2024 50.0 42.7 - 76.0 % Final     Lymphocyte %   Date Value Ref Range Status   12/03/2024 36.6 19.6 - 45.3 % Final     Monocyte %   Date Value Ref Range Status   12/03/2024 10.2 5.0 - 12.0 % Final     Eosinophil %   Date Value Ref Range Status   12/03/2024 1.5 0.3 - 6.2 % Final     Basophil %   Date Value Ref Range Status    12/03/2024 1.4 0.0 - 1.5 % Final     Immature Grans %   Date Value Ref Range Status   12/03/2024 0.3 0.0 - 0.5 % Final     Neutrophils, Absolute   Date Value Ref Range Status   12/03/2024 2.95 1.70 - 7.00 10*3/mm3 Final     Lymphocytes, Absolute   Date Value Ref Range Status   12/03/2024 2.16 0.70 - 3.10 10*3/mm3 Final     Monocytes, Absolute   Date Value Ref Range Status   12/03/2024 0.60 0.10 - 0.90 10*3/mm3 Final     Eosinophils, Absolute   Date Value Ref Range Status   12/03/2024 0.09 0.00 - 0.40 10*3/mm3 Final     Basophils, Absolute   Date Value Ref Range Status   12/03/2024 0.08 0.00 - 0.20 10*3/mm3 Final     Immature Grans, Absolute   Date Value Ref Range Status   12/03/2024 0.02 0.00 - 0.05 10*3/mm3 Final     nRBC   Date Value Ref Range Status   12/03/2024 0.0 0.0 - 0.2 /100 WBC Final

## 2024-12-17 ENCOUNTER — SPECIALTY PHARMACY (OUTPATIENT)
Dept: PHARMACY | Facility: HOSPITAL | Age: 79
End: 2024-12-17
Payer: MEDICARE

## 2024-12-17 RX ORDER — HYDROXYUREA 500 MG/1
CAPSULE ORAL
Qty: 204 CAPSULE | Refills: 1 | Status: SHIPPED | OUTPATIENT
Start: 2024-12-17

## 2024-12-17 NOTE — PROGRESS NOTES
Specialty Pharmacy Patient Management Program  Per Protocol Prescription Order or Refill     Patient will be filling or currently fills medications at  [facility]  and is enrolled in the Patient Management Program.    Requested Prescriptions     Signed Prescriptions Disp Refills    hydroxyurea (Hydrea) 500 MG capsule 204 capsule 1     Sig: Take two capsules (1000 mg) for 4 days per week; then take three capsules (1500 mg) all other days.     Prescription orders above were sent to the pharmacy per Collaborative Care Agreement Protocol.     Last Office Visit: 11/5/24  Next Office Visit: 1/14/25    Genna Rodriguez PharmD, [facility]  Clinical Specialty Pharmacist, Oncology  12/17/2024  15:31 EST

## 2024-12-17 NOTE — PROGRESS NOTES
Specialty Pharmacy Patient Management Program  Oncology Reassessment     Sonia Wooten was referred by an their provider to the Oncology Patient Management program offered by [facility] for essential thrombocythemia. A follow-up outreach was conducted, including assessment of continued therapy appropriateness, medication adherence, and side effect incidence and management for Hydrea (hydroxyurea).    Changes to Insurance Coverage or Financial Support  None    Relevant Past Medical History and Comorbidities  Relevant medical history and concomitant health conditions were discussed with the patient. The patient's chart has been reviewed for relevant past medical history and comorbid health conditions and updated as necessary.   Past Medical History:   Diagnosis Date    ADHD (attention deficit hyperactivity disorder)     have had since childhood    Allergic exema    Anxiety     Cancer blood    in treatment CBC    Cataract removed    Colon polyp checked every 5 years    none at present    DDD (degenerative disc disease), lumbar     Fibromyalgia, primary off and on    GERD (gastroesophageal reflux disease)     H/o Hip pain     Headache occasional    History of depression     History of low back pain     History of thrombocytosis     HL (hearing loss) left ear    Hypercholesterolemia     Irritable bowel syndrome occasional    Low back pain     Scoliosis slight curve    Sleep apnea     Tremor slight in leg    Urinary tract infection October this year    treated     Social History     Socioeconomic History    Marital status:      Spouse name: Temo   Tobacco Use    Smoking status: Former     Current packs/day: 0.00     Average packs/day: 1 pack/day for 15.0 years (15.0 ttl pk-yrs)     Types: Cigarettes     Start date: 10/1/1964     Quit date: 10/1/1979     Years since quittin.2    Smokeless tobacco: Never   Vaping Use    Vaping status: Never Used   Substance and Sexual Activity     Alcohol use: Yes     Alcohol/week: 3.0 standard drinks of alcohol     Types: 1 Glasses of wine, 1 Cans of beer, 1 Shots of liquor per week     Comment: Social    Drug use: Never    Sexual activity: Not Currently     Partners: Male          Hospitalizations and Urgent Care Since Last Assessment  ED Visits, Admissions, or Hospitalizations: None  Urgent Office Visits: None    Allergies  Known allergies and reactions were discussed with the patient. The patient's chart has been reviewed for allergy information and updated as necessary.   Allergies   Allergen Reactions    Penicillins Hives     Shortness of breath    Codeine Nausea And Vomiting          Relevant Laboratory Values  Relevant laboratory values were discussed with the patient. The following specialty medication dose adjustment(s) are recommended: No dose adjustments are needed for the oral specialty medication(s) based on the labs.    Lab Results   Component Value Date    GLUCOSE 107 (H) 09/05/2024    CALCIUM 9.6 09/05/2024     09/05/2024    K 4.2 09/05/2024    CO2 25.8 09/05/2024     09/05/2024    BUN 14 09/05/2024    CREATININE 0.72 09/05/2024    EGFRIFNONA 81 12/13/2021    BCR 19.4 09/05/2024    ANIONGAP 11.2 09/05/2024     Lab Results   Component Value Date    WBC 5.90 12/03/2024    RBC 2.94 (L) 12/03/2024    HGB 13.5 12/03/2024    HCT 37.1 12/03/2024    .2 (H) 12/03/2024    MCH 45.9 (H) 12/03/2024    MCHC 36.4 (H) 12/03/2024    RDW 12.7 12/03/2024    RDWSD 58.3 (H) 12/03/2024    MPV 9.1 12/03/2024     (H) 12/03/2024    NEUTRORELPCT 50.0 12/03/2024    LYMPHORELPCT 36.6 12/03/2024    MONORELPCT 10.2 12/03/2024    EOSRELPCT 1.5 12/03/2024    BASORELPCT 1.4 12/03/2024    AUTOIGPER 0.3 12/03/2024    NEUTROABS 2.95 12/03/2024    LYMPHSABS 2.16 12/03/2024    MONOSABS 0.60 12/03/2024    EOSABS 0.09 12/03/2024    BASOSABS 0.08 12/03/2024    AUTOIGNUM 0.02 12/03/2024    NRBC 0.0 12/03/2024       Current Medication List  This medication  list has been reviewed with the patient and evaluated for any interactions or necessary modifications/recommendations, and updated to include all prescription medications, OTC medications, and supplements the patient is currently taking.  This list reflects what is contained in the patient's profile, which has also been marked as reviewed to communicate to other providers it is the most up to date version of the patient's current medication therapy.     Current Outpatient Medications:     aspirin 81 MG EC tablet, Take 1 tablet by mouth Daily., Disp: , Rfl:     calcium carbonate (OS-YINKA) 600 MG tablet, Take 1 tablet by mouth Daily., Disp: , Rfl:     Cholecalciferol (VITAMIN D-3 PO), Take  by mouth., Disp: , Rfl:     fluorouracil (EFUDEX) 5 % cream, , Disp: , Rfl:     FLUoxetine (PROzac) 20 MG capsule, TAKE 1 CAPSULE BY MOUTH DAILY, Disp: 90 capsule, Rfl: 1    fluticasone (FLONASE) 50 MCG/ACT nasal spray, , Disp: , Rfl:     hydroxyurea (Hydrea) 500 MG capsule, Take two capsules (1000 mg) for 4 days per week; then take three capsules (1500 mg) all other days., Disp: 204 capsule, Rfl: 1    omeprazole (priLOSEC) 40 MG capsule, TAKE 1 CAPSULE BY MOUTH DAILY, Disp: 90 capsule, Rfl: 1    pravastatin (PRAVACHOL) 20 MG tablet, Take 1 tablet by mouth Daily., Disp: 90 tablet, Rfl: 1    probiotic (CULTURELLE) capsule capsule, Take  by mouth Daily., Disp: , Rfl:     Triamcinolone Acetonide 0.025 % lotion, , Disp: , Rfl:     Current Facility-Administered Medications:     cyanocobalamin injection 1,000 mcg, 1,000 mcg, Intramuscular, Q28 Days, Umberto Brown MD, 1,000 mcg at 01/29/24 1408         Drug Interactions  Assessed medication list for interactions, no significant drug interactions noted.   Advised patient to call the clinic if any new medications are started so we can assess for drug-drug interactions.  Drug-food interactions discussed:  None    Adverse Drug Reactions  Medication tolerability: Tolerating with no to  minimal ADRs  Medication plan: Continue therapy with normal follow-up  Plan for ADR Management: None    Adherence, Self-Administration, and Current Therapy Problems  Adherence related to the patient's specialty therapy was discussed with the patient. The Adherence segment of this outreach has been reviewed and updated.     Adherence Questions  Linked Medication(s) Assessed: Hydroxyurea (HYDREA)  On average, how many doses/injections does the patient miss per month?: 0  What are the identified reasons for non-adherence or missed doses? : no problems identified  What is the estimated medication adherence level?: %  Based on the patient/caregiver response and refill history, does this patient require an MTP to track adherence improvements?: no    Additional Barriers to Patient Self-Administration: None  Methods for Supporting Patient Self-Administration: None  Patient has had no issues obtaining medication from pharmacy.    Open Medication Therapy Problems  No medication therapy recommendations to display    Goals of Therapy  Goals related to the patient's specialty therapy were discussed with the patient. The Patient Goals segment of this outreach has been reviewed and updated.   Goals Addressed Today        Specialty Pharmacy General Goal      Control blood counts              Quality of Life Assessment   Quality of Life related to the patient's enrollment in the patient management program and services provided was discussed with the patient. The QOL segment of this outreach has been reviewed and updated.  Quality of Life Improvement Scale: 6-A little better    Discussed aforementioned material with patients  over the phone.     Reassessment Plan & Follow-Up  1. Medication Therapy Changes: None  2. Related Plans, Therapy Recommendations, or Issues to Be Addressed: None  3. Pharmacist to perform regular assessments no more than (6) months from the previous assessment.   4. Care Coordinator to set up  future refill outreaches, coordinate prescription delivery, and escalate clinical questions to pharmacist.    Attestation  Therapeutic appropriateness: Appropriate   I attest the patient was actively involved in and has agreed to the above plan of care.  If the prescribed therapy is at any point deemed not appropriate based on the current or future assessments, a consultation will be initiated with the patient's specialty care provider to determine the best course of action. The revised plan of therapy will be documented along with any required assessments and/or additional patient education provided.     ISHA Mcnally, Pharmacy Intern  Clinical Specialty Pharmacist, Oncology  12/17/2024  15:59 EST

## 2024-12-17 NOTE — PROGRESS NOTES
Specialty Pharmacy Patient Management Program  Per Protocol Prescription Order or Refill       Requested Prescriptions     Signed Prescriptions Disp Refills    hydroxyurea (Hydrea) 500 MG capsule 204 capsule 1     Sig: Take two capsules (1000 mg) for 4 days per week; then take three capsules (1500 mg) all other days.     Prescription orders above were sent to  [facility]  per Collaborative Care Agreement Protocol.     Completed independent double check on medication order/RX.    Kaushik Hernandez, PharmD, BCOP  Clinical Specialty Pharmacist, Oncology  12/17/2024  15:55 EST

## 2024-12-24 DIAGNOSIS — F41.9 ANXIETY: ICD-10-CM

## 2025-01-13 RX ORDER — HYDROXYUREA 500 MG/1
CAPSULE ORAL
OUTPATIENT
Start: 2025-01-13

## 2025-01-13 NOTE — PROGRESS NOTES
"[facility] CBC GROUP OUTPATIENT FOLLOW UP CLINIC VISIT    REASON FOR FOLLOW-UP:    Essential thrombocytosis  B12 deficiency    HISTORY OF PRESENT ILLNESS:  Sonia Wooten is a 79 y.o. female who returns today for follow up of the above issue.      She and her  note compliance with hydroxyurea 1000 mg 4 days/week and 1500 mg 3 days/week.  He notes that some days she is more alert than others.  Otherwise she has been doing well.  Monthly vitamin B12 injections here continue.  She questions if these need to continue.      REVIEW OF SYSTEMS:  ROS as per HPI    PHYSICAL EXAMINATION:    Vitals:    01/14/25 1317   BP: 106/72   Pulse: 113   Resp: 16   Temp: 97.3 °F (36.3 °C)   TempSrc: Oral   SpO2: 96%   Weight: 72.1 kg (158 lb 14.4 oz)   Height: 162.1 cm (63.82\")   PainSc: 0-No pain     PHYSICAL EXAM:  General:  No acute distress, awake, alert   Skin:  Warm and dry, no visible rash  HEENT:  Normocephalic/atraumatic.    Chest:  Normal respiratory effort.    Extremities:  No visible clubbing, cyanosis, or edema  Neuro/psych:  Grossly nonfocal.  Normal mood and affect.    DIAGNOSTIC DATA:  CBC & Differential (01/14/2025 12:59)  Retic With IRF & RET-He (01/14/2025 12:59)    IMAGING:    None reviewed    ASSESSMENT:  This is a 79 y.o. female with:    *Essential Thrombocytosis  CALR mutation positive and initial platelet count > 1.5 million  Undergoing treatment with hydroxyurea  7/29/2024 platelets remained elevated at 741, therefore hydroxyurea was increased to 1500 mg daily  Today, 9/5/2024 despite increased dose of hydroxyurea 1 month ago, the patient continues to have thrombocytosis with platelets 755,000.  She has now developed neutropenia.  This will require a reduced dose of her hydroxyurea.  11/5/2024: Doing well on 1000 mg hydroxyurea Monday through Friday and 1500 mg daily on the weekends.   Platelets have increased significantly at 926,000, from 692,000, from 747,000, from 544,000    *B12 " deficiency  Continuing B12 injections monthly.    *Signs of early dementia.   her  is now managing her medications.    *Gastritis and peptic ulcer disease.    She now is on proton pump inhibitor therapy and Carafate after undergoing an EGD with Dr. Guzman 2/1/2024.    *Neutropenia secondary to hydroxyurea  While taking hydroxyurea 1500 mg daily, WBC 3.35, ANC 1.14.    11/5/2024: Neutropenia has resolved with white blood cell count of 5.33, ANC 2.55    PLAN:  In spite of prior issues with cytopenias on hydroxyurea 1500 mg daily, I think we need to increase the dose back to this today with platelets greater than 900,000  Continue aspirin 81 mg daily  Vitamin B12 injection today and monthly.  At some point she may be able to try a sublingual supplement.  APRN in 4 and 8 weeks with labs, vitamin B12 injections.  I will see her back in 12 weeks.    The patient remains on a high risk medication requiring close monitoring for toxicity.      Russell House MD   01/14/2025

## 2025-01-14 ENCOUNTER — LAB (OUTPATIENT)
Dept: LAB | Facility: HOSPITAL | Age: 80
End: 2025-01-14
Payer: MEDICARE

## 2025-01-14 ENCOUNTER — INFUSION (OUTPATIENT)
Dept: ONCOLOGY | Facility: HOSPITAL | Age: 80
End: 2025-01-14
Payer: MEDICARE

## 2025-01-14 ENCOUNTER — OFFICE VISIT (OUTPATIENT)
Dept: ONCOLOGY | Facility: CLINIC | Age: 80
End: 2025-01-14
Payer: MEDICARE

## 2025-01-14 VITALS
RESPIRATION RATE: 16 BRPM | BODY MASS INDEX: 27.13 KG/M2 | HEIGHT: 64 IN | SYSTOLIC BLOOD PRESSURE: 106 MMHG | DIASTOLIC BLOOD PRESSURE: 72 MMHG | TEMPERATURE: 97.3 F | WEIGHT: 158.9 LBS | HEART RATE: 113 BPM | OXYGEN SATURATION: 96 %

## 2025-01-14 DIAGNOSIS — E53.8 B12 DEFICIENCY: Primary | ICD-10-CM

## 2025-01-14 DIAGNOSIS — D47.3 ESSENTIAL THROMBOCYTHEMIA: ICD-10-CM

## 2025-01-14 DIAGNOSIS — D75.839 THROMBOCYTOSIS: ICD-10-CM

## 2025-01-14 DIAGNOSIS — D70.9 NEUTROPENIA, UNSPECIFIED TYPE: ICD-10-CM

## 2025-01-14 LAB
BASOPHILS # BLD AUTO: 0.06 10*3/MM3 (ref 0–0.2)
BASOPHILS NFR BLD AUTO: 1 % (ref 0–1.5)
DEPRECATED RDW RBC AUTO: 59.9 FL (ref 37–54)
EOSINOPHIL # BLD AUTO: 0.04 10*3/MM3 (ref 0–0.4)
EOSINOPHIL NFR BLD AUTO: 0.6 % (ref 0.3–6.2)
ERYTHROCYTE [DISTWIDTH] IN BLOOD BY AUTOMATED COUNT: 13.1 % (ref 12.3–15.4)
HCT VFR BLD AUTO: 37.6 % (ref 34–46.6)
HGB BLD-MCNC: 13.5 G/DL (ref 12–15.9)
HGB RETIC QN AUTO: 43.7 PG (ref 29.8–36.1)
IMM GRANULOCYTES # BLD AUTO: 0.02 10*3/MM3 (ref 0–0.05)
IMM GRANULOCYTES NFR BLD AUTO: 0.3 % (ref 0–0.5)
IMM RETICS NFR: 23.6 % (ref 3–15.8)
LYMPHOCYTES # BLD AUTO: 2.32 10*3/MM3 (ref 0.7–3.1)
LYMPHOCYTES NFR BLD AUTO: 37.3 % (ref 19.6–45.3)
MCH RBC QN AUTO: 45.2 PG (ref 26.6–33)
MCHC RBC AUTO-ENTMCNC: 35.9 G/DL (ref 31.5–35.7)
MCV RBC AUTO: 125.8 FL (ref 79–97)
MONOCYTES # BLD AUTO: 0.91 10*3/MM3 (ref 0.1–0.9)
MONOCYTES NFR BLD AUTO: 14.6 % (ref 5–12)
NEUTROPHILS NFR BLD AUTO: 2.87 10*3/MM3 (ref 1.7–7)
NEUTROPHILS NFR BLD AUTO: 46.2 % (ref 42.7–76)
NRBC BLD AUTO-RTO: 0.3 /100 WBC (ref 0–0.2)
PLATELET # BLD AUTO: 926 10*3/MM3 (ref 140–450)
PMV BLD AUTO: 9.1 FL (ref 6–12)
RBC # BLD AUTO: 2.99 10*6/MM3 (ref 3.77–5.28)
RETICS # AUTO: 0.09 10*6/MM3 (ref 0.02–0.13)
RETICS/RBC NFR AUTO: 3.12 % (ref 0.7–1.9)
WBC NRBC COR # BLD AUTO: 6.22 10*3/MM3 (ref 3.4–10.8)

## 2025-01-14 PROCEDURE — 36415 COLL VENOUS BLD VENIPUNCTURE: CPT

## 2025-01-14 PROCEDURE — 96372 THER/PROPH/DIAG INJ SC/IM: CPT

## 2025-01-14 PROCEDURE — 85025 COMPLETE CBC W/AUTO DIFF WBC: CPT

## 2025-01-14 PROCEDURE — 85046 RETICYTE/HGB CONCENTRATE: CPT

## 2025-01-14 PROCEDURE — 25010000002 CYANOCOBALAMIN PER 1000 MCG: Performed by: INTERNAL MEDICINE

## 2025-01-14 RX ORDER — CYANOCOBALAMIN 1000 UG/ML
1000 INJECTION, SOLUTION INTRAMUSCULAR; SUBCUTANEOUS ONCE
Status: COMPLETED | OUTPATIENT
Start: 2025-01-14 | End: 2025-01-14

## 2025-01-14 RX ORDER — HYDROXYUREA 500 MG/1
1500 CAPSULE ORAL DAILY
Qty: 270 CAPSULE | Refills: 3 | Status: SHIPPED | OUTPATIENT
Start: 2025-01-14 | End: 2026-01-09

## 2025-01-14 RX ADMIN — CYANOCOBALAMIN 1000 MCG: 1000 INJECTION, SOLUTION INTRAMUSCULAR at 13:41

## 2025-01-15 ENCOUNTER — SPECIALTY PHARMACY (OUTPATIENT)
Dept: PHARMACY | Facility: HOSPITAL | Age: 80
End: 2025-01-15
Payer: MEDICARE

## 2025-01-15 NOTE — PROGRESS NOTES
Specialty Pharmacy Note: Hydrea (hydroxyurea)    Sonia Wooten is a 79 y.o. female with essential thrombocytosis was seen 1/14/25 by Dr. House. Per provider dictation, increase dose of oral oncology regimen Hydrea (hydroxyurea).  Labs Review: The CMP and CBC from 1/14/25 have been reviewed.  Dose increase warranted to due plt counts per MD. Prescription sent in yesterday by provider.     Specialty pharmacy will continue to follow patient.    Genna Rodriguez, ChanningD, BCPS  1/15/2025  10:04 EST

## 2025-01-16 DIAGNOSIS — F41.9 ANXIETY: ICD-10-CM

## 2025-01-30 DIAGNOSIS — F41.9 ANXIETY: ICD-10-CM

## 2025-03-04 ENCOUNTER — APPOINTMENT (OUTPATIENT)
Dept: LAB | Facility: HOSPITAL | Age: 80
End: 2025-03-04
Payer: MEDICARE

## 2025-03-04 ENCOUNTER — OFFICE VISIT (OUTPATIENT)
Dept: ONCOLOGY | Facility: CLINIC | Age: 80
End: 2025-03-04
Payer: MEDICARE

## 2025-03-04 ENCOUNTER — INFUSION (OUTPATIENT)
Dept: ONCOLOGY | Facility: HOSPITAL | Age: 80
End: 2025-03-04
Payer: MEDICARE

## 2025-03-04 ENCOUNTER — LAB (OUTPATIENT)
Dept: LAB | Facility: HOSPITAL | Age: 80
End: 2025-03-04
Payer: MEDICARE

## 2025-03-04 VITALS
BODY MASS INDEX: 26.19 KG/M2 | HEART RATE: 84 BPM | TEMPERATURE: 97.4 F | SYSTOLIC BLOOD PRESSURE: 124 MMHG | OXYGEN SATURATION: 99 % | DIASTOLIC BLOOD PRESSURE: 79 MMHG | HEIGHT: 64 IN | RESPIRATION RATE: 16 BRPM | WEIGHT: 153.4 LBS

## 2025-03-04 DIAGNOSIS — E53.8 B12 DEFICIENCY: Primary | ICD-10-CM

## 2025-03-04 DIAGNOSIS — D47.3 ESSENTIAL THROMBOCYTHEMIA: Primary | ICD-10-CM

## 2025-03-04 DIAGNOSIS — E53.8 B12 DEFICIENCY: ICD-10-CM

## 2025-03-04 DIAGNOSIS — D47.3 ESSENTIAL THROMBOCYTHEMIA: ICD-10-CM

## 2025-03-04 LAB
BASOPHILS # BLD AUTO: 0.05 10*3/MM3 (ref 0–0.2)
BASOPHILS NFR BLD AUTO: 1.3 % (ref 0–1.5)
DEPRECATED RDW RBC AUTO: 70 FL (ref 37–54)
EOSINOPHIL # BLD AUTO: 0.01 10*3/MM3 (ref 0–0.4)
EOSINOPHIL NFR BLD AUTO: 0.3 % (ref 0.3–6.2)
ERYTHROCYTE [DISTWIDTH] IN BLOOD BY AUTOMATED COUNT: 14.9 % (ref 12.3–15.4)
HCT VFR BLD AUTO: 30.3 % (ref 34–46.6)
HGB BLD-MCNC: 11 G/DL (ref 12–15.9)
HGB RETIC QN AUTO: 47 PG (ref 29.8–36.1)
IMM GRANULOCYTES # BLD AUTO: 0.03 10*3/MM3 (ref 0–0.05)
IMM GRANULOCYTES NFR BLD AUTO: 0.8 % (ref 0–0.5)
IMM RETICS NFR: 30.9 % (ref 3–15.8)
LYMPHOCYTES # BLD AUTO: 1.94 10*3/MM3 (ref 0.7–3.1)
LYMPHOCYTES NFR BLD AUTO: 49.9 % (ref 19.6–45.3)
MCH RBC QN AUTO: 47.4 PG (ref 26.6–33)
MCHC RBC AUTO-ENTMCNC: 36.3 G/DL (ref 31.5–35.7)
MCV RBC AUTO: 130.6 FL (ref 79–97)
MONOCYTES # BLD AUTO: 0.38 10*3/MM3 (ref 0.1–0.9)
MONOCYTES NFR BLD AUTO: 9.8 % (ref 5–12)
NEUTROPHILS NFR BLD AUTO: 1.48 10*3/MM3 (ref 1.7–7)
NEUTROPHILS NFR BLD AUTO: 37.9 % (ref 42.7–76)
NRBC BLD AUTO-RTO: 0.8 /100 WBC (ref 0–0.2)
PLATELET # BLD AUTO: 569 10*3/MM3 (ref 140–450)
PMV BLD AUTO: 9.4 FL (ref 6–12)
RBC # BLD AUTO: 2.32 10*6/MM3 (ref 3.77–5.28)
RETICS # AUTO: 0.07 10*6/MM3 (ref 0.02–0.13)
RETICS/RBC NFR AUTO: 2.89 % (ref 0.7–1.9)
WBC NRBC COR # BLD AUTO: 3.89 10*3/MM3 (ref 3.4–10.8)

## 2025-03-04 PROCEDURE — 85025 COMPLETE CBC W/AUTO DIFF WBC: CPT

## 2025-03-04 PROCEDURE — 85046 RETICYTE/HGB CONCENTRATE: CPT

## 2025-03-04 PROCEDURE — 96372 THER/PROPH/DIAG INJ SC/IM: CPT

## 2025-03-04 PROCEDURE — 36415 COLL VENOUS BLD VENIPUNCTURE: CPT

## 2025-03-04 PROCEDURE — 25010000002 CYANOCOBALAMIN PER 1000 MCG: Performed by: INTERNAL MEDICINE

## 2025-03-04 RX ORDER — CYANOCOBALAMIN 1000 UG/ML
1000 INJECTION, SOLUTION INTRAMUSCULAR; SUBCUTANEOUS ONCE
Status: COMPLETED | OUTPATIENT
Start: 2025-03-04 | End: 2025-03-04

## 2025-03-04 RX ADMIN — CYANOCOBALAMIN 1000 MCG: 1000 INJECTION, SOLUTION INTRAMUSCULAR at 13:01

## 2025-03-04 NOTE — PROGRESS NOTES
"[facility] GROUP OUTPATIENT FOLLOW UP CLINIC VISIT    REASON FOR FOLLOW-UP:    Essential thrombocytosis  B12 deficiency    HISTORY OF PRESENT ILLNESS:  Sonia Wooten is a 79 y.o. female who returns today for follow up of the above issue.      She returns today for 4 week follow up for lab review while continuing on hydrea 1500mg daily. She notes tolerating this dose well. She denies shortness of breath, lightheadedness or dizziness. She continues on vitamin B12 injection monthly.       REVIEW OF SYSTEMS:  ROS as per HPI    PHYSICAL EXAMINATION:    Vitals:    03/04/25 1226   BP: 124/79   Pulse: 84   Resp: 16   Temp: 97.4 °F (36.3 °C)   TempSrc: Oral   SpO2: 99%   Weight: 69.6 kg (153 lb 6.4 oz)   Height: 162.1 cm (63.82\")   PainSc: 0-No pain     PHYSICAL EXAM:   General:  No acute distress, awake, alert   Skin:  Warm and dry, no visible rash  HEENT:  Normocephalic/atraumatic.    Chest:  Normal respiratory effort.  Lungs clear to auscultation bilaterally.   Extremities:  No visible clubbing, cyanosis, or edema  Neuro/psych:  Grossly nonfocal.  Normal mood and affect.    DIAGNOSTIC DATA:  Results from last 7 days   Lab Units 03/04/25  1146   WBC 10*3/mm3 3.89   NEUTROS ABS 10*3/mm3 1.48*   HEMOGLOBIN g/dL 11.0*   HEMATOCRIT % 30.3*   PLATELETS 10*3/mm3 569*       IMAGING:    None reviewed    ASSESSMENT:  This is a 79 y.o. female with:    *Essential Thrombocytosis  CALR mutation positive and initial platelet count > 1.5 million  Undergoing treatment with hydroxyurea  7/29/2024 platelets remained elevated at 741, therefore hydroxyurea was increased to 1500 mg daily  Today, 9/5/2024 despite increased dose of hydroxyurea 1 month ago, the patient continues to have thrombocytosis with platelets 755,000.  She has now developed neutropenia.  This will require a reduced dose of her hydroxyurea.  11/5/2024: Doing well on 1000 mg hydroxyurea Monday through Friday and 1500 mg daily on the weekends.   Platelets have " increased significantly at 926,000, from 692,000, from 747,000, from 544,000  3/4/2025: Platelets today 569,000. Continue on hydrea 1500mg daily.     *B12 deficiency  Continuing B12 injections monthly.    *Signs of early dementia.   her  is now managing her medications.    *Gastritis and peptic ulcer disease.    She now is on proton pump inhibitor therapy and Carafate after undergoing an EGD with Dr. Guzman 2/1/2024.    *Neutropenia secondary to hydroxyurea  While taking hydroxyurea 1500 mg daily, WBC 3.35, ANC 1.14.    11/5/2024: Neutropenia has resolved with white blood cell count of 5.33, ANC 2.55  3/4/2025: ANC decreased to 1.48. Will continue to monitor and have her return in 2 weeks for CBC with RN review. If stable, continue current dosing.     PLAN:  Continue hydrea 1500mg daily   Proceed with vitamin B12 injection today. Continue monthly.  Continue aspirin 81mg daily   Return to clinic in 2 weeks for RN review with CBC and retic count   Return to clinic in 4 weeks for NP visit with cbc and retic count    Patient is on a high risk medication requiring close monitoring for toxicity.    Senait Medina, ANÍBAL   03/04/2025

## 2025-03-05 ENCOUNTER — SPECIALTY PHARMACY (OUTPATIENT)
Dept: PHARMACY | Facility: HOSPITAL | Age: 80
End: 2025-03-05
Payer: MEDICARE

## 2025-03-05 NOTE — PROGRESS NOTES
Specialty Pharmacy Note: Hydrea (hydroxyurea)    Sonia Wooten is a 79 y.o. female with essential thrombocytosis was seen 3/4/25 by APRN. Per provider dictation, no changes to oral oncology regimen Hydrea (hydroxyurea).  Labs Review: The CMP and CBC from 3/4/25 have been reviewed. No dose adjustments are needed for the oral specialty medication(s) based on the labs.    Specialty pharmacy will continue to follow patient.    Genna Rodriguez, PharmD, BCPS  3/5/2025  09:56 EST

## 2025-03-06 DIAGNOSIS — E78.00 ELEVATED LDL CHOLESTEROL LEVEL: ICD-10-CM

## 2025-03-06 RX ORDER — PRAVASTATIN SODIUM 20 MG
20 TABLET ORAL DAILY
Qty: 90 TABLET | Refills: 1 | Status: SHIPPED | OUTPATIENT
Start: 2025-03-06

## 2025-03-12 DIAGNOSIS — R10.13 DYSPEPSIA: ICD-10-CM

## 2025-03-12 RX ORDER — OMEPRAZOLE 40 MG/1
40 CAPSULE, DELAYED RELEASE ORAL DAILY
Qty: 90 CAPSULE | Refills: 1 | Status: SHIPPED | OUTPATIENT
Start: 2025-03-12

## 2025-03-18 ENCOUNTER — LAB (OUTPATIENT)
Dept: LAB | Facility: HOSPITAL | Age: 80
End: 2025-03-18
Payer: MEDICARE

## 2025-03-18 ENCOUNTER — CLINICAL SUPPORT (OUTPATIENT)
Dept: ONCOLOGY | Facility: HOSPITAL | Age: 80
End: 2025-03-18
Payer: MEDICARE

## 2025-03-18 DIAGNOSIS — E53.8 B12 DEFICIENCY: ICD-10-CM

## 2025-03-18 DIAGNOSIS — D47.3 ESSENTIAL THROMBOCYTHEMIA: Primary | ICD-10-CM

## 2025-03-18 DIAGNOSIS — D47.3 ESSENTIAL THROMBOCYTHEMIA: ICD-10-CM

## 2025-03-18 LAB
BASOPHILS # BLD AUTO: 0.03 10*3/MM3 (ref 0–0.2)
BASOPHILS NFR BLD AUTO: 1.1 % (ref 0–1.5)
DEPRECATED RDW RBC AUTO: 73 FL (ref 37–54)
EOSINOPHIL # BLD AUTO: 0.03 10*3/MM3 (ref 0–0.4)
EOSINOPHIL NFR BLD AUTO: 1.1 % (ref 0.3–6.2)
ERYTHROCYTE [DISTWIDTH] IN BLOOD BY AUTOMATED COUNT: 15.4 % (ref 12.3–15.4)
HCT VFR BLD AUTO: 23.3 % (ref 34–46.6)
HGB BLD-MCNC: 8.6 G/DL (ref 12–15.9)
HGB RETIC QN AUTO: 45.6 PG (ref 29.8–36.1)
IMM GRANULOCYTES # BLD AUTO: 0.01 10*3/MM3 (ref 0–0.05)
IMM GRANULOCYTES NFR BLD AUTO: 0.4 % (ref 0–0.5)
IMM RETICS NFR: 25.1 % (ref 3–15.8)
LYMPHOCYTES # BLD AUTO: 1.65 10*3/MM3 (ref 0.7–3.1)
LYMPHOCYTES NFR BLD AUTO: 60.9 % (ref 19.6–45.3)
MCH RBC QN AUTO: 47.8 PG (ref 26.6–33)
MCHC RBC AUTO-ENTMCNC: 36.9 G/DL (ref 31.5–35.7)
MCV RBC AUTO: 129.4 FL (ref 79–97)
MONOCYTES # BLD AUTO: 0.24 10*3/MM3 (ref 0.1–0.9)
MONOCYTES NFR BLD AUTO: 8.9 % (ref 5–12)
NEUTROPHILS NFR BLD AUTO: 0.75 10*3/MM3 (ref 1.7–7)
NEUTROPHILS NFR BLD AUTO: 27.6 % (ref 42.7–76)
NRBC BLD AUTO-RTO: 1.1 /100 WBC (ref 0–0.2)
PLATELET # BLD AUTO: 303 10*3/MM3 (ref 140–450)
PMV BLD AUTO: 10 FL (ref 6–12)
RBC # BLD AUTO: 1.8 10*6/MM3 (ref 3.77–5.28)
RETICS # AUTO: 0.05 10*6/MM3 (ref 0.02–0.13)
RETICS/RBC NFR AUTO: 2.98 % (ref 0.7–1.9)
WBC NRBC COR # BLD AUTO: 2.71 10*3/MM3 (ref 3.4–10.8)

## 2025-03-18 PROCEDURE — 36415 COLL VENOUS BLD VENIPUNCTURE: CPT

## 2025-03-18 PROCEDURE — 85046 RETICYTE/HGB CONCENTRATE: CPT

## 2025-03-18 PROCEDURE — 85025 COMPLETE CBC W/AUTO DIFF WBC: CPT

## 2025-03-18 NOTE — PROGRESS NOTES
Called patient to relay results and check symptoms. Pt and spouse state she has been experiencing more weakness and some dizziness for the past week. Discussed results and symptoms w/ Senait who saw patient two weeks ago. We will alter Hydrea dosing to be 1000mg M-F and 1500mg on Sat and Sun. She will come back next week to recheck labs. Lab orders placed and message sent to scheduling. She denies any signs of bleeding. She knows to call should she have any bleeding or should symptoms worsen. Shauna Cheng RN     CBC & Differential (03/18/2025 13:59)  Retic With IRF & RET-He (03/18/2025 13:59)

## 2025-03-20 ENCOUNTER — SPECIALTY PHARMACY (OUTPATIENT)
Dept: PHARMACY | Facility: HOSPITAL | Age: 80
End: 2025-03-20
Payer: MEDICARE

## 2025-03-20 NOTE — PROGRESS NOTES
Specialty Pharmacy Patient Management Program       Staff message rec from Opal MARTINEZ, Clinic RN-Pt will be decreasing her dose of Hydroxyurea    Pt fills this at her local [facility].    Shauna Cheng, RN sent to Kolby Coello RegSched Rep  Cc: Shauna hCeng, RN; Basilia Andrews, Pharmacy Technician; Genna Rodriguez [facility]  Please call and bring back in for lab and rn review in 1 week.    Thank you!    MTM,    Decreased her hydrea to 1000mg M-F 1500mg on Sat and Sun.    Thanks!    Basilai Andrews, Pharmacy Technician  03/20/25  10:49 EDT

## 2025-03-25 ENCOUNTER — LAB (OUTPATIENT)
Dept: LAB | Facility: HOSPITAL | Age: 80
End: 2025-03-25
Payer: MEDICARE

## 2025-03-25 ENCOUNTER — CLINICAL SUPPORT (OUTPATIENT)
Dept: ONCOLOGY | Facility: HOSPITAL | Age: 80
End: 2025-03-25
Payer: MEDICARE

## 2025-03-25 DIAGNOSIS — D47.3 ESSENTIAL THROMBOCYTHEMIA: ICD-10-CM

## 2025-03-25 DIAGNOSIS — D47.3 ESSENTIAL THROMBOCYTHEMIA: Primary | ICD-10-CM

## 2025-03-25 DIAGNOSIS — D64.9 ANEMIA, UNSPECIFIED TYPE: ICD-10-CM

## 2025-03-25 LAB
ABO GROUP BLD: NORMAL
BASOPHILS # BLD AUTO: 0.06 10*3/MM3 (ref 0–0.2)
BASOPHILS NFR BLD AUTO: 1.5 % (ref 0–1.5)
BLD GP AB SCN SERPL QL: NEGATIVE
DEPRECATED RDW RBC AUTO: 76 FL (ref 37–54)
EOSINOPHIL # BLD AUTO: 0.01 10*3/MM3 (ref 0–0.4)
EOSINOPHIL NFR BLD AUTO: 0.2 % (ref 0.3–6.2)
ERYTHROCYTE [DISTWIDTH] IN BLOOD BY AUTOMATED COUNT: 16.3 % (ref 12.3–15.4)
HCT VFR BLD AUTO: 22.7 % (ref 34–46.6)
HGB BLD-MCNC: 8.2 G/DL (ref 12–15.9)
HGB RETIC QN AUTO: 46.5 PG (ref 29.8–36.1)
IMM GRANULOCYTES # BLD AUTO: 0.03 10*3/MM3 (ref 0–0.05)
IMM GRANULOCYTES NFR BLD AUTO: 0.7 % (ref 0–0.5)
IMM RETICS NFR: 33.9 % (ref 3–15.8)
IRON 24H UR-MRATE: 95 MCG/DL (ref 37–145)
IRON SATN MFR SERPL: 31 % (ref 20–50)
LYMPHOCYTES # BLD AUTO: 2.44 10*3/MM3 (ref 0.7–3.1)
LYMPHOCYTES NFR BLD AUTO: 60.5 % (ref 19.6–45.3)
MCH RBC QN AUTO: 47.4 PG (ref 26.6–33)
MCHC RBC AUTO-ENTMCNC: 36.1 G/DL (ref 31.5–35.7)
MCV RBC AUTO: 131.2 FL (ref 79–97)
MONOCYTES # BLD AUTO: 0.48 10*3/MM3 (ref 0.1–0.9)
MONOCYTES NFR BLD AUTO: 11.9 % (ref 5–12)
NEUTROPHILS NFR BLD AUTO: 1.01 10*3/MM3 (ref 1.7–7)
NEUTROPHILS NFR BLD AUTO: 25.2 % (ref 42.7–76)
NRBC BLD AUTO-RTO: 2.7 /100 WBC (ref 0–0.2)
PLATELET # BLD AUTO: 787 10*3/MM3 (ref 140–450)
PMV BLD AUTO: 9.6 FL (ref 6–12)
RBC # BLD AUTO: 1.73 10*6/MM3 (ref 3.77–5.28)
RETICS # AUTO: 0.07 10*6/MM3 (ref 0.02–0.13)
RETICS/RBC NFR AUTO: 3.98 % (ref 0.7–1.9)
RH BLD: POSITIVE
T&S EXPIRATION DATE: NORMAL
TIBC SERPL-MCNC: 311 MCG/DL (ref 298–536)
TRANSFERRIN SERPL-MCNC: 209 MG/DL (ref 200–360)
VIT B12 BLD-MCNC: 536 PG/ML (ref 211–946)
WBC NRBC COR # BLD AUTO: 4.03 10*3/MM3 (ref 3.4–10.8)

## 2025-03-25 PROCEDURE — 85025 COMPLETE CBC W/AUTO DIFF WBC: CPT

## 2025-03-25 PROCEDURE — 85046 RETICYTE/HGB CONCENTRATE: CPT

## 2025-03-25 PROCEDURE — 86923 COMPATIBILITY TEST ELECTRIC: CPT

## 2025-03-25 PROCEDURE — 36415 COLL VENOUS BLD VENIPUNCTURE: CPT

## 2025-03-25 PROCEDURE — 84466 ASSAY OF TRANSFERRIN: CPT

## 2025-03-25 PROCEDURE — 82607 VITAMIN B-12: CPT | Performed by: INTERNAL MEDICINE

## 2025-03-25 PROCEDURE — 86901 BLOOD TYPING SEROLOGIC RH(D): CPT | Performed by: INTERNAL MEDICINE

## 2025-03-25 PROCEDURE — 83540 ASSAY OF IRON: CPT

## 2025-03-25 PROCEDURE — 86900 BLOOD TYPING SEROLOGIC ABO: CPT | Performed by: INTERNAL MEDICINE

## 2025-03-25 PROCEDURE — 86850 RBC ANTIBODY SCREEN: CPT | Performed by: INTERNAL MEDICINE

## 2025-03-25 RX ORDER — DIPHENHYDRAMINE HCL 25 MG
25 CAPSULE ORAL ONCE
Status: CANCELLED | OUTPATIENT
Start: 2025-03-26 | End: 2025-03-25

## 2025-03-25 RX ORDER — ACETAMINOPHEN 325 MG/1
650 TABLET ORAL ONCE
Status: CANCELLED | OUTPATIENT
Start: 2025-03-26 | End: 2025-03-25

## 2025-03-25 RX ORDER — SODIUM CHLORIDE 9 MG/ML
250 INJECTION, SOLUTION INTRAVENOUS ONCE
Status: CANCELLED | OUTPATIENT
Start: 2025-03-26

## 2025-03-25 NOTE — PROGRESS NOTES
Patient is here for lab review with RN.  CBC reviewed with Dr. House. Patient complains of weakness. 1 unit of PRBC ordered. Continue same dosing of Hydrea. Copy of labs given to patient and follow up appointment reviewed. Walked patient to lab for more bloodwork and blood band. Asked lab staff to take patient up to scheduling once remainder of labs are drawn. High priority message sent to scheduling pool. Patient is instructed to call the office with any concerns or new symptoms prior to next visit. Patient verbalized understanding and discharged in stable condition. Shauna Cheng RN

## 2025-03-26 ENCOUNTER — HOSPITAL ENCOUNTER (OUTPATIENT)
Dept: INFUSION THERAPY | Facility: HOSPITAL | Age: 80
Discharge: HOME | End: 2025-03-26
Payer: MEDICARE

## 2025-03-26 VITALS
OXYGEN SATURATION: 100 % | DIASTOLIC BLOOD PRESSURE: 72 MMHG | RESPIRATION RATE: 16 BRPM | HEART RATE: 78 BPM | TEMPERATURE: 98.2 F | SYSTOLIC BLOOD PRESSURE: 120 MMHG

## 2025-03-26 DIAGNOSIS — D64.9 ANEMIA, UNSPECIFIED TYPE: ICD-10-CM

## 2025-03-26 DIAGNOSIS — D47.3 ESSENTIAL THROMBOCYTHEMIA: ICD-10-CM

## 2025-03-26 LAB
FOLATE BLD-MCNC: 321 NG/ML
FOLATE RBC-MCNC: 1390 NG/ML
HCT VFR BLD AUTO: 23.1 % (ref 34–46.6)

## 2025-03-26 PROCEDURE — P9016 RBC LEUKOCYTES REDUCED: HCPCS

## 2025-03-26 PROCEDURE — 86900 BLOOD TYPING SEROLOGIC ABO: CPT

## 2025-03-26 PROCEDURE — 36430 TRANSFUSION BLD/BLD COMPNT: CPT

## 2025-03-26 PROCEDURE — 63710000001 DIPHENHYDRAMINE PER 50 MG: Performed by: INTERNAL MEDICINE

## 2025-03-26 RX ORDER — DIPHENHYDRAMINE HCL 25 MG
25 CAPSULE ORAL ONCE
Status: COMPLETED | OUTPATIENT
Start: 2025-03-26 | End: 2025-03-26

## 2025-03-26 RX ORDER — SODIUM CHLORIDE 9 MG/ML
250 INJECTION, SOLUTION INTRAVENOUS ONCE
Status: DISCONTINUED | OUTPATIENT
Start: 2025-03-26 | End: 2025-03-29 | Stop reason: HOSPADM

## 2025-03-26 RX ORDER — ACETAMINOPHEN 325 MG/1
650 TABLET ORAL ONCE
Status: COMPLETED | OUTPATIENT
Start: 2025-03-26 | End: 2025-03-26

## 2025-03-26 RX ADMIN — DIPHENHYDRAMINE HYDROCHLORIDE 25 MG: 25 CAPSULE ORAL at 09:13

## 2025-03-26 RX ADMIN — ACETAMINOPHEN 650 MG: 325 TABLET, FILM COATED ORAL at 09:13

## 2025-03-27 ENCOUNTER — OFFICE VISIT (OUTPATIENT)
Dept: INTERNAL MEDICINE | Age: 80
End: 2025-03-27
Payer: MEDICARE

## 2025-03-27 VITALS
WEIGHT: 150 LBS | TEMPERATURE: 97.8 F | BODY MASS INDEX: 25.61 KG/M2 | HEIGHT: 64 IN | HEART RATE: 84 BPM | SYSTOLIC BLOOD PRESSURE: 124 MMHG | OXYGEN SATURATION: 97 % | DIASTOLIC BLOOD PRESSURE: 78 MMHG

## 2025-03-27 DIAGNOSIS — Z78.0 POST-MENOPAUSAL: ICD-10-CM

## 2025-03-27 DIAGNOSIS — R41.3 IMPAIRED MEMORY: ICD-10-CM

## 2025-03-27 DIAGNOSIS — H61.23 BILATERAL IMPACTED CERUMEN: ICD-10-CM

## 2025-03-27 DIAGNOSIS — R73.09 ELEVATED GLUCOSE: ICD-10-CM

## 2025-03-27 DIAGNOSIS — E78.00 ELEVATED LDL CHOLESTEROL LEVEL: ICD-10-CM

## 2025-03-27 DIAGNOSIS — M85.80 OSTEOPENIA, UNSPECIFIED LOCATION: ICD-10-CM

## 2025-03-27 DIAGNOSIS — Z00.00 ENCOUNTER FOR ANNUAL WELLNESS VISIT (AWV) IN MEDICARE PATIENT: Primary | ICD-10-CM

## 2025-03-27 DIAGNOSIS — I10 PRIMARY HYPERTENSION: ICD-10-CM

## 2025-03-27 LAB
ALBUMIN SERPL-MCNC: 4.2 G/DL (ref 3.5–5.2)
ALBUMIN/GLOB SERPL: 2 G/DL
ALP SERPL-CCNC: 53 U/L (ref 39–117)
ALT SERPL-CCNC: 12 U/L (ref 1–33)
AST SERPL-CCNC: 21 U/L (ref 1–32)
BH BB BLOOD EXPIRATION DATE: NORMAL
BH BB BLOOD TYPE BARCODE: 5100
BH BB DISPENSE STATUS: NORMAL
BH BB PRODUCT CODE: NORMAL
BH BB UNIT NUMBER: NORMAL
BILIRUB SERPL-MCNC: 0.7 MG/DL (ref 0–1.2)
BUN SERPL-MCNC: 7 MG/DL (ref 8–23)
BUN/CREAT SERPL: 9.5 (ref 7–25)
CALCIUM SERPL-MCNC: 9.1 MG/DL (ref 8.6–10.5)
CHLORIDE SERPL-SCNC: 103 MMOL/L (ref 98–107)
CHOLEST SERPL-MCNC: 168 MG/DL (ref 0–200)
CHOLEST/HDLC SERPL: 3.57 {RATIO}
CO2 SERPL-SCNC: 27 MMOL/L (ref 22–29)
CREAT SERPL-MCNC: 0.74 MG/DL (ref 0.57–1)
CROSSMATCH INTERPRETATION: NORMAL
EGFRCR SERPLBLD CKD-EPI 2021: 82.4 ML/MIN/1.73
GLOBULIN SER CALC-MCNC: 2.1 GM/DL
GLUCOSE SERPL-MCNC: 94 MG/DL (ref 65–99)
HBA1C MFR BLD: 4.5 % (ref 4.8–5.6)
HDLC SERPL-MCNC: 47 MG/DL (ref 40–60)
LDLC SERPL CALC-MCNC: 102 MG/DL (ref 0–100)
POTASSIUM SERPL-SCNC: 4.3 MMOL/L (ref 3.5–5.2)
PROT SERPL-MCNC: 6.3 G/DL (ref 6–8.5)
SODIUM SERPL-SCNC: 140 MMOL/L (ref 136–145)
T4 FREE SERPL-MCNC: 1.14 NG/DL (ref 0.92–1.68)
TRIGL SERPL-MCNC: 104 MG/DL (ref 0–150)
TSH SERPL DL<=0.005 MIU/L-ACNC: 4.55 UIU/ML (ref 0.27–4.2)
UNIT  ABO: NORMAL
UNIT  RH: NORMAL
VLDLC SERPL CALC-MCNC: 19 MG/DL (ref 5–40)

## 2025-03-27 NOTE — PROGRESS NOTES
I N T E R N A L  M E D I C I AMARI E    ANÍBAL Macias      ENCOUNTER DATE:  03/27/2025    Sonia ENCISO Sugar / 79 y.o. / female      MEDICARE ANNUAL WELLNESS VISIT       Chief Complaint:    Chief Complaint   Patient presents with    Medicare Wellness-subsequent         Patient's general assessment of her health since a year ago:     - Compared to one year ago, she feels her physical health is:   STABLE/SAME    - Compared to one year ago, she feels her mental health is:  STABLE/SAME    Recent Hospitalization (within past 365 days) (NO unless indicated)  No      Patient Care Team:    Patient Care Team:  Nafisa Kebede APRN as PCP - General (Family Medicine)  Ester Rivera APRN as Referring Physician (Nurse Practitioner)  Umberto Brown MD as Consulting Physician (Hematology and Oncology)  Pallares, Clara Ann, MD as Consulting Physician (Internal Medicine)  Robyn Lopez APRN as Nurse Practitioner (Nurse Practitioner)  Russell House MD as Consulting Physician (Hematology and Oncology)    Allergies:  Penicillins and Codeine    Medications:  Current Outpatient Medications on File Prior to Visit   Medication Sig Dispense Refill    aspirin 81 MG EC tablet Take 1 tablet by mouth Daily.      calcium carbonate (OS-YINKA) 600 MG tablet Take 1 tablet by mouth Daily.      Cholecalciferol (VITAMIN D-3 PO) Take  by mouth.      fluorouracil (EFUDEX) 5 % cream       FLUoxetine (PROzac) 20 MG capsule TAKE 1 CAPSULE BY MOUTH DAILY 90 capsule 0    fluticasone (FLONASE) 50 MCG/ACT nasal spray       hydroxyurea (Hydrea) 500 MG capsule Take 3 capsules by mouth Daily for 360 days. (Patient taking differently: Take 3 capsules by mouth Daily. 1000mg Monday- Friday and 1500 mg Saturday and Sunday.) 270 capsule 3    omeprazole (priLOSEC) 40 MG capsule TAKE 1 CAPSULE BY MOUTH DAILY 90 capsule 1    pravastatin (PRAVACHOL) 20 MG tablet TAKE 1 TABLET BY MOUTH DAILY 90 tablet 1    probiotic (CULTURELLE) capsule capsule Take  by mouth  Daily.      Triamcinolone Acetonide 0.025 % lotion        Current Facility-Administered Medications on File Prior to Visit   Medication Dose Route Frequency Provider Last Rate Last Admin    cyanocobalamin injection 1,000 mcg  1,000 mcg Intramuscular Q28 Days Umberto Brown MD   1,000 mcg at 01/29/24 1408    sodium chloride 0.9 % infusion 250 mL  250 mL Intravenous Once Russell House MD              No opioid medication identified on active medication list. I have reviewed chart for other potential  high risk medication/s and harmful drug interactions in the elderly.       No opioid listed on medication list       HPI for other active medical problems:     Accompanied by her  to today's appointment.  Referred for neuropsych testing in March 2024 for concerns of short term memory deficits.  Reports memory remains stable.  Denies any safety concerns or impaired ADLs; however  is providing assistance with preparing meals, driving, paying bills.  Has not yet scheduled evaluation but  states he plans to do so.      HTN:  At today's appointment, BP is well controlled.  No longer on taking any BP medications.       HLD: Well controlled on pravastatin 20 mg daily.  September 2024 lipid panel with mildly elevated ; normal triglycerides 141.       GERD: Symptoms well controlled on omeprazole 40 mg PRN.       Followed by hematology/ oncology, Dr. House, for myeloproliferative disorder, thrombocytosis, anemia.  Next appointment is April 1, 2025.  Received 1 unit PRBC yesterday.  March 25, 2025 CBC with H&H 8.2/22.7.     Anxiety/ Depression: Symptoms well controlled on fluoxetine 20 mg daily x 10 years.     Allergic rhinitis: Stable on Flonase nasal spray daily.       Declines to undergo further mammograms.      September 2023 DEXA with osteopenia.  She is aware of recommendation to take daily Vitamin D3 with calcium.  Agreeable to update this fall.      HISTORY     PFSH:     The following portions  of the patient's history were reviewed and updated as appropriate: Allergies / Current Medications / Past Medical History / Surgical History / Social History / Family History    Problem List:  Patient Active Problem List   Diagnosis    Essential thrombocythemia    Myeloproliferative disorder    Monoallelic mutation of CALR3 gene    Eczema    Primary hypertension    Anxiety    Migraine without aura and without status migrainosus, not intractable    Anemia    Elevated LDL cholesterol level    B12 deficiency       Past Medical History:  Past Medical History:   Diagnosis Date    ADHD (attention deficit hyperactivity disorder) 1995    have had since childhood    Allergic exema    Anxiety     Cancer blood    in treatment CBC    Cataract removed    Colon polyp checked every 5 years    none at present    DDD (degenerative disc disease), lumbar     Fibromyalgia, primary off and on    GERD (gastroesophageal reflux disease)     H/o Hip pain     Headache occasional    History of depression     History of low back pain     History of thrombocytosis     HL (hearing loss) left ear    Hypercholesterolemia     Irritable bowel syndrome occasional    Low back pain     Scoliosis slight curve    Sleep apnea     Tremor slight in leg    Urinary tract infection October this year    treated       Past Surgical History:  Past Surgical History:   Procedure Laterality Date    APPENDECTOMY  removed 1972    BACK SURGERY      CATARACT EXTRACTION  2018    COLONOSCOPY  regular checkups    COSMETIC SURGERY  15 years ago    ENDOSCOPY N/A 2/1/2024    Procedure: ESOPHAGOGASTRODUODENOSCOPY with biopsy;  Surgeon: Kal Guzman MD;  Location: [facility] [place] MAIN OR;  Service: Gastroenterology;  Laterality: N/A;  hiatal hernia, antral ulcer, gastritis,    HIP SURGERY      JOINT REPLACEMENT  right hip & left knee    2011 & 2013    KNEE SURGERY      SPINE SURGERY  1971    SUBTOTAL HYSTERECTOMY  1972    TOTAL HIP ARTHROPLASTY Right 2011    TOTAL KNEE  "ARTHROPLASTY Right 2013    TUBAL ABDOMINAL LIGATION         Social History:  Social History     Socioeconomic History    Marital status:      Spouse name: Temo   Tobacco Use    Smoking status: Former     Current packs/day: 0.00     Average packs/day: 1 pack/day for 15.0 years (15.0 ttl pk-yrs)     Types: Cigarettes     Start date: 10/1/1964     Quit date: 10/1/1979     Years since quittin.5    Smokeless tobacco: Never   Vaping Use    Vaping status: Never Used   Substance and Sexual Activity    Alcohol use: Yes     Alcohol/week: 3.0 standard drinks of alcohol     Types: 1 Glasses of wine, 1 Cans of beer, 1 Shots of liquor per week     Comment: Social    Drug use: Never    Sexual activity: Not Currently     Partners: Male       Family History:  Family History   Problem Relation Age of Onset    Hyperlipidemia Mother     Stroke Maternal Grandfather     Breast cancer Neg Hx     Colon cancer Neg Hx     Colon polyps Neg Hx     Crohn's disease Neg Hx     Irritable bowel syndrome Neg Hx     Ulcerative colitis Neg Hx          PATIENT ASSESSMENT     Vitals:  Vitals:    25 0832   BP: 124/78   Pulse: 84   Temp: 97.8 °F (36.6 °C)   SpO2: 97%   Weight: 68 kg (150 lb)   Height: 162.1 cm (63.82\")       BP Readings from Last 3 Encounters:   25 124/78   25 120/72   25 124/79     Wt Readings from Last 3 Encounters:   25 68 kg (150 lb)   25 69.6 kg (153 lb 6.4 oz)   25 72.1 kg (158 lb 14.4 oz)      Body mass index is 25.89 kg/m².    [unfilled]        Review of Systems:    Review of Systems   Constitutional:  Negative for chills, fever and unexpected weight change.   HENT:  Positive for hearing loss.    Respiratory:  Negative for cough, chest tightness and shortness of breath.    Cardiovascular:  Negative for chest pain, palpitations and leg swelling.   Neurological:  Negative for dizziness, weakness, light-headedness and headaches.   Psychiatric/Behavioral:  The patient is not " nervous/anxious.        Physical Exam:    Physical Exam  Vitals reviewed.   Constitutional:       General: She is not in acute distress.     Appearance: Normal appearance. She is not ill-appearing, toxic-appearing or diaphoretic.   HENT:      Head: Normocephalic and atraumatic.      Right Ear: There is impacted cerumen.      Left Ear: There is impacted cerumen.      Nose: Nose normal. No congestion or rhinorrhea.      Mouth/Throat:      Mouth: Mucous membranes are moist.      Pharynx: Oropharynx is clear. No oropharyngeal exudate or posterior oropharyngeal erythema.   Eyes:      Extraocular Movements: Extraocular movements intact.      Conjunctiva/sclera: Conjunctivae normal.      Pupils: Pupils are equal, round, and reactive to light.   Cardiovascular:      Rate and Rhythm: Normal rate and regular rhythm.      Heart sounds: Normal heart sounds.   Pulmonary:      Effort: Pulmonary effort is normal. No respiratory distress.      Breath sounds: Normal breath sounds.   Abdominal:      General: Bowel sounds are normal.      Palpations: Abdomen is soft.      Tenderness: There is no abdominal tenderness.   Musculoskeletal:         General: Normal range of motion.      Cervical back: Normal range of motion and neck supple.      Right lower leg: No edema.      Left lower leg: No edema.   Lymphadenopathy:      Cervical: No cervical adenopathy.   Skin:     General: Skin is warm and dry.   Neurological:      General: No focal deficit present.      Mental Status: She is alert and oriented to person, place, and time. Mental status is at baseline.   Psychiatric:         Mood and Affect: Mood normal.         Behavior: Behavior normal.         Thought Content: Thought content normal.         Judgment: Judgment normal.         Reviewed Data:    Labs:   Lab Results   Component Value Date     09/05/2024    K 4.2 09/05/2024    CALCIUM 9.6 09/05/2024    AST 29 09/05/2024    ALT 14 09/05/2024    BUN 14 09/05/2024    CREATININE 0.72  "09/05/2024    CREATININE 0.86 07/01/2024    CREATININE 0.61 03/11/2024    EGFRIFNONA 81 12/13/2021       Lab Results   Component Value Date    HGBA1C 4.70 (L) 03/20/2024       Lab Results   Component Value Date     (H) 09/25/2024     (H) 03/20/2024    LDL 95 06/08/2023    HDL 49 09/25/2024    TRIG 141 09/25/2024    CHOLHDLRATIO 3.63 09/25/2024       Lab Results   Component Value Date    TSH 4.140 03/20/2024    FREET4 1.03 03/20/2024          Lab Results   Component Value Date    WBC 4.03 03/25/2025    HGB 8.2 (L) 03/25/2025    HGB 8.6 (L) 03/18/2025    HGB 11.0 (L) 03/04/2025     (H) 03/25/2025                 No results found for: \"PSA\"    Imaging:                Medical Tests:              Summary of old records / correspondence / consultant report:             Request outside records:           SCREENING ASSESSMENT      Screening for Glaucoma:  Previous screening for glaucoma?: Yes    Hearing Loss Screen:  Finger Rub Hearing Test (right ear): Borderline  Finger Rub Hearing Test (left ear): Borderline    Urinary Incontinence Screen:  Episodes of urinary incontinence? : No    Depression Screen:      3/27/2025     8:42 AM   PHQ-2/PHQ-9 Depression Screening   Little interest or pleasure in doing things Not at all   Feeling down, depressed, or hopeless Not at all   How difficult have these problems made it for you to do your work, take care of things at home, or get along with other people? Not difficult at all        PHQ-2: On medication    PHQ-9: Mild         FUNCTIONAL, FALL RISK, & COGNITIVE SCREENING (components below):     A) Functional and cognitive status based on patient responses:        3/27/2025     8:38 AM   Functional & Cognitive Status   Do you have difficulty preparing food and eating? No   Do you have difficulty bathing yourself, getting dressed or grooming yourself? No   Do you have difficulty using the toilet? No   Do you have difficulty moving around from place to place? No "   Do you have trouble with steps or getting out of a bed or a chair? No   Current Diet Well Balanced Diet   Dental Exam Up to date   Eye Exam Up to date   Exercise (times per week) 0 times per week   Current Exercises Include No Regular Exercise   Do you need help using the phone?  No   Are you deaf or do you have serious difficulty hearing?  No   Do you need help to go to places out of walking distance? No   Do you need help shopping? Yes   Do you need help preparing meals?  No   Do you need help with housework?  Yes   Do you need help with laundry? Yes   Do you need help taking your medications? No   Do you need help managing money? Yes   Do you ever drive or ride in a car without wearing a seat belt? No   Have you felt unusual stress, anger or loneliness in the last month? No   Who do you live with? Spouse   If you need help, do you have trouble finding someone available to you? No   Have you been bothered in the last four weeks by sexual problems? No   Do you have difficulty concentrating, remembering or making decisions? Yes       B) Assessment of Fall Risk:    Fall Risk Assessment was completed, and patient is at low risk for falls.    Need for further evaluation of gait, strength, and balance? : NO    Timed Up and Go (TUG): 10 seconds   (>= 12 seconds indicates high risk for falling)    Observable abnormalities included:   Normal balance and gait pattern    C. Assessment of Cognitive Function:    Mini-Cog Test:     1) Registration (3 objects): NO   2) Clock Draw: Passed? : Yes   3) Number of objects recalled: 1 (MA)     Further evaluation required? : Mild cognitive impairment noted   Will refer for neuropsychological evaluation     **OVERALL ASSESSMENT OF FUNCTIONAL ABILITY**  (Assessment of ability to perform ADL's (showering/bathing, using toilet, dressing, feeding self, moving self around) and IADL's (use telephone, shop, prepare food, housekeep, do laundry, transport independently, take medications  independently, and handle finances)    DEGREE OF FUNCTIONAL IMPAIRMENT:   NONE (based on assessment noted above)    ABILITY TO LIVE INDEPENDENTLY:    Capable of living with spouse/partner/family       COUNSELING       A. Identification of Health Risk Factors:    Risk factors include: cognitive impairment  depression / other psychiatric problems      B. Age-Appropriate Screening Schedule:  (Refer to the list below for future screening recommendations based on patient's age, sex and/or medical conditions. Orders for these recommended tests are listed in the plan section. The patient has been provided with a written plan)    Health Maintenance Topics  Health Maintenance   Topic Date Due    TDAP/TD VACCINES (1 - Tdap) Never done    COVID-19 Vaccine (8 - 2024-25 season) 03/10/2025    DXA SCAN  09/18/2025    LIPID PANEL  09/25/2025    ANNUAL WELLNESS VISIT  03/27/2026    HEPATITIS C SCREENING  Completed    RSV Vaccine - Adults  Completed    INFLUENZA VACCINE  Completed    Pneumococcal Vaccine 50+  Completed    ZOSTER VACCINE  Completed    COLONOSCOPY  Discontinued       Health Maintenance Topics Due or Over-Due  Health Maintenance Due   Topic Date Due    TDAP/TD VACCINES (1 - Tdap) Never done    COVID-19 Vaccine (8 - 2024-25 season) 03/10/2025         C. Advanced Care Planning:    Advance Care Planning   ACP discussion was held with the patient during this visit. Patient has an advance directive in EMR which is still valid.        D. Patient Self-Management and Personalized Health Advice:    She has been provided with PERSONALIZED COUNSELING/INFORMATION (AVS educational information) about:     -- improving exercise / conditioning  recommended hearing testing  reducing risk for cardiovascular disease (heart, stroke, vascular)  evidence of cognitive problems and need for ongoing surveillance for progressive changes  managing depression/anxiety      She has been recommended for the following PREVENTATIVE SERVICES which has  been performed today, will be ordered today or ordered/performed on upcoming follow-up visit:     LIFESTYLE PREVENTATIVE MEASURES   EYE exam for GLAUCOMA screening recommended annually (or follow-up regularly with eye care specialist for active glaucoma history)    CARDIOVASCULAR SCREENING   N/A / Current    CANCER SCREENING   BREAST CANCER screening discussed and mammogram recommended but DEFERRED by patient (monthly self exam recommended)    MISC SCREENING  OSTEOPOROSIS screening (DEXA ordered)  DIABETES screening performed (current/reviewed labs/lab ordered)    VACCINATION/IMMUNIZATION   COVID-19 vaccination discussed/recommended  Tdap / Td administered/recommended/discussed       E. Miscellaneous Items: [id number]    Aspirin use counseling: Taking ASA appropriately as indicated    Discussed BMI with her. The BMI is in the acceptable range    Reviewed use of high risk medication in the elderly: YES    Reviewed for potential of harmful drug interactions in the elderly: YES        WRAP UP       Assessment & Plan:    1) MEDICARE ANNUAL WELLNESS VISIT    2) OTHER MEDICAL CONDITIONS ADDRESSED TODAY:            Problem List Items Addressed This Visit       Primary hypertension    Relevant Orders    Comprehensive Metabolic Panel    TSH+Free T4    Elevated LDL cholesterol level    Overview   Continue pravastatin 20 mg daily.         Relevant Medications    pravastatin (PRAVACHOL) 20 MG tablet    Other Relevant Orders    Comprehensive Metabolic Panel    Lipid Panel With / Chol / HDL Ratio     Other Visit Diagnoses         Encounter for annual wellness visit (AWV) in Medicare patient    -  Primary      Impaired memory        Relevant Orders    Ambulatory Referral to Neuropsychology      Elevated glucose        Relevant Orders    Comprehensive Metabolic Panel    Hemoglobin A1c      Osteopenia, unspecified location        Relevant Orders    DEXA Bone Density Axial      Post-menopausal        Relevant Orders    DEXA Bone  Density Axial      Bilateral impacted cerumen        Relevant Orders    Ambulatory Referral to ENT (Otolaryngology) (Completed)                      Orders Placed This Encounter   Procedures    DEXA Bone Density Axial    Comprehensive Metabolic Panel    Hemoglobin A1c    Lipid Panel With / Chol / HDL Ratio    TSH+Free T4    Ambulatory Referral to ENT (Otolaryngology)    Ambulatory Referral to Neuropsychology       Discussion / Summary:    Phone number provided for Tin Carvajal and encouraged to schedule for neuropysch evaluation.   states he will call to schedule.      Patient encouraged to complete ear cleaning with ENT office and undergo hearing exam.  Discussed correlation between reduced hearing and dementia.      Encouraged regular exercise as tolerated.    Update DEXA this fall 2025.    Continue to follow with hematology office.    Medications as of TODAY:              Current Outpatient Medications   Medication Sig Dispense Refill    aspirin 81 MG EC tablet Take 1 tablet by mouth Daily.      calcium carbonate (OS-YINKA) 600 MG tablet Take 1 tablet by mouth Daily.      Cholecalciferol (VITAMIN D-3 PO) Take  by mouth.      fluorouracil (EFUDEX) 5 % cream       FLUoxetine (PROzac) 20 MG capsule TAKE 1 CAPSULE BY MOUTH DAILY 90 capsule 0    fluticasone (FLONASE) 50 MCG/ACT nasal spray       hydroxyurea (Hydrea) 500 MG capsule Take 3 capsules by mouth Daily for 360 days. (Patient taking differently: Take 3 capsules by mouth Daily. 1000mg Monday- Friday and 1500 mg Saturday and Sunday.) 270 capsule 3    omeprazole (priLOSEC) 40 MG capsule TAKE 1 CAPSULE BY MOUTH DAILY 90 capsule 1    pravastatin (PRAVACHOL) 20 MG tablet TAKE 1 TABLET BY MOUTH DAILY 90 tablet 1    probiotic (CULTURELLE) capsule capsule Take  by mouth Daily.      Triamcinolone Acetonide 0.025 % lotion        Current Facility-Administered Medications   Medication Dose Route Frequency Provider Last Rate Last Admin    cyanocobalamin  injection 1,000 mcg  1,000 mcg Intramuscular Q28 Days Umberto Brown MD   1,000 mcg at 01/29/24 1408     Facility-Administered Medications Ordered in Other Visits   Medication Dose Route Frequency Provider Last Rate Last Admin    sodium chloride 0.9 % infusion 250 mL  250 mL Intravenous Once Russell House MD             FOLLOW-UP:            Return for 4 month chronic care, 1 year AWV.              Future Appointments   Date Time Provider Department Center   4/1/2025 12:40 PM LAB CHAIR 6 [facility]  LAB [facility]   4/1/2025  1:00 PM Senait Medina APRN [facility]   4/1/2025  1:30 PM INJECTION CHAIR [facility] INF[facility]   4/29/2025  3:40 PM LAB CHAIR 6 [facility]  LAB [facility]   4/29/2025  4:00 PM Russell House MD [facility] [place]   4/29/2025  4:30 PM CHAIR 08 [facility] Whiteside MD  INFUS [facility]   7/28/2025 10:30 AM Nafisa Kebede APRN [facility]  [facility]           After Visit Summary (AVS) including the Personalized Prevention  Plan Services (PPPS) was either printed and given to the patient at check-out today and/or sent to [facility] for review.

## 2025-04-01 ENCOUNTER — INFUSION (OUTPATIENT)
Dept: ONCOLOGY | Facility: HOSPITAL | Age: 80
End: 2025-04-01
Payer: MEDICARE

## 2025-04-01 ENCOUNTER — OFFICE VISIT (OUTPATIENT)
Dept: ONCOLOGY | Facility: CLINIC | Age: 80
End: 2025-04-01
Payer: MEDICARE

## 2025-04-01 ENCOUNTER — LAB (OUTPATIENT)
Dept: LAB | Facility: HOSPITAL | Age: 80
End: 2025-04-01
Payer: MEDICARE

## 2025-04-01 VITALS
SYSTOLIC BLOOD PRESSURE: 103 MMHG | OXYGEN SATURATION: 97 % | TEMPERATURE: 98.7 F | HEART RATE: 100 BPM | BODY MASS INDEX: 25.66 KG/M2 | DIASTOLIC BLOOD PRESSURE: 72 MMHG | HEIGHT: 64 IN | WEIGHT: 150.3 LBS | RESPIRATION RATE: 17 BRPM

## 2025-04-01 DIAGNOSIS — E53.8 B12 DEFICIENCY: ICD-10-CM

## 2025-04-01 DIAGNOSIS — D47.3 ESSENTIAL THROMBOCYTHEMIA: Primary | ICD-10-CM

## 2025-04-01 DIAGNOSIS — E53.8 B12 DEFICIENCY: Primary | ICD-10-CM

## 2025-04-01 DIAGNOSIS — D47.3 ESSENTIAL THROMBOCYTHEMIA: ICD-10-CM

## 2025-04-01 LAB
BASOPHILS # BLD AUTO: 0.04 10*3/MM3 (ref 0–0.2)
BASOPHILS NFR BLD AUTO: 1.1 % (ref 0–1.5)
DEPRECATED RDW RBC AUTO: 111.2 FL (ref 37–54)
EOSINOPHIL # BLD AUTO: 0.01 10*3/MM3 (ref 0–0.4)
EOSINOPHIL NFR BLD AUTO: 0.3 % (ref 0.3–6.2)
ERYTHROCYTE [DISTWIDTH] IN BLOOD BY AUTOMATED COUNT: 24.9 % (ref 12.3–15.4)
HCT VFR BLD AUTO: 30.1 % (ref 34–46.6)
HGB BLD-MCNC: 10.3 G/DL (ref 12–15.9)
HGB RETIC QN AUTO: 40.7 PG (ref 29.8–36.1)
IMM GRANULOCYTES # BLD AUTO: 0.02 10*3/MM3 (ref 0–0.05)
IMM GRANULOCYTES NFR BLD AUTO: 0.6 % (ref 0–0.5)
IMM RETICS NFR: 34.8 % (ref 3–15.8)
LYMPHOCYTES # BLD AUTO: 1.63 10*3/MM3 (ref 0.7–3.1)
LYMPHOCYTES NFR BLD AUTO: 46.2 % (ref 19.6–45.3)
MCH RBC QN AUTO: 43.1 PG (ref 26.6–33)
MCHC RBC AUTO-ENTMCNC: 34.2 G/DL (ref 31.5–35.7)
MCV RBC AUTO: 125.9 FL (ref 79–97)
MONOCYTES # BLD AUTO: 0.51 10*3/MM3 (ref 0.1–0.9)
MONOCYTES NFR BLD AUTO: 14.4 % (ref 5–12)
NEUTROPHILS NFR BLD AUTO: 1.32 10*3/MM3 (ref 1.7–7)
NEUTROPHILS NFR BLD AUTO: 37.4 % (ref 42.7–76)
NRBC BLD AUTO-RTO: 2.3 /100 WBC (ref 0–0.2)
PLATELET # BLD AUTO: 1060 10*3/MM3 (ref 140–450)
PMV BLD AUTO: 8.9 FL (ref 6–12)
RBC # BLD AUTO: 2.39 10*6/MM3 (ref 3.77–5.28)
RETICS # AUTO: 0.14 10*6/MM3 (ref 0.02–0.13)
RETICS/RBC NFR AUTO: 5.66 % (ref 0.7–1.9)
WBC NRBC COR # BLD AUTO: 3.53 10*3/MM3 (ref 3.4–10.8)

## 2025-04-01 PROCEDURE — 96372 THER/PROPH/DIAG INJ SC/IM: CPT

## 2025-04-01 PROCEDURE — 85046 RETICYTE/HGB CONCENTRATE: CPT

## 2025-04-01 PROCEDURE — 25010000002 CYANOCOBALAMIN PER 1000 MCG: Performed by: INTERNAL MEDICINE

## 2025-04-01 PROCEDURE — 85025 COMPLETE CBC W/AUTO DIFF WBC: CPT

## 2025-04-01 PROCEDURE — 36415 COLL VENOUS BLD VENIPUNCTURE: CPT

## 2025-04-01 RX ORDER — CYANOCOBALAMIN 1000 UG/ML
1000 INJECTION, SOLUTION INTRAMUSCULAR; SUBCUTANEOUS ONCE
Status: COMPLETED | OUTPATIENT
Start: 2025-04-01 | End: 2025-04-01

## 2025-04-01 RX ADMIN — CYANOCOBALAMIN 1000 MCG: 1000 INJECTION, SOLUTION INTRAMUSCULAR at 13:18

## 2025-04-01 NOTE — PROGRESS NOTES
"[facility] CBC GROUP OUTPATIENT FOLLOW UP CLINIC VISIT    REASON FOR FOLLOW-UP:    Essential thrombocytosis  B12 deficiency    HISTORY OF PRESENT ILLNESS:  Sonia Wooten is a 79 y.o. female who returns today for follow up of the above issue.        She returns today for 4 week follow up. Last month her hydrea was increased due to worsening thrombocytosis, however, following the increase she became neutropenic and anemic. Hydrea has since been adjusted from 1500mg daily to 1000mg M-F and 1500mg Saturday and Sunday. She received 1 unit prbc transfusion on 3/26/2025. She denies lightheadedness or dizziness, but she does note ongoing fatigue. Her  also reports her appetite waxes and wanes. Advised increasing protein in diet/trying protein shakes.     PHYSICAL EXAMINATION:  Vitals:    04/01/25 1254   BP: 103/72   Pulse: 100   Resp: 17   Temp: 98.7 °F (37.1 °C)   TempSrc: Oral   SpO2: 97%   Weight: 68.2 kg (150 lb 4.8 oz)   Height: 162.1 cm (63.82\")   PainSc: 0-No pain       PHYSICAL EXAM:   General:  No acute distress, awake, alert   Skin:  Warm and dry, no visible rash  HEENT:  Normocephalic/atraumatic.    Chest:  Normal respiratory effort. Lungs clear to auscultation bilaterally.   Extremities:  No visible clubbing, cyanosis, or edema  Neuro/psych:  Grossly nonfocal.  Normal mood and affect.    DIAGNOSTIC DATA:  Results from last 7 days   Lab Units 04/01/25  1237 03/25/25  1456   WBC 10*3/mm3 3.53  --    NEUTROS ABS 10*3/mm3 1.32*  --    HEMOGLOBIN g/dL 10.3*  --    HEMATOCRIT % 30.1* 23.1*   PLATELETS 10*3/mm3 1,060*  --      Results from last 7 days   Lab Units 03/27/25  0913 03/25/25  1456   SODIUM mmol/L 140  --    POTASSIUM mmol/L 4.3  --    CHLORIDE mmol/L 103  --    CO2 mmol/L 27.0  --    BUN mg/dL 7*  --    CREATININE mg/dL 0.74  --    CALCIUM mg/dL 9.1  --    ALBUMIN g/dL 4.2  --    BILIRUBIN mg/dL 0.7  --    ALK PHOS U/L 53  --    ALT (SGPT) U/L 12  --    AST (SGOT) U/L 21  --    GLUCOSE mg/dL 94  " --    IRON mcg/dL  --  95   TIBC mcg/dL  --  311         IMAGING:    None reviewed    ASSESSMENT:  This is a 79 y.o. female with:    *Essential Thrombocytosis  CALR mutation positive and initial platelet count > 1.5 million  Undergoing treatment with hydroxyurea  7/29/2024 platelets remained elevated at 741, therefore hydroxyurea was increased to 1500 mg daily  Today, 9/5/2024 despite increased dose of hydroxyurea 1 month ago, the patient continues to have thrombocytosis with platelets 755,000.  She has now developed neutropenia.  This will require a reduced dose of her hydroxyurea.  11/5/2024: Doing well on 1000 mg hydroxyurea Monday through Friday and 1500 mg daily on the weekends.   Platelets have increased significantly at 926,000, from 692,000, from 747,000, from 544,000  3/4/2025: Platelets today 569,000. Continue on hydrea 1500mg daily.   4/1/2025: Platelets 1,060,000. Discussed with MD #2, increase hydrea to 1500mg M, W, F and 1000mg all the other days. Hgb 10.3, likely a reflection of prbc transfusion.     *B12 deficiency  Continuing B12 injections monthly.    *Signs of early dementia.   her  is now managing her medications.    *Gastritis and peptic ulcer disease.    She now is on proton pump inhibitor therapy and Carafate after undergoing an EGD with Dr. Guzman 2/1/2024.    *Neutropenia secondary to hydroxyurea  While taking hydroxyurea 1500 mg daily, WBC 3.35, ANC 1.14.    11/5/2024: Neutropenia has resolved with white blood cell count of 5.33, ANC 2.55  3/4/2025: ANC decreased to 1.48. Will continue to monitor and have her return in 2 weeks for CBC with RN review. If stable, continue current dosing.   4/1/2025: ANC 1.32.     PLAN:   Increase hydrea to 1500mg M, W, F, 1000mg all the other days. Discussed with MD #2.   Proceed with monthly vitamin B12 injection   Continue aspirin 81mg daily   Return to clinic in 1 week for CBC, retic count and RN review   Return to clinic on 4/29/2025 for MD visit,  cbc, retic count   Instructed to reach out sooner with any concerns.     Patient is on a high risk medication requiring close monitoring for toxicity.    ANÍBAL Calderon   04/01/2025

## 2025-04-02 ENCOUNTER — SPECIALTY PHARMACY (OUTPATIENT)
Dept: PHARMACY | Facility: HOSPITAL | Age: 80
End: 2025-04-02
Payer: MEDICARE

## 2025-04-02 RX ORDER — HYDROXYUREA 500 MG/1
CAPSULE ORAL
Qty: 204 CAPSULE | Refills: 1 | Status: SHIPPED | OUTPATIENT
Start: 2025-04-02

## 2025-04-02 NOTE — PROGRESS NOTES
Specialty Pharmacy Patient Management Program  Per Protocol Prescription Order or Refill       Requested Prescriptions     Signed Prescriptions Disp Refills    hydroxyurea (Hydrea) 500 MG capsule 204 capsule 1     Sig: Take 3 capsules (1500 mg) on Mon, Wed, Fri. Take 2 capsules (1000 mg) all other days.     Prescription orders above were sent to  [facility]  per Collaborative Care Agreement Protocol.     Completed independent double check on medication order/RX.    Kaushik Hernandez, Lora, BCOP  Clinical Specialty Pharmacist, Oncology  4/2/2025  09:10 EDT

## 2025-04-02 NOTE — PROGRESS NOTES
Specialty Pharmacy Patient Management Program  Per Protocol Prescription Order or Refill     Sonia Wooten is a 79 y.o. female with essential thrombocytosis was seen 4/1/25 by APRN. Per provider dictation, increase dose of oral oncology regimen Hydrea (hydroxyurea).  Labs Review: The CMP and CBC from 4/1/25 have been reviewed. Dose adjustments are needed for the oral specialty medication(s) based on plt count.  The dose will be changed to 1500 mg M/W/F and 1000 mg all other days.    Patient will be filling or currently fills medications at  [facility]  and is enrolled in the Patient Management Program.    Requested Prescriptions     Signed Prescriptions Disp Refills    hydroxyurea (Hydrea) 500 MG capsule 204 capsule 1     Sig: Take 3 capsules (1500 mg) on Mon, Wed, Fri. Take 2 capsules (1000 mg) all other days.     Prescription orders above were sent to the pharmacy per Collaborative Care Agreement Protocol.     Genna Rodriguez, PharmD, [facility]  Clinical Specialty Pharmacist, Oncology  4/2/2025  08:30 EDT

## 2025-04-08 ENCOUNTER — LAB (OUTPATIENT)
Dept: LAB | Facility: HOSPITAL | Age: 80
End: 2025-04-08
Payer: MEDICARE

## 2025-04-08 ENCOUNTER — CLINICAL SUPPORT (OUTPATIENT)
Dept: ONCOLOGY | Facility: HOSPITAL | Age: 80
End: 2025-04-08
Payer: MEDICARE

## 2025-04-08 DIAGNOSIS — D47.3 ESSENTIAL THROMBOCYTHEMIA: ICD-10-CM

## 2025-04-08 DIAGNOSIS — E53.8 B12 DEFICIENCY: ICD-10-CM

## 2025-04-08 LAB
BASOPHILS # BLD AUTO: 0.04 10*3/MM3 (ref 0–0.2)
BASOPHILS NFR BLD AUTO: 1 % (ref 0–1.5)
EOSINOPHIL # BLD AUTO: 0.01 10*3/MM3 (ref 0–0.4)
EOSINOPHIL NFR BLD AUTO: 0.2 % (ref 0.3–6.2)
ERYTHROCYTE [DISTWIDTH] IN BLOOD BY AUTOMATED COUNT: ABNORMAL %
HCT VFR BLD AUTO: 29.8 % (ref 34–46.6)
HGB BLD-MCNC: 10.2 G/DL (ref 12–15.9)
HGB RETIC QN AUTO: 45.5 PG (ref 29.8–36.1)
IMM GRANULOCYTES # BLD AUTO: 0.04 10*3/MM3 (ref 0–0.05)
IMM GRANULOCYTES NFR BLD AUTO: 1 % (ref 0–0.5)
IMM RETICS NFR: 32.2 % (ref 3–15.8)
LYMPHOCYTES # BLD AUTO: 1.9 10*3/MM3 (ref 0.7–3.1)
LYMPHOCYTES NFR BLD AUTO: 47 % (ref 19.6–45.3)
MCH RBC QN AUTO: 44.3 PG (ref 26.6–33)
MCHC RBC AUTO-ENTMCNC: 34.2 G/DL (ref 31.5–35.7)
MCV RBC AUTO: 129.6 FL (ref 79–97)
MONOCYTES # BLD AUTO: 0.52 10*3/MM3 (ref 0.1–0.9)
MONOCYTES NFR BLD AUTO: 12.9 % (ref 5–12)
NEUTROPHILS NFR BLD AUTO: 1.53 10*3/MM3 (ref 1.7–7)
NEUTROPHILS NFR BLD AUTO: 37.9 % (ref 42.7–76)
NRBC BLD AUTO-RTO: 1.5 /100 WBC (ref 0–0.2)
PLATELET # BLD AUTO: 885 10*3/MM3 (ref 140–450)
PMV BLD AUTO: 8.9 FL (ref 6–12)
RBC # BLD AUTO: 2.3 10*6/MM3 (ref 3.77–5.28)
RETICS # AUTO: 0.15 10*6/MM3 (ref 0.02–0.13)
RETICS/RBC NFR AUTO: 6.63 % (ref 0.7–1.9)
WBC NRBC COR # BLD AUTO: 4.04 10*3/MM3 (ref 3.4–10.8)

## 2025-04-08 PROCEDURE — 85025 COMPLETE CBC W/AUTO DIFF WBC: CPT

## 2025-04-08 PROCEDURE — 36415 COLL VENOUS BLD VENIPUNCTURE: CPT

## 2025-04-08 PROCEDURE — 85046 RETICYTE/HGB CONCENTRATE: CPT

## 2025-04-08 NOTE — PROGRESS NOTES
Patient is here for lab review with RN.  CBC reviewed, counts are stable for this patient at this time. Patient has no complaints. Copy of labs given to patient and follow up appointment reviewed. Patient will return to see Dr. House on 4/29. Per Dr. House no need for checks prior to that time. Patient is instructed to call the office with any concerns or new symptoms prior to next visit. Patient verbalized understanding and discharged in stable condition. Shauna Cheng RN

## 2025-04-28 RX ORDER — CYANOCOBALAMIN 1000 UG/ML
1000 INJECTION, SOLUTION INTRAMUSCULAR; SUBCUTANEOUS ONCE
Status: CANCELLED | OUTPATIENT
Start: 2025-04-29

## 2025-04-28 NOTE — PROGRESS NOTES
"[facility] CBC GROUP OUTPATIENT FOLLOW UP CLINIC VISIT    REASON FOR FOLLOW-UP:    Essential thrombocytosis  B12 deficiency    HISTORY OF PRESENT ILLNESS:  Sonia Wooten is a 80 y.o. female who returns today for follow up of the above issue.      At her last visit the hydroxyurea dose was increased to 1500 mg on Mondays Wednesdays and Fridays and 1000 mg on the other days.  She tolerates this well.  She is doing well with no new issues today.    PHYSICAL EXAMINATION:  Vitals:    04/29/25 1559   BP: 119/79   Pulse: 82   Resp: 16   Temp: 97.6 °F (36.4 °C)   TempSrc: Oral   SpO2: 97%   Weight: 68.7 kg (151 lb 8 oz)   Height: 162.1 cm (63.82\")   PainSc: 0-No pain       PHYSICAL EXAM:   General:  No acute distress, awake, alert   Skin:  Warm and dry, no visible rash  HEENT:  Normocephalic/atraumatic.    Chest:  Normal respiratory effort.   Extremities:  No visible clubbing, cyanosis, or edema  Neuro/psych:  Grossly nonfocal.  Normal mood and affect.    DIAGNOSTIC DATA:  Retic With IRF & RET-He (04/29/2025 15:42)  CBC & Differential (04/29/2025 15:42)    IMAGING:    None reviewed    ASSESSMENT:  This is a 80 y.o. female with:    *Essential Thrombocytosis  CALR mutation positive and initial platelet count > 1.5 million  Undergoing treatment with hydroxyurea  7/29/2024 platelets remained elevated at 741, therefore hydroxyurea was increased to 1500 mg daily  Today, 9/5/2024 despite increased dose of hydroxyurea 1 month ago, the patient continues to have thrombocytosis with platelets 755,000.  She has now developed neutropenia.  This will require a reduced dose of her hydroxyurea.  11/5/2024: Doing well on 1000 mg hydroxyurea Monday through Friday and 1500 mg daily on the weekends.   Platelets have increased significantly at 926,000, from 692,000, from 747,000, from 544,000  3/4/2025: Platelets today 569,000. Continue on hydrea 1500mg daily.   4/1/2025: Platelets 1,060,000. Discussed with MD #2, increase hydrea to 1500mg " M, W, F and 1000mg all the other days. Hgb 10.3, likely a reflection of prbc transfusion.   4/29/2025: Platelets improved to 477,000, from 885,000 on 4/8.  Hemoglobin 11.4.    *B12 deficiency  Continuing B12 injections monthly.    *Signs of early dementia.   her  is now managing her medications.    *Gastritis and peptic ulcer disease.    She now is on proton pump inhibitor therapy and Carafate after undergoing an EGD with Dr. Guzman 2/1/2024.    *Neutropenia secondary to hydroxyurea  While taking hydroxyurea 1500 mg daily, WBC 3.35, ANC 1.14.    11/5/2024: Neutropenia has resolved with white blood cell count of 5.33, ANC 2.55  3/4/2025: ANC decreased to 1.48. Will continue to monitor and have her return in 2 weeks for CBC with RN review. If stable, continue current dosing.   4/1/2025: ANC 1.32.   4/29/2025: White blood cell count improving at 4.76 with an ANC of 2.38    PLAN:   Continue hydroxyurea at the current dose of 1500 mg on Mondays Wednesdays and Fridays and 1000 mg on the other days  Vitamin B12 injection today and monthly  Aspirin 81 mg daily continues  APRN in 1 and 2 months with labs and a vitamin B12 injection and I will see her back in 3 months with the same    Patient is on a high risk medication requiring close monitoring for toxicity.    Russell House MD   04/29/2025

## 2025-04-29 ENCOUNTER — SPECIALTY PHARMACY (OUTPATIENT)
Dept: ONCOLOGY | Facility: HOSPITAL | Age: 80
End: 2025-04-29
Payer: MEDICARE

## 2025-04-29 ENCOUNTER — INFUSION (OUTPATIENT)
Dept: ONCOLOGY | Facility: HOSPITAL | Age: 80
End: 2025-04-29
Payer: MEDICARE

## 2025-04-29 ENCOUNTER — LAB (OUTPATIENT)
Dept: LAB | Facility: HOSPITAL | Age: 80
End: 2025-04-29
Payer: MEDICARE

## 2025-04-29 ENCOUNTER — OFFICE VISIT (OUTPATIENT)
Dept: ONCOLOGY | Facility: CLINIC | Age: 80
End: 2025-04-29
Payer: MEDICARE

## 2025-04-29 VITALS
BODY MASS INDEX: 25.86 KG/M2 | OXYGEN SATURATION: 97 % | TEMPERATURE: 97.6 F | WEIGHT: 151.5 LBS | DIASTOLIC BLOOD PRESSURE: 79 MMHG | SYSTOLIC BLOOD PRESSURE: 119 MMHG | HEIGHT: 64 IN | RESPIRATION RATE: 16 BRPM | HEART RATE: 82 BPM

## 2025-04-29 DIAGNOSIS — E53.8 B12 DEFICIENCY: Primary | ICD-10-CM

## 2025-04-29 DIAGNOSIS — D47.3 ESSENTIAL THROMBOCYTHEMIA: ICD-10-CM

## 2025-04-29 DIAGNOSIS — D64.9 ANEMIA, UNSPECIFIED TYPE: ICD-10-CM

## 2025-04-29 DIAGNOSIS — D47.3 ESSENTIAL THROMBOCYTHEMIA: Primary | ICD-10-CM

## 2025-04-29 LAB
BASOPHILS # BLD AUTO: 0.05 10*3/MM3 (ref 0–0.2)
BASOPHILS NFR BLD AUTO: 1.1 % (ref 0–1.5)
DEPRECATED RDW RBC AUTO: 92.3 FL (ref 37–54)
EOSINOPHIL # BLD AUTO: 0.03 10*3/MM3 (ref 0–0.4)
EOSINOPHIL NFR BLD AUTO: 0.6 % (ref 0.3–6.2)
ERYTHROCYTE [DISTWIDTH] IN BLOOD BY AUTOMATED COUNT: 19.8 % (ref 12.3–15.4)
HCT VFR BLD AUTO: 31.7 % (ref 34–46.6)
HGB BLD-MCNC: 11.4 G/DL (ref 12–15.9)
HGB RETIC QN AUTO: 47.9 PG (ref 29.8–36.1)
IMM GRANULOCYTES # BLD AUTO: 0.01 10*3/MM3 (ref 0–0.05)
IMM GRANULOCYTES NFR BLD AUTO: 0.2 % (ref 0–0.5)
IMM RETICS NFR: 36.2 % (ref 3–15.8)
LYMPHOCYTES # BLD AUTO: 1.8 10*3/MM3 (ref 0.7–3.1)
LYMPHOCYTES NFR BLD AUTO: 37.8 % (ref 19.6–45.3)
MCH RBC QN AUTO: 46 PG (ref 26.6–33)
MCHC RBC AUTO-ENTMCNC: 36 G/DL (ref 31.5–35.7)
MCV RBC AUTO: 127.8 FL (ref 79–97)
MONOCYTES # BLD AUTO: 0.49 10*3/MM3 (ref 0.1–0.9)
MONOCYTES NFR BLD AUTO: 10.3 % (ref 5–12)
NEUTROPHILS NFR BLD AUTO: 2.38 10*3/MM3 (ref 1.7–7)
NEUTROPHILS NFR BLD AUTO: 50 % (ref 42.7–76)
NRBC BLD AUTO-RTO: 0 /100 WBC (ref 0–0.2)
PLATELET # BLD AUTO: 477 10*3/MM3 (ref 140–450)
PMV BLD AUTO: 9.2 FL (ref 6–12)
RBC # BLD AUTO: 2.48 10*6/MM3 (ref 3.77–5.28)
RETICS # AUTO: 0.08 10*6/MM3 (ref 0.02–0.13)
RETICS/RBC NFR AUTO: 3.03 % (ref 0.7–1.9)
WBC NRBC COR # BLD AUTO: 4.76 10*3/MM3 (ref 3.4–10.8)

## 2025-04-29 PROCEDURE — 1160F RVW MEDS BY RX/DR IN RCRD: CPT | Performed by: INTERNAL MEDICINE

## 2025-04-29 PROCEDURE — 3078F DIAST BP <80 MM HG: CPT | Performed by: INTERNAL MEDICINE

## 2025-04-29 PROCEDURE — 99214 OFFICE O/P EST MOD 30 MIN: CPT | Performed by: INTERNAL MEDICINE

## 2025-04-29 PROCEDURE — 3074F SYST BP LT 130 MM HG: CPT | Performed by: INTERNAL MEDICINE

## 2025-04-29 PROCEDURE — G2211 COMPLEX E/M VISIT ADD ON: HCPCS | Performed by: INTERNAL MEDICINE

## 2025-04-29 PROCEDURE — 96372 THER/PROPH/DIAG INJ SC/IM: CPT

## 2025-04-29 PROCEDURE — 1126F AMNT PAIN NOTED NONE PRSNT: CPT | Performed by: INTERNAL MEDICINE

## 2025-04-29 PROCEDURE — 36415 COLL VENOUS BLD VENIPUNCTURE: CPT

## 2025-04-29 PROCEDURE — 1159F MED LIST DOCD IN RCRD: CPT | Performed by: INTERNAL MEDICINE

## 2025-04-29 PROCEDURE — 85025 COMPLETE CBC W/AUTO DIFF WBC: CPT

## 2025-04-29 PROCEDURE — 25010000002 CYANOCOBALAMIN PER 1000 MCG: Performed by: INTERNAL MEDICINE

## 2025-04-29 PROCEDURE — 85046 RETICYTE/HGB CONCENTRATE: CPT

## 2025-04-29 RX ORDER — CYANOCOBALAMIN 1000 UG/ML
1000 INJECTION, SOLUTION INTRAMUSCULAR; SUBCUTANEOUS ONCE
Status: COMPLETED | OUTPATIENT
Start: 2025-04-29 | End: 2025-04-29

## 2025-04-29 RX ADMIN — CYANOCOBALAMIN 1000 MCG: 1000 INJECTION, SOLUTION INTRAMUSCULAR at 16:20

## 2025-04-29 NOTE — PROGRESS NOTES
Specialty Pharmacy Patient Management Program  Oncology Reassessment     Sonia Wooten was referred by an their provider to the Oncology Patient Management program offered by [facility] for essential thrombocytosis]. A follow-up outreach was conducted, including assessment of continued therapy appropriateness, medication adherence, and side effect incidence and management for hydrea.    CCA signed today    Changes to Insurance Coverage or Financial Support  none    Relevant Past Medical History and Comorbidities  Relevant medical history and concomitant health conditions were discussed with the patient. The patient's chart has been reviewed for relevant past medical history and comorbid health conditions and updated as necessary.   Past Medical History:   Diagnosis Date    ADHD (attention deficit hyperactivity disorder)     have had since childhood    Allergic exema    Anxiety     Cancer blood    in treatment CBC    Cataract removed    Colon polyp checked every 5 years    none at present    DDD (degenerative disc disease), lumbar     Fibromyalgia, primary off and on    GERD (gastroesophageal reflux disease)     H/o Hip pain     Headache occasional    History of depression     History of low back pain     History of thrombocytosis     HL (hearing loss) left ear    Hypercholesterolemia     Irritable bowel syndrome occasional    Low back pain     Scoliosis slight curve    Sleep apnea     Tremor slight in leg    Urinary tract infection October this year    treated     Social History     Socioeconomic History    Marital status:      Spouse name: Temo   Tobacco Use    Smoking status: Former     Current packs/day: 0.00     Average packs/day: 1 pack/day for 15.0 years (15.0 ttl pk-yrs)     Types: Cigarettes     Start date: 10/1/1964     Quit date: 10/1/1979     Years since quittin.6    Smokeless tobacco: Never   Vaping Use    Vaping status: Never Used   Substance and Sexual Activity     Alcohol use: Yes     Alcohol/week: 3.0 standard drinks of alcohol     Types: 1 Glasses of wine, 1 Cans of beer, 1 Shots of liquor per week     Comment: Social    Drug use: Never    Sexual activity: Not Currently     Partners: Male     Problem list reviewed by Genna Rodriguez RPH on 4/29/2025 at  4:12 PM    Hospitalizations and Urgent Care Since Last Assessment  ED Visits, Admissions, or Hospitalizations: no  Urgent Office Visits: no    Allergies  Known allergies and reactions were discussed with the patient. The patient's chart has been reviewed for allergy information and updated as necessary.   Allergies   Allergen Reactions    Penicillins Hives     Shortness of breath    Codeine Nausea And Vomiting     Allergies reviewed by Genna Rodriguez RPH on 4/29/2025 at  4:12 PM    Relevant Laboratory Values  Relevant laboratory values were discussed with the patient. The following specialty medication dose adjustment(s) are recommended: No dose adjustments are needed for the oral specialty medication(s) based on the labs.    Lab Results   Component Value Date    GLUCOSE 94 03/27/2025    CALCIUM 9.1 03/27/2025     03/27/2025    K 4.3 03/27/2025    CO2 27.0 03/27/2025     03/27/2025    BUN 7 (L) 03/27/2025    CREATININE 0.74 03/27/2025    EGFRIFNONA 81 12/13/2021    BCR 9.5 03/27/2025    ANIONGAP 11.2 09/05/2024     Lab Results   Component Value Date    WBC 4.76 04/29/2025    RBC 2.48 (L) 04/29/2025    HGB 11.4 (L) 04/29/2025    HCT 31.7 (L) 04/29/2025    .8 (H) 04/29/2025    MCH 46.0 (H) 04/29/2025    MCHC 36.0 (H) 04/29/2025    RDW 19.8 (H) 04/29/2025    RDWSD 92.3 (H) 04/29/2025    MPV 9.2 04/29/2025     (H) 04/29/2025    NEUTRORELPCT 50.0 04/29/2025    LYMPHORELPCT 37.8 04/29/2025    MONORELPCT 10.3 04/29/2025    EOSRELPCT 0.6 04/29/2025    BASORELPCT 1.1 04/29/2025    AUTOIGPER 0.2 04/29/2025    NEUTROABS 2.38 04/29/2025    LYMPHSABS 1.80 04/29/2025    MONOSABS 0.49 04/29/2025    EOSABS 0.03  04/29/2025    BASOSABS 0.05 04/29/2025    AUTOIGNUM 0.01 04/29/2025    NRBC 0.0 04/29/2025       Current Medication List  This medication list has been reviewed with the patient and evaluated for any interactions or necessary modifications/recommendations, and updated to include all prescription medications, OTC medications, and supplements the patient is currently taking.  This list reflects what is contained in the patient's profile, which has also been marked as reviewed to communicate to other providers it is the most up to date version of the patient's current medication therapy.     Current Outpatient Medications:     aspirin 81 MG EC tablet, Take 1 tablet by mouth Daily., Disp: , Rfl:     calcium carbonate (OS-YINKA) 600 MG tablet, Take 1 tablet by mouth Daily., Disp: , Rfl:     Cholecalciferol (VITAMIN D-3 PO), Take  by mouth., Disp: , Rfl:     COVID-19 mRNA Vac-Adán,Pfizer, (Comirnaty) 30 MCG/0.3ML suspension prefilled syringe prefilled syringe, Inject 0.3 mL into the appropriate muscle as directed by prescriber. (Patient not taking: Reported on 4/29/2025), Disp: 0.3 mL, Rfl: 0    fluorouracil (EFUDEX) 5 % cream, , Disp: , Rfl:     FLUoxetine (PROzac) 20 MG capsule, TAKE 1 CAPSULE BY MOUTH DAILY, Disp: 90 capsule, Rfl: 0    fluticasone (FLONASE) 50 MCG/ACT nasal spray, , Disp: , Rfl:     hydroxyurea (Hydrea) 500 MG capsule, Take 3 capsules (1500 mg) on Mon, Wed, Fri. Take 2 capsules (1000 mg) all other days., Disp: 204 capsule, Rfl: 1    omeprazole (priLOSEC) 40 MG capsule, TAKE 1 CAPSULE BY MOUTH DAILY, Disp: 90 capsule, Rfl: 1    pravastatin (PRAVACHOL) 20 MG tablet, TAKE 1 TABLET BY MOUTH DAILY, Disp: 90 tablet, Rfl: 1    probiotic (CULTURELLE) capsule capsule, Take  by mouth Daily., Disp: , Rfl:     Tetanus-Diphth-Acell Pertussis (Boostrix) 5-2.5-18.5 LF-MCG/0.5 injection, Inject 0.5 mL into the appropriate muscle as directed by prescriber. (Patient not taking: Reported on 4/29/2025), Disp: 0.5 mL, Rfl:  0    Triamcinolone Acetonide 0.025 % lotion, , Disp: , Rfl:     Current Facility-Administered Medications:     cyanocobalamin injection 1,000 mcg, 1,000 mcg, Intramuscular, Q28 Days, Umberto Brown MD, 1,000 mcg at 01/29/24 1408    Facility-Administered Medications Ordered in Other Visits:     cyanocobalamin injection 1,000 mcg, 1,000 mcg, Intramuscular, Once, Russell House MD    Medicines reviewed by Genna Rodriguez [facility] on 4/29/2025 at  4:12 PM    Drug Interactions  Assessed medication list for interactions, no significant drug interactions noted.   Advised patient to call the clinic if any new medications are started so we can assess for drug-drug interactions.  Drug-food interactions discussed:  none    Adverse Drug Reactions  Medication tolerability: Tolerating with no to minimal ADRs  Medication plan: Continue therapy with normal follow-up  Plan for ADR Management: not needed    Adherence, Self-Administration, and Current Therapy Problems  Adherence related to the patient's specialty therapy was discussed with the patient. The Adherence segment of this outreach has been reviewed and updated.     Adherence Questions  Linked Medication(s) Assessed: Hydroxyurea (HYDREA)  On average, how many doses/injections does the patient miss per month?: 0  What are the identified reasons for non-adherence or missed doses? : no problems identified  What is the estimated medication adherence level?: %  Based on the patient/caregiver response and refill history, does this patient require an MTP to track adherence improvements?: no    Additional Barriers to Patient Self-Administration: none  Methods for Supporting Patient Self-Administration: none  Patient has had no issues obtaining medication from pharmacy.    Open Medication Therapy Problems  No medication therapy recommendations to display    Goals of Therapy  Goals related to the patient's specialty therapy were discussed with the patient. The Patient Goals segment  of this outreach has been reviewed and updated.   Goals Addressed Today        Specialty Pharmacy General Goal      Control blood counts              Quality of Life Assessment   Quality of Life related to the patient's enrollment in the patient management program and services provided was discussed with the patient. The QOL segment of this outreach has been reviewed and updated.  Quality of Life Improvement Scale: 5-No change    Discussed aforementioned material with patient in person, face-to-face, in clinic.     Reassessment Plan & Follow-Up  1. Medication Therapy Changes: none  2. Related Plans, Therapy Recommendations, or Issues to Be Addressed: none  3. Pharmacist to perform regular assessments no more than (6) months from the previous assessment.   4. Care Coordinator to set up future refill outreaches, coordinate prescription delivery, and escalate clinical questions to pharmacist.    Attestation  Therapeutic appropriateness: Appropriate   I attest the patient was actively involved in and has agreed to the above plan of care.  If the prescribed therapy is at any point deemed not appropriate based on the current or future assessments, a consultation will be initiated with the patient's specialty care provider to determine the best course of action. The revised plan of therapy will be documented along with any required assessments and/or additional patient education provided.     Genna Rodriguez, PharmD, [facility]  Clinical Specialty Pharmacist, Oncology  4/29/2025  16:12 EDT

## 2025-04-29 NOTE — PROGRESS NOTES
"    I N T E R N A L  M E D I C I N E  Nafisa Kebede, ANÍBAL    ENCOUNTER DATE:  05/01/2025    Sonia Samt / 80 y.o. / female      CHIEF COMPLAINT / REASON FOR OFFICE VISIT     Alzheimer's Disease      ASSESSMENT & PLAN     Diagnoses and all orders for this visit:    1. Mild Alzheimer's dementia, unspecified timing of dementia onset, unspecified whether behavioral, psychotic, or mood disturbance or anxiety (Primary)  -     Ambulatory Referral to Neurology  -     donepezil (Aricept) 5 MG tablet; Take 1 tablet by mouth Every Night.  Dispense: 90 tablet; Refill: 0  -     MRI Brain With & Without Contrast; Future         SUMMARY/DISCUSSION  We discussed diagnosed of mild Alzheimer's dementia and importance of staying up to date with eye and hearing exams - she will complete hearing exam as scheduled later this month.  Also discussed importance of healthy diet, regular exercise and social interaction.  She will resume [employer] exercise group.  Discussed importance of structure routine.   is good support system.  No safety concerns at this time.    Discussed risks, benefits, side effects of donepezil as a medication to help with memory symptoms.  She is agreeable to start low dose at bedtime.  Recheck in 6 months.  Obtain Brain MRI and referred to neurology office to consider possible newer Alzheimer's medication treatment options.      40 minutes spent reviewing neuropsych report, meeting with patient, developing plan of care, answering questions.      Next Appointment with me: 7/28/2025    Return in about 6 weeks (around 6/12/2025) for Recheck Dementia.      VITAL SIGNS     Visit Vitals  /82   Pulse 94   Temp 97.8 °F (36.6 °C)   Resp 16   Ht 162.1 cm (63.82\")   Wt 68.5 kg (151 lb)   SpO2 99%   BMI 26.07 kg/m²             Wt Readings from Last 3 Encounters:   05/01/25 68.5 kg (151 lb)   04/29/25 68.7 kg (151 lb 8 oz)   04/01/25 68.2 kg (150 lb 4.8 oz)     Body mass index is 26.07 " kg/m².        MEDICATIONS AT THE TIME OF OFFICE VISIT     Current Outpatient Medications on File Prior to Visit   Medication Sig Dispense Refill    aspirin 81 MG EC tablet Take 1 tablet by mouth Daily.      calcium carbonate (OS-YINKA) 600 MG tablet Take 1 tablet by mouth Daily.      Cholecalciferol (VITAMIN D-3 PO) Take  by mouth.      fluorouracil (EFUDEX) 5 % cream       FLUoxetine (PROzac) 20 MG capsule TAKE 1 CAPSULE BY MOUTH DAILY 90 capsule 0    fluticasone (FLONASE) 50 MCG/ACT nasal spray       hydroxyurea (Hydrea) 500 MG capsule Take 3 capsules (1500 mg) on Mon, Wed, Fri. Take 2 capsules (1000 mg) all other days. 204 capsule 1    omeprazole (priLOSEC) 40 MG capsule TAKE 1 CAPSULE BY MOUTH DAILY 90 capsule 1    pravastatin (PRAVACHOL) 20 MG tablet TAKE 1 TABLET BY MOUTH DAILY 90 tablet 1    probiotic (CULTURELLE) capsule capsule Take  by mouth Daily.      [DISCONTINUED] COVID-19 mRNA Vac-Adán,Pfizer, (Comirnaty) 30 MCG/0.3ML suspension prefilled syringe prefilled syringe Inject 0.3 mL into the appropriate muscle as directed by prescriber. (Patient not taking: Reported on 4/1/2025) 0.3 mL 0    [DISCONTINUED] Tetanus-Diphth-Acell Pertussis (Boostrix) 5-2.5-18.5 LF-MCG/0.5 injection Inject 0.5 mL into the appropriate muscle as directed by prescriber. (Patient not taking: Reported on 4/1/2025) 0.5 mL 0    [DISCONTINUED] Triamcinolone Acetonide 0.025 % lotion  (Patient not taking: Reported on 5/1/2025)       Current Facility-Administered Medications on File Prior to Visit   Medication Dose Route Frequency Provider Last Rate Last Admin    cyanocobalamin injection 1,000 mcg  1,000 mcg Intramuscular Q28 Days Umberto Brown MD   1,000 mcg at 01/29/24 1408        HISTORY OF PRESENT ILLNESS     Underwent neuropsychological testing with [email or web address] on April 9, 2025 given concerns of progressive memory decline in the context of known depression.  Reported increased symptoms of apathy, social withdrawal and  fatigue in last year.   Diagnosed with mild dementia of Alzheimer's type.      Denies any dysphoric mood.  Patient and  believe depression symptoms are well controlled on fluoxetine 20 mg daily.      Hearing test scheduled later this month with [facility].  March 2025 Vitamin B12 536, TSH very mildly elevated at 4.550 with normal Free T4.        Patient Care Team:  Nafisa Kebede APRN as PCP - General (Family Medicine)  Ester Rivera APRN as Referring Physician (Nurse Practitioner)  Umberto Brown MD as Consulting Physician (Hematology and Oncology)  Pallares, Clara Ann, MD as Consulting Physician (Internal Medicine)  Robyn Lopez APRN as Nurse Practitioner (Nurse Practitioner)  Russell House MD as Consulting Physician (Hematology and Oncology)    REVIEW OF SYSTEMS     Review of Systems   Constitutional:  Negative for chills, fever and unexpected weight change.   Respiratory:  Negative for cough, chest tightness and shortness of breath.    Cardiovascular:  Negative for chest pain, palpitations and leg swelling.   Neurological:  Negative for dizziness, weakness, light-headedness and headaches.        +Short term memory deficits   Psychiatric/Behavioral:  The patient is not nervous/anxious.           PHYSICAL EXAMINATION     Physical Exam  Vitals reviewed.   Constitutional:       General: She is not in acute distress.     Appearance: Normal appearance. She is not ill-appearing, toxic-appearing or diaphoretic.   HENT:      Head: Normocephalic and atraumatic.   Cardiovascular:      Rate and Rhythm: Normal rate and regular rhythm.      Heart sounds: Normal heart sounds.   Pulmonary:      Effort: Pulmonary effort is normal.      Breath sounds: Normal breath sounds.   Musculoskeletal:      Right lower leg: No edema.      Left lower leg: No edema.   Neurological:      Mental Status: She is alert and oriented to person, place, and time. Mental status is at baseline.   Psychiatric:         Mood and  Affect: Mood normal.         Behavior: Behavior normal.         Thought Content: Thought content normal.         Judgment: Judgment normal.           REVIEWED DATA     Labs:           Imaging:            Medical Tests:           Summary of old records / correspondence / consultant report:           Request outside records:

## 2025-04-30 ENCOUNTER — SPECIALTY PHARMACY (OUTPATIENT)
Dept: PHARMACY | Facility: HOSPITAL | Age: 80
End: 2025-04-30
Payer: MEDICARE

## 2025-04-30 NOTE — PROGRESS NOTES
Specialty Pharmacy Note: Hydrea (hydroxyurea)    Sonia Wooten is a 80 y.o. female with essential thrombocytosis was seen 4/29/25 by Dr. House. Per provider dictation, no changes to oral oncology regimen Hydrea (hydroxyurea).  Labs Review: The CMP and CBC from 4/29/25 have been reviewed. No dose adjustments are needed for the oral specialty medication(s) based on the labs.    Specialty pharmacy will continue to follow patient.    Genna Rodriguez, ChanningD, BCPS  4/30/2025  09:17 EDT

## 2025-05-01 ENCOUNTER — OFFICE VISIT (OUTPATIENT)
Dept: INTERNAL MEDICINE | Age: 80
End: 2025-05-01
Payer: MEDICARE

## 2025-05-01 VITALS
HEART RATE: 94 BPM | SYSTOLIC BLOOD PRESSURE: 118 MMHG | TEMPERATURE: 97.8 F | WEIGHT: 151 LBS | HEIGHT: 64 IN | RESPIRATION RATE: 16 BRPM | BODY MASS INDEX: 25.78 KG/M2 | OXYGEN SATURATION: 99 % | DIASTOLIC BLOOD PRESSURE: 82 MMHG

## 2025-05-01 DIAGNOSIS — G30.9 MILD ALZHEIMER'S DEMENTIA, UNSPECIFIED TIMING OF DEMENTIA ONSET, UNSPECIFIED WHETHER BEHAVIORAL, PSYCHOTIC, OR MOOD DISTURBANCE OR ANXIETY: Primary | ICD-10-CM

## 2025-05-01 DIAGNOSIS — F02.A0 MILD ALZHEIMER'S DEMENTIA, UNSPECIFIED TIMING OF DEMENTIA ONSET, UNSPECIFIED WHETHER BEHAVIORAL, PSYCHOTIC, OR MOOD DISTURBANCE OR ANXIETY: Primary | ICD-10-CM

## 2025-05-01 RX ORDER — DONEPEZIL HYDROCHLORIDE 5 MG/1
5 TABLET, FILM COATED ORAL NIGHTLY
Qty: 90 TABLET | Refills: 0 | Status: SHIPPED | OUTPATIENT
Start: 2025-05-01

## 2025-05-19 ENCOUNTER — OFFICE VISIT (OUTPATIENT)
Dept: NEUROLOGY | Facility: CLINIC | Age: 80
End: 2025-05-19
Payer: MEDICARE

## 2025-05-19 VITALS
DIASTOLIC BLOOD PRESSURE: 68 MMHG | BODY MASS INDEX: 25.95 KG/M2 | OXYGEN SATURATION: 98 % | SYSTOLIC BLOOD PRESSURE: 108 MMHG | HEART RATE: 82 BPM | WEIGHT: 152 LBS | HEIGHT: 64 IN

## 2025-05-19 DIAGNOSIS — Z53.21 PATIENT LEFT WITHOUT BEING SEEN: Primary | ICD-10-CM

## 2025-05-27 ENCOUNTER — OFFICE VISIT (OUTPATIENT)
Dept: ONCOLOGY | Facility: CLINIC | Age: 80
End: 2025-05-27
Payer: MEDICARE

## 2025-05-27 ENCOUNTER — INFUSION (OUTPATIENT)
Dept: ONCOLOGY | Facility: HOSPITAL | Age: 80
End: 2025-05-27
Payer: MEDICARE

## 2025-05-27 ENCOUNTER — LAB (OUTPATIENT)
Dept: LAB | Facility: HOSPITAL | Age: 80
End: 2025-05-27
Payer: MEDICARE

## 2025-05-27 VITALS
DIASTOLIC BLOOD PRESSURE: 68 MMHG | HEIGHT: 64 IN | HEART RATE: 89 BPM | RESPIRATION RATE: 17 BRPM | SYSTOLIC BLOOD PRESSURE: 110 MMHG | TEMPERATURE: 97.8 F | WEIGHT: 150.5 LBS | OXYGEN SATURATION: 97 % | BODY MASS INDEX: 25.7 KG/M2

## 2025-05-27 DIAGNOSIS — E53.8 B12 DEFICIENCY: Primary | ICD-10-CM

## 2025-05-27 DIAGNOSIS — D47.3 ESSENTIAL THROMBOCYTHEMIA: Primary | ICD-10-CM

## 2025-05-27 DIAGNOSIS — D64.9 ANEMIA, UNSPECIFIED TYPE: ICD-10-CM

## 2025-05-27 DIAGNOSIS — D47.3 ESSENTIAL THROMBOCYTHEMIA: ICD-10-CM

## 2025-05-27 LAB
BASOPHILS # BLD AUTO: 0.06 10*3/MM3 (ref 0–0.2)
BASOPHILS NFR BLD AUTO: 1.4 % (ref 0–1.5)
DEPRECATED RDW RBC AUTO: 74 FL (ref 37–54)
EOSINOPHIL # BLD AUTO: 0.01 10*3/MM3 (ref 0–0.4)
EOSINOPHIL NFR BLD AUTO: 0.2 % (ref 0.3–6.2)
ERYTHROCYTE [DISTWIDTH] IN BLOOD BY AUTOMATED COUNT: 16.1 % (ref 12.3–15.4)
HCT VFR BLD AUTO: 36.2 % (ref 34–46.6)
HGB BLD-MCNC: 12.7 G/DL (ref 12–15.9)
HGB RETIC QN AUTO: 46.4 PG (ref 29.8–36.1)
IMM GRANULOCYTES # BLD AUTO: 0.02 10*3/MM3 (ref 0–0.05)
IMM GRANULOCYTES NFR BLD AUTO: 0.5 % (ref 0–0.5)
IMM RETICS NFR: 35.6 % (ref 3–15.8)
LYMPHOCYTES # BLD AUTO: 1.69 10*3/MM3 (ref 0.7–3.1)
LYMPHOCYTES NFR BLD AUTO: 40.8 % (ref 19.6–45.3)
MCH RBC QN AUTO: 45.7 PG (ref 26.6–33)
MCHC RBC AUTO-ENTMCNC: 35.1 G/DL (ref 31.5–35.7)
MCV RBC AUTO: 130.2 FL (ref 79–97)
MONOCYTES # BLD AUTO: 0.51 10*3/MM3 (ref 0.1–0.9)
MONOCYTES NFR BLD AUTO: 12.3 % (ref 5–12)
NEUTROPHILS NFR BLD AUTO: 1.85 10*3/MM3 (ref 1.7–7)
NEUTROPHILS NFR BLD AUTO: 44.8 % (ref 42.7–76)
NRBC BLD AUTO-RTO: 0.5 /100 WBC (ref 0–0.2)
PLATELET # BLD AUTO: 517 10*3/MM3 (ref 140–450)
PMV BLD AUTO: 9.3 FL (ref 6–12)
RBC # BLD AUTO: 2.78 10*6/MM3 (ref 3.77–5.28)
RETICS # AUTO: 0.08 10*6/MM3 (ref 0.02–0.13)
RETICS/RBC NFR AUTO: 2.84 % (ref 0.7–1.9)
WBC NRBC COR # BLD AUTO: 4.14 10*3/MM3 (ref 3.4–10.8)

## 2025-05-27 PROCEDURE — 36415 COLL VENOUS BLD VENIPUNCTURE: CPT

## 2025-05-27 PROCEDURE — 85046 RETICYTE/HGB CONCENTRATE: CPT

## 2025-05-27 PROCEDURE — 25010000002 CYANOCOBALAMIN PER 1000 MCG: Performed by: INTERNAL MEDICINE

## 2025-05-27 PROCEDURE — 85025 COMPLETE CBC W/AUTO DIFF WBC: CPT

## 2025-05-27 PROCEDURE — 96372 THER/PROPH/DIAG INJ SC/IM: CPT

## 2025-05-27 RX ORDER — CYANOCOBALAMIN 1000 UG/ML
1000 INJECTION, SOLUTION INTRAMUSCULAR; SUBCUTANEOUS ONCE
Status: COMPLETED | OUTPATIENT
Start: 2025-05-27 | End: 2025-05-27

## 2025-05-27 RX ADMIN — CYANOCOBALAMIN 1000 MCG: 1000 INJECTION, SOLUTION INTRAMUSCULAR at 11:51

## 2025-05-27 NOTE — PROGRESS NOTES
"[facility] CBC GROUP OUTPATIENT FOLLOW UP CLINIC VISIT    REASON FOR FOLLOW-UP:    Essential thrombocytosis  B12 deficiency    HISTORY OF PRESENT ILLNESS:  Sonia Wooten is a 80 y.o. female who returns today for follow up of the above issue.      She is doing well today without new concerns. She continues on hydrea 1500mg M, W, F and 1000mg on the other days.         PHYSICAL EXAMINATION:  Vitals:    05/27/25 1138   Resp: 17   TempSrc: Oral   SpO2: 97%   Height: 162 cm (63.78\")   PainSc: 0-No pain       PHYSICAL EXAM:   General:  No acute distress, awake, alert   Skin:  Warm and dry, no visible rash  HEENT:  Normocephalic/atraumatic.    Chest:  Normal respiratory effort.   Extremities:  No visible clubbing, cyanosis, or edema  Neuro/psych:  Grossly nonfocal.  Normal mood and affect.    DIAGNOSTIC DATA:  Retic With IRF & RET-He (05/27/2025 11:28)  CBC & Differential (05/27/2025 11:28)    IMAGING:    None reviewed    ASSESSMENT:  This is a 80 y.o. female with:    *Essential Thrombocytosis  CALR mutation positive and initial platelet count > 1.5 million  Undergoing treatment with hydroxyurea  7/29/2024 platelets remained elevated at 741, therefore hydroxyurea was increased to 1500 mg daily  Today, 9/5/2024 despite increased dose of hydroxyurea 1 month ago, the patient continues to have thrombocytosis with platelets 755,000.  She has now developed neutropenia.  This will require a reduced dose of her hydroxyurea.  11/5/2024: Doing well on 1000 mg hydroxyurea Monday through Friday and 1500 mg daily on the weekends.   Platelets have increased significantly at 926,000, from 692,000, from 747,000, from 544,000  3/4/2025: Platelets today 569,000. Continue on hydrea 1500mg daily.   4/1/2025: Platelets 1,060,000. Discussed with MD #2, increase hydrea to 1500mg M, W, F and 1000mg all the other days. Hgb 10.3, likely a reflection of prbc transfusion.   4/29/2025: Platelets 477,000, from 885,000 on 4/8.  Hemoglobin " 11.4.  5/27/2025: Platelets 517,000, WBC 4.14, hemoglobin 12.7. Continue with current dosing of hydrea- 1500mg M W F and 1000mg on the other days.     *B12 deficiency  Continuing B12 injections monthly.    *Signs of early dementia.   her  is now managing her medications.    *Gastritis and peptic ulcer disease.    She now is on proton pump inhibitor therapy and Carafate after undergoing an EGD with Dr. Guzman 2/1/2024.    *Neutropenia secondary to hydroxyurea  While taking hydroxyurea 1500 mg daily, WBC 3.35, ANC 1.14.    11/5/2024: Neutropenia has resolved with white blood cell count of 5.33, ANC 2.55  3/4/2025: ANC decreased to 1.48. Will continue to monitor and have her return in 2 weeks for CBC with RN review. If stable, continue current dosing.   4/1/2025: ANC 1.32.   4/29/2025: White blood cell count improving at 4.76 with an ANC of 2.38  5/27/2025: WBC 4.14 and ANC 1.85    PLAN:   Continue hydroxyurea at the current dose of 1500 mg on Mondays, Wednesdays, and Fridays and 1000mg on the other days  Proceed today with vitamin B12 injection  Continue aspirin 81 mg daily  Return to clinic in 1 month for NP visit, vitamin B12 injection, CBC and retic hgb   Return to clinic in 2 months for MD visit, vitamin B12 injection, CBC and retic hgb    Patient is on a high risk medication requiring close monitoring for toxicity.    Senait Medina, ANÍBAL   05/27/2025

## 2025-05-28 ENCOUNTER — SPECIALTY PHARMACY (OUTPATIENT)
Dept: PHARMACY | Facility: HOSPITAL | Age: 80
End: 2025-05-28
Payer: MEDICARE

## 2025-05-28 NOTE — PROGRESS NOTES
Specialty Pharmacy Patient Management Program  Lab Monitoring       Specialty Pharmacy Note: Hydrea (hydroxyurea)    Sonia Wooten is a 80 y.o. female with essential thrombocythemia and myeloproliferative disorder was seen 5/27/25 by ANÍBAL Vee. Per provider dictation, no changes to oral oncology regimen Hydrea (hydroxyurea). Continues on 1500 mg Monday, Wednesday, Friday and 1000 mg on the other days    Labs Review: The CMP and CBC from 5/27/25 have been reviewed. No dose adjustments are needed for the oral specialty medication(s) based on the labs.    Specialty pharmacy will continue to follow patient.    Follow-up:  1 month - NP visit and labs - 6/24/25  2 month - MD visit and labs - 7/29/25      Esther Esquivel, PharmD, [facility]  Specialty Clinical Pharmacist  05/28/25  10:28 EDT

## 2025-06-12 ENCOUNTER — OFFICE VISIT (OUTPATIENT)
Dept: INTERNAL MEDICINE | Age: 80
End: 2025-06-12
Payer: MEDICARE

## 2025-06-12 VITALS
OXYGEN SATURATION: 97 % | BODY MASS INDEX: 25.85 KG/M2 | SYSTOLIC BLOOD PRESSURE: 116 MMHG | DIASTOLIC BLOOD PRESSURE: 78 MMHG | TEMPERATURE: 97.4 F | WEIGHT: 151.4 LBS | HEART RATE: 80 BPM | HEIGHT: 64 IN

## 2025-06-12 DIAGNOSIS — F02.A0 MILD LATE ONSET ALZHEIMER'S DEMENTIA, UNSPECIFIED WHETHER BEHAVIORAL, PSYCHOTIC, OR MOOD DISTURBANCE OR ANXIETY: Primary | ICD-10-CM

## 2025-06-12 DIAGNOSIS — G30.1 MILD LATE ONSET ALZHEIMER'S DEMENTIA, UNSPECIFIED WHETHER BEHAVIORAL, PSYCHOTIC, OR MOOD DISTURBANCE OR ANXIETY: Primary | ICD-10-CM

## 2025-06-12 RX ORDER — DONEPEZIL HYDROCHLORIDE 10 MG/1
10 TABLET, FILM COATED ORAL NIGHTLY
Qty: 90 TABLET | Refills: 0 | Status: SHIPPED | OUTPATIENT
Start: 2025-06-12

## 2025-06-12 NOTE — PROGRESS NOTES
"    I N T E R N A L  M E D I C I N E  Nafisa Kebede, APRAMARI    ENCOUNTER DATE:  06/12/2025    Sonia Samt / 80 y.o. / female      CHIEF COMPLAINT / REASON FOR OFFICE VISIT     Dementia      ASSESSMENT & PLAN     Diagnoses and all orders for this visit:    1. Mild late onset Alzheimer's dementia, unspecified whether behavioral, psychotic, or mood disturbance or anxiety (Primary)  -     Ambulatory Referral to Neurology  -     donepezil (Aricept) 10 MG tablet; Take 1 tablet by mouth Every Night.  Dispense: 90 tablet; Refill: 0         SUMMARY/DISCUSSION  Has tolerated donepezil well.  Will increase to 10 mg daily and recheck in July 2025 as scheduled.  Monitor for any side effects, including bradycardia, lightheadedness, nausea.  Follow up with brain MRI as discussed.  Referral to neurology to consider possible other Alzheimer's dementia medication options.    will follow up with ENT office to inquire about details of patient's hearing exam.      Next Appointment with me: 7/28/2025    Return for Next scheduled follow up.      VITAL SIGNS     Visit Vitals  /78   Pulse 80   Temp 97.4 °F (36.3 °C)   Ht 162 cm (63.78\")   Wt 68.7 kg (151 lb 6.4 oz)   SpO2 97%   BMI 26.17 kg/m²             Wt Readings from Last 3 Encounters:   06/12/25 68.7 kg (151 lb 6.4 oz)   05/27/25 68.3 kg (150 lb 8 oz)   05/19/25 68.9 kg (152 lb)     Body mass index is 26.17 kg/m².        MEDICATIONS AT THE TIME OF OFFICE VISIT     Current Outpatient Medications on File Prior to Visit   Medication Sig Dispense Refill    aspirin 81 MG EC tablet Take 1 tablet by mouth Daily.      calcium carbonate (OS-YINKA) 600 MG tablet Take 1 tablet by mouth Daily.      Cholecalciferol (VITAMIN D-3 PO) Take  by mouth.      fluorouracil (EFUDEX) 5 % cream Apply 1 Application topically to the appropriate area as directed As Needed.      FLUoxetine (PROzac) 20 MG capsule TAKE 1 CAPSULE BY MOUTH DAILY 90 capsule 0    fluticasone (FLONASE) 50 MCG/ACT nasal spray "       hydroxyurea (Hydrea) 500 MG capsule Take 3 capsules (1500 mg) on Mon, Wed, Fri. Take 2 capsules (1000 mg) all other days. 204 capsule 1    omeprazole (priLOSEC) 40 MG capsule TAKE 1 CAPSULE BY MOUTH DAILY 90 capsule 1    pravastatin (PRAVACHOL) 20 MG tablet TAKE 1 TABLET BY MOUTH DAILY 90 tablet 1    probiotic (CULTURELLE) capsule capsule Take  by mouth Daily.      [DISCONTINUED] donepezil (Aricept) 5 MG tablet Take 1 tablet by mouth Every Night. 90 tablet 0     Current Facility-Administered Medications on File Prior to Visit   Medication Dose Route Frequency Provider Last Rate Last Admin    cyanocobalamin injection 1,000 mcg  1,000 mcg Intramuscular Q28 Days Umberto Brown MD   1,000 mcg at 01/29/24 1408        HISTORY OF PRESENT ILLNESS     Accompanied by her  to today's appointment, who assists with history.     Underwent neuropsychological testing with [facility] on April 9, 2025 given concerns of progressive memory decline in the context of known depression.   Diagnosed with mild dementia of Alzheimer's type.      Here today as 6 week follow up after starting donepezil 5 mg daily.  Denies any side effects.  No lightheadedness, dizziness, abdominal pain, nausea.  Memory stable per .  Brain MRI is scheduled for June 25, 2025.          Patient Care Team:  Nafisa Kebede APRN as PCP - General (Family Medicine)  Ester Rivera APRN as Referring Physician (Nurse Practitioner)  Umberto Brown MD as Consulting Physician (Hematology and Oncology)  Pallares, Clara Ann, MD as Consulting Physician (Internal Medicine)  Robyn Lopez APRN as Nurse Practitioner (Nurse Practitioner)  Russell House MD as Consulting Physician (Hematology and Oncology)    REVIEW OF SYSTEMS     Review of Systems   Constitutional:  Negative for chills, fever and unexpected weight change.   Respiratory:  Negative for cough, chest tightness and shortness of breath.    Cardiovascular:  Negative for chest  pain, palpitations and leg swelling.   Neurological:  Negative for dizziness, weakness, light-headedness and headaches.   Psychiatric/Behavioral:  The patient is not nervous/anxious.           PHYSICAL EXAMINATION     Physical Exam  Vitals reviewed.   Constitutional:       General: She is not in acute distress.     Appearance: Normal appearance. She is not ill-appearing, toxic-appearing or diaphoretic.   HENT:      Head: Normocephalic and atraumatic.   Cardiovascular:      Rate and Rhythm: Normal rate and regular rhythm.      Heart sounds: Normal heart sounds.   Pulmonary:      Effort: Pulmonary effort is normal.      Breath sounds: Normal breath sounds.   Musculoskeletal:      Right lower leg: No edema.      Left lower leg: No edema.   Neurological:      Mental Status: She is alert and oriented to person, place, and time. Mental status is at baseline.   Psychiatric:         Mood and Affect: Mood normal.         Behavior: Behavior normal.         Thought Content: Thought content normal.         Judgment: Judgment normal.           REVIEWED DATA     Labs:           Imaging:            Medical Tests:           Summary of old records / correspondence / consultant report:           Request outside records:

## 2025-06-24 ENCOUNTER — INFUSION (OUTPATIENT)
Dept: ONCOLOGY | Facility: HOSPITAL | Age: 80
End: 2025-06-24
Payer: MEDICARE

## 2025-06-24 ENCOUNTER — LAB (OUTPATIENT)
Dept: LAB | Facility: HOSPITAL | Age: 80
End: 2025-06-24
Payer: MEDICARE

## 2025-06-24 ENCOUNTER — OFFICE VISIT (OUTPATIENT)
Dept: ONCOLOGY | Facility: CLINIC | Age: 80
End: 2025-06-24
Payer: MEDICARE

## 2025-06-24 VITALS
SYSTOLIC BLOOD PRESSURE: 117 MMHG | HEIGHT: 64 IN | OXYGEN SATURATION: 97 % | WEIGHT: 149.7 LBS | HEART RATE: 77 BPM | DIASTOLIC BLOOD PRESSURE: 78 MMHG | BODY MASS INDEX: 25.56 KG/M2 | TEMPERATURE: 97.8 F

## 2025-06-24 DIAGNOSIS — E53.8 B12 DEFICIENCY: Primary | ICD-10-CM

## 2025-06-24 DIAGNOSIS — D47.3 ESSENTIAL THROMBOCYTHEMIA: ICD-10-CM

## 2025-06-24 DIAGNOSIS — D64.9 ANEMIA, UNSPECIFIED TYPE: ICD-10-CM

## 2025-06-24 DIAGNOSIS — D47.3 ESSENTIAL THROMBOCYTHEMIA: Primary | ICD-10-CM

## 2025-06-24 LAB
BASOPHILS # BLD AUTO: 0.05 10*3/MM3 (ref 0–0.2)
BASOPHILS NFR BLD AUTO: 1.2 % (ref 0–1.5)
DEPRECATED RDW RBC AUTO: 62.3 FL (ref 37–54)
EOSINOPHIL # BLD AUTO: 0.02 10*3/MM3 (ref 0–0.4)
EOSINOPHIL NFR BLD AUTO: 0.5 % (ref 0.3–6.2)
ERYTHROCYTE [DISTWIDTH] IN BLOOD BY AUTOMATED COUNT: 13.9 % (ref 12.3–15.4)
HCT VFR BLD AUTO: 34.1 % (ref 34–46.6)
HGB BLD-MCNC: 12.4 G/DL (ref 12–15.9)
HGB RETIC QN AUTO: 45.8 PG (ref 29.8–36.1)
IMM GRANULOCYTES # BLD AUTO: 0.02 10*3/MM3 (ref 0–0.05)
IMM GRANULOCYTES NFR BLD AUTO: 0.5 % (ref 0–0.5)
IMM RETICS NFR: 29.2 % (ref 3–15.8)
LYMPHOCYTES # BLD AUTO: 1.87 10*3/MM3 (ref 0.7–3.1)
LYMPHOCYTES NFR BLD AUTO: 46.4 % (ref 19.6–45.3)
MCH RBC QN AUTO: 46.4 PG (ref 26.6–33)
MCHC RBC AUTO-ENTMCNC: 36.4 G/DL (ref 31.5–35.7)
MCV RBC AUTO: 127.7 FL (ref 79–97)
MONOCYTES # BLD AUTO: 0.32 10*3/MM3 (ref 0.1–0.9)
MONOCYTES NFR BLD AUTO: 7.9 % (ref 5–12)
NEUTROPHILS NFR BLD AUTO: 1.75 10*3/MM3 (ref 1.7–7)
NEUTROPHILS NFR BLD AUTO: 43.5 % (ref 42.7–76)
NRBC BLD AUTO-RTO: 0 /100 WBC (ref 0–0.2)
PLATELET # BLD AUTO: 527 10*3/MM3 (ref 140–450)
PMV BLD AUTO: 9.3 FL (ref 6–12)
RBC # BLD AUTO: 2.67 10*6/MM3 (ref 3.77–5.28)
RETICS # AUTO: 0.07 10*6/MM3 (ref 0.02–0.13)
RETICS/RBC NFR AUTO: 2.63 % (ref 0.7–1.9)
WBC NRBC COR # BLD AUTO: 4.03 10*3/MM3 (ref 3.4–10.8)

## 2025-06-24 PROCEDURE — 99214 OFFICE O/P EST MOD 30 MIN: CPT

## 2025-06-24 PROCEDURE — 36415 COLL VENOUS BLD VENIPUNCTURE: CPT

## 2025-06-24 PROCEDURE — 85046 RETICYTE/HGB CONCENTRATE: CPT

## 2025-06-24 PROCEDURE — 3074F SYST BP LT 130 MM HG: CPT

## 2025-06-24 PROCEDURE — 96372 THER/PROPH/DIAG INJ SC/IM: CPT

## 2025-06-24 PROCEDURE — 85025 COMPLETE CBC W/AUTO DIFF WBC: CPT

## 2025-06-24 PROCEDURE — 25010000002 CYANOCOBALAMIN PER 1000 MCG

## 2025-06-24 PROCEDURE — 1126F AMNT PAIN NOTED NONE PRSNT: CPT

## 2025-06-24 PROCEDURE — 3078F DIAST BP <80 MM HG: CPT

## 2025-06-24 RX ORDER — CYANOCOBALAMIN 1000 UG/ML
1000 INJECTION, SOLUTION INTRAMUSCULAR; SUBCUTANEOUS ONCE
Status: COMPLETED | OUTPATIENT
Start: 2025-06-24 | End: 2025-06-24

## 2025-06-24 RX ADMIN — CYANOCOBALAMIN 1000 MCG: 1000 INJECTION, SOLUTION INTRAMUSCULAR at 14:14

## 2025-06-24 NOTE — PROGRESS NOTES
"[facility] CBC GROUP OUTPATIENT FOLLOW UP CLINIC VISIT    REASON FOR FOLLOW-UP:    Essential thrombocytosis  B12 deficiency    HISTORY OF PRESENT ILLNESS:  Sonia Wooten is a 80 y.o. female who returns today for follow up of the above issue.      She is doing well today without new concerns. She continues on hydrea 1500mg M, W, F and 1000mg on the other days.     She returns today for follow-up on the above diagnosis.  She continues on Hydrea 1500 mg Monday, Wednesday, and Friday and 1000 mg all the other days.  She is doing well overall, although spouse does report that 3 to 4 days out of the week she will have stomach pains in the morning that resolved in the afternoon.  She denies nausea, vomiting, or diarrhea.  She does occasionally have constipation.    PHYSICAL EXAMINATION:  Vitals:    06/24/25 1400   BP: 117/78   Pulse: 77   Temp: 97.8 °F (36.6 °C)   TempSrc: Oral   SpO2: 97%   Weight: 67.9 kg (149 lb 11.2 oz)   Height: 162 cm (63.78\")   PainSc: 0-No pain     PHYSICAL EXAM:   General:  No acute distress, awake, alert   Skin:  Warm and dry, no visible rash  HEENT:  Normocephalic/atraumatic.    Chest: Normal respiratory effort.   Extremities:  No visible clubbing, cyanosis, or edema  Neuro/psych:  Grossly nonfocal.  Normal mood and affect.    DIAGNOSTIC DATA:  Retic With IRF & RET-He (06/24/2025 13:27)  CBC & Differential (06/24/2025 13:27)    IMAGING:    None reviewed    ASSESSMENT:  This is a 80 y.o. female with:    *Essential Thrombocytosis  CALR mutation positive and initial platelet count > 1.5 million  Undergoing treatment with hydroxyurea  7/29/2024 platelets remained elevated at 741, therefore hydroxyurea was increased to 1500 mg daily  Today, 9/5/2024 despite increased dose of hydroxyurea 1 month ago, the patient continues to have thrombocytosis with platelets 755,000.  She has now developed neutropenia.  This will require a reduced dose of her hydroxyurea.  11/5/2024: Doing well on 1000 mg " hydroxyurea Monday through Friday and 1500 mg daily on the weekends.   Platelets have increased significantly at 926,000, from 692,000, from 747,000, from 544,000  3/4/2025: Platelets today 569,000. Continue on hydrea 1500mg daily.   4/1/2025: Platelets 1,060,000. Discussed with MD #2, increase hydrea to 1500mg M, W, F and 1000mg all the other days. Hgb 10.3, likely a reflection of prbc transfusion.   4/29/2025: Platelets 477,000, from 885,000 on 4/8.  Hemoglobin 11.4.  5/27/2025: Platelets 517,000, WBC 4.14, hemoglobin 12.7. Continue with current dosing of hydrea- 1500mg M W F and 1000mg on the other days.   6/24/2025: Platelets 527,000. Continue with current dosing of hydrea- 1500mg M W F and 1000mg on the other days.     *B12 deficiency  Continuing B12 injections monthly.    *Signs of early dementia.   her  is now managing her medications.    *Gastritis and peptic ulcer disease.    She now is on proton pump inhibitor therapy and Carafate after undergoing an EGD with Dr. Guzman 2/1/2024.  6/24/2025: She notes stomach pains in the morning about 3 to 4 days out of the week.  Advised taking Pepcid 20 mg at night also encouraged patient to take medications earlier in the night, she is currently taking them around 10 PM right before she goes to bed.    *Neutropenia secondary to hydroxyurea  While taking hydroxyurea 1500 mg daily, WBC 3.35, ANC 1.14.    11/5/2024: Neutropenia has resolved with white blood cell count of 5.33, ANC 2.55  3/4/2025: ANC decreased to 1.48. Will continue to monitor and have her return in 2 weeks for CBC with RN review. If stable, continue current dosing.   4/1/2025: ANC 1.32.   4/29/2025: White blood cell count improving at 4.76 with an ANC of 2.38  5/27/2025: WBC 4.14 and ANC 1.85  6/24/2025: WBC 4.03 and ANC 1.75.     PLAN:   Continue hydroxyurea at current dose of 1500mg on Mondays, Wednesdays, and Fridays, and 1000mg all the other days   Proceed today with vitamin B12 injection    Start Pepcid 20 mg nightly  Continue aspirin 81mg daily   Return to clinic in 1 month for MD visit, vitamin b12 injection with CBC and retic hgb  Instructed to reach out sooner with any concerns      ANÍBAL Calderon   06/24/2025

## 2025-06-25 ENCOUNTER — HOSPITAL ENCOUNTER (OUTPATIENT)
Facility: HOSPITAL | Age: 80
Discharge: HOME | End: 2025-06-25
Payer: MEDICARE

## 2025-06-25 ENCOUNTER — SPECIALTY PHARMACY (OUTPATIENT)
Dept: PHARMACY | Facility: HOSPITAL | Age: 80
End: 2025-06-25
Payer: MEDICARE

## 2025-06-25 DIAGNOSIS — F02.A0 MILD ALZHEIMER'S DEMENTIA, UNSPECIFIED TIMING OF DEMENTIA ONSET, UNSPECIFIED WHETHER BEHAVIORAL, PSYCHOTIC, OR MOOD DISTURBANCE OR ANXIETY: ICD-10-CM

## 2025-06-25 DIAGNOSIS — G30.9 MILD ALZHEIMER'S DEMENTIA, UNSPECIFIED TIMING OF DEMENTIA ONSET, UNSPECIFIED WHETHER BEHAVIORAL, PSYCHOTIC, OR MOOD DISTURBANCE OR ANXIETY: ICD-10-CM

## 2025-06-25 PROCEDURE — 25510000002 GADOBENATE DIMEGLUMINE 529 MG/ML SOLUTION

## 2025-06-25 PROCEDURE — 70553 MRI BRAIN STEM W/O & W/DYE: CPT

## 2025-06-25 PROCEDURE — A9577 INJ MULTIHANCE: HCPCS

## 2025-06-25 RX ADMIN — GADOBENATE DIMEGLUMINE 15 ML: 529 INJECTION, SOLUTION INTRAVENOUS at 16:17

## 2025-06-25 NOTE — PROGRESS NOTES
Specialty Pharmacy Note: Hydrea (hydroxyurea)    Sonia Wooten is a 80 y.o. female with essential thrombocytosis was seen 6/24/25 by APRN. Per provider dictation, no changes to oral oncology regimen Hydrea (hydroxyurea). Continue hydroxyurea at current dose of 1500mg on Mondays, Wednesdays, and Fridays, and 1000mg all the other days   Labs Review: The CMP and CBC from 6/24/25 have been reviewed. No dose adjustments are needed for the oral specialty medication(s) based on the labs.    Specialty pharmacy will continue to follow patient.    Genna Rodriguez, PharmD, BCPS  6/25/2025  08:07 EDT

## 2025-07-28 ENCOUNTER — OFFICE VISIT (OUTPATIENT)
Dept: INTERNAL MEDICINE | Age: 80
End: 2025-07-28
Payer: MEDICARE

## 2025-07-28 VITALS
OXYGEN SATURATION: 98 % | WEIGHT: 146.2 LBS | TEMPERATURE: 97.4 F | DIASTOLIC BLOOD PRESSURE: 78 MMHG | HEART RATE: 81 BPM | SYSTOLIC BLOOD PRESSURE: 102 MMHG | BODY MASS INDEX: 24.96 KG/M2 | HEIGHT: 64 IN

## 2025-07-28 DIAGNOSIS — F02.A0 MILD LATE ONSET ALZHEIMER'S DEMENTIA, UNSPECIFIED WHETHER BEHAVIORAL, PSYCHOTIC, OR MOOD DISTURBANCE OR ANXIETY: ICD-10-CM

## 2025-07-28 DIAGNOSIS — G30.1 MILD LATE ONSET ALZHEIMER'S DEMENTIA, UNSPECIFIED WHETHER BEHAVIORAL, PSYCHOTIC, OR MOOD DISTURBANCE OR ANXIETY: ICD-10-CM

## 2025-07-28 DIAGNOSIS — R53.83 OTHER FATIGUE: ICD-10-CM

## 2025-07-28 DIAGNOSIS — E78.5 HYPERLIPIDEMIA, UNSPECIFIED HYPERLIPIDEMIA TYPE: ICD-10-CM

## 2025-07-28 DIAGNOSIS — I10 PRIMARY HYPERTENSION: Primary | ICD-10-CM

## 2025-07-28 PROCEDURE — 3074F SYST BP LT 130 MM HG: CPT

## 2025-07-28 PROCEDURE — 1159F MED LIST DOCD IN RCRD: CPT

## 2025-07-28 PROCEDURE — G2211 COMPLEX E/M VISIT ADD ON: HCPCS

## 2025-07-28 PROCEDURE — 99214 OFFICE O/P EST MOD 30 MIN: CPT

## 2025-07-28 PROCEDURE — 1160F RVW MEDS BY RX/DR IN RCRD: CPT

## 2025-07-28 PROCEDURE — 1126F AMNT PAIN NOTED NONE PRSNT: CPT

## 2025-07-28 PROCEDURE — 3078F DIAST BP <80 MM HG: CPT

## 2025-07-28 NOTE — PROGRESS NOTES
"    I N T E R N A L  M E D I C I N E  Nafisa Kebede, APRN    ENCOUNTER DATE:  07/28/2025    Sonia Samt / 80 y.o. / female      CHIEF COMPLAINT / REASON FOR OFFICE VISIT     Hypertension and Hyperlipidemia      ASSESSMENT & PLAN     Diagnoses and all orders for this visit:    1. Primary hypertension (Primary)  -     Comprehensive Metabolic Panel    2. Hyperlipidemia, unspecified hyperlipidemia type  Overview:  Continue pravastatin 20 mg daily.    Orders:  -     Comprehensive Metabolic Panel    3. Other fatigue  -     Ambulatory Referral to Sleep Medicine    4. Mild late onset Alzheimer's dementia, unspecified whether behavioral, psychotic, or mood disturbance or anxiety  Overview:  Continue donepezil 10 mg daily.           SUMMARY/DISCUSSION  Encouraged low fat diet, regular exercise.  Update labs tomorrow to rule out anemia, given progressive fatigue concerns.  Establish with sleep medicine to rule out NORTH.  Memory stable; continue donepezil 10 mg daily as prescribed.  Establish with neurology office for further investigation as scheduled in October 2025.        Next Appointment with me: Visit date not found    Return in about 4 months (around 11/28/2025) for Chronic care.      VITAL SIGNS     Visit Vitals  /78   Pulse 81   Temp 97.4 °F (36.3 °C)   Ht 162 cm (63.78\")   Wt 66.3 kg (146 lb 3.2 oz)   SpO2 98%   BMI 25.27 kg/m²             Wt Readings from Last 3 Encounters:   07/28/25 66.3 kg (146 lb 3.2 oz)   06/24/25 67.9 kg (149 lb 11.2 oz)   06/12/25 68.7 kg (151 lb 6.4 oz)     Body mass index is 25.27 kg/m².        MEDICATIONS AT THE TIME OF OFFICE VISIT     Current Outpatient Medications on File Prior to Visit   Medication Sig Dispense Refill    aspirin 81 MG EC tablet Take 1 tablet by mouth Daily.      calcium carbonate (OS-YINKA) 600 MG tablet Take 2 tablets by mouth Daily.      Cholecalciferol (VITAMIN D-3 PO) Take  by mouth.      donepezil (Aricept) 10 MG tablet Take 1 tablet by mouth Every Night. 90 " tablet 0    FLUoxetine (PROzac) 20 MG capsule TAKE 1 CAPSULE BY MOUTH DAILY 90 capsule 0    fluticasone (FLONASE) 50 MCG/ACT nasal spray       hydroxyurea (Hydrea) 500 MG capsule Take 3 capsules (1500 mg) on Mon, Wed, Fri. Take 2 capsules (1000 mg) all other days. 204 capsule 1    omeprazole (priLOSEC) 40 MG capsule TAKE 1 CAPSULE BY MOUTH DAILY 90 capsule 1    pravastatin (PRAVACHOL) 20 MG tablet TAKE 1 TABLET BY MOUTH DAILY 90 tablet 1    probiotic (CULTURELLE) capsule capsule Take  by mouth Daily.      fluorouracil (EFUDEX) 5 % cream Apply 1 Application topically to the appropriate area as directed As Needed. (Patient not taking: Reported on 7/28/2025)       Current Facility-Administered Medications on File Prior to Visit   Medication Dose Route Frequency Provider Last Rate Last Admin    cyanocobalamin injection 1,000 mcg  1,000 mcg Intramuscular Q28 Days Umberto Brown MD   1,000 mcg at 01/29/24 1408        HISTORY OF PRESENT ILLNESS     Last seen June 12, 2025.  Accompanied by her  to today's visit.  States feeling well.    Ongoing fatigue, progressive over last 1-2 years.  States she is sleeping well, but recurrent naps during the day.  Poor exercise levels.  Remote history of sleep study, possibly 10+ years ago.        Followed by hematology/ oncology, Dr. House, for myeloproliferative disorder, thrombocytosis, anemia.  Next appointment is tomorrow, July 29, 2025.      Underwent neuropsychological testing with [facility] on April 9, 2025 with diagnosis of mild dementia of Alzheimer's type.  Scheduled to establish with neurology, Dr. Navarro, on October 31, 2025.  On Vitamin B12 injections monthly.  Now taking donepezil 10 mg daily and tolerating well - no side effects.   states memory is stable overall.  June 2025 Brain MRI with mild small vessel disease in the cerebral and mild to moderate small vessel disease in the central pontine white matter and there is mild diffuse cerebral  atrophy.    BP remains well controlled off of prescription medication.        HLD: Remains on pravastatin 20 mg daily.  March 2025 lipid panel with mildly elevated ; normal triglycerides 104.       GERD: Symptoms well controlled on omeprazole 40 mg PRN.        Anxiety/ Depression: Symptoms well controlled on fluoxetine 20 mg daily for at least last decade.     Allergic rhinitis: Stable on Flonase nasal spray daily.       Declines to undergo further mammograms.      September 2023 DEXA with osteopenia.  Plans to update DEXA on September 19, 2025.       Patient Care Team:  Nafisa Kebede APRN as PCP - General (Family Medicine)  Ester Rivera APRN as Referring Physician (Nurse Practitioner)  Umberto Brown MD as Consulting Physician (Hematology and Oncology)  Pallares, Clara Ann, MD as Consulting Physician (Internal Medicine)  Robyn Lopez APRN as Nurse Practitioner (Nurse Practitioner)  Russell House MD as Consulting Physician (Hematology and Oncology)    REVIEW OF SYSTEMS     Review of Systems   Constitutional:  Positive for fatigue. Negative for chills, fever and unexpected weight change.   Respiratory:  Negative for cough, chest tightness and shortness of breath.    Cardiovascular:  Negative for chest pain, palpitations and leg swelling.   Neurological:  Negative for dizziness, weakness, light-headedness and headaches.   Psychiatric/Behavioral:  Positive for sleep disturbance. The patient is not nervous/anxious.           PHYSICAL EXAMINATION     Physical Exam  Vitals reviewed.   Constitutional:       General: She is not in acute distress.     Appearance: Normal appearance. She is not ill-appearing, toxic-appearing or diaphoretic.   HENT:      Head: Normocephalic and atraumatic.   Cardiovascular:      Rate and Rhythm: Normal rate and regular rhythm.      Heart sounds: Normal heart sounds.   Pulmonary:      Effort: Pulmonary effort is normal.      Breath sounds: Normal breath sounds.    Neurological:      Mental Status: She is alert and oriented to person, place, and time. Mental status is at baseline.   Psychiatric:         Mood and Affect: Mood normal.         Behavior: Behavior normal.         Thought Content: Thought content normal.         Judgment: Judgment normal.           REVIEWED DATA     Labs:           Imaging:            Medical Tests:           Summary of old records / correspondence / consultant report:           Request outside records:

## 2025-07-29 ENCOUNTER — INFUSION (OUTPATIENT)
Dept: ONCOLOGY | Facility: HOSPITAL | Age: 80
End: 2025-07-29
Payer: MEDICARE

## 2025-07-29 ENCOUNTER — OFFICE VISIT (OUTPATIENT)
Dept: ONCOLOGY | Facility: CLINIC | Age: 80
End: 2025-07-29
Payer: MEDICARE

## 2025-07-29 ENCOUNTER — LAB (OUTPATIENT)
Dept: LAB | Facility: HOSPITAL | Age: 80
End: 2025-07-29
Payer: MEDICARE

## 2025-07-29 VITALS
OXYGEN SATURATION: 97 % | TEMPERATURE: 97.9 F | SYSTOLIC BLOOD PRESSURE: 107 MMHG | HEART RATE: 102 BPM | BODY MASS INDEX: 24.87 KG/M2 | HEIGHT: 64 IN | RESPIRATION RATE: 16 BRPM | DIASTOLIC BLOOD PRESSURE: 72 MMHG | WEIGHT: 145.7 LBS

## 2025-07-29 DIAGNOSIS — D64.9 ANEMIA, UNSPECIFIED TYPE: ICD-10-CM

## 2025-07-29 DIAGNOSIS — E53.8 B12 DEFICIENCY: Primary | ICD-10-CM

## 2025-07-29 DIAGNOSIS — D53.9 MACROCYTIC ANEMIA: ICD-10-CM

## 2025-07-29 DIAGNOSIS — D47.3 ESSENTIAL THROMBOCYTHEMIA: ICD-10-CM

## 2025-07-29 LAB
BASOPHILS # BLD AUTO: 0.05 10*3/MM3 (ref 0–0.2)
BASOPHILS NFR BLD AUTO: 1.3 % (ref 0–1.5)
DEPRECATED RDW RBC AUTO: 70.4 FL (ref 37–54)
EOSINOPHIL # BLD AUTO: 0.01 10*3/MM3 (ref 0–0.4)
EOSINOPHIL NFR BLD AUTO: 0.3 % (ref 0.3–6.2)
ERYTHROCYTE [DISTWIDTH] IN BLOOD BY AUTOMATED COUNT: 14 % (ref 12.3–15.4)
HCT VFR BLD AUTO: 32.6 % (ref 34–46.6)
HGB BLD-MCNC: 11.6 G/DL (ref 12–15.9)
HGB RETIC QN AUTO: 45.9 PG (ref 29.8–36.1)
IMM GRANULOCYTES # BLD AUTO: 0 10*3/MM3 (ref 0–0.05)
IMM GRANULOCYTES NFR BLD AUTO: 0 % (ref 0–0.5)
IMM RETICS NFR: 31.1 % (ref 3–15.8)
LYMPHOCYTES # BLD AUTO: 1.71 10*3/MM3 (ref 0.7–3.1)
LYMPHOCYTES NFR BLD AUTO: 44.4 % (ref 19.6–45.3)
MCH RBC QN AUTO: 48.3 PG (ref 26.6–33)
MCHC RBC AUTO-ENTMCNC: 35.6 G/DL (ref 31.5–35.7)
MCV RBC AUTO: 135.8 FL (ref 79–97)
MONOCYTES # BLD AUTO: 0.33 10*3/MM3 (ref 0.1–0.9)
MONOCYTES NFR BLD AUTO: 8.6 % (ref 5–12)
NEUTROPHILS NFR BLD AUTO: 1.75 10*3/MM3 (ref 1.7–7)
NEUTROPHILS NFR BLD AUTO: 45.4 % (ref 42.7–76)
NRBC BLD AUTO-RTO: 0.5 /100 WBC (ref 0–0.2)
PLATELET # BLD AUTO: 624 10*3/MM3 (ref 140–450)
PMV BLD AUTO: 9.2 FL (ref 6–12)
RBC # BLD AUTO: 2.4 10*6/MM3 (ref 3.77–5.28)
RETICS # AUTO: 0.08 10*6/MM3 (ref 0.02–0.13)
RETICS/RBC NFR AUTO: 3.19 % (ref 0.7–1.9)
WBC NRBC COR # BLD AUTO: 3.85 10*3/MM3 (ref 3.4–10.8)

## 2025-07-29 PROCEDURE — 1126F AMNT PAIN NOTED NONE PRSNT: CPT | Performed by: INTERNAL MEDICINE

## 2025-07-29 PROCEDURE — G2211 COMPLEX E/M VISIT ADD ON: HCPCS | Performed by: INTERNAL MEDICINE

## 2025-07-29 PROCEDURE — 1159F MED LIST DOCD IN RCRD: CPT | Performed by: INTERNAL MEDICINE

## 2025-07-29 PROCEDURE — 3078F DIAST BP <80 MM HG: CPT | Performed by: INTERNAL MEDICINE

## 2025-07-29 PROCEDURE — 3074F SYST BP LT 130 MM HG: CPT | Performed by: INTERNAL MEDICINE

## 2025-07-29 PROCEDURE — 85046 RETICYTE/HGB CONCENTRATE: CPT

## 2025-07-29 PROCEDURE — 36415 COLL VENOUS BLD VENIPUNCTURE: CPT

## 2025-07-29 PROCEDURE — 85025 COMPLETE CBC W/AUTO DIFF WBC: CPT

## 2025-07-29 PROCEDURE — 99214 OFFICE O/P EST MOD 30 MIN: CPT | Performed by: INTERNAL MEDICINE

## 2025-07-29 PROCEDURE — 96372 THER/PROPH/DIAG INJ SC/IM: CPT

## 2025-07-29 PROCEDURE — 25010000002 CYANOCOBALAMIN PER 1000 MCG: Performed by: INTERNAL MEDICINE

## 2025-07-29 PROCEDURE — 1160F RVW MEDS BY RX/DR IN RCRD: CPT | Performed by: INTERNAL MEDICINE

## 2025-07-29 RX ORDER — CYANOCOBALAMIN 1000 UG/ML
1000 INJECTION, SOLUTION INTRAMUSCULAR; SUBCUTANEOUS ONCE
Status: COMPLETED | OUTPATIENT
Start: 2025-07-29 | End: 2025-07-29

## 2025-07-29 RX ADMIN — CYANOCOBALAMIN 1000 MCG: 1000 INJECTION, SOLUTION INTRAMUSCULAR at 12:02

## 2025-07-30 ENCOUNTER — SPECIALTY PHARMACY (OUTPATIENT)
Dept: PHARMACY | Facility: HOSPITAL | Age: 80
End: 2025-07-30
Payer: MEDICARE

## 2025-07-30 LAB
ALBUMIN SERPL-MCNC: 4.4 G/DL (ref 3.8–4.8)
ALP SERPL-CCNC: 54 IU/L (ref 44–121)
ALT SERPL-CCNC: 20 IU/L (ref 0–32)
AST SERPL-CCNC: 22 IU/L (ref 0–40)
BILIRUB SERPL-MCNC: 0.9 MG/DL (ref 0–1.2)
BUN SERPL-MCNC: 15 MG/DL (ref 8–27)
BUN/CREAT SERPL: 21 (ref 12–28)
CALCIUM SERPL-MCNC: 9.3 MG/DL (ref 8.7–10.3)
CHLORIDE SERPL-SCNC: 101 MMOL/L (ref 96–106)
CO2 SERPL-SCNC: 20 MMOL/L (ref 20–29)
CREAT SERPL-MCNC: 0.73 MG/DL (ref 0.57–1)
EGFRCR SERPLBLD CKD-EPI 2021: 83 ML/MIN/1.73
GLOBULIN SER CALC-MCNC: 2.4 G/DL (ref 1.5–4.5)
GLUCOSE SERPL-MCNC: 123 MG/DL (ref 70–99)
POTASSIUM SERPL-SCNC: 4.4 MMOL/L (ref 3.5–5.2)
PROT SERPL-MCNC: 6.8 G/DL (ref 6–8.5)
SODIUM SERPL-SCNC: 138 MMOL/L (ref 134–144)

## 2025-07-30 NOTE — PROGRESS NOTES
Specialty Pharmacy Note: Hydrea (hydroxyurea)    Sonia Wooten is a 80 y.o. 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 House. Per provider dictation, no changes to oral oncology regimen Hydrea (hydroxyurea).  Labs Review: The CMP and CBC from 7/29/25 have been reviewed. No dose adjustments are needed for the oral specialty medication(s) based on the labs.    Specialty pharmacy will continue to follow patient.    Genna Rodriguez, Channing[place], BCPS  7/30/2025  08:54 EDT

## 2025-08-26 ENCOUNTER — OFFICE VISIT (OUTPATIENT)
Facility: HOSPITAL | Age: 80
End: 2025-08-26
Payer: MEDICARE

## 2025-08-26 VITALS — HEART RATE: 107 BPM | OXYGEN SATURATION: 96 % | WEIGHT: 143 LBS | HEIGHT: 64 IN | BODY MASS INDEX: 24.41 KG/M2

## 2025-08-26 DIAGNOSIS — G30.9 MILD ALZHEIMER'S DEMENTIA, UNSPECIFIED TIMING OF DEMENTIA ONSET, UNSPECIFIED WHETHER BEHAVIORAL, PSYCHOTIC, OR MOOD DISTURBANCE OR ANXIETY: ICD-10-CM

## 2025-08-26 DIAGNOSIS — G47.33 OSA (OBSTRUCTIVE SLEEP APNEA): ICD-10-CM

## 2025-08-26 DIAGNOSIS — G47.10 HYPERSOMNIA: ICD-10-CM

## 2025-08-26 DIAGNOSIS — F02.A0 MILD ALZHEIMER'S DEMENTIA, UNSPECIFIED TIMING OF DEMENTIA ONSET, UNSPECIFIED WHETHER BEHAVIORAL, PSYCHOTIC, OR MOOD DISTURBANCE OR ANXIETY: ICD-10-CM

## 2025-08-26 DIAGNOSIS — R53.83 OTHER FATIGUE: Primary | ICD-10-CM

## 2025-08-26 PROCEDURE — G0463 HOSPITAL OUTPT CLINIC VISIT: HCPCS

## (undated) DEVICE — MSK ENDO PORT O2 POM ELITE CURAPLEX A/

## (undated) DEVICE — BITEBLOCK OMNI BLOC

## (undated) DEVICE — SINGLE-USE BIOPSY FORCEPS: Brand: RADIAL JAW 4

## (undated) DEVICE — GOWN ,SIRUS,NONREINFORCED 3XL: Brand: MEDLINE

## (undated) DEVICE — Device

## (undated) DEVICE — VIAL FORMLN CAP 10PCT 20ML

## (undated) DEVICE — KT ORCA ORCAPOD DISP STRL

## (undated) DEVICE — ADAPT CLN SCPE ENDO PORPOISE BX/50 DISP

## (undated) DEVICE — LAB CORP CLOTEST UREASE